# Patient Record
Sex: FEMALE | Race: BLACK OR AFRICAN AMERICAN | Employment: OTHER | URBAN - METROPOLITAN AREA
[De-identification: names, ages, dates, MRNs, and addresses within clinical notes are randomized per-mention and may not be internally consistent; named-entity substitution may affect disease eponyms.]

---

## 2017-06-13 PROBLEM — S31.819A WOUND OF RIGHT BUTTOCK: Status: ACTIVE | Noted: 2017-06-13

## 2017-06-20 PROBLEM — L89.313 DECUBITUS ULCER OF RIGHT BUTTOCK, STAGE 3 (HCC): Status: ACTIVE | Noted: 2017-06-20

## 2018-07-25 RX ORDER — PREGABALIN 50 MG/1
50 CAPSULE ORAL 2 TIMES DAILY
COMMUNITY
End: 2020-01-01

## 2018-08-10 ENCOUNTER — ANESTHESIA (OUTPATIENT)
Dept: OPERATING ROOM | Age: 68
End: 2018-08-10
Payer: COMMERCIAL

## 2018-08-10 ENCOUNTER — ANESTHESIA EVENT (OUTPATIENT)
Dept: OPERATING ROOM | Age: 68
End: 2018-08-10
Payer: COMMERCIAL

## 2018-08-10 ENCOUNTER — HOSPITAL ENCOUNTER (OUTPATIENT)
Age: 68
Setting detail: OUTPATIENT SURGERY
Discharge: HOME | End: 2018-08-10
Attending: OPHTHALMOLOGY | Admitting: OPHTHALMOLOGY
Payer: COMMERCIAL

## 2018-08-10 VITALS — DIASTOLIC BLOOD PRESSURE: 76 MMHG | OXYGEN SATURATION: 100 % | SYSTOLIC BLOOD PRESSURE: 147 MMHG

## 2018-08-10 VITALS
HEIGHT: 64 IN | SYSTOLIC BLOOD PRESSURE: 139 MMHG | TEMPERATURE: 97 F | WEIGHT: 270 LBS | HEART RATE: 80 BPM | RESPIRATION RATE: 18 BRPM | DIASTOLIC BLOOD PRESSURE: 89 MMHG | BODY MASS INDEX: 46.1 KG/M2 | OXYGEN SATURATION: 98 %

## 2018-08-10 LAB — METER GLUCOSE: 217 MG/DL (ref 70–110)

## 2018-08-10 PROCEDURE — 3600000012 HC SURGERY LEVEL 2 ADDTL 15MIN: Performed by: OPHTHALMOLOGY

## 2018-08-10 PROCEDURE — 82962 GLUCOSE BLOOD TEST: CPT

## 2018-08-10 PROCEDURE — V2632 POST CHMBR INTRAOCULAR LENS: HCPCS | Performed by: OPHTHALMOLOGY

## 2018-08-10 PROCEDURE — 2580000003 HC RX 258: Performed by: NURSE ANESTHETIST, CERTIFIED REGISTERED

## 2018-08-10 PROCEDURE — 6370000000 HC RX 637 (ALT 250 FOR IP): Performed by: OPHTHALMOLOGY

## 2018-08-10 PROCEDURE — 6370000000 HC RX 637 (ALT 250 FOR IP)

## 2018-08-10 PROCEDURE — 3700000000 HC ANESTHESIA ATTENDED CARE: Performed by: OPHTHALMOLOGY

## 2018-08-10 PROCEDURE — 3600000002 HC SURGERY LEVEL 2 BASE: Performed by: OPHTHALMOLOGY

## 2018-08-10 PROCEDURE — 3700000001 HC ADD 15 MINUTES (ANESTHESIA): Performed by: OPHTHALMOLOGY

## 2018-08-10 PROCEDURE — 6360000002 HC RX W HCPCS: Performed by: NURSE ANESTHETIST, CERTIFIED REGISTERED

## 2018-08-10 PROCEDURE — 6360000002 HC RX W HCPCS: Performed by: OPHTHALMOLOGY

## 2018-08-10 PROCEDURE — 7100000011 HC PHASE II RECOVERY - ADDTL 15 MIN: Performed by: OPHTHALMOLOGY

## 2018-08-10 PROCEDURE — 2500000003 HC RX 250 WO HCPCS: Performed by: OPHTHALMOLOGY

## 2018-08-10 PROCEDURE — 7100000010 HC PHASE II RECOVERY - FIRST 15 MIN: Performed by: OPHTHALMOLOGY

## 2018-08-10 PROCEDURE — 2709999900 HC NON-CHARGEABLE SUPPLY: Performed by: OPHTHALMOLOGY

## 2018-08-10 DEVICE — LENS IOL SN60WF 20.5D: Type: IMPLANTABLE DEVICE | Site: EYE | Status: FUNCTIONAL

## 2018-08-10 RX ORDER — TETRACAINE HYDROCHLORIDE 5 MG/ML
1 SOLUTION OPHTHALMIC
Status: COMPLETED | OUTPATIENT
Start: 2018-08-10 | End: 2018-08-10

## 2018-08-10 RX ORDER — MIDAZOLAM HYDROCHLORIDE 1 MG/ML
INJECTION INTRAMUSCULAR; INTRAVENOUS PRN
Status: DISCONTINUED | OUTPATIENT
Start: 2018-08-10 | End: 2018-08-10 | Stop reason: SDUPTHER

## 2018-08-10 RX ORDER — PHENYLEPHRINE HCL 2.5 %
1 DROPS OPHTHALMIC (EYE)
Status: COMPLETED | OUTPATIENT
Start: 2018-08-10 | End: 2018-08-10

## 2018-08-10 RX ORDER — MOXIFLOXACIN 5 MG/ML
SOLUTION/ DROPS OPHTHALMIC PRN
Status: DISCONTINUED | OUTPATIENT
Start: 2018-08-10 | End: 2018-08-10 | Stop reason: HOSPADM

## 2018-08-10 RX ORDER — KETOROLAC TROMETHAMINE 5 MG/ML
1 SOLUTION OPHTHALMIC
Status: COMPLETED | OUTPATIENT
Start: 2018-08-10 | End: 2018-08-10

## 2018-08-10 RX ORDER — SODIUM CHLORIDE, SODIUM LACTATE, POTASSIUM CHLORIDE, CALCIUM CHLORIDE 600; 310; 30; 20 MG/100ML; MG/100ML; MG/100ML; MG/100ML
INJECTION, SOLUTION INTRAVENOUS CONTINUOUS
Status: DISCONTINUED | OUTPATIENT
Start: 2018-08-10 | End: 2018-08-10 | Stop reason: HOSPADM

## 2018-08-10 RX ORDER — CYCLOPENTOLATE HYDROCHLORIDE 10 MG/ML
1 SOLUTION/ DROPS OPHTHALMIC
Status: COMPLETED | OUTPATIENT
Start: 2018-08-10 | End: 2018-08-10

## 2018-08-10 RX ORDER — TETRACAINE HYDROCHLORIDE 5 MG/ML
SOLUTION OPHTHALMIC PRN
Status: DISCONTINUED | OUTPATIENT
Start: 2018-08-10 | End: 2018-08-10 | Stop reason: HOSPADM

## 2018-08-10 RX ORDER — LIDOCAINE HYDROCHLORIDE 10 MG/ML
INJECTION, SOLUTION EPIDURAL; INFILTRATION; INTRACAUDAL; PERINEURAL PRN
Status: DISCONTINUED | OUTPATIENT
Start: 2018-08-10 | End: 2018-08-10 | Stop reason: HOSPADM

## 2018-08-10 RX ORDER — PREDNISOLONE ACETATE 10 MG/ML
1 SUSPENSION/ DROPS OPHTHALMIC
Status: DISCONTINUED | OUTPATIENT
Start: 2018-08-10 | End: 2018-08-10 | Stop reason: HOSPADM

## 2018-08-10 RX ORDER — MOXIFLOXACIN 5 MG/ML
1 SOLUTION/ DROPS OPHTHALMIC
Status: COMPLETED | OUTPATIENT
Start: 2018-08-10 | End: 2018-08-10

## 2018-08-10 RX ORDER — SODIUM CHLORIDE, SODIUM LACTATE, POTASSIUM CHLORIDE, CALCIUM CHLORIDE 600; 310; 30; 20 MG/100ML; MG/100ML; MG/100ML; MG/100ML
INJECTION, SOLUTION INTRAVENOUS CONTINUOUS PRN
Status: DISCONTINUED | OUTPATIENT
Start: 2018-08-10 | End: 2018-08-10 | Stop reason: SDUPTHER

## 2018-08-10 RX ORDER — BRIMONIDINE TARTRATE 2 MG/ML
1 SOLUTION/ DROPS OPHTHALMIC EVERY 8 HOURS SCHEDULED
Status: DISCONTINUED | OUTPATIENT
Start: 2018-08-10 | End: 2018-08-10 | Stop reason: HOSPADM

## 2018-08-10 RX ADMIN — Medication 1 DROP: at 09:30

## 2018-08-10 RX ADMIN — SODIUM CHLORIDE, POTASSIUM CHLORIDE, SODIUM LACTATE AND CALCIUM CHLORIDE: 600; 310; 30; 20 INJECTION, SOLUTION INTRAVENOUS at 11:02

## 2018-08-10 RX ADMIN — MIDAZOLAM HYDROCHLORIDE 0.5 MG: 1 INJECTION, SOLUTION INTRAMUSCULAR; INTRAVENOUS at 10:36

## 2018-08-10 RX ADMIN — Medication 1 DROP: at 09:42

## 2018-08-10 RX ADMIN — Medication 1 DROP: at 09:16

## 2018-08-10 RX ADMIN — Medication 1 DROP: at 09:31

## 2018-08-10 RX ADMIN — PHENYLEPHRINE HYDROCHLORIDE 1 DROP: 25 SOLUTION/ DROPS OPHTHALMIC at 09:17

## 2018-08-10 RX ADMIN — SODIUM CHLORIDE, POTASSIUM CHLORIDE, SODIUM LACTATE AND CALCIUM CHLORIDE: 600; 310; 30; 20 INJECTION, SOLUTION INTRAVENOUS at 10:31

## 2018-08-10 RX ADMIN — PHENYLEPHRINE HYDROCHLORIDE 1 DROP: 25 SOLUTION/ DROPS OPHTHALMIC at 09:41

## 2018-08-10 RX ADMIN — PHENYLEPHRINE HYDROCHLORIDE 1 DROP: 25 SOLUTION/ DROPS OPHTHALMIC at 09:31

## 2018-08-10 RX ADMIN — Medication 1 DROP: at 09:17

## 2018-08-10 ASSESSMENT — PULMONARY FUNCTION TESTS
PIF_VALUE: 0
PIF_VALUE: 0
PIF_VALUE: 1
PIF_VALUE: 1
PIF_VALUE: 0
PIF_VALUE: 1
PIF_VALUE: 0
PIF_VALUE: 1
PIF_VALUE: 0
PIF_VALUE: 1
PIF_VALUE: 0
PIF_VALUE: 1
PIF_VALUE: 1
PIF_VALUE: 0
PIF_VALUE: 1
PIF_VALUE: 0
PIF_VALUE: 0

## 2018-08-10 ASSESSMENT — PAIN DESCRIPTION - PAIN TYPE
TYPE: SURGICAL PAIN
TYPE: SURGICAL PAIN

## 2018-08-10 ASSESSMENT — PAIN SCALES - GENERAL
PAINLEVEL_OUTOF10: 0
PAINLEVEL_OUTOF10: 0

## 2018-08-10 ASSESSMENT — LIFESTYLE VARIABLES: SMOKING_STATUS: 0

## 2018-08-10 NOTE — ANESTHESIA PRE PROCEDURE
furosemide (LASIX) 20 MG tablet Take 1 tablet by mouth every other day 6/5/17  Yes Poornima Urrutia MD   glipiZIDE (GLUCOTROL XL) 5 MG extended release tablet Take 1 tablet by mouth daily 6/5/17 7/25/18 Yes Poornima Urrutia MD   loratadine (CLARITIN) 10 MG tablet Take 1 tablet by mouth daily 6/5/17  Yes Poornima Urrutia MD   metFORMIN (GLUCOPHAGE) 1000 MG tablet Take 1 tablet by mouth 2 times daily (with meals) 6/5/17  Yes Poornima Urrutia MD   [facility] [place] KIT 1 kit by Does not apply route daily as needed (leg edema) Compression stockings for bilateral leg swelling 6/5/17  Yes Poornima Urrutia MD   RA ALCOHOL SWABS 70 % PADS USE WITH INJECTIONS TWICE A DAY AND CHECKING BLOOD SUGAR TWICE A DAY 5/31/17  Yes Ruba Haynes MD   Insulin Pen Needle (B-D ULTRAFINE III SHORT PEN) 31G X 8 MM MISC Inject 1 kit into the skin 2 times daily 9/28/16  Yes Giancarlo Bonilla MD   Multiple Vitamins-Minerals (CHOICEFUL MULTIVITAMIN) CAPS Take 1 tablet by mouth daily 9/28/16  Yes Giancarlo Bonilla MD   [facility] [place] [address] Bed with gel overlay dsp # 1  Pt has Osteoarthritis and is Morbidly Obese 4/4/16  Yes Ronn Armstrong DO   [facility] [place] [address] bed mattress for severe osteoarthritis , morbid obesity , pressure sores  and difficulty ambulating 12/2/15  Yes Giancarlo Bonilla MD   Misc. Devices MISC Chair lift for difficulty ambulating , severe knee osteoarthritis and  morbid obesity 12/2/15  Yes Giancarlo Bonilla MD   Misc. Devices MISC Grab bar  diagnosis osteoarthritis and full thickness knee meniscal tear 2/24/15  Yes Madhavi Spaulding MD   Mis. [address] bed 11/7/14  Yes Bonifacio Liu MD   [facility] [place] [address] [facility] wheelchair 11/7/14  Yes Bonifacio Liu MD   Mis. [address] bed for better  repositioning of Patient with diagnosis of OA and full thickness meniscal tear 10/15/14  Yes Giancarlo Bonilla MD   Wound Dressings (SKINTEGRITY HYDROGEL) GEL Apply 1 each topically 3 times daily 6/5/17   Poornima Urrutia MD   Misc.  Devices

## 2018-08-10 NOTE — OP NOTE
[facility] [address]  [phone]  Roxi Kerr    Re:   OPERATIVE REPORT  AGE:   75 y.o. SEX:   female      DATE OF PROCEDURE:   8/10/2018    SURGEON:   Roxi Kerr    ASSISTANT:  None    PREOPERATIVE DIAGNOSIS:  Mature cataract, right eye    POSTOPERATIVE DIAGNOSIS:  Mature cataract, right eye    OPERATION:  Phacoemulcification of cataract with insertion of posterior chamber intraocular lens implant. ANESTHESIA:  Topical with monitered intravenous sedation. ESTIMATED BLOOD LOSS:  None. COMPLICATIONS:  None    PROCEDURE:  The eye was anesthetized with topical  lidocaine in the pre-op area. The patient was prepped and draped in the usual sterile fashion. 5% Betadine solution was dropped in the patient's eye. A lid speculum was then placed into the eye using the operative microscope. A clear beveled corneal incision was created at the 11 o'clock position. Viscoelastic was then used to reconstitute the anterior chamber. A side port incision was then created for a 2nd instrument at the 2 o'clock position. A bent 27-gauge needle as well as capsulorhexis forceps were  then used to create the capsulorrhexis. Hydrodissection with hydrodelineation were the performed in the four quadrants with preservative free lidocaine. The nucleus and epinucleus were then removed with the phacoemulsification tip. The residual cortex was then removed using an aspiration/irrigation unit. The capsular bag was polished and then reconstituted with viscoelastic. A posterior chamber intraocular lens model [id number]  20.50 Diopters was then injected into the capsular bag. The aspiration-irrigation united was then used to remove the residual viscoelastic. The wound was then hydrated and tested and found to be watertight. Vigamox, PF as well as Alphagan were then instilled into the eye. The lid speculum was removed.     The patient was transferred to the recovery room in good condition after having tolerated the procedure well. Post-operative instruction sheet was given to the patient with a one day appointment. They were instructed to call over night if they experienced any problems. Macario Flores M.D.,F.A.C.S.

## 2018-08-10 NOTE — PROGRESS NOTES
1110 family at bedside;nourishment provided  1130 discharge instructions reviewed;verbalizes understanding

## 2018-08-11 NOTE — ANESTHESIA POSTPROCEDURE EVALUATION
Department of Anesthesiology  Postprocedure Note    Patient: Tab Johnson  MRN: [id number]  YOB: 1950  Date of evaluation: 8/10/2018  Time:  10:25 PM     Procedure Summary     Date:  08/10/18 Room / Location:  [facility] OR  / [facility] OR    Anesthesia Start:  1031 Anesthesia Stop:  1108    Procedure:  RIGHT EYE CATARACT EXTRACTION WITH  IOL IMPLANT (Right ) Diagnosis:  (MATURE CATARACT RIGHT EYE )    Surgeon:  Gaby Painter MD Responsible Provider:  Edilia Archer MD    Anesthesia Type:  MAC ASA Status:  3          Anesthesia Type: MAC    Braeden Phase I: Braeden Score: 10    Braeden Phase II: Braeden Score: 10    Last vitals: Reviewed and per EMR flowsheets.        Anesthesia Post Evaluation    Patient location during evaluation: PACU  Patient participation: complete - patient participated  Level of consciousness: awake and alert  Airway patency: patent  Nausea & Vomiting: no vomiting and no nausea  Complications: no  Cardiovascular status: blood pressure returned to baseline  Respiratory status: acceptable  Hydration status: euvolemic

## 2019-10-31 ENCOUNTER — TELEPHONE (OUTPATIENT)
Dept: INTERNAL MEDICINE | Age: 69
End: 2019-10-31

## 2019-11-06 ENCOUNTER — OFFICE VISIT (OUTPATIENT)
Dept: INTERNAL MEDICINE | Age: 69
End: 2019-11-06
Payer: COMMERCIAL

## 2019-11-06 VITALS
HEIGHT: 64 IN | HEART RATE: 80 BPM | BODY MASS INDEX: 46.1 KG/M2 | RESPIRATION RATE: 16 BRPM | TEMPERATURE: 98.6 F | SYSTOLIC BLOOD PRESSURE: 132 MMHG | WEIGHT: 270 LBS | DIASTOLIC BLOOD PRESSURE: 80 MMHG

## 2019-11-06 DIAGNOSIS — E78.2 MIXED HYPERLIPIDEMIA: ICD-10-CM

## 2019-11-06 DIAGNOSIS — M10.9 GOUT INVOLVING TOE, UNSPECIFIED CAUSE, UNSPECIFIED CHRONICITY, UNSPECIFIED LATERALITY: ICD-10-CM

## 2019-11-06 DIAGNOSIS — E11.42 TYPE 2 DIABETES MELLITUS WITH DIABETIC POLYNEUROPATHY, WITHOUT LONG-TERM CURRENT USE OF INSULIN (HCC): ICD-10-CM

## 2019-11-06 DIAGNOSIS — M15.9 OSTEOARTHRITIS OF MULTIPLE JOINTS, UNSPECIFIED OSTEOARTHRITIS TYPE: ICD-10-CM

## 2019-11-06 DIAGNOSIS — M79.641 BILATERAL HAND PAIN: ICD-10-CM

## 2019-11-06 DIAGNOSIS — M79.642 BILATERAL HAND PAIN: ICD-10-CM

## 2019-11-06 DIAGNOSIS — I10 ESSENTIAL HYPERTENSION: Chronic | ICD-10-CM

## 2019-11-06 LAB — HBA1C MFR BLD: 7.5 %

## 2019-11-06 PROCEDURE — 1090F PRES/ABSN URINE INCON ASSESS: CPT | Performed by: INTERNAL MEDICINE

## 2019-11-06 PROCEDURE — 4040F PNEUMOC VAC/ADMIN/RCVD: CPT | Performed by: INTERNAL MEDICINE

## 2019-11-06 PROCEDURE — 99215 OFFICE O/P EST HI 40 MIN: CPT | Performed by: INTERNAL MEDICINE

## 2019-11-06 PROCEDURE — G8427 DOCREV CUR MEDS BY ELIG CLIN: HCPCS | Performed by: INTERNAL MEDICINE

## 2019-11-06 PROCEDURE — 2022F DILAT RTA XM EVC RTNOPTHY: CPT | Performed by: INTERNAL MEDICINE

## 2019-11-06 PROCEDURE — 99213 OFFICE O/P EST LOW 20 MIN: CPT | Performed by: INTERNAL MEDICINE

## 2019-11-06 PROCEDURE — 1123F ACP DISCUSS/DSCN MKR DOCD: CPT | Performed by: INTERNAL MEDICINE

## 2019-11-06 PROCEDURE — 3017F COLORECTAL CA SCREEN DOC REV: CPT | Performed by: INTERNAL MEDICINE

## 2019-11-06 PROCEDURE — G8417 CALC BMI ABV UP PARAM F/U: HCPCS | Performed by: INTERNAL MEDICINE

## 2019-11-06 PROCEDURE — 3046F HEMOGLOBIN A1C LEVEL >9.0%: CPT | Performed by: INTERNAL MEDICINE

## 2019-11-06 PROCEDURE — 1036F TOBACCO NON-USER: CPT | Performed by: INTERNAL MEDICINE

## 2019-11-06 PROCEDURE — G8484 FLU IMMUNIZE NO ADMIN: HCPCS | Performed by: INTERNAL MEDICINE

## 2019-11-06 PROCEDURE — 83036 HEMOGLOBIN GLYCOSYLATED A1C: CPT | Performed by: INTERNAL MEDICINE

## 2019-11-06 PROCEDURE — G8400 PT W/DXA NO RESULTS DOC: HCPCS | Performed by: INTERNAL MEDICINE

## 2019-11-06 RX ORDER — ACETAMINOPHEN 325 MG/1
650 TABLET ORAL EVERY 4 HOURS PRN
Qty: 120 TABLET | Refills: 3 | Status: ON HOLD
Start: 2019-11-06 | End: 2020-01-01 | Stop reason: HOSPADM

## 2019-11-06 RX ORDER — LANCETS
EACH MISCELLANEOUS
Qty: 200 EACH | Refills: 1 | Status: SHIPPED | OUTPATIENT
Start: 2019-11-06

## 2019-11-06 RX ORDER — IBUPROFEN 400 MG/1
400 TABLET ORAL EVERY 6 HOURS PRN
Qty: 120 TABLET | Refills: 0 | Status: SHIPPED
Start: 2019-11-06 | End: 2020-02-12 | Stop reason: SDUPTHER

## 2019-11-06 RX ORDER — ROSUVASTATIN CALCIUM 10 MG/1
10 TABLET, COATED ORAL DAILY
Qty: 30 TABLET | Refills: 0 | Status: SHIPPED | OUTPATIENT
Start: 2019-11-06 | End: 2019-12-10 | Stop reason: SDUPTHER

## 2019-11-06 RX ORDER — FUROSEMIDE 20 MG/1
20 TABLET ORAL EVERY OTHER DAY
Qty: 15 TABLET | Refills: 3 | Status: SHIPPED
Start: 2019-11-06 | End: 2020-02-17 | Stop reason: SDUPTHER

## 2019-11-06 RX ORDER — LISINOPRIL 40 MG/1
TABLET ORAL
Qty: 30 TABLET | Refills: 3 | Status: SHIPPED
Start: 2019-11-06 | End: 2020-02-17 | Stop reason: SDUPTHER

## 2019-11-06 RX ORDER — AMLODIPINE BESYLATE 10 MG/1
10 TABLET ORAL DAILY
Qty: 30 TABLET | Refills: 3 | Status: SHIPPED
Start: 2019-11-06 | End: 2020-02-17 | Stop reason: SDUPTHER

## 2019-11-06 RX ORDER — IBUPROFEN 800 MG/1
800 TABLET ORAL EVERY 6 HOURS PRN
Refills: 0 | COMMUNITY
Start: 2019-09-05 | End: 2019-11-06 | Stop reason: SDUPTHER

## 2019-11-06 RX ORDER — ATENOLOL 50 MG/1
TABLET ORAL
Qty: 60 TABLET | Refills: 3 | Status: SHIPPED
Start: 2019-11-06 | End: 2020-02-17 | Stop reason: SDUPTHER

## 2019-11-06 RX ORDER — GLIPIZIDE 5 MG/1
5 TABLET, FILM COATED, EXTENDED RELEASE ORAL DAILY
Qty: 30 TABLET | Refills: 3 | Status: SHIPPED
Start: 2019-11-06 | End: 2020-02-17 | Stop reason: SDUPTHER

## 2019-11-06 RX ORDER — ERGOCALCIFEROL 1.25 MG/1
50000 CAPSULE ORAL WEEKLY
Qty: 8 CAPSULE | Refills: 2 | Status: ON HOLD
Start: 2019-11-06 | End: 2020-01-01 | Stop reason: HOSPADM

## 2019-11-06 RX ORDER — ALLOPURINOL 100 MG/1
100 TABLET ORAL DAILY
Qty: 30 TABLET | Refills: 3 | Status: SHIPPED
Start: 2019-11-06 | End: 2020-02-17 | Stop reason: SDUPTHER

## 2019-11-06 RX ORDER — ASPIRIN 81 MG/1
81 TABLET ORAL DAILY
Qty: 30 TABLET | Refills: 3 | Status: SHIPPED
Start: 2019-11-06 | End: 2020-02-17 | Stop reason: SDUPTHER

## 2019-11-06 RX ORDER — DEXTROSE 4 G
TABLET,CHEWABLE ORAL
Qty: 300 EACH | Refills: 1 | Status: SHIPPED
Start: 2019-11-06 | End: 2020-02-17 | Stop reason: SDUPTHER

## 2019-11-06 RX ORDER — ROSUVASTATIN CALCIUM 10 MG/1
10 TABLET, COATED ORAL DAILY
Refills: 0 | COMMUNITY
Start: 2019-10-22 | End: 2019-11-06 | Stop reason: SDUPTHER

## 2019-11-06 ASSESSMENT — ENCOUNTER SYMPTOMS
RHINORRHEA: 0
TROUBLE SWALLOWING: 0
NAUSEA: 0
ABDOMINAL PAIN: 0
SHORTNESS OF BREATH: 0
WHEEZING: 0
VOMITING: 0
DIARRHEA: 0
COUGH: 0
EYE ITCHING: 0
CONSTIPATION: 0
EYE DISCHARGE: 0
BLOOD IN STOOL: 0
CHEST TIGHTNESS: 0

## 2019-11-06 ASSESSMENT — PATIENT HEALTH QUESTIONNAIRE - PHQ9
SUM OF ALL RESPONSES TO PHQ9 QUESTIONS 1 & 2: 1
SUM OF ALL RESPONSES TO PHQ QUESTIONS 1-9: 1
SUM OF ALL RESPONSES TO PHQ QUESTIONS 1-9: 1
1. LITTLE INTEREST OR PLEASURE IN DOING THINGS: 1
2. FEELING DOWN, DEPRESSED OR HOPELESS: 0

## 2019-11-20 ENCOUNTER — TELEPHONE (OUTPATIENT)
Dept: INTERNAL MEDICINE | Age: 69
End: 2019-11-20

## 2019-12-03 ENCOUNTER — TELEPHONE (OUTPATIENT)
Dept: INTERNAL MEDICINE | Age: 69
End: 2019-12-03

## 2019-12-04 ENCOUNTER — TELEPHONE (OUTPATIENT)
Dept: INTERNAL MEDICINE | Age: 69
End: 2019-12-04

## 2019-12-04 DIAGNOSIS — M15.9 OSTEOARTHRITIS OF MULTIPLE JOINTS, UNSPECIFIED OSTEOARTHRITIS TYPE: Primary | ICD-10-CM

## 2019-12-04 RX ORDER — PETROLATUM 42 G/100G
OINTMENT TOPICAL
Qty: 396 G | Refills: 1 | Status: SHIPPED
Start: 2019-12-04 | End: 2020-02-17 | Stop reason: SDUPTHER

## 2019-12-04 RX ORDER — DULOXETIN HYDROCHLORIDE 60 MG/1
60 CAPSULE, DELAYED RELEASE ORAL DAILY
Qty: 90 CAPSULE | Refills: 1 | Status: SHIPPED
Start: 2019-12-04 | End: 2020-05-28 | Stop reason: SDUPTHER

## 2019-12-10 DIAGNOSIS — E11.42 TYPE 2 DIABETES MELLITUS WITH DIABETIC POLYNEUROPATHY, WITHOUT LONG-TERM CURRENT USE OF INSULIN (HCC): ICD-10-CM

## 2019-12-11 RX ORDER — ROSUVASTATIN CALCIUM 10 MG/1
TABLET, COATED ORAL
Qty: 30 TABLET | Refills: 1 | Status: SHIPPED
Start: 2019-12-11 | End: 2020-02-13

## 2020-01-01 ENCOUNTER — HOSPITAL ENCOUNTER (OUTPATIENT)
Age: 70
Discharge: HOME | End: 2020-08-21
Payer: COMMERCIAL

## 2020-01-01 ENCOUNTER — APPOINTMENT (OUTPATIENT)
Dept: NEUROLOGY | Age: 70
DRG: 208 | End: 2020-01-01
Payer: COMMERCIAL

## 2020-01-01 ENCOUNTER — HOSPITAL ENCOUNTER (OUTPATIENT)
Dept: WOUND CARE | Age: 70
Discharge: HOME | End: 2020-09-01
Payer: COMMERCIAL

## 2020-01-01 ENCOUNTER — ANESTHESIA EVENT (OUTPATIENT)
Dept: ENDOSCOPY | Age: 70
DRG: 208 | End: 2020-01-01
Payer: COMMERCIAL

## 2020-01-01 ENCOUNTER — HOSPITAL ENCOUNTER (OUTPATIENT)
Dept: WOUND CARE | Age: 70
Discharge: HOME | End: 2020-09-29
Payer: COMMERCIAL

## 2020-01-01 ENCOUNTER — TELEPHONE (OUTPATIENT)
Dept: INTERNAL MEDICINE | Age: 70
End: 2020-01-01

## 2020-01-01 ENCOUNTER — APPOINTMENT (OUTPATIENT)
Dept: MRI IMAGING | Age: 70
DRG: 208 | End: 2020-01-01
Payer: COMMERCIAL

## 2020-01-01 ENCOUNTER — APPOINTMENT (OUTPATIENT)
Dept: GENERAL RADIOLOGY | Age: 70
DRG: 208 | End: 2020-01-01
Payer: COMMERCIAL

## 2020-01-01 ENCOUNTER — APPOINTMENT (OUTPATIENT)
Dept: CT IMAGING | Age: 70
DRG: 208 | End: 2020-01-01
Payer: COMMERCIAL

## 2020-01-01 ENCOUNTER — HOSPITAL ENCOUNTER (OUTPATIENT)
Age: 70
Discharge: HOME | End: 2020-10-22
Payer: COMMERCIAL

## 2020-01-01 ENCOUNTER — HOSPITAL ENCOUNTER (OUTPATIENT)
Dept: CT IMAGING | Age: 70
Discharge: HOME | End: 2020-08-27
Payer: COMMERCIAL

## 2020-01-01 ENCOUNTER — HOSPITAL ENCOUNTER (OUTPATIENT)
Dept: WOUND CARE | Age: 70
Discharge: HOME | End: 2020-11-03
Payer: COMMERCIAL

## 2020-01-01 ENCOUNTER — HOSPITAL ENCOUNTER (OUTPATIENT)
Dept: WOUND CARE | Age: 70
Discharge: HOME | End: 2020-11-10
Payer: COMMERCIAL

## 2020-01-01 ENCOUNTER — HOSPITAL ENCOUNTER (INPATIENT)
Age: 70
LOS: 12 days | Discharge: HOME HEALTH | DRG: 208 | End: 2020-10-14
Attending: EMERGENCY MEDICINE | Admitting: INTERNAL MEDICINE
Payer: COMMERCIAL

## 2020-01-01 ENCOUNTER — APPOINTMENT (OUTPATIENT)
Dept: GENERAL RADIOLOGY | Age: 70
DRG: 689 | End: 2020-01-01
Payer: COMMERCIAL

## 2020-01-01 ENCOUNTER — HOSPITAL ENCOUNTER (OUTPATIENT)
Dept: WOUND CARE | Age: 70
Discharge: HOME | End: 2020-12-08
Payer: COMMERCIAL

## 2020-01-01 ENCOUNTER — ANESTHESIA EVENT (OUTPATIENT)
Dept: NON INVASIVE DIAGNOSTICS | Age: 70
DRG: 208 | End: 2020-01-01
Payer: COMMERCIAL

## 2020-01-01 ENCOUNTER — TELEPHONE (OUTPATIENT)
Dept: ADMINISTRATIVE | Age: 70
End: 2020-01-01

## 2020-01-01 ENCOUNTER — CARE COORDINATION (OUTPATIENT)
Dept: CASE MANAGEMENT | Age: 70
End: 2020-01-01

## 2020-01-01 ENCOUNTER — HOSPITAL ENCOUNTER (OUTPATIENT)
Dept: WOUND CARE | Age: 70
Discharge: HOME | End: 2020-10-27
Payer: COMMERCIAL

## 2020-01-01 ENCOUNTER — TELEPHONE (OUTPATIENT)
Dept: WOUND CARE | Age: 70
End: 2020-01-01

## 2020-01-01 ENCOUNTER — APPOINTMENT (OUTPATIENT)
Dept: CT IMAGING | Age: 70
DRG: 689 | End: 2020-01-01
Payer: COMMERCIAL

## 2020-01-01 ENCOUNTER — HOSPITAL ENCOUNTER (OUTPATIENT)
Dept: WOUND CARE | Age: 70
Discharge: HOME | End: 2020-09-15
Payer: COMMERCIAL

## 2020-01-01 ENCOUNTER — HOSPITAL ENCOUNTER (OUTPATIENT)
Dept: GENERAL RADIOLOGY | Age: 70
Discharge: HOME | End: 2020-11-18
Payer: COMMERCIAL

## 2020-01-01 ENCOUNTER — HOSPITAL ENCOUNTER (OUTPATIENT)
Dept: WOUND CARE | Age: 70
Discharge: HOME | End: 2020-12-01
Payer: COMMERCIAL

## 2020-01-01 ENCOUNTER — APPOINTMENT (OUTPATIENT)
Dept: NON INVASIVE DIAGNOSTICS | Age: 70
DRG: 208 | End: 2020-01-01
Payer: COMMERCIAL

## 2020-01-01 ENCOUNTER — HOSPITAL ENCOUNTER (INPATIENT)
Age: 70
LOS: 4 days | Discharge: HOME HEALTH | DRG: 689 | End: 2020-09-28
Attending: EMERGENCY MEDICINE | Admitting: INTERNAL MEDICINE
Payer: COMMERCIAL

## 2020-01-01 ENCOUNTER — NURSE TRIAGE (OUTPATIENT)
Dept: OTHER | Facility: CLINIC | Age: 70
End: 2020-01-01

## 2020-01-01 ENCOUNTER — ANESTHESIA (OUTPATIENT)
Dept: ENDOSCOPY | Age: 70
DRG: 208 | End: 2020-01-01
Payer: COMMERCIAL

## 2020-01-01 ENCOUNTER — HOSPITAL ENCOUNTER (OUTPATIENT)
Age: 70
Discharge: HOME | End: 2020-10-18
Payer: COMMERCIAL

## 2020-01-01 ENCOUNTER — HOSPITAL ENCOUNTER (OUTPATIENT)
Dept: WOUND CARE | Age: 70
Discharge: HOME | End: 2020-08-18
Payer: COMMERCIAL

## 2020-01-01 ENCOUNTER — HOSPITAL ENCOUNTER (OUTPATIENT)
Age: 70
Discharge: HOME | End: 2020-09-25
Payer: COMMERCIAL

## 2020-01-01 ENCOUNTER — HOSPITAL ENCOUNTER (OUTPATIENT)
Dept: WOUND CARE | Age: 70
Discharge: HOME | End: 2020-09-22
Payer: COMMERCIAL

## 2020-01-01 ENCOUNTER — HOSPITAL ENCOUNTER (OUTPATIENT)
Dept: WOUND CARE | Age: 70
Discharge: HOME | End: 2020-11-17
Payer: COMMERCIAL

## 2020-01-01 ENCOUNTER — ANESTHESIA (OUTPATIENT)
Dept: NON INVASIVE DIAGNOSTICS | Age: 70
DRG: 208 | End: 2020-01-01
Payer: COMMERCIAL

## 2020-01-01 ENCOUNTER — HOSPITAL ENCOUNTER (OUTPATIENT)
Age: 70
Discharge: HOME | End: 2020-08-16
Payer: COMMERCIAL

## 2020-01-01 ENCOUNTER — HOSPITAL ENCOUNTER (OUTPATIENT)
Age: 70
Discharge: HOME | End: 2020-10-23
Payer: COMMERCIAL

## 2020-01-01 VITALS
WEIGHT: 241.6 LBS | OXYGEN SATURATION: 94 % | BODY MASS INDEX: 41.25 KG/M2 | DIASTOLIC BLOOD PRESSURE: 88 MMHG | RESPIRATION RATE: 18 BRPM | TEMPERATURE: 97 F | HEIGHT: 64 IN | HEART RATE: 90 BPM | SYSTOLIC BLOOD PRESSURE: 124 MMHG

## 2020-01-01 VITALS
SYSTOLIC BLOOD PRESSURE: 121 MMHG | DIASTOLIC BLOOD PRESSURE: 84 MMHG | OXYGEN SATURATION: 97 % | RESPIRATION RATE: 20 BRPM

## 2020-01-01 VITALS
BODY MASS INDEX: 38.41 KG/M2 | TEMPERATURE: 96 F | HEART RATE: 76 BPM | DIASTOLIC BLOOD PRESSURE: 68 MMHG | WEIGHT: 225 LBS | RESPIRATION RATE: 18 BRPM | SYSTOLIC BLOOD PRESSURE: 118 MMHG | HEIGHT: 64 IN

## 2020-01-01 VITALS
DIASTOLIC BLOOD PRESSURE: 60 MMHG | HEART RATE: 104 BPM | HEIGHT: 65 IN | WEIGHT: 202 LBS | TEMPERATURE: 96 F | RESPIRATION RATE: 16 BRPM | SYSTOLIC BLOOD PRESSURE: 100 MMHG | BODY MASS INDEX: 33.66 KG/M2

## 2020-01-01 VITALS
SYSTOLIC BLOOD PRESSURE: 140 MMHG | BODY MASS INDEX: 33.72 KG/M2 | HEIGHT: 65 IN | RESPIRATION RATE: 16 BRPM | DIASTOLIC BLOOD PRESSURE: 72 MMHG | TEMPERATURE: 98.5 F | HEART RATE: 92 BPM | WEIGHT: 202.4 LBS

## 2020-01-01 VITALS
RESPIRATION RATE: 16 BRPM | BODY MASS INDEX: 33.66 KG/M2 | HEIGHT: 65 IN | TEMPERATURE: 96.4 F | DIASTOLIC BLOOD PRESSURE: 70 MMHG | HEART RATE: 64 BPM | SYSTOLIC BLOOD PRESSURE: 108 MMHG | WEIGHT: 202 LBS

## 2020-01-01 VITALS
RESPIRATION RATE: 16 BRPM | WEIGHT: 206.9 LBS | HEART RATE: 79 BPM | OXYGEN SATURATION: 100 % | TEMPERATURE: 97.3 F | DIASTOLIC BLOOD PRESSURE: 72 MMHG | BODY MASS INDEX: 35.32 KG/M2 | HEIGHT: 64 IN | SYSTOLIC BLOOD PRESSURE: 165 MMHG

## 2020-01-01 VITALS
HEART RATE: 84 BPM | RESPIRATION RATE: 16 BRPM | TEMPERATURE: 96.8 F | DIASTOLIC BLOOD PRESSURE: 70 MMHG | SYSTOLIC BLOOD PRESSURE: 110 MMHG | BODY MASS INDEX: 38.41 KG/M2 | HEIGHT: 64 IN | WEIGHT: 225 LBS

## 2020-01-01 VITALS
RESPIRATION RATE: 18 BRPM | DIASTOLIC BLOOD PRESSURE: 76 MMHG | HEART RATE: 88 BPM | SYSTOLIC BLOOD PRESSURE: 132 MMHG | TEMPERATURE: 95.4 F

## 2020-01-01 VITALS — SYSTOLIC BLOOD PRESSURE: 144 MMHG | OXYGEN SATURATION: 96 % | DIASTOLIC BLOOD PRESSURE: 108 MMHG

## 2020-01-01 LAB
AADO2: 110.3 MMHG
AADO2: 176.2 MMHG
AADO2: 217.4 MMHG
AADO2: 366.5 MMHG
AADO2: 87.4 MMHG
ABO/RH: NORMAL
ABO/RH: NORMAL
ACINETOBACTER BAUMANNII BY PCR: NOT DETECTED
ACINETOBACTER BAUMANNII BY PCR: NOT DETECTED
ALBUMIN SERPL-MCNC: 1.8 G/DL (ref 3.5–5.2)
ALBUMIN SERPL-MCNC: 1.9 G/DL (ref 3.5–5.2)
ALBUMIN SERPL-MCNC: 2.1 G/DL (ref 3.5–5.2)
ALBUMIN SERPL-MCNC: 2.2 G/DL (ref 3.5–5.2)
ALBUMIN SERPL-MCNC: 2.3 G/DL (ref 3.5–5.2)
ALBUMIN SERPL-MCNC: 2.4 G/DL (ref 3.5–5.2)
ALBUMIN SERPL-MCNC: 2.4 G/DL (ref 3.5–5.2)
ALBUMIN SERPL-MCNC: 2.5 G/DL (ref 3.5–5.2)
ALBUMIN SERPL-MCNC: 2.5 G/DL (ref 3.5–5.2)
ALBUMIN SERPL-MCNC: 2.6 G/DL (ref 3.5–5.2)
ALBUMIN SERPL-MCNC: 2.6 G/DL (ref 3.5–5.2)
ALBUMIN SERPL-MCNC: 2.7 G/DL (ref 3.5–5.2)
ALBUMIN SERPL-MCNC: 2.7 G/DL (ref 3.5–5.2)
ALBUMIN SERPL-MCNC: 2.8 G/DL (ref 3.5–5.2)
ALP BLD-CCNC: 101 U/L (ref 35–104)
ALP BLD-CCNC: 102 U/L (ref 35–104)
ALP BLD-CCNC: 143 U/L (ref 35–104)
ALP BLD-CCNC: 225 U/L (ref 35–104)
ALP BLD-CCNC: 75 U/L (ref 35–104)
ALP BLD-CCNC: 81 U/L (ref 35–104)
ALP BLD-CCNC: 85 U/L (ref 35–104)
ALP BLD-CCNC: 86 U/L (ref 35–104)
ALP BLD-CCNC: 89 U/L (ref 35–104)
ALP BLD-CCNC: 89 U/L (ref 35–104)
ALP BLD-CCNC: 93 U/L (ref 35–104)
ALP BLD-CCNC: 95 U/L (ref 35–104)
ALP BLD-CCNC: 96 U/L (ref 35–104)
ALP BLD-CCNC: 98 U/L (ref 35–104)
ALT SERPL-CCNC: 10 U/L (ref 0–32)
ALT SERPL-CCNC: 10 U/L (ref 0–32)
ALT SERPL-CCNC: 11 U/L (ref 0–32)
ALT SERPL-CCNC: 12 U/L (ref 0–32)
ALT SERPL-CCNC: 15 U/L (ref 0–32)
ALT SERPL-CCNC: 16 U/L (ref 0–32)
ALT SERPL-CCNC: 25 U/L (ref 0–32)
ALT SERPL-CCNC: 38 U/L (ref 0–32)
ALT SERPL-CCNC: 55 U/L (ref 0–32)
ALT SERPL-CCNC: 7 U/L (ref 0–32)
ALT SERPL-CCNC: 8 U/L (ref 0–32)
ALT SERPL-CCNC: 9 U/L (ref 0–32)
ALT SERPL-CCNC: <5 U/L (ref 0–32)
ANAEROBIC CULTURE: NORMAL
ANAEROBIC CULTURE: NORMAL
ANION GAP SERPL CALCULATED.3IONS-SCNC: 10 MMOL/L (ref 7–16)
ANION GAP SERPL CALCULATED.3IONS-SCNC: 11 MMOL/L (ref 7–16)
ANION GAP SERPL CALCULATED.3IONS-SCNC: 12 MMOL/L (ref 7–16)
ANION GAP SERPL CALCULATED.3IONS-SCNC: 13 MMOL/L (ref 7–16)
ANION GAP SERPL CALCULATED.3IONS-SCNC: 13 MMOL/L (ref 7–16)
ANION GAP SERPL CALCULATED.3IONS-SCNC: 18 MMOL/L (ref 7–16)
ANION GAP SERPL CALCULATED.3IONS-SCNC: 5 MMOL/L (ref 7–16)
ANION GAP SERPL CALCULATED.3IONS-SCNC: 6 MMOL/L (ref 7–16)
ANION GAP SERPL CALCULATED.3IONS-SCNC: 7 MMOL/L (ref 7–16)
ANION GAP SERPL CALCULATED.3IONS-SCNC: 7 MMOL/L (ref 7–16)
ANION GAP SERPL CALCULATED.3IONS-SCNC: 8 MMOL/L (ref 7–16)
ANION GAP SERPL CALCULATED.3IONS-SCNC: 9 MMOL/L (ref 7–16)
ANISOCYTOSIS: ABNORMAL
ANTIBODY SCREEN: NORMAL
ANTIBODY SCREEN: NORMAL
AST SERPL-CCNC: 10 U/L (ref 0–31)
AST SERPL-CCNC: 10 U/L (ref 0–31)
AST SERPL-CCNC: 11 U/L (ref 0–31)
AST SERPL-CCNC: 12 U/L (ref 0–31)
AST SERPL-CCNC: 12 U/L (ref 0–31)
AST SERPL-CCNC: 13 U/L (ref 0–31)
AST SERPL-CCNC: 15 U/L (ref 0–31)
AST SERPL-CCNC: 18 U/L (ref 0–31)
AST SERPL-CCNC: 19 U/L (ref 0–31)
AST SERPL-CCNC: 27 U/L (ref 0–31)
AST SERPL-CCNC: 28 U/L (ref 0–31)
AST SERPL-CCNC: 36 U/L (ref 0–31)
AST SERPL-CCNC: 39 U/L (ref 0–31)
AST SERPL-CCNC: 6 U/L (ref 0–31)
B.E.: -5.4 MMOL/L (ref -3–3)
B.E.: -6 MMOL/L (ref -3–3)
B.E.: -6.1 MMOL/L (ref -3–3)
B.E.: -7 MMOL/L (ref -3–3)
B.E.: -7.6 MMOL/L (ref -3–3)
B.E.: 2.5 MMOL/L (ref -3–3)
BACTERIA: ABNORMAL /HPF
BASOPHILS ABSOLUTE: 0 E9/L (ref 0–0.2)
BASOPHILS ABSOLUTE: 0.01 E9/L (ref 0–0.2)
BASOPHILS ABSOLUTE: 0.02 E9/L (ref 0–0.2)
BASOPHILS ABSOLUTE: 0.02 E9/L (ref 0–0.2)
BASOPHILS ABSOLUTE: 0.03 E9/L (ref 0–0.2)
BASOPHILS ABSOLUTE: 0.04 E9/L (ref 0–0.2)
BASOPHILS ABSOLUTE: 0.05 E9/L (ref 0–0.2)
BASOPHILS ABSOLUTE: 0.08 E9/L (ref 0–0.2)
BASOPHILS ABSOLUTE: 0.1 E9/L (ref 0–0.2)
BASOPHILS ABSOLUTE: 0.14 E9/L (ref 0–0.2)
BASOPHILS RELATIVE PERCENT: 0 % (ref 0–2)
BASOPHILS RELATIVE PERCENT: 0.1 % (ref 0–2)
BASOPHILS RELATIVE PERCENT: 0.2 % (ref 0–2)
BASOPHILS RELATIVE PERCENT: 0.2 % (ref 0–2)
BASOPHILS RELATIVE PERCENT: 0.3 % (ref 0–2)
BASOPHILS RELATIVE PERCENT: 0.4 % (ref 0–2)
BASOPHILS RELATIVE PERCENT: 0.6 % (ref 0–2)
BASOPHILS RELATIVE PERCENT: 0.9 % (ref 0–2)
BASOPHILS RELATIVE PERCENT: 1.8 % (ref 0–2)
BILIRUB SERPL-MCNC: 0.2 MG/DL (ref 0–1.2)
BILIRUB SERPL-MCNC: 0.3 MG/DL (ref 0–1.2)
BILIRUB SERPL-MCNC: <0.2 MG/DL (ref 0–1.2)
BILIRUBIN DIRECT: <0.2 MG/DL (ref 0–0.3)
BILIRUBIN URINE: ABNORMAL
BILIRUBIN URINE: ABNORMAL
BILIRUBIN URINE: NEGATIVE
BILIRUBIN URINE: NEGATIVE
BILIRUBIN, INDIRECT: ABNORMAL MG/DL (ref 0–1)
BLOOD CULTURE, ROUTINE: ABNORMAL
BLOOD CULTURE, ROUTINE: NORMAL
BLOOD CULTURE, ROUTINE: NORMAL
BLOOD, URINE: ABNORMAL
BLOOD, URINE: NEGATIVE
BOTTLE TYPE: ABNORMAL
BOTTLE TYPE: ABNORMAL
BUN BLDV-MCNC: 11 MG/DL (ref 8–23)
BUN BLDV-MCNC: 11 MG/DL (ref 8–23)
BUN BLDV-MCNC: 12 MG/DL (ref 8–23)
BUN BLDV-MCNC: 12 MG/DL (ref 8–23)
BUN BLDV-MCNC: 13 MG/DL (ref 8–23)
BUN BLDV-MCNC: 14 MG/DL (ref 8–23)
BUN BLDV-MCNC: 15 MG/DL (ref 8–23)
BUN BLDV-MCNC: 15 MG/DL (ref 8–23)
BUN BLDV-MCNC: 16 MG/DL (ref 8–23)
BUN BLDV-MCNC: 17 MG/DL (ref 8–23)
BUN BLDV-MCNC: 18 MG/DL (ref 8–23)
BUN BLDV-MCNC: 19 MG/DL (ref 8–23)
BUN BLDV-MCNC: 20 MG/DL (ref 8–23)
BUN BLDV-MCNC: 22 MG/DL (ref 8–23)
BUN BLDV-MCNC: 25 MG/DL (ref 8–23)
BUN BLDV-MCNC: 25 MG/DL (ref 8–23)
BUN BLDV-MCNC: 28 MG/DL (ref 8–23)
BUN BLDV-MCNC: 28 MG/DL (ref 8–23)
BUN BLDV-MCNC: 29 MG/DL (ref 8–23)
BUN BLDV-MCNC: 30 MG/DL (ref 8–23)
BUN BLDV-MCNC: 31 MG/DL (ref 8–23)
BUN BLDV-MCNC: 35 MG/DL (ref 8–23)
BUN BLDV-MCNC: 8 MG/DL (ref 8–23)
BUN BLDV-MCNC: 9 MG/DL (ref 8–23)
BURR CELLS: ABNORMAL
C-REACTIVE PROTEIN: 10 MG/DL (ref 0–0.4)
C-REACTIVE PROTEIN: 4.7 MG/DL (ref 0–0.4)
C-REACTIVE PROTEIN: 5.4 MG/DL (ref 0–0.4)
C-REACTIVE PROTEIN: 6.5 MG/DL (ref 0–0.4)
C-REACTIVE PROTEIN: 8.6 MG/DL (ref 0–0.4)
CALCIUM SERPL-MCNC: 7.5 MG/DL (ref 8.6–10.2)
CALCIUM SERPL-MCNC: 7.6 MG/DL (ref 8.6–10.2)
CALCIUM SERPL-MCNC: 7.8 MG/DL (ref 8.6–10.2)
CALCIUM SERPL-MCNC: 8.1 MG/DL (ref 8.6–10.2)
CALCIUM SERPL-MCNC: 8.3 MG/DL (ref 8.6–10.2)
CALCIUM SERPL-MCNC: 8.4 MG/DL (ref 8.6–10.2)
CALCIUM SERPL-MCNC: 8.4 MG/DL (ref 8.6–10.2)
CALCIUM SERPL-MCNC: 8.5 MG/DL (ref 8.6–10.2)
CALCIUM SERPL-MCNC: 8.5 MG/DL (ref 8.6–10.2)
CALCIUM SERPL-MCNC: 8.6 MG/DL (ref 8.6–10.2)
CALCIUM SERPL-MCNC: 8.9 MG/DL (ref 8.6–10.2)
CALCIUM SERPL-MCNC: 9.1 MG/DL (ref 8.6–10.2)
CALCIUM SERPL-MCNC: 9.1 MG/DL (ref 8.6–10.2)
CALCIUM SERPL-MCNC: 9.2 MG/DL (ref 8.6–10.2)
CALCIUM SERPL-MCNC: 9.2 MG/DL (ref 8.6–10.2)
CALCIUM SERPL-MCNC: 9.3 MG/DL (ref 8.6–10.2)
CALCIUM SERPL-MCNC: 9.3 MG/DL (ref 8.6–10.2)
CALCIUM SERPL-MCNC: 9.5 MG/DL (ref 8.6–10.2)
CANDIDA ALBICANS BY PCR: NOT DETECTED
CANDIDA ALBICANS BY PCR: NOT DETECTED
CANDIDA GLABRATA BY PCR: NOT DETECTED
CANDIDA GLABRATA BY PCR: NOT DETECTED
CANDIDA KRUSEI BY PCR: NOT DETECTED
CANDIDA KRUSEI BY PCR: NOT DETECTED
CANDIDA PARAPSILOSIS BY PCR: NOT DETECTED
CANDIDA PARAPSILOSIS BY PCR: NOT DETECTED
CANDIDA TROPICALIS BY PCR: NOT DETECTED
CANDIDA TROPICALIS BY PCR: NOT DETECTED
CHLORIDE BLD-SCNC: 100 MMOL/L (ref 98–107)
CHLORIDE BLD-SCNC: 102 MMOL/L (ref 98–107)
CHLORIDE BLD-SCNC: 102 MMOL/L (ref 98–107)
CHLORIDE BLD-SCNC: 103 MMOL/L (ref 98–107)
CHLORIDE BLD-SCNC: 104 MMOL/L (ref 98–107)
CHLORIDE BLD-SCNC: 105 MMOL/L (ref 98–107)
CHLORIDE BLD-SCNC: 106 MMOL/L (ref 98–107)
CHLORIDE BLD-SCNC: 107 MMOL/L (ref 98–107)
CHLORIDE BLD-SCNC: 108 MMOL/L (ref 98–107)
CHLORIDE BLD-SCNC: 108 MMOL/L (ref 98–107)
CHLORIDE BLD-SCNC: 109 MMOL/L (ref 98–107)
CHLORIDE BLD-SCNC: 110 MMOL/L (ref 98–107)
CHLORIDE BLD-SCNC: 98 MMOL/L (ref 98–107)
CHOLESTEROL, TOTAL: 124 MG/DL (ref 0–199)
CHP ED QC CHECK: NORMAL
CHP ED QC CHECK: NORMAL
CK MB: 3.4 NG/ML (ref 0–4.3)
CK MB: 4.6 NG/ML (ref 0–4.3)
CLARITY: ABNORMAL
CLARITY: CLEAR
CO2: 18 MMOL/L (ref 22–29)
CO2: 19 MMOL/L (ref 22–29)
CO2: 21 MMOL/L (ref 22–29)
CO2: 21 MMOL/L (ref 22–29)
CO2: 22 MMOL/L (ref 22–29)
CO2: 23 MMOL/L (ref 22–29)
CO2: 24 MMOL/L (ref 22–29)
CO2: 24 MMOL/L (ref 22–29)
CO2: 25 MMOL/L (ref 22–29)
CO2: 25 MMOL/L (ref 22–29)
CO2: 26 MMOL/L (ref 22–29)
CO2: 26 MMOL/L (ref 22–29)
CO2: 27 MMOL/L (ref 22–29)
CO2: 28 MMOL/L (ref 22–29)
CO2: 28 MMOL/L (ref 22–29)
CO2: 29 MMOL/L (ref 22–29)
CO2: 30 MMOL/L (ref 22–29)
CO2: 31 MMOL/L (ref 22–29)
CO2: 31 MMOL/L (ref 22–29)
COHB: 0.1 % (ref 0–1.5)
COHB: 0.4 % (ref 0–1.5)
COHB: 0.4 % (ref 0–1.5)
COHB: 0.5 % (ref 0–1.5)
COHB: 0.6 % (ref 0–1.5)
COHB: 0.8 % (ref 0–1.5)
COLOR: YELLOW
CREAT SERPL-MCNC: 0.4 MG/DL (ref 0.5–1)
CREAT SERPL-MCNC: 0.5 MG/DL (ref 0.5–1)
CREAT SERPL-MCNC: 0.6 MG/DL (ref 0.5–1)
CREAT SERPL-MCNC: 0.7 MG/DL (ref 0.5–1)
CREAT SERPL-MCNC: 0.9 MG/DL (ref 0.5–1)
CREAT SERPL-MCNC: 1 MG/DL (ref 0.5–1)
CREAT SERPL-MCNC: 1.1 MG/DL (ref 0.5–1)
CREAT SERPL-MCNC: 1.3 MG/DL (ref 0.5–1)
CREATININE URINE: 88 MG/DL (ref 29–226)
CRITICAL: ABNORMAL
CULTURE, BLOOD 2: NORMAL
D DIMER: 3113 NG/ML DDU
DATE ANALYZED: ABNORMAL
DATE OF COLLECTION: ABNORMAL
EKG ATRIAL RATE: 67 BPM
EKG ATRIAL RATE: 70 BPM
EKG ATRIAL RATE: 73 BPM
EKG P AXIS: 49 DEGREES
EKG P AXIS: 71 DEGREES
EKG P AXIS: 76 DEGREES
EKG P-R INTERVAL: 126 MS
EKG P-R INTERVAL: 132 MS
EKG P-R INTERVAL: 138 MS
EKG Q-T INTERVAL: 434 MS
EKG Q-T INTERVAL: 436 MS
EKG Q-T INTERVAL: 450 MS
EKG QRS DURATION: 66 MS
EKG QRS DURATION: 66 MS
EKG QRS DURATION: 70 MS
EKG QTC CALCULATION (BAZETT): 468 MS
EKG QTC CALCULATION (BAZETT): 475 MS
EKG QTC CALCULATION (BAZETT): 480 MS
EKG R AXIS: 17 DEGREES
EKG R AXIS: 25 DEGREES
EKG R AXIS: 9 DEGREES
EKG T AXIS: 64 DEGREES
EKG T AXIS: 77 DEGREES
EKG T AXIS: 81 DEGREES
EKG VENTRICULAR RATE: 67 BPM
EKG VENTRICULAR RATE: 70 BPM
EKG VENTRICULAR RATE: 73 BPM
ENTEROBACTER CLOACAE COMPLEX BY PCR: NOT DETECTED
ENTEROBACTER CLOACAE COMPLEX BY PCR: NOT DETECTED
ENTEROBACTERALES BY PCR: NOT DETECTED
ENTEROBACTERALES BY PCR: NOT DETECTED
ENTEROCOCCUS BY PCR: DETECTED
ENTEROCOCCUS BY PCR: NOT DETECTED
EOSINOPHILS ABSOLUTE: 0 E9/L (ref 0.05–0.5)
EOSINOPHILS ABSOLUTE: 0.01 E9/L (ref 0.05–0.5)
EOSINOPHILS ABSOLUTE: 0.03 E9/L (ref 0.05–0.5)
EOSINOPHILS ABSOLUTE: 0.06 E9/L (ref 0.05–0.5)
EOSINOPHILS ABSOLUTE: 0.06 E9/L (ref 0.05–0.5)
EOSINOPHILS ABSOLUTE: 0.07 E9/L (ref 0.05–0.5)
EOSINOPHILS ABSOLUTE: 0.07 E9/L (ref 0.05–0.5)
EOSINOPHILS ABSOLUTE: 0.08 E9/L (ref 0.05–0.5)
EOSINOPHILS ABSOLUTE: 0.09 E9/L (ref 0.05–0.5)
EOSINOPHILS ABSOLUTE: 0.1 E9/L (ref 0.05–0.5)
EOSINOPHILS ABSOLUTE: 0.12 E9/L (ref 0.05–0.5)
EOSINOPHILS ABSOLUTE: 0.14 E9/L (ref 0.05–0.5)
EOSINOPHILS ABSOLUTE: 0.15 E9/L (ref 0.05–0.5)
EOSINOPHILS ABSOLUTE: 0.17 E9/L (ref 0.05–0.5)
EOSINOPHILS ABSOLUTE: 0.17 E9/L (ref 0.05–0.5)
EOSINOPHILS ABSOLUTE: 0.21 E9/L (ref 0.05–0.5)
EOSINOPHILS ABSOLUTE: 0.28 E9/L (ref 0.05–0.5)
EOSINOPHILS ABSOLUTE: 0.29 E9/L (ref 0.05–0.5)
EOSINOPHILS RELATIVE PERCENT: 0 % (ref 0–6)
EOSINOPHILS RELATIVE PERCENT: 0.1 % (ref 0–6)
EOSINOPHILS RELATIVE PERCENT: 0.2 % (ref 0–6)
EOSINOPHILS RELATIVE PERCENT: 0.3 % (ref 0–6)
EOSINOPHILS RELATIVE PERCENT: 0.5 % (ref 0–6)
EOSINOPHILS RELATIVE PERCENT: 0.5 % (ref 0–6)
EOSINOPHILS RELATIVE PERCENT: 0.6 % (ref 0–6)
EOSINOPHILS RELATIVE PERCENT: 0.7 % (ref 0–6)
EOSINOPHILS RELATIVE PERCENT: 0.9 % (ref 0–6)
EOSINOPHILS RELATIVE PERCENT: 1 % (ref 0–6)
EOSINOPHILS RELATIVE PERCENT: 1 % (ref 0–6)
EOSINOPHILS RELATIVE PERCENT: 1.1 % (ref 0–6)
EOSINOPHILS RELATIVE PERCENT: 1.1 % (ref 0–6)
EOSINOPHILS RELATIVE PERCENT: 1.2 % (ref 0–6)
EOSINOPHILS RELATIVE PERCENT: 1.7 % (ref 0–6)
EOSINOPHILS RELATIVE PERCENT: 1.8 % (ref 0–6)
EOSINOPHILS RELATIVE PERCENT: 1.8 % (ref 0–6)
EOSINOPHILS RELATIVE PERCENT: 2.3 % (ref 0–6)
EOSINOPHILS RELATIVE PERCENT: 2.6 % (ref 0–6)
EOSINOPHILS RELATIVE PERCENT: 2.7 % (ref 0–6)
EOSINOPHILS RELATIVE PERCENT: 3.5 % (ref 0–6)
EPITHELIAL CELLS, UA: ABNORMAL /HPF
EPITHELIAL CELLS, UA: ABNORMAL /HPF
ESCHERICHIA COLI BY PCR: NOT DETECTED
ESCHERICHIA COLI BY PCR: NOT DETECTED
FERRITIN: 283 NG/ML
FIO2: 100 %
FIO2: 40 %
FIO2: 40 %
FIO2: 50 %
FIO2: 50 %
FOLATE: 2.3 NG/ML (ref 4.8–24.2)
GFR AFRICAN AMERICAN: 49
GFR AFRICAN AMERICAN: 59
GFR AFRICAN AMERICAN: >60
GFR NON-AFRICAN AMERICAN: 49 ML/MIN/1.73
GFR NON-AFRICAN AMERICAN: 59 ML/MIN/1.73
GFR NON-AFRICAN AMERICAN: >60 ML/MIN/1.73
GLUCOSE BLD-MCNC: 104 MG/DL (ref 74–99)
GLUCOSE BLD-MCNC: 105 MG/DL (ref 74–99)
GLUCOSE BLD-MCNC: 114 MG/DL (ref 74–99)
GLUCOSE BLD-MCNC: 115 MG/DL
GLUCOSE BLD-MCNC: 125 MG/DL (ref 74–99)
GLUCOSE BLD-MCNC: 137 MG/DL (ref 74–99)
GLUCOSE BLD-MCNC: 138 MG/DL (ref 74–99)
GLUCOSE BLD-MCNC: 139 MG/DL (ref 74–99)
GLUCOSE BLD-MCNC: 143 MG/DL (ref 74–99)
GLUCOSE BLD-MCNC: 144 MG/DL (ref 74–99)
GLUCOSE BLD-MCNC: 144 MG/DL (ref 74–99)
GLUCOSE BLD-MCNC: 159 MG/DL (ref 74–99)
GLUCOSE BLD-MCNC: 161 MG/DL (ref 74–99)
GLUCOSE BLD-MCNC: 170 MG/DL (ref 74–99)
GLUCOSE BLD-MCNC: 173 MG/DL (ref 74–99)
GLUCOSE BLD-MCNC: 189 MG/DL (ref 74–99)
GLUCOSE BLD-MCNC: 195 MG/DL
GLUCOSE BLD-MCNC: 201 MG/DL (ref 74–99)
GLUCOSE BLD-MCNC: 239 MG/DL (ref 74–99)
GLUCOSE BLD-MCNC: 247 MG/DL (ref 74–99)
GLUCOSE BLD-MCNC: 248 MG/DL (ref 74–99)
GLUCOSE BLD-MCNC: 253 MG/DL (ref 74–99)
GLUCOSE BLD-MCNC: 255 MG/DL (ref 74–99)
GLUCOSE BLD-MCNC: 259 MG/DL (ref 74–99)
GLUCOSE BLD-MCNC: 268 MG/DL (ref 74–99)
GLUCOSE BLD-MCNC: 408 MG/DL (ref 74–99)
GLUCOSE BLD-MCNC: 58 MG/DL (ref 74–99)
GLUCOSE BLD-MCNC: 90 MG/DL (ref 74–99)
GLUCOSE URINE: 100 MG/DL
GLUCOSE URINE: NEGATIVE MG/DL
GRAM STAIN RESULT: ABNORMAL
HAEMOPHILUS INFLUENZAE BY PCR: NOT DETECTED
HAEMOPHILUS INFLUENZAE BY PCR: NOT DETECTED
HBA1C MFR BLD: 5.3 % (ref 4–5.6)
HBA1C MFR BLD: 6.3 % (ref 4–5.6)
HCO3: 15.5 MMOL/L (ref 22–26)
HCO3: 17.2 MMOL/L (ref 22–26)
HCO3: 17.7 MMOL/L (ref 22–26)
HCO3: 18.2 MMOL/L (ref 22–26)
HCO3: 21.3 MMOL/L (ref 22–26)
HCO3: 24.8 MMOL/L (ref 22–26)
HCT VFR BLD CALC: 22.8 % (ref 34–48)
HCT VFR BLD CALC: 23.8 % (ref 34–48)
HCT VFR BLD CALC: 24 % (ref 34–48)
HCT VFR BLD CALC: 24.2 % (ref 34–48)
HCT VFR BLD CALC: 24.5 % (ref 34–48)
HCT VFR BLD CALC: 24.8 % (ref 34–48)
HCT VFR BLD CALC: 24.8 % (ref 34–48)
HCT VFR BLD CALC: 25 % (ref 34–48)
HCT VFR BLD CALC: 25.2 % (ref 34–48)
HCT VFR BLD CALC: 25.3 % (ref 34–48)
HCT VFR BLD CALC: 26.3 % (ref 34–48)
HCT VFR BLD CALC: 26.8 % (ref 34–48)
HCT VFR BLD CALC: 27.4 % (ref 34–48)
HCT VFR BLD CALC: 27.5 % (ref 34–48)
HCT VFR BLD CALC: 28.3 % (ref 34–48)
HCT VFR BLD CALC: 28.4 % (ref 34–48)
HCT VFR BLD CALC: 28.9 % (ref 34–48)
HCT VFR BLD CALC: 30.4 % (ref 34–48)
HCT VFR BLD CALC: 31.1 % (ref 34–48)
HCT VFR BLD CALC: 31.5 % (ref 34–48)
HCT VFR BLD CALC: 32.6 % (ref 34–48)
HCT VFR BLD CALC: 33.3 % (ref 34–48)
HCT VFR BLD CALC: 34.2 % (ref 34–48)
HCT VFR BLD CALC: 35.5 % (ref 34–48)
HDLC SERPL-MCNC: 47 MG/DL
HEMOGLOBIN: 10.1 G/DL (ref 11.5–15.5)
HEMOGLOBIN: 10.5 G/DL (ref 11.5–15.5)
HEMOGLOBIN: 7 G/DL (ref 11.5–15.5)
HEMOGLOBIN: 7 G/DL (ref 11.5–15.5)
HEMOGLOBIN: 7.3 G/DL (ref 11.5–15.5)
HEMOGLOBIN: 7.4 G/DL (ref 11.5–15.5)
HEMOGLOBIN: 7.4 G/DL (ref 11.5–15.5)
HEMOGLOBIN: 7.5 G/DL (ref 11.5–15.5)
HEMOGLOBIN: 7.6 G/DL (ref 11.5–15.5)
HEMOGLOBIN: 7.6 G/DL (ref 11.5–15.5)
HEMOGLOBIN: 7.7 G/DL (ref 11.5–15.5)
HEMOGLOBIN: 7.7 G/DL (ref 11.5–15.5)
HEMOGLOBIN: 8 G/DL (ref 11.5–15.5)
HEMOGLOBIN: 8.1 G/DL (ref 11.5–15.5)
HEMOGLOBIN: 8.1 G/DL (ref 11.5–15.5)
HEMOGLOBIN: 8.3 G/DL (ref 11.5–15.5)
HEMOGLOBIN: 8.3 G/DL (ref 11.5–15.5)
HEMOGLOBIN: 8.4 G/DL (ref 11.5–15.5)
HEMOGLOBIN: 8.6 G/DL (ref 11.5–15.5)
HEMOGLOBIN: 8.8 G/DL (ref 11.5–15.5)
HEMOGLOBIN: 8.9 G/DL (ref 11.5–15.5)
HEMOGLOBIN: 9.4 G/DL (ref 11.5–15.5)
HEMOGLOBIN: 9.6 G/DL (ref 11.5–15.5)
HEMOGLOBIN: 9.8 G/DL (ref 11.5–15.5)
HHB: 0.6 % (ref 0–5)
HHB: 0.7 % (ref 0–5)
HHB: 1.3 % (ref 0–5)
HHB: 1.4 % (ref 0–5)
HHB: 1.5 % (ref 0–5)
HHB: 2.5 % (ref 0–5)
HYPOCHROMIA: ABNORMAL
IMMATURE GRANULOCYTES #: 0.05 E9/L
IMMATURE GRANULOCYTES #: 0.07 E9/L
IMMATURE GRANULOCYTES #: 0.07 E9/L
IMMATURE GRANULOCYTES #: 0.08 E9/L
IMMATURE GRANULOCYTES #: 0.08 E9/L
IMMATURE GRANULOCYTES #: 0.1 E9/L
IMMATURE GRANULOCYTES #: 0.1 E9/L
IMMATURE GRANULOCYTES #: 0.11 E9/L
IMMATURE GRANULOCYTES #: 0.12 E9/L
IMMATURE GRANULOCYTES #: 0.13 E9/L
IMMATURE GRANULOCYTES #: 0.14 E9/L
IMMATURE GRANULOCYTES #: 0.14 E9/L
IMMATURE GRANULOCYTES #: 0.15 E9/L
IMMATURE GRANULOCYTES #: 0.15 E9/L
IMMATURE GRANULOCYTES #: 0.18 E9/L
IMMATURE GRANULOCYTES #: 0.21 E9/L
IMMATURE GRANULOCYTES #: 0.23 E9/L
IMMATURE GRANULOCYTES %: 0.5 % (ref 0–5)
IMMATURE GRANULOCYTES %: 0.7 % (ref 0–5)
IMMATURE GRANULOCYTES %: 0.8 % (ref 0–5)
IMMATURE GRANULOCYTES %: 0.9 % (ref 0–5)
IMMATURE GRANULOCYTES %: 0.9 % (ref 0–5)
IMMATURE GRANULOCYTES %: 1 % (ref 0–5)
IMMATURE GRANULOCYTES %: 1 % (ref 0–5)
IMMATURE GRANULOCYTES %: 1.1 % (ref 0–5)
IMMATURE GRANULOCYTES %: 1.2 % (ref 0–5)
IMMATURE GRANULOCYTES %: 1.3 % (ref 0–5)
IMMATURE GRANULOCYTES %: 1.3 % (ref 0–5)
IMMATURE GRANULOCYTES %: 1.5 % (ref 0–5)
IMMATURE GRANULOCYTES %: 1.6 % (ref 0–5)
IMMATURE GRANULOCYTES %: 1.6 % (ref 0–5)
IMMATURE GRANULOCYTES %: 2.2 % (ref 0–5)
IRON SATURATION: 11 % (ref 15–50)
IRON: 13 MCG/DL (ref 37–145)
KETONES, URINE: ABNORMAL MG/DL
KETONES, URINE: ABNORMAL MG/DL
KETONES, URINE: NEGATIVE MG/DL
KETONES, URINE: NEGATIVE MG/DL
KLEBSIELLA OXYTOCA BY PCR: NOT DETECTED
KLEBSIELLA OXYTOCA BY PCR: NOT DETECTED
KLEBSIELLA PNEUMONIAE GROUP BY PCR: NOT DETECTED
KLEBSIELLA PNEUMONIAE GROUP BY PCR: NOT DETECTED
L. PNEUMOPHILA SEROGP 1 UR AG: NORMAL
LAB: ABNORMAL
LACTATE DEHYDROGENASE: 243 U/L (ref 135–214)
LACTIC ACID, SEPSIS: 1.1 MMOL/L (ref 0.5–1.9)
LACTIC ACID, SEPSIS: 2.8 MMOL/L (ref 0.5–1.9)
LACTIC ACID, SEPSIS: 3.1 MMOL/L (ref 0.5–1.9)
LACTIC ACID: 1.3 MMOL/L (ref 0.5–2.2)
LDL CHOLESTEROL CALCULATED: 58 MG/DL (ref 0–99)
LEUKOCYTE ESTERASE, URINE: ABNORMAL
LIPASE: 28 U/L (ref 13–60)
LISTERIA MONOCYTOGENES BY PCR: NOT DETECTED
LISTERIA MONOCYTOGENES BY PCR: NOT DETECTED
LV EF: 68 %
LVEF MODALITY: NORMAL
LYMPHOCYTES ABSOLUTE: 0.58 E9/L (ref 1.5–4)
LYMPHOCYTES ABSOLUTE: 0.59 E9/L (ref 1.5–4)
LYMPHOCYTES ABSOLUTE: 0.71 E9/L (ref 1.5–4)
LYMPHOCYTES ABSOLUTE: 0.78 E9/L (ref 1.5–4)
LYMPHOCYTES ABSOLUTE: 0.84 E9/L (ref 1.5–4)
LYMPHOCYTES ABSOLUTE: 1.15 E9/L (ref 1.5–4)
LYMPHOCYTES ABSOLUTE: 1.26 E9/L (ref 1.5–4)
LYMPHOCYTES ABSOLUTE: 1.39 E9/L (ref 1.5–4)
LYMPHOCYTES ABSOLUTE: 1.45 E9/L (ref 1.5–4)
LYMPHOCYTES ABSOLUTE: 1.47 E9/L (ref 1.5–4)
LYMPHOCYTES ABSOLUTE: 1.52 E9/L (ref 1.5–4)
LYMPHOCYTES ABSOLUTE: 1.65 E9/L (ref 1.5–4)
LYMPHOCYTES ABSOLUTE: 1.68 E9/L (ref 1.5–4)
LYMPHOCYTES ABSOLUTE: 1.76 E9/L (ref 1.5–4)
LYMPHOCYTES ABSOLUTE: 1.88 E9/L (ref 1.5–4)
LYMPHOCYTES ABSOLUTE: 1.89 E9/L (ref 1.5–4)
LYMPHOCYTES ABSOLUTE: 1.93 E9/L (ref 1.5–4)
LYMPHOCYTES ABSOLUTE: 2.02 E9/L (ref 1.5–4)
LYMPHOCYTES ABSOLUTE: 2.08 E9/L (ref 1.5–4)
LYMPHOCYTES ABSOLUTE: 2.24 E9/L (ref 1.5–4)
LYMPHOCYTES ABSOLUTE: 2.32 E9/L (ref 1.5–4)
LYMPHOCYTES ABSOLUTE: 2.35 E9/L (ref 1.5–4)
LYMPHOCYTES ABSOLUTE: 2.39 E9/L (ref 1.5–4)
LYMPHOCYTES RELATIVE PERCENT: 11.7 % (ref 20–42)
LYMPHOCYTES RELATIVE PERCENT: 12.6 % (ref 20–42)
LYMPHOCYTES RELATIVE PERCENT: 13 % (ref 20–42)
LYMPHOCYTES RELATIVE PERCENT: 14 % (ref 20–42)
LYMPHOCYTES RELATIVE PERCENT: 15.1 % (ref 20–42)
LYMPHOCYTES RELATIVE PERCENT: 16.7 % (ref 20–42)
LYMPHOCYTES RELATIVE PERCENT: 17.6 % (ref 20–42)
LYMPHOCYTES RELATIVE PERCENT: 17.8 % (ref 20–42)
LYMPHOCYTES RELATIVE PERCENT: 18.2 % (ref 20–42)
LYMPHOCYTES RELATIVE PERCENT: 19.1 % (ref 20–42)
LYMPHOCYTES RELATIVE PERCENT: 22.7 % (ref 20–42)
LYMPHOCYTES RELATIVE PERCENT: 23 % (ref 20–42)
LYMPHOCYTES RELATIVE PERCENT: 23.4 % (ref 20–42)
LYMPHOCYTES RELATIVE PERCENT: 24.8 % (ref 20–42)
LYMPHOCYTES RELATIVE PERCENT: 25.3 % (ref 20–42)
LYMPHOCYTES RELATIVE PERCENT: 26.3 % (ref 20–42)
LYMPHOCYTES RELATIVE PERCENT: 29.3 % (ref 20–42)
LYMPHOCYTES RELATIVE PERCENT: 4.5 % (ref 20–42)
LYMPHOCYTES RELATIVE PERCENT: 4.9 % (ref 20–42)
LYMPHOCYTES RELATIVE PERCENT: 6.1 % (ref 20–42)
LYMPHOCYTES RELATIVE PERCENT: 6.1 % (ref 20–42)
LYMPHOCYTES RELATIVE PERCENT: 7.7 % (ref 20–42)
LYMPHOCYTES RELATIVE PERCENT: 9.6 % (ref 20–42)
Lab: ABNORMAL
MAGNESIUM: 1.2 MG/DL (ref 1.6–2.6)
MAGNESIUM: 1.3 MG/DL (ref 1.6–2.6)
MAGNESIUM: 1.4 MG/DL (ref 1.6–2.6)
MAGNESIUM: 1.4 MG/DL (ref 1.6–2.6)
MAGNESIUM: 1.5 MG/DL (ref 1.6–2.6)
MAGNESIUM: 1.6 MG/DL (ref 1.6–2.6)
MAGNESIUM: 1.7 MG/DL (ref 1.6–2.6)
MAGNESIUM: 1.8 MG/DL (ref 1.6–2.6)
MAGNESIUM: 1.9 MG/DL (ref 1.6–2.6)
MAGNESIUM: 2 MG/DL (ref 1.6–2.6)
MAGNESIUM: 2.2 MG/DL (ref 1.6–2.6)
MAGNESIUM: 2.3 MG/DL (ref 1.6–2.6)
MCH RBC QN AUTO: 22.9 PG (ref 26–35)
MCH RBC QN AUTO: 23.8 PG (ref 26–35)
MCH RBC QN AUTO: 23.9 PG (ref 26–35)
MCH RBC QN AUTO: 23.9 PG (ref 26–35)
MCH RBC QN AUTO: 24 PG (ref 26–35)
MCH RBC QN AUTO: 24.1 PG (ref 26–35)
MCH RBC QN AUTO: 24.2 PG (ref 26–35)
MCH RBC QN AUTO: 24.2 PG (ref 26–35)
MCH RBC QN AUTO: 24.3 PG (ref 26–35)
MCH RBC QN AUTO: 24.3 PG (ref 26–35)
MCH RBC QN AUTO: 24.4 PG (ref 26–35)
MCH RBC QN AUTO: 24.5 PG (ref 26–35)
MCH RBC QN AUTO: 24.5 PG (ref 26–35)
MCH RBC QN AUTO: 24.6 PG (ref 26–35)
MCH RBC QN AUTO: 24.8 PG (ref 26–35)
MCH RBC QN AUTO: 24.8 PG (ref 26–35)
MCH RBC QN AUTO: 25 PG (ref 26–35)
MCH RBC QN AUTO: 25.1 PG (ref 26–35)
MCHC RBC AUTO-ENTMCNC: 28 % (ref 32–34.5)
MCHC RBC AUTO-ENTMCNC: 28.3 % (ref 32–34.5)
MCHC RBC AUTO-ENTMCNC: 28.4 % (ref 32–34.5)
MCHC RBC AUTO-ENTMCNC: 28.5 % (ref 32–34.5)
MCHC RBC AUTO-ENTMCNC: 28.6 % (ref 32–34.5)
MCHC RBC AUTO-ENTMCNC: 28.7 % (ref 32–34.5)
MCHC RBC AUTO-ENTMCNC: 29.2 % (ref 32–34.5)
MCHC RBC AUTO-ENTMCNC: 29.4 % (ref 32–34.5)
MCHC RBC AUTO-ENTMCNC: 29.5 % (ref 32–34.5)
MCHC RBC AUTO-ENTMCNC: 29.8 % (ref 32–34.5)
MCHC RBC AUTO-ENTMCNC: 30 % (ref 32–34.5)
MCHC RBC AUTO-ENTMCNC: 30.4 % (ref 32–34.5)
MCHC RBC AUTO-ENTMCNC: 30.6 % (ref 32–34.5)
MCHC RBC AUTO-ENTMCNC: 30.7 % (ref 32–34.5)
MCHC RBC AUTO-ENTMCNC: 30.7 % (ref 32–34.5)
MCHC RBC AUTO-ENTMCNC: 30.8 % (ref 32–34.5)
MCHC RBC AUTO-ENTMCNC: 30.8 % (ref 32–34.5)
MCHC RBC AUTO-ENTMCNC: 31 % (ref 32–34.5)
MCHC RBC AUTO-ENTMCNC: 31 % (ref 32–34.5)
MCHC RBC AUTO-ENTMCNC: 31.1 % (ref 32–34.5)
MCV RBC AUTO: 78.3 FL (ref 80–99.9)
MCV RBC AUTO: 78.4 FL (ref 80–99.9)
MCV RBC AUTO: 78.5 FL (ref 80–99.9)
MCV RBC AUTO: 78.8 FL (ref 80–99.9)
MCV RBC AUTO: 79 FL (ref 80–99.9)
MCV RBC AUTO: 79.1 FL (ref 80–99.9)
MCV RBC AUTO: 79.2 FL (ref 80–99.9)
MCV RBC AUTO: 79.4 FL (ref 80–99.9)
MCV RBC AUTO: 79.7 FL (ref 80–99.9)
MCV RBC AUTO: 80.3 FL (ref 80–99.9)
MCV RBC AUTO: 80.9 FL (ref 80–99.9)
MCV RBC AUTO: 81.1 FL (ref 80–99.9)
MCV RBC AUTO: 81.1 FL (ref 80–99.9)
MCV RBC AUTO: 81.2 FL (ref 80–99.9)
MCV RBC AUTO: 81.3 FL (ref 80–99.9)
MCV RBC AUTO: 81.6 FL (ref 80–99.9)
MCV RBC AUTO: 81.8 FL (ref 80–99.9)
MCV RBC AUTO: 82.1 FL (ref 80–99.9)
MCV RBC AUTO: 82.7 FL (ref 80–99.9)
MCV RBC AUTO: 83 FL (ref 80–99.9)
MCV RBC AUTO: 83.2 FL (ref 80–99.9)
MCV RBC AUTO: 84.8 FL (ref 80–99.9)
MCV RBC AUTO: 87.6 FL (ref 80–99.9)
MCV RBC AUTO: 88.4 FL (ref 80–99.9)
MCV RBC AUTO: 88.7 FL (ref 80–99.9)
METAMYELOCYTES RELATIVE PERCENT: 0.9 % (ref 0–1)
METER GLUCOSE: 101 MG/DL (ref 74–99)
METER GLUCOSE: 105 MG/DL (ref 74–99)
METER GLUCOSE: 105 MG/DL (ref 74–99)
METER GLUCOSE: 106 MG/DL (ref 74–99)
METER GLUCOSE: 108 MG/DL (ref 74–99)
METER GLUCOSE: 109 MG/DL (ref 74–99)
METER GLUCOSE: 111 MG/DL (ref 74–99)
METER GLUCOSE: 111 MG/DL (ref 74–99)
METER GLUCOSE: 112 MG/DL (ref 74–99)
METER GLUCOSE: 112 MG/DL (ref 74–99)
METER GLUCOSE: 114 MG/DL (ref 74–99)
METER GLUCOSE: 114 MG/DL (ref 74–99)
METER GLUCOSE: 118 MG/DL (ref 74–99)
METER GLUCOSE: 119 MG/DL (ref 74–99)
METER GLUCOSE: 119 MG/DL (ref 74–99)
METER GLUCOSE: 129 MG/DL (ref 74–99)
METER GLUCOSE: 129 MG/DL (ref 74–99)
METER GLUCOSE: 130 MG/DL (ref 74–99)
METER GLUCOSE: 131 MG/DL (ref 74–99)
METER GLUCOSE: 131 MG/DL (ref 74–99)
METER GLUCOSE: 134 MG/DL (ref 74–99)
METER GLUCOSE: 134 MG/DL (ref 74–99)
METER GLUCOSE: 135 MG/DL (ref 74–99)
METER GLUCOSE: 137 MG/DL (ref 74–99)
METER GLUCOSE: 138 MG/DL (ref 74–99)
METER GLUCOSE: 139 MG/DL (ref 74–99)
METER GLUCOSE: 139 MG/DL (ref 74–99)
METER GLUCOSE: 140 MG/DL (ref 74–99)
METER GLUCOSE: 140 MG/DL (ref 74–99)
METER GLUCOSE: 143 MG/DL (ref 74–99)
METER GLUCOSE: 143 MG/DL (ref 74–99)
METER GLUCOSE: 145 MG/DL (ref 74–99)
METER GLUCOSE: 145 MG/DL (ref 74–99)
METER GLUCOSE: 147 MG/DL (ref 74–99)
METER GLUCOSE: 148 MG/DL (ref 74–99)
METER GLUCOSE: 150 MG/DL (ref 74–99)
METER GLUCOSE: 151 MG/DL (ref 74–99)
METER GLUCOSE: 157 MG/DL (ref 74–99)
METER GLUCOSE: 157 MG/DL (ref 74–99)
METER GLUCOSE: 159 MG/DL (ref 74–99)
METER GLUCOSE: 162 MG/DL (ref 74–99)
METER GLUCOSE: 163 MG/DL (ref 74–99)
METER GLUCOSE: 164 MG/DL (ref 74–99)
METER GLUCOSE: 165 MG/DL (ref 74–99)
METER GLUCOSE: 167 MG/DL (ref 74–99)
METER GLUCOSE: 167 MG/DL (ref 74–99)
METER GLUCOSE: 168 MG/DL (ref 74–99)
METER GLUCOSE: 169 MG/DL (ref 74–99)
METER GLUCOSE: 171 MG/DL (ref 74–99)
METER GLUCOSE: 171 MG/DL (ref 74–99)
METER GLUCOSE: 175 MG/DL (ref 74–99)
METER GLUCOSE: 178 MG/DL (ref 74–99)
METER GLUCOSE: 178 MG/DL (ref 74–99)
METER GLUCOSE: 179 MG/DL (ref 74–99)
METER GLUCOSE: 184 MG/DL (ref 74–99)
METER GLUCOSE: 185 MG/DL (ref 74–99)
METER GLUCOSE: 185 MG/DL (ref 74–99)
METER GLUCOSE: 186 MG/DL (ref 74–99)
METER GLUCOSE: 188 MG/DL (ref 74–99)
METER GLUCOSE: 188 MG/DL (ref 74–99)
METER GLUCOSE: 191 MG/DL (ref 74–99)
METER GLUCOSE: 192 MG/DL (ref 74–99)
METER GLUCOSE: 194 MG/DL (ref 74–99)
METER GLUCOSE: 195 MG/DL (ref 74–99)
METER GLUCOSE: 195 MG/DL (ref 74–99)
METER GLUCOSE: 201 MG/DL (ref 74–99)
METER GLUCOSE: 202 MG/DL (ref 74–99)
METER GLUCOSE: 219 MG/DL (ref 74–99)
METER GLUCOSE: 219 MG/DL (ref 74–99)
METER GLUCOSE: 220 MG/DL (ref 74–99)
METER GLUCOSE: 227 MG/DL (ref 74–99)
METER GLUCOSE: 229 MG/DL (ref 74–99)
METER GLUCOSE: 231 MG/DL (ref 74–99)
METER GLUCOSE: 234 MG/DL (ref 74–99)
METER GLUCOSE: 57 MG/DL (ref 74–99)
METER GLUCOSE: 59 MG/DL (ref 74–99)
METER GLUCOSE: 68 MG/DL (ref 74–99)
METER GLUCOSE: 70 MG/DL (ref 74–99)
METER GLUCOSE: 70 MG/DL (ref 74–99)
METER GLUCOSE: 71 MG/DL (ref 74–99)
METER GLUCOSE: 83 MG/DL (ref 74–99)
METER GLUCOSE: 86 MG/DL (ref 74–99)
METER GLUCOSE: 93 MG/DL (ref 74–99)
METER GLUCOSE: 94 MG/DL (ref 74–99)
METER GLUCOSE: 94 MG/DL (ref 74–99)
METER GLUCOSE: 95 MG/DL (ref 74–99)
METER GLUCOSE: 98 MG/DL (ref 74–99)
METHB: 0.1 % (ref 0–1.5)
METHB: 0.1 % (ref 0–1.5)
METHB: 0.2 % (ref 0–1.5)
METHB: 0.2 % (ref 0–1.5)
METHB: 0.3 % (ref 0–1.5)
METHB: 0.3 % (ref 0–1.5)
METHICILLIN RESISTANCE MECA/C  BY PCR: NOT DETECTED
MICROALBUMIN UR-MCNC: 95.2 MG/L
MICROALBUMIN/CREAT UR-RTO: 108.2 (ref 0–30)
MODE: ABNORMAL
MODE: AC
MONOCYTES ABSOLUTE: 0.19 E9/L (ref 0.1–0.95)
MONOCYTES ABSOLUTE: 0.25 E9/L (ref 0.1–0.95)
MONOCYTES ABSOLUTE: 0.34 E9/L (ref 0.1–0.95)
MONOCYTES ABSOLUTE: 0.4 E9/L (ref 0.1–0.95)
MONOCYTES ABSOLUTE: 0.44 E9/L (ref 0.1–0.95)
MONOCYTES ABSOLUTE: 0.47 E9/L (ref 0.1–0.95)
MONOCYTES ABSOLUTE: 0.48 E9/L (ref 0.1–0.95)
MONOCYTES ABSOLUTE: 0.49 E9/L (ref 0.1–0.95)
MONOCYTES ABSOLUTE: 0.51 E9/L (ref 0.1–0.95)
MONOCYTES ABSOLUTE: 0.54 E9/L (ref 0.1–0.95)
MONOCYTES ABSOLUTE: 0.61 E9/L (ref 0.1–0.95)
MONOCYTES ABSOLUTE: 0.63 E9/L (ref 0.1–0.95)
MONOCYTES ABSOLUTE: 0.67 E9/L (ref 0.1–0.95)
MONOCYTES ABSOLUTE: 0.69 E9/L (ref 0.1–0.95)
MONOCYTES ABSOLUTE: 0.74 E9/L (ref 0.1–0.95)
MONOCYTES ABSOLUTE: 0.75 E9/L (ref 0.1–0.95)
MONOCYTES ABSOLUTE: 0.78 E9/L (ref 0.1–0.95)
MONOCYTES ABSOLUTE: 0.86 E9/L (ref 0.1–0.95)
MONOCYTES ABSOLUTE: 0.86 E9/L (ref 0.1–0.95)
MONOCYTES ABSOLUTE: 1.01 E9/L (ref 0.1–0.95)
MONOCYTES ABSOLUTE: 1.01 E9/L (ref 0.1–0.95)
MONOCYTES RELATIVE PERCENT: 1.8 % (ref 2–12)
MONOCYTES RELATIVE PERCENT: 1.9 % (ref 2–12)
MONOCYTES RELATIVE PERCENT: 2.6 % (ref 2–12)
MONOCYTES RELATIVE PERCENT: 3.2 % (ref 2–12)
MONOCYTES RELATIVE PERCENT: 4.3 % (ref 2–12)
MONOCYTES RELATIVE PERCENT: 4.4 % (ref 2–12)
MONOCYTES RELATIVE PERCENT: 4.9 % (ref 2–12)
MONOCYTES RELATIVE PERCENT: 5.2 % (ref 2–12)
MONOCYTES RELATIVE PERCENT: 5.3 % (ref 2–12)
MONOCYTES RELATIVE PERCENT: 6.1 % (ref 2–12)
MONOCYTES RELATIVE PERCENT: 6.5 % (ref 2–12)
MONOCYTES RELATIVE PERCENT: 6.6 % (ref 2–12)
MONOCYTES RELATIVE PERCENT: 6.9 % (ref 2–12)
MONOCYTES RELATIVE PERCENT: 6.9 % (ref 2–12)
MONOCYTES RELATIVE PERCENT: 7 % (ref 2–12)
MONOCYTES RELATIVE PERCENT: 7.3 % (ref 2–12)
MONOCYTES RELATIVE PERCENT: 7.7 % (ref 2–12)
MONOCYTES RELATIVE PERCENT: 7.8 % (ref 2–12)
MONOCYTES RELATIVE PERCENT: 8.4 % (ref 2–12)
MONOCYTES RELATIVE PERCENT: 8.6 % (ref 2–12)
MONOCYTES RELATIVE PERCENT: 9.2 % (ref 2–12)
MRSA CULTURE ONLY: NORMAL
MYELOCYTE PERCENT: 0.9 % (ref 0–0)
NEISSERIA MENINGITIDIS BY PCR: NOT DETECTED
NEISSERIA MENINGITIDIS BY PCR: NOT DETECTED
NEUTROPHILS ABSOLUTE: 13.18 E9/L (ref 1.8–7.3)
NEUTROPHILS ABSOLUTE: 14.46 E9/L (ref 1.8–7.3)
NEUTROPHILS ABSOLUTE: 26.01 E9/L (ref 1.8–7.3)
NEUTROPHILS ABSOLUTE: 4.19 E9/L (ref 1.8–7.3)
NEUTROPHILS ABSOLUTE: 4.75 E9/L (ref 1.8–7.3)
NEUTROPHILS ABSOLUTE: 5.04 E9/L (ref 1.8–7.3)
NEUTROPHILS ABSOLUTE: 5.12 E9/L (ref 1.8–7.3)
NEUTROPHILS ABSOLUTE: 5.74 E9/L (ref 1.8–7.3)
NEUTROPHILS ABSOLUTE: 5.74 E9/L (ref 1.8–7.3)
NEUTROPHILS ABSOLUTE: 6.56 E9/L (ref 1.8–7.3)
NEUTROPHILS ABSOLUTE: 6.57 E9/L (ref 1.8–7.3)
NEUTROPHILS ABSOLUTE: 6.68 E9/L (ref 1.8–7.3)
NEUTROPHILS ABSOLUTE: 7.14 E9/L (ref 1.8–7.3)
NEUTROPHILS ABSOLUTE: 7.23 E9/L (ref 1.8–7.3)
NEUTROPHILS ABSOLUTE: 7.53 E9/L (ref 1.8–7.3)
NEUTROPHILS ABSOLUTE: 7.58 E9/L (ref 1.8–7.3)
NEUTROPHILS ABSOLUTE: 7.92 E9/L (ref 1.8–7.3)
NEUTROPHILS ABSOLUTE: 8.32 E9/L (ref 1.8–7.3)
NEUTROPHILS ABSOLUTE: 8.35 E9/L (ref 1.8–7.3)
NEUTROPHILS ABSOLUTE: 8.46 E9/L (ref 1.8–7.3)
NEUTROPHILS ABSOLUTE: 8.64 E9/L (ref 1.8–7.3)
NEUTROPHILS ABSOLUTE: 8.9 E9/L (ref 1.8–7.3)
NEUTROPHILS ABSOLUTE: 9.16 E9/L (ref 1.8–7.3)
NEUTROPHILS RELATIVE PERCENT: 63.1 % (ref 43–80)
NEUTROPHILS RELATIVE PERCENT: 63.2 % (ref 43–80)
NEUTROPHILS RELATIVE PERCENT: 63.8 % (ref 43–80)
NEUTROPHILS RELATIVE PERCENT: 63.9 % (ref 43–80)
NEUTROPHILS RELATIVE PERCENT: 64.3 % (ref 43–80)
NEUTROPHILS RELATIVE PERCENT: 68.4 % (ref 43–80)
NEUTROPHILS RELATIVE PERCENT: 68.9 % (ref 43–80)
NEUTROPHILS RELATIVE PERCENT: 71.1 % (ref 43–80)
NEUTROPHILS RELATIVE PERCENT: 72.3 % (ref 43–80)
NEUTROPHILS RELATIVE PERCENT: 73.3 % (ref 43–80)
NEUTROPHILS RELATIVE PERCENT: 74.7 % (ref 43–80)
NEUTROPHILS RELATIVE PERCENT: 75.5 % (ref 43–80)
NEUTROPHILS RELATIVE PERCENT: 75.8 % (ref 43–80)
NEUTROPHILS RELATIVE PERCENT: 76.2 % (ref 43–80)
NEUTROPHILS RELATIVE PERCENT: 78.1 % (ref 43–80)
NEUTROPHILS RELATIVE PERCENT: 78.3 % (ref 43–80)
NEUTROPHILS RELATIVE PERCENT: 80.2 % (ref 43–80)
NEUTROPHILS RELATIVE PERCENT: 85.2 % (ref 43–80)
NEUTROPHILS RELATIVE PERCENT: 86.1 % (ref 43–80)
NEUTROPHILS RELATIVE PERCENT: 87.8 % (ref 43–80)
NEUTROPHILS RELATIVE PERCENT: 90.3 % (ref 43–80)
NEUTROPHILS RELATIVE PERCENT: 90.9 % (ref 43–80)
NEUTROPHILS RELATIVE PERCENT: 92.6 % (ref 43–80)
NITRITE, URINE: NEGATIVE
NUCLEATED RED BLOOD CELLS: 0 /100 WBC
NUCLEATED RED BLOOD CELLS: 0 /100 WBC
NUCLEATED RED BLOOD CELLS: 0.9 /100 WBC
O2 CONTENT: 11.5 ML/DL
O2 CONTENT: 12.1 ML/DL
O2 CONTENT: 12.6 ML/DL
O2 CONTENT: 13 ML/DL
O2 CONTENT: 13.6 ML/DL
O2 CONTENT: 14.3 ML/DL
O2 SATURATION: 97.5 % (ref 92–98.5)
O2 SATURATION: 98.5 % (ref 92–98.5)
O2 SATURATION: 98.6 % (ref 92–98.5)
O2 SATURATION: 98.7 % (ref 92–98.5)
O2 SATURATION: 99.3 % (ref 92–98.5)
O2 SATURATION: 99.4 % (ref 92–98.5)
O2HB: 96.9 % (ref 94–97)
O2HB: 97.6 % (ref 94–97)
O2HB: 98.1 % (ref 94–97)
O2HB: 98.2 % (ref 94–97)
O2HB: 98.5 % (ref 94–97)
O2HB: 98.7 % (ref 94–97)
OPERATOR ID: 1661
OPERATOR ID: 1687
OPERATOR ID: 3342
OPERATOR ID: 3342
OPERATOR ID: 914
OPERATOR ID: ABNORMAL
ORDER NUMBER: ABNORMAL
ORDER NUMBER: ABNORMAL
ORGANISM: ABNORMAL
OVALOCYTES: ABNORMAL
PATIENT TEMP: 37 C
PCO2: 24.5 MMHG (ref 35–45)
PCO2: 28.6 MMHG (ref 35–45)
PCO2: 29.3 MMHG (ref 35–45)
PCO2: 29.4 MMHG (ref 35–45)
PCO2: 31.6 MMHG (ref 35–45)
PCO2: 47.7 MMHG (ref 35–45)
PDW BLD-RTO: 16.2 FL (ref 11.5–15)
PDW BLD-RTO: 16.5 FL (ref 11.5–15)
PDW BLD-RTO: 16.9 FL (ref 11.5–15)
PDW BLD-RTO: 17.2 FL (ref 11.5–15)
PDW BLD-RTO: 17.7 FL (ref 11.5–15)
PDW BLD-RTO: 17.8 FL (ref 11.5–15)
PDW BLD-RTO: 18.7 FL (ref 11.5–15)
PDW BLD-RTO: 18.9 FL (ref 11.5–15)
PDW BLD-RTO: 19.1 FL (ref 11.5–15)
PDW BLD-RTO: 19.1 FL (ref 11.5–15)
PDW BLD-RTO: 19.3 FL (ref 11.5–15)
PDW BLD-RTO: 19.4 FL (ref 11.5–15)
PDW BLD-RTO: 19.4 FL (ref 11.5–15)
PDW BLD-RTO: 19.6 FL (ref 11.5–15)
PDW BLD-RTO: 19.7 FL (ref 11.5–15)
PDW BLD-RTO: 19.8 FL (ref 11.5–15)
PDW BLD-RTO: 20.4 FL (ref 11.5–15)
PDW BLD-RTO: 20.5 FL (ref 11.5–15)
PDW BLD-RTO: 20.6 FL (ref 11.5–15)
PDW BLD-RTO: 20.6 FL (ref 11.5–15)
PDW BLD-RTO: 21.1 FL (ref 11.5–15)
PDW BLD-RTO: 21.5 FL (ref 11.5–15)
PDW BLD-RTO: 21.8 FL (ref 11.5–15)
PEEP/CPAP: 5 CMH2O
PFO2: 1.98 MMHG/%
PFO2: 2.65 MMHG/%
PFO2: 2.74 MMHG/%
PFO2: 3.3 MMHG/%
PFO2: 3.85 MMHG/%
PH BLOOD GAS: 7.27 (ref 7.35–7.45)
PH BLOOD GAS: 7.38 (ref 7.35–7.45)
PH BLOOD GAS: 7.38 (ref 7.35–7.45)
PH BLOOD GAS: 7.41 (ref 7.35–7.45)
PH BLOOD GAS: 7.42 (ref 7.35–7.45)
PH BLOOD GAS: 7.55 (ref 7.35–7.45)
PH UA: 5.5 (ref 5–9)
PH UA: 6 (ref 5–9)
PHOSPHORUS: 1.1 MG/DL (ref 2.5–4.5)
PHOSPHORUS: 1.4 MG/DL (ref 2.5–4.5)
PHOSPHORUS: 1.5 MG/DL (ref 2.5–4.5)
PHOSPHORUS: 2 MG/DL (ref 2.5–4.5)
PHOSPHORUS: 2.2 MG/DL (ref 2.5–4.5)
PHOSPHORUS: 2.3 MG/DL (ref 2.5–4.5)
PHOSPHORUS: 2.7 MG/DL (ref 2.5–4.5)
PHOSPHORUS: 2.8 MG/DL (ref 2.5–4.5)
PHOSPHORUS: 2.8 MG/DL (ref 2.5–4.5)
PHOSPHORUS: 3.8 MG/DL (ref 2.5–4.5)
PHOSPHORUS: 3.9 MG/DL (ref 2.5–4.5)
PHOSPHORUS: 4 MG/DL (ref 2.5–4.5)
PLATELET # BLD: 248 E9/L (ref 130–450)
PLATELET # BLD: 257 E9/L (ref 130–450)
PLATELET # BLD: 265 E9/L (ref 130–450)
PLATELET # BLD: 268 E9/L (ref 130–450)
PLATELET # BLD: 271 E9/L (ref 130–450)
PLATELET # BLD: 272 E9/L (ref 130–450)
PLATELET # BLD: 274 E9/L (ref 130–450)
PLATELET # BLD: 284 E9/L (ref 130–450)
PLATELET # BLD: 286 E9/L (ref 130–450)
PLATELET # BLD: 288 E9/L (ref 130–450)
PLATELET # BLD: 289 E9/L (ref 130–450)
PLATELET # BLD: 297 E9/L (ref 130–450)
PLATELET # BLD: 312 E9/L (ref 130–450)
PLATELET # BLD: 327 E9/L (ref 130–450)
PLATELET # BLD: 335 E9/L (ref 130–450)
PLATELET # BLD: 336 E9/L (ref 130–450)
PLATELET # BLD: 345 E9/L (ref 130–450)
PLATELET # BLD: 355 E9/L (ref 130–450)
PLATELET # BLD: 371 E9/L (ref 130–450)
PLATELET # BLD: 372 E9/L (ref 130–450)
PLATELET # BLD: 393 E9/L (ref 130–450)
PLATELET # BLD: 439 E9/L (ref 130–450)
PLATELET # BLD: 518 E9/L (ref 130–450)
PLATELET # BLD: 553 E9/L (ref 130–450)
PLATELET # BLD: 562 E9/L (ref 130–450)
PMV BLD AUTO: 10.1 FL (ref 7–12)
PMV BLD AUTO: 10.1 FL (ref 7–12)
PMV BLD AUTO: 10.3 FL (ref 7–12)
PMV BLD AUTO: 10.5 FL (ref 7–12)
PMV BLD AUTO: 10.5 FL (ref 7–12)
PMV BLD AUTO: 10.6 FL (ref 7–12)
PMV BLD AUTO: 9 FL (ref 7–12)
PMV BLD AUTO: 9.1 FL (ref 7–12)
PMV BLD AUTO: 9.1 FL (ref 7–12)
PMV BLD AUTO: 9.2 FL (ref 7–12)
PMV BLD AUTO: 9.3 FL (ref 7–12)
PMV BLD AUTO: 9.4 FL (ref 7–12)
PMV BLD AUTO: 9.5 FL (ref 7–12)
PMV BLD AUTO: 9.6 FL (ref 7–12)
PMV BLD AUTO: 9.7 FL (ref 7–12)
PMV BLD AUTO: 9.8 FL (ref 7–12)
PMV BLD AUTO: 9.9 FL (ref 7–12)
PO2: 109 MMHG (ref 75–100)
PO2: 132 MMHG (ref 75–100)
PO2: 132.3 MMHG (ref 75–100)
PO2: 154 MMHG (ref 75–100)
PO2: 273.8 MMHG (ref 75–100)
PO2: 99 MMHG (ref 75–100)
POIKILOCYTES: ABNORMAL
POLYCHROMASIA: ABNORMAL
POTASSIUM REFLEX MAGNESIUM: 2.8 MMOL/L (ref 3.5–5)
POTASSIUM REFLEX MAGNESIUM: 3.5 MMOL/L (ref 3.5–5)
POTASSIUM REFLEX MAGNESIUM: 4 MMOL/L (ref 3.5–5)
POTASSIUM REFLEX MAGNESIUM: 4.2 MMOL/L (ref 3.5–5)
POTASSIUM SERPL-SCNC: 2.4 MMOL/L (ref 3.5–5)
POTASSIUM SERPL-SCNC: 2.5 MMOL/L (ref 3.5–5)
POTASSIUM SERPL-SCNC: 2.8 MMOL/L (ref 3.5–5)
POTASSIUM SERPL-SCNC: 3 MMOL/L (ref 3.5–5)
POTASSIUM SERPL-SCNC: 3.1 MMOL/L (ref 3.5–5)
POTASSIUM SERPL-SCNC: 3.2 MMOL/L (ref 3.5–5)
POTASSIUM SERPL-SCNC: 3.2 MMOL/L (ref 3.5–5)
POTASSIUM SERPL-SCNC: 3.3 MMOL/L (ref 3.5–5)
POTASSIUM SERPL-SCNC: 3.4 MMOL/L (ref 3.5–5)
POTASSIUM SERPL-SCNC: 3.4 MMOL/L (ref 3.5–5)
POTASSIUM SERPL-SCNC: 3.5 MMOL/L (ref 3.5–5)
POTASSIUM SERPL-SCNC: 3.5 MMOL/L (ref 3.5–5)
POTASSIUM SERPL-SCNC: 3.6 MMOL/L (ref 3.5–5)
POTASSIUM SERPL-SCNC: 3.7 MMOL/L (ref 3.5–5)
POTASSIUM SERPL-SCNC: 3.9 MMOL/L (ref 3.5–5)
POTASSIUM SERPL-SCNC: 4.2 MMOL/L (ref 3.5–5)
POTASSIUM SERPL-SCNC: 4.2 MMOL/L (ref 3.5–5)
POTASSIUM SERPL-SCNC: 4.3 MMOL/L (ref 3.5–5)
POTASSIUM SERPL-SCNC: 4.6 MMOL/L (ref 3.5–5)
POTASSIUM SERPL-SCNC: 4.7 MMOL/L (ref 3.5–5)
POTASSIUM SERPL-SCNC: 4.8 MMOL/L (ref 3.5–5)
POTASSIUM SERPL-SCNC: 4.8 MMOL/L (ref 3.5–5)
POTASSIUM SERPL-SCNC: 5 MMOL/L (ref 3.5–5)
POTASSIUM SERPL-SCNC: 5.1 MMOL/L (ref 3.5–5)
PREALBUMIN: 14 MG/DL (ref 20–40)
PREALBUMIN: 16 MG/DL (ref 20–40)
PREALBUMIN: 17 MG/DL (ref 20–40)
PRO-BNP: 385 PG/ML (ref 0–125)
PRO-BNP: 827 PG/ML (ref 0–125)
PROCALCITONIN: 0.09 NG/ML (ref 0–0.08)
PROTEIN UA: 100 MG/DL
PROTEIN UA: 100 MG/DL
PROTEIN UA: ABNORMAL MG/DL
PROTEIN UA: NEGATIVE MG/DL
PROTEUS BY PCR: NOT DETECTED
PROTEUS BY PCR: NOT DETECTED
PS: 8 CMH20
PSEUDOMONAS AERUGINOSA BY PCR: NOT DETECTED
PSEUDOMONAS AERUGINOSA BY PCR: NOT DETECTED
RBC # BLD: 2.86 E12/L (ref 3.5–5.5)
RBC # BLD: 3.02 E12/L (ref 3.5–5.5)
RBC # BLD: 3.02 E12/L (ref 3.5–5.5)
RBC # BLD: 3.03 E12/L (ref 3.5–5.5)
RBC # BLD: 3.03 E12/L (ref 3.5–5.5)
RBC # BLD: 3.09 E12/L (ref 3.5–5.5)
RBC # BLD: 3.1 E12/L (ref 3.5–5.5)
RBC # BLD: 3.12 E12/L (ref 3.5–5.5)
RBC # BLD: 3.16 E12/L (ref 3.5–5.5)
RBC # BLD: 3.19 E12/L (ref 3.5–5.5)
RBC # BLD: 3.24 E12/L (ref 3.5–5.5)
RBC # BLD: 3.26 E12/L (ref 3.5–5.5)
RBC # BLD: 3.35 E12/L (ref 3.5–5.5)
RBC # BLD: 3.37 E12/L (ref 3.5–5.5)
RBC # BLD: 3.4 E12/L (ref 3.5–5.5)
RBC # BLD: 3.42 E12/L (ref 3.5–5.5)
RBC # BLD: 3.44 E12/L (ref 3.5–5.5)
RBC # BLD: 3.47 E12/L (ref 3.5–5.5)
RBC # BLD: 3.55 E12/L (ref 3.5–5.5)
RBC # BLD: 3.58 E12/L (ref 3.5–5.5)
RBC # BLD: 3.95 E12/L (ref 3.5–5.5)
RBC # BLD: 4.03 E12/L (ref 3.5–5.5)
RBC # BLD: 4.1 E12/L (ref 3.5–5.5)
RBC # BLD: 4.31 E12/L (ref 3.5–5.5)
RBC # BLD: 4.42 E12/L (ref 3.5–5.5)
RBC UA: ABNORMAL /HPF (ref 0–2)
RI(T): 133 %
RI(T): 134 %
RI(T): 220 %
RI(T): 57 %
RI(T): 84 %
ROULEAUX: ABNORMAL
ROULEAUX: ABNORMAL
RR MECHANICAL: 12 B/MIN
SARS-COV-2, NAAT: NOT DETECTED
SARS-COV-2, NAAT: NOT DETECTED
SCHISTOCYTES: ABNORMAL
SEDIMENTATION RATE, ERYTHROCYTE: 105 MM/HR (ref 0–20)
SEDIMENTATION RATE, ERYTHROCYTE: 126 MM/HR (ref 0–20)
SEDIMENTATION RATE, ERYTHROCYTE: 80 MM/HR (ref 0–20)
SEDIMENTATION RATE, ERYTHROCYTE: 93 MM/HR (ref 0–20)
SERRATIA MARCESCENS BY PCR: NOT DETECTED
SERRATIA MARCESCENS BY PCR: NOT DETECTED
SODIUM BLD-SCNC: 132 MMOL/L (ref 132–146)
SODIUM BLD-SCNC: 132 MMOL/L (ref 132–146)
SODIUM BLD-SCNC: 133 MMOL/L (ref 132–146)
SODIUM BLD-SCNC: 134 MMOL/L (ref 132–146)
SODIUM BLD-SCNC: 135 MMOL/L (ref 132–146)
SODIUM BLD-SCNC: 137 MMOL/L (ref 132–146)
SODIUM BLD-SCNC: 138 MMOL/L (ref 132–146)
SODIUM BLD-SCNC: 139 MMOL/L (ref 132–146)
SODIUM BLD-SCNC: 139 MMOL/L (ref 132–146)
SODIUM BLD-SCNC: 140 MMOL/L (ref 132–146)
SODIUM BLD-SCNC: 141 MMOL/L (ref 132–146)
SODIUM BLD-SCNC: 142 MMOL/L (ref 132–146)
SODIUM BLD-SCNC: 142 MMOL/L (ref 132–146)
SODIUM BLD-SCNC: 143 MMOL/L (ref 132–146)
SODIUM BLD-SCNC: 143 MMOL/L (ref 132–146)
SODIUM BLD-SCNC: 144 MMOL/L (ref 132–146)
SODIUM BLD-SCNC: 147 MMOL/L (ref 132–146)
SODIUM BLD-SCNC: 151 MMOL/L (ref 132–146)
SOURCE OF BLOOD CULTURE: ABNORMAL
SOURCE OF BLOOD CULTURE: ABNORMAL
SOURCE, BLOOD GAS: ABNORMAL
SPECIFIC GRAVITY UA: 1.01 (ref 1–1.03)
SPECIFIC GRAVITY UA: 1.02 (ref 1–1.03)
SPECIFIC GRAVITY UA: <=1.005 (ref 1–1.03)
SPECIFIC GRAVITY UA: >=1.03 (ref 1–1.03)
STAPHYLOCOCCUS AUREUS BY PCR: NOT DETECTED
STAPHYLOCOCCUS AUREUS BY PCR: NOT DETECTED
STAPHYLOCOCCUS SPECIES BY PCR: DETECTED
STAPHYLOCOCCUS SPECIES BY PCR: NOT DETECTED
STREPTOCOCCUS AGALACTIAE BY PCR: NOT DETECTED
STREPTOCOCCUS AGALACTIAE BY PCR: NOT DETECTED
STREPTOCOCCUS PNEUMONIAE BY PCR: NOT DETECTED
STREPTOCOCCUS PNEUMONIAE BY PCR: NOT DETECTED
STREPTOCOCCUS PYOGENES  BY PCR: NOT DETECTED
STREPTOCOCCUS PYOGENES  BY PCR: NOT DETECTED
STREPTOCOCCUS SPECIES BY PCR: NOT DETECTED
STREPTOCOCCUS SPECIES BY PCR: NOT DETECTED
TARGET CELLS: ABNORMAL
THB: 10.4 G/DL (ref 11.5–16.5)
THB: 8.2 G/DL (ref 11.5–16.5)
THB: 8.2 G/DL (ref 11.5–16.5)
THB: 8.8 G/DL (ref 11.5–16.5)
THB: 9.2 G/DL (ref 11.5–16.5)
THB: 9.7 G/DL (ref 11.5–16.5)
TIME ANALYZED: 1111
TIME ANALYZED: 2015
TIME ANALYZED: 534
TIME ANALYZED: 541
TIME ANALYZED: 656
TIME ANALYZED: 723
TOTAL CK: 34 U/L (ref 20–180)
TOTAL CK: 424 U/L (ref 20–180)
TOTAL CK: 58 U/L (ref 20–180)
TOTAL CK: 69 U/L (ref 20–180)
TOTAL IRON BINDING CAPACITY: 114 MCG/DL (ref 250–450)
TOTAL PROTEIN: 5 G/DL (ref 6.4–8.3)
TOTAL PROTEIN: 6.1 G/DL (ref 6.4–8.3)
TOTAL PROTEIN: 6.2 G/DL (ref 6.4–8.3)
TOTAL PROTEIN: 6.4 G/DL (ref 6.4–8.3)
TOTAL PROTEIN: 6.6 G/DL (ref 6.4–8.3)
TOTAL PROTEIN: 6.7 G/DL (ref 6.4–8.3)
TOTAL PROTEIN: 6.7 G/DL (ref 6.4–8.3)
TOTAL PROTEIN: 6.8 G/DL (ref 6.4–8.3)
TOTAL PROTEIN: 6.9 G/DL (ref 6.4–8.3)
TOTAL PROTEIN: 7 G/DL (ref 6.4–8.3)
TOTAL PROTEIN: 7.1 G/DL (ref 6.4–8.3)
TOTAL PROTEIN: 7.3 G/DL (ref 6.4–8.3)
TOTAL PROTEIN: 7.7 G/DL (ref 6.4–8.3)
TOTAL PROTEIN: 8.2 G/DL (ref 6.4–8.3)
TRANSFERRIN: 91 MG/DL (ref 200–360)
TRIGL SERPL-MCNC: 95 MG/DL (ref 0–149)
TROPONIN: 0.02 NG/ML (ref 0–0.03)
TROPONIN: 0.02 NG/ML (ref 0–0.03)
TROPONIN: 0.03 NG/ML (ref 0–0.03)
TROPONIN: 0.05 NG/ML (ref 0–0.03)
URINE CULTURE, ROUTINE: ABNORMAL
URINE CULTURE, ROUTINE: ABNORMAL
URINE CULTURE, ROUTINE: NORMAL
UROBILINOGEN, URINE: 0.2 E.U./DL
VANCOMYCIN RESISTANT BY PCR: DETECTED
VITAMIN B-12: 328 PG/ML (ref 211–946)
VLDLC SERPL CALC-MCNC: 19 MG/DL
VT MECHANICAL: 400 ML
WBC # BLD: 10 E9/L (ref 4.5–11.5)
WBC # BLD: 10.1 E9/L (ref 4.5–11.5)
WBC # BLD: 10.2 E9/L (ref 4.5–11.5)
WBC # BLD: 10.7 E9/L (ref 4.5–11.5)
WBC # BLD: 11.3 E9/L (ref 4.5–11.5)
WBC # BLD: 11.8 E9/L (ref 4.5–11.5)
WBC # BLD: 12 E9/L (ref 4.5–11.5)
WBC # BLD: 12.3 E9/L (ref 4.5–11.5)
WBC # BLD: 13.2 E9/L (ref 4.5–11.5)
WBC # BLD: 15.9 E9/L (ref 4.5–11.5)
WBC # BLD: 16.5 E9/L (ref 4.5–11.5)
WBC # BLD: 28.1 E9/L (ref 4.5–11.5)
WBC # BLD: 6.6 E9/L (ref 4.5–11.5)
WBC # BLD: 7.4 E9/L (ref 4.5–11.5)
WBC # BLD: 8 E9/L (ref 4.5–11.5)
WBC # BLD: 8 E9/L (ref 4.5–11.5)
WBC # BLD: 8.1 E9/L (ref 4.5–11.5)
WBC # BLD: 8.2 E9/L (ref 4.5–11.5)
WBC # BLD: 8.3 E9/L (ref 4.5–11.5)
WBC # BLD: 8.4 E9/L (ref 4.5–11.5)
WBC # BLD: 9.2 E9/L (ref 4.5–11.5)
WBC # BLD: 9.6 E9/L (ref 4.5–11.5)
WBC # BLD: 9.8 E9/L (ref 4.5–11.5)
WBC UA: >20 /HPF (ref 0–5)
WBC UA: ABNORMAL /HPF (ref 0–5)
WOUND/ABSCESS: ABNORMAL
YEAST: PRESENT /HPF

## 2020-01-01 PROCEDURE — 71045 X-RAY EXAM CHEST 1 VIEW: CPT

## 2020-01-01 PROCEDURE — 6360000002 HC RX W HCPCS: Performed by: INTERNAL MEDICINE

## 2020-01-01 PROCEDURE — 2580000003 HC RX 258: Performed by: EMERGENCY MEDICINE

## 2020-01-01 PROCEDURE — 2500000003 HC RX 250 WO HCPCS: Performed by: NURSE PRACTITIONER

## 2020-01-01 PROCEDURE — 83735 ASSAY OF MAGNESIUM: CPT

## 2020-01-01 PROCEDURE — 36415 COLL VENOUS BLD VENIPUNCTURE: CPT

## 2020-01-01 PROCEDURE — APPSS60 APP SPLIT SHARED TIME 46-60 MINUTES: Performed by: PHYSICIAN ASSISTANT

## 2020-01-01 PROCEDURE — 2580000003 HC RX 258: Performed by: STUDENT IN AN ORGANIZED HEALTH CARE EDUCATION/TRAINING PROGRAM

## 2020-01-01 PROCEDURE — 6370000000 HC RX 637 (ALT 250 FOR IP): Performed by: PSYCHIATRY & NEUROLOGY

## 2020-01-01 PROCEDURE — 6360000002 HC RX W HCPCS

## 2020-01-01 PROCEDURE — 85025 COMPLETE CBC W/AUTO DIFF WBC: CPT

## 2020-01-01 PROCEDURE — 6370000000 HC RX 637 (ALT 250 FOR IP): Performed by: INTERNAL MEDICINE

## 2020-01-01 PROCEDURE — 82962 GLUCOSE BLOOD TEST: CPT

## 2020-01-01 PROCEDURE — 92611 MOTION FLUOROSCOPY/SWALLOW: CPT | Performed by: SPEECH-LANGUAGE PATHOLOGIST

## 2020-01-01 PROCEDURE — 83605 ASSAY OF LACTIC ACID: CPT

## 2020-01-01 PROCEDURE — 6360000002 HC RX W HCPCS: Performed by: SURGERY

## 2020-01-01 PROCEDURE — 84484 ASSAY OF TROPONIN QUANT: CPT

## 2020-01-01 PROCEDURE — 2580000003 HC RX 258: Performed by: INTERNAL MEDICINE

## 2020-01-01 PROCEDURE — G0378 HOSPITAL OBSERVATION PER HR: HCPCS

## 2020-01-01 PROCEDURE — 6370000000 HC RX 637 (ALT 250 FOR IP): Performed by: NURSE PRACTITIONER

## 2020-01-01 PROCEDURE — 84466 ASSAY OF TRANSFERRIN: CPT

## 2020-01-01 PROCEDURE — 31500 INSERT EMERGENCY AIRWAY: CPT

## 2020-01-01 PROCEDURE — C9113 INJ PANTOPRAZOLE SODIUM, VIA: HCPCS | Performed by: SURGERY

## 2020-01-01 PROCEDURE — 7100000011 HC PHASE II RECOVERY - ADDTL 15 MIN: Performed by: SURGERY

## 2020-01-01 PROCEDURE — 83036 HEMOGLOBIN GLYCOSYLATED A1C: CPT

## 2020-01-01 PROCEDURE — APPSS30 APP SPLIT SHARED TIME 16-30 MINUTES: Performed by: NURSE PRACTITIONER

## 2020-01-01 PROCEDURE — 84100 ASSAY OF PHOSPHORUS: CPT

## 2020-01-01 PROCEDURE — 43246 EGD PLACE GASTROSTOMY TUBE: CPT | Performed by: SURGERY

## 2020-01-01 PROCEDURE — 70450 CT HEAD/BRAIN W/O DYE: CPT

## 2020-01-01 PROCEDURE — 80048 BASIC METABOLIC PNL TOTAL CA: CPT

## 2020-01-01 PROCEDURE — 81001 URINALYSIS AUTO W/SCOPE: CPT

## 2020-01-01 PROCEDURE — 36410 VNPNXR 3YR/> PHY/QHP DX/THER: CPT

## 2020-01-01 PROCEDURE — 1200000000 HC SEMI PRIVATE

## 2020-01-01 PROCEDURE — 87040 BLOOD CULTURE FOR BACTERIA: CPT

## 2020-01-01 PROCEDURE — 7100000010 HC PHASE II RECOVERY - FIRST 15 MIN

## 2020-01-01 PROCEDURE — 37799 UNLISTED PX VASCULAR SURGERY: CPT

## 2020-01-01 PROCEDURE — 82550 ASSAY OF CK (CPK): CPT

## 2020-01-01 PROCEDURE — 2580000003 HC RX 258

## 2020-01-01 PROCEDURE — 85651 RBC SED RATE NONAUTOMATED: CPT

## 2020-01-01 PROCEDURE — 6370000000 HC RX 637 (ALT 250 FOR IP): Performed by: PHYSICIAN ASSISTANT

## 2020-01-01 PROCEDURE — 82553 CREATINE MB FRACTION: CPT

## 2020-01-01 PROCEDURE — 2060000000 HC ICU INTERMEDIATE R&B

## 2020-01-01 PROCEDURE — 2500000003 HC RX 250 WO HCPCS: Performed by: EMERGENCY MEDICINE

## 2020-01-01 PROCEDURE — 80053 COMPREHEN METABOLIC PANEL: CPT

## 2020-01-01 PROCEDURE — 82805 BLOOD GASES W/O2 SATURATION: CPT

## 2020-01-01 PROCEDURE — 05H933Z INSERTION OF INFUSION DEVICE INTO RIGHT BRACHIAL VEIN, PERCUTANEOUS APPROACH: ICD-10-PCS | Performed by: INTERNAL MEDICINE

## 2020-01-01 PROCEDURE — 99285 EMERGENCY DEPT VISIT HI MDM: CPT

## 2020-01-01 PROCEDURE — 82728 ASSAY OF FERRITIN: CPT

## 2020-01-01 PROCEDURE — 93325 DOPPLER ECHO COLOR FLOW MAPG: CPT

## 2020-01-01 PROCEDURE — 2709999900 HC NON-CHARGEABLE SUPPLY: Performed by: SURGERY

## 2020-01-01 PROCEDURE — 6370000000 HC RX 637 (ALT 250 FOR IP): Performed by: SURGERY

## 2020-01-01 PROCEDURE — 0DH63UZ INSERTION OF FEEDING DEVICE INTO STOMACH, PERCUTANEOUS APPROACH: ICD-10-PCS | Performed by: SURGERY

## 2020-01-01 PROCEDURE — 85018 HEMOGLOBIN: CPT

## 2020-01-01 PROCEDURE — 2580000003 HC RX 258: Performed by: NURSE PRACTITIONER

## 2020-01-01 PROCEDURE — 99233 SBSQ HOSP IP/OBS HIGH 50: CPT | Performed by: INTERNAL MEDICINE

## 2020-01-01 PROCEDURE — 11042 DBRDMT SUBQ TIS 1ST 20SQCM/<: CPT | Performed by: SURGERY

## 2020-01-01 PROCEDURE — 99232 SBSQ HOSP IP/OBS MODERATE 35: CPT | Performed by: INTERNAL MEDICINE

## 2020-01-01 PROCEDURE — 11042 DBRDMT SUBQ TIS 1ST 20SQCM/<: CPT

## 2020-01-01 PROCEDURE — 76937 US GUIDE VASCULAR ACCESS: CPT

## 2020-01-01 PROCEDURE — B24BZZ4 ULTRASONOGRAPHY OF HEART WITH AORTA, TRANSESOPHAGEAL: ICD-10-PCS | Performed by: INTERNAL MEDICINE

## 2020-01-01 PROCEDURE — 2580000003 HC RX 258: Performed by: SURGERY

## 2020-01-01 PROCEDURE — 87075 CULTR BACTERIA EXCEPT BLOOD: CPT

## 2020-01-01 PROCEDURE — 86850 RBC ANTIBODY SCREEN: CPT

## 2020-01-01 PROCEDURE — APPSS45 APP SPLIT SHARED TIME 31-45 MINUTES: Performed by: NURSE PRACTITIONER

## 2020-01-01 PROCEDURE — 6360000002 HC RX W HCPCS: Performed by: NURSE ANESTHETIST, CERTIFIED REGISTERED

## 2020-01-01 PROCEDURE — 6370000000 HC RX 637 (ALT 250 FOR IP): Performed by: EMERGENCY MEDICINE

## 2020-01-01 PROCEDURE — 83690 ASSAY OF LIPASE: CPT

## 2020-01-01 PROCEDURE — C9113 INJ PANTOPRAZOLE SODIUM, VIA: HCPCS | Performed by: EMERGENCY MEDICINE

## 2020-01-01 PROCEDURE — 5A1945Z RESPIRATORY VENTILATION, 24-96 CONSECUTIVE HOURS: ICD-10-PCS | Performed by: STUDENT IN AN ORGANIZED HEALTH CARE EDUCATION/TRAINING PROGRAM

## 2020-01-01 PROCEDURE — 96372 THER/PROPH/DIAG INJ SC/IM: CPT

## 2020-01-01 PROCEDURE — 80061 LIPID PANEL: CPT

## 2020-01-01 PROCEDURE — 99222 1ST HOSP IP/OBS MODERATE 55: CPT | Performed by: INTERNAL MEDICINE

## 2020-01-01 PROCEDURE — 3700000001 HC ADD 15 MINUTES (ANESTHESIA): Performed by: SURGERY

## 2020-01-01 PROCEDURE — 93005 ELECTROCARDIOGRAM TRACING: CPT | Performed by: STUDENT IN AN ORGANIZED HEALTH CARE EDUCATION/TRAINING PROGRAM

## 2020-01-01 PROCEDURE — 51702 INSERT TEMP BLADDER CATH: CPT

## 2020-01-01 PROCEDURE — 99232 SBSQ HOSP IP/OBS MODERATE 35: CPT | Performed by: SURGERY

## 2020-01-01 PROCEDURE — 99231 SBSQ HOSP IP/OBS SF/LOW 25: CPT | Performed by: INTERNAL MEDICINE

## 2020-01-01 PROCEDURE — 92526 ORAL FUNCTION THERAPY: CPT | Performed by: SPEECH-LANGUAGE PATHOLOGIST

## 2020-01-01 PROCEDURE — 86900 BLOOD TYPING SEROLOGIC ABO: CPT

## 2020-01-01 PROCEDURE — 93312 ECHO TRANSESOPHAGEAL: CPT | Performed by: INTERNAL MEDICINE

## 2020-01-01 PROCEDURE — 80076 HEPATIC FUNCTION PANEL: CPT

## 2020-01-01 PROCEDURE — 96376 TX/PRO/DX INJ SAME DRUG ADON: CPT

## 2020-01-01 PROCEDURE — 82570 ASSAY OF URINE CREATININE: CPT

## 2020-01-01 PROCEDURE — 6360000002 HC RX W HCPCS: Performed by: EMERGENCY MEDICINE

## 2020-01-01 PROCEDURE — 99239 HOSP IP/OBS DSCHRG MGMT >30: CPT | Performed by: INTERNAL MEDICINE

## 2020-01-01 PROCEDURE — 3700000000 HC ANESTHESIA ATTENDED CARE: Performed by: SURGERY

## 2020-01-01 PROCEDURE — 86140 C-REACTIVE PROTEIN: CPT

## 2020-01-01 PROCEDURE — 36600 WITHDRAWAL OF ARTERIAL BLOOD: CPT

## 2020-01-01 PROCEDURE — 6360000004 HC RX CONTRAST MEDICATION: Performed by: RADIOLOGY

## 2020-01-01 PROCEDURE — 92610 EVALUATE SWALLOWING FUNCTION: CPT | Performed by: SPEECH-LANGUAGE PATHOLOGIST

## 2020-01-01 PROCEDURE — 6360000002 HC RX W HCPCS: Performed by: STUDENT IN AN ORGANIZED HEALTH CARE EDUCATION/TRAINING PROGRAM

## 2020-01-01 PROCEDURE — 70551 MRI BRAIN STEM W/O DYE: CPT

## 2020-01-01 PROCEDURE — 74177 CT ABD & PELVIS W/CONTRAST: CPT

## 2020-01-01 PROCEDURE — 94002 VENT MGMT INPAT INIT DAY: CPT

## 2020-01-01 PROCEDURE — 6360000002 HC RX W HCPCS: Performed by: FAMILY MEDICINE

## 2020-01-01 PROCEDURE — 87150 DNA/RNA AMPLIFIED PROBE: CPT

## 2020-01-01 PROCEDURE — 83615 LACTATE (LD) (LDH) ENZYME: CPT

## 2020-01-01 PROCEDURE — 99223 1ST HOSP IP/OBS HIGH 75: CPT | Performed by: PSYCHIATRY & NEUROLOGY

## 2020-01-01 PROCEDURE — 87186 SC STD MICRODIL/AGAR DIL: CPT

## 2020-01-01 PROCEDURE — 99223 1ST HOSP IP/OBS HIGH 75: CPT | Performed by: INTERNAL MEDICINE

## 2020-01-01 PROCEDURE — 2000000000 HC ICU R&B

## 2020-01-01 PROCEDURE — 36620 INSERTION CATHETER ARTERY: CPT

## 2020-01-01 PROCEDURE — 2500000003 HC RX 250 WO HCPCS: Performed by: INTERNAL MEDICINE

## 2020-01-01 PROCEDURE — 7100000010 HC PHASE II RECOVERY - FIRST 15 MIN: Performed by: SURGERY

## 2020-01-01 PROCEDURE — 74018 RADEX ABDOMEN 1 VIEW: CPT

## 2020-01-01 PROCEDURE — 2580000003 HC RX 258: Performed by: NURSE ANESTHETIST, CERTIFIED REGISTERED

## 2020-01-01 PROCEDURE — 95816 EEG AWAKE AND DROWSY: CPT

## 2020-01-01 PROCEDURE — 97165 OT EVAL LOW COMPLEX 30 MIN: CPT

## 2020-01-01 PROCEDURE — 3700000001 HC ADD 15 MINUTES (ANESTHESIA)

## 2020-01-01 PROCEDURE — 94003 VENT MGMT INPAT SUBQ DAY: CPT

## 2020-01-01 PROCEDURE — 83880 ASSAY OF NATRIURETIC PEPTIDE: CPT

## 2020-01-01 PROCEDURE — 71250 CT THORAX DX C-: CPT

## 2020-01-01 PROCEDURE — 96374 THER/PROPH/DIAG INJ IV PUSH: CPT

## 2020-01-01 PROCEDURE — 96375 TX/PRO/DX INJ NEW DRUG ADDON: CPT

## 2020-01-01 PROCEDURE — 82746 ASSAY OF FOLIC ACID SERUM: CPT

## 2020-01-01 PROCEDURE — 3609013300 HC EGD TUBE PLACEMENT: Performed by: SURGERY

## 2020-01-01 PROCEDURE — 99232 SBSQ HOSP IP/OBS MODERATE 35: CPT | Performed by: PSYCHIATRY & NEUROLOGY

## 2020-01-01 PROCEDURE — 93010 ELECTROCARDIOGRAM REPORT: CPT | Performed by: INTERNAL MEDICINE

## 2020-01-01 PROCEDURE — 99222 1ST HOSP IP/OBS MODERATE 55: CPT | Performed by: SURGERY

## 2020-01-01 PROCEDURE — 36556 INSERT NON-TUNNEL CV CATH: CPT

## 2020-01-01 PROCEDURE — 84134 ASSAY OF PREALBUMIN: CPT

## 2020-01-01 PROCEDURE — 74176 CT ABD & PELVIS W/O CONTRAST: CPT

## 2020-01-01 PROCEDURE — APPSS45 APP SPLIT SHARED TIME 31-45 MINUTES: Performed by: PHYSICIAN ASSISTANT

## 2020-01-01 PROCEDURE — 93312 ECHO TRANSESOPHAGEAL: CPT

## 2020-01-01 PROCEDURE — 87088 URINE BACTERIA CULTURE: CPT

## 2020-01-01 PROCEDURE — 84132 ASSAY OF SERUM POTASSIUM: CPT

## 2020-01-01 PROCEDURE — 85378 FIBRIN DEGRADE SEMIQUANT: CPT

## 2020-01-01 PROCEDURE — 87077 CULTURE AEROBIC IDENTIFY: CPT

## 2020-01-01 PROCEDURE — 93320 DOPPLER ECHO COMPLETE: CPT

## 2020-01-01 PROCEDURE — 83550 IRON BINDING TEST: CPT

## 2020-01-01 PROCEDURE — 87070 CULTURE OTHR SPECIMN AEROBIC: CPT

## 2020-01-01 PROCEDURE — 0BH18EZ INSERTION OF ENDOTRACHEAL AIRWAY INTO TRACHEA, VIA NATURAL OR ARTIFICIAL OPENING ENDOSCOPIC: ICD-10-PCS | Performed by: STUDENT IN AN ORGANIZED HEALTH CARE EDUCATION/TRAINING PROGRAM

## 2020-01-01 PROCEDURE — 86901 BLOOD TYPING SEROLOGIC RH(D): CPT

## 2020-01-01 PROCEDURE — 6360000002 HC RX W HCPCS: Performed by: NURSE PRACTITIONER

## 2020-01-01 PROCEDURE — 82607 VITAMIN B-12: CPT

## 2020-01-01 PROCEDURE — 87081 CULTURE SCREEN ONLY: CPT

## 2020-01-01 PROCEDURE — 83540 ASSAY OF IRON: CPT

## 2020-01-01 PROCEDURE — 93325 DOPPLER ECHO COLOR FLOW MAPG: CPT | Performed by: INTERNAL MEDICINE

## 2020-01-01 PROCEDURE — 02HV33Z INSERTION OF INFUSION DEVICE INTO SUPERIOR VENA CAVA, PERCUTANEOUS APPROACH: ICD-10-PCS | Performed by: INTERNAL MEDICINE

## 2020-01-01 PROCEDURE — 2700000000 HC OXYGEN THERAPY PER DAY

## 2020-01-01 PROCEDURE — C1751 CATH, INF, PER/CENT/MIDLINE: HCPCS

## 2020-01-01 PROCEDURE — 3700000000 HC ANESTHESIA ATTENDED CARE

## 2020-01-01 PROCEDURE — 2700000000 HC OXYGEN THERAPY PER DAY: Performed by: INTERNAL MEDICINE

## 2020-01-01 PROCEDURE — U0002 COVID-19 LAB TEST NON-CDC: HCPCS

## 2020-01-01 PROCEDURE — 7100000011 HC PHASE II RECOVERY - ADDTL 15 MIN

## 2020-01-01 PROCEDURE — 2500000003 HC RX 250 WO HCPCS

## 2020-01-01 PROCEDURE — 74230 X-RAY XM SWLNG FUNCJ C+: CPT

## 2020-01-01 PROCEDURE — 6360000002 HC RX W HCPCS: Performed by: ANESTHESIOLOGY

## 2020-01-01 PROCEDURE — 99284 EMERGENCY DEPT VISIT MOD MDM: CPT

## 2020-01-01 PROCEDURE — 93320 DOPPLER ECHO COMPLETE: CPT | Performed by: INTERNAL MEDICINE

## 2020-01-01 PROCEDURE — 82044 UR ALBUMIN SEMIQUANTITATIVE: CPT

## 2020-01-01 PROCEDURE — 84145 PROCALCITONIN (PCT): CPT

## 2020-01-01 PROCEDURE — 87450 HC DIRECT STREP B ANTIGEN: CPT

## 2020-01-01 PROCEDURE — 36592 COLLECT BLOOD FROM PICC: CPT

## 2020-01-01 PROCEDURE — 95816 EEG AWAKE AND DROWSY: CPT | Performed by: PSYCHIATRY & NEUROLOGY

## 2020-01-01 PROCEDURE — 85014 HEMATOCRIT: CPT

## 2020-01-01 RX ORDER — ALLOPURINOL 100 MG/1
100 TABLET ORAL DAILY
Qty: 30 TABLET | Refills: 3 | Status: SHIPPED | OUTPATIENT
Start: 2020-01-01 | End: 2021-01-01

## 2020-01-01 RX ORDER — POTASSIUM CHLORIDE 7.45 MG/ML
INJECTION INTRAVENOUS
Status: COMPLETED
Start: 2020-01-01 | End: 2020-01-01

## 2020-01-01 RX ORDER — MAGNESIUM SULFATE IN WATER 40 MG/ML
2 INJECTION, SOLUTION INTRAVENOUS ONCE
Status: COMPLETED | OUTPATIENT
Start: 2020-01-01 | End: 2020-01-01

## 2020-01-01 RX ORDER — ALLOPURINOL 100 MG/1
100 TABLET ORAL DAILY
Status: DISCONTINUED | OUTPATIENT
Start: 2020-01-01 | End: 2020-01-01 | Stop reason: HOSPADM

## 2020-01-01 RX ORDER — ROSUVASTATIN CALCIUM 10 MG/1
10 TABLET, COATED ORAL NIGHTLY
Status: DISCONTINUED | OUTPATIENT
Start: 2020-01-01 | End: 2020-01-01 | Stop reason: HOSPADM

## 2020-01-01 RX ORDER — LIDOCAINE HYDROCHLORIDE 40 MG/ML
SOLUTION TOPICAL ONCE
Status: DISCONTINUED | OUTPATIENT
Start: 2020-01-01 | End: 2020-01-01 | Stop reason: HOSPADM

## 2020-01-01 RX ORDER — EXENATIDE 250 UG/ML
10 INJECTION SUBCUTANEOUS 2 TIMES DAILY WITH MEALS
Qty: 2.4 ML | Refills: 1 | Status: SHIPPED
Start: 2020-01-01 | End: 2021-01-01 | Stop reason: ALTCHOICE

## 2020-01-01 RX ORDER — FOLIC ACID 1 MG/1
1 TABLET ORAL DAILY
Status: DISCONTINUED | OUTPATIENT
Start: 2020-01-01 | End: 2020-01-01 | Stop reason: HOSPADM

## 2020-01-01 RX ORDER — GLIPIZIDE 5 MG/1
10 TABLET ORAL
Status: DISCONTINUED | OUTPATIENT
Start: 2020-01-01 | End: 2020-01-01 | Stop reason: HOSPADM

## 2020-01-01 RX ORDER — POLYETHYLENE GLYCOL 3350 17 G/17G
17 POWDER, FOR SOLUTION ORAL DAILY PRN
Status: DISCONTINUED | OUTPATIENT
Start: 2020-01-01 | End: 2020-01-01 | Stop reason: HOSPADM

## 2020-01-01 RX ORDER — HEPARIN SODIUM (PORCINE) LOCK FLUSH IV SOLN 100 UNIT/ML 100 UNIT/ML
1 SOLUTION INTRAVENOUS EVERY 12 HOURS SCHEDULED
Status: DISCONTINUED | OUTPATIENT
Start: 2020-01-01 | End: 2020-01-01 | Stop reason: HOSPADM

## 2020-01-01 RX ORDER — HYDRALAZINE HYDROCHLORIDE 20 MG/ML
5 INJECTION INTRAMUSCULAR; INTRAVENOUS EVERY 10 MIN PRN
Status: DISCONTINUED | OUTPATIENT
Start: 2020-01-01 | End: 2020-01-01 | Stop reason: HOSPADM

## 2020-01-01 RX ORDER — DEXTROSE AND SODIUM CHLORIDE 5; .9 G/100ML; G/100ML
INJECTION, SOLUTION INTRAVENOUS CONTINUOUS
Status: DISCONTINUED | OUTPATIENT
Start: 2020-01-01 | End: 2020-01-01

## 2020-01-01 RX ORDER — ONDANSETRON 2 MG/ML
4 INJECTION INTRAMUSCULAR; INTRAVENOUS EVERY 6 HOURS PRN
Status: DISCONTINUED | OUTPATIENT
Start: 2020-01-01 | End: 2020-01-01 | Stop reason: HOSPADM

## 2020-01-01 RX ORDER — HALOPERIDOL 5 MG/ML
1 INJECTION INTRAMUSCULAR EVERY 4 HOURS PRN
Status: DISCONTINUED | OUTPATIENT
Start: 2020-01-01 | End: 2020-01-01 | Stop reason: HOSPADM

## 2020-01-01 RX ORDER — POTASSIUM CHLORIDE 7.45 MG/ML
10 INJECTION INTRAVENOUS PRN
Status: DISCONTINUED | OUTPATIENT
Start: 2020-01-01 | End: 2020-01-01

## 2020-01-01 RX ORDER — SENNOSIDES 8.8 MG/5ML
5 LIQUID ORAL NIGHTLY
Status: DISCONTINUED | OUTPATIENT
Start: 2020-01-01 | End: 2020-01-01

## 2020-01-01 RX ORDER — POTASSIUM CHLORIDE 7.45 MG/ML
10 INJECTION INTRAVENOUS
Status: DISPENSED | OUTPATIENT
Start: 2020-01-01 | End: 2020-01-01

## 2020-01-01 RX ORDER — POTASSIUM CHLORIDE 7.45 MG/ML
10 INJECTION INTRAVENOUS
Status: COMPLETED | OUTPATIENT
Start: 2020-01-01 | End: 2020-01-01

## 2020-01-01 RX ORDER — SODIUM CHLORIDE 9 MG/ML
INJECTION, SOLUTION INTRAVENOUS ONCE
Status: COMPLETED | OUTPATIENT
Start: 2020-01-01 | End: 2020-01-01

## 2020-01-01 RX ORDER — DEXTROSE MONOHYDRATE 50 MG/ML
100 INJECTION, SOLUTION INTRAVENOUS PRN
Status: DISCONTINUED | OUTPATIENT
Start: 2020-01-01 | End: 2020-01-01 | Stop reason: HOSPADM

## 2020-01-01 RX ORDER — IBUPROFEN 800 MG/1
800 TABLET ORAL EVERY 6 HOURS PRN
Status: DISCONTINUED | OUTPATIENT
Start: 2020-01-01 | End: 2020-01-01 | Stop reason: HOSPADM

## 2020-01-01 RX ORDER — PROPOFOL 10 MG/ML
INJECTION, EMULSION INTRAVENOUS PRN
Status: DISCONTINUED | OUTPATIENT
Start: 2020-01-01 | End: 2020-01-01 | Stop reason: SDUPTHER

## 2020-01-01 RX ORDER — SODIUM CHLORIDE 0.9 % (FLUSH) 0.9 %
10 SYRINGE (ML) INJECTION EVERY 12 HOURS SCHEDULED
Status: DISCONTINUED | OUTPATIENT
Start: 2020-01-01 | End: 2020-01-01 | Stop reason: HOSPADM

## 2020-01-01 RX ORDER — SODIUM CHLORIDE 0.9 % (FLUSH) 0.9 %
10 SYRINGE (ML) INJECTION PRN
Status: DISCONTINUED | OUTPATIENT
Start: 2020-01-01 | End: 2020-01-01 | Stop reason: HOSPADM

## 2020-01-01 RX ORDER — POTASSIUM BICARBONATE 25 MEQ/1
50 TABLET, EFFERVESCENT ORAL 2 TIMES DAILY
Status: DISCONTINUED | OUTPATIENT
Start: 2020-01-01 | End: 2020-01-01 | Stop reason: CLARIF

## 2020-01-01 RX ORDER — IBUPROFEN 800 MG/1
800 TABLET ORAL EVERY 8 HOURS PRN
Qty: 120 TABLET | Refills: 1 | Status: ON HOLD
Start: 2020-01-01 | End: 2020-01-01 | Stop reason: HOSPADM

## 2020-01-01 RX ORDER — INSULIN LISPRO 100 [IU]/ML
INJECTION, SOLUTION INTRAVENOUS; SUBCUTANEOUS
Qty: 3 PEN | Refills: 0 | Status: SHIPPED | OUTPATIENT
Start: 2020-01-01 | End: 2021-01-01 | Stop reason: ALTCHOICE

## 2020-01-01 RX ORDER — MAGNESIUM SULFATE IN WATER 40 MG/ML
2 INJECTION, SOLUTION INTRAVENOUS ONCE
Status: DISCONTINUED | OUTPATIENT
Start: 2020-01-01 | End: 2020-01-01 | Stop reason: HOSPADM

## 2020-01-01 RX ORDER — INSULIN GLARGINE 100 [IU]/ML
0.25 INJECTION, SOLUTION SUBCUTANEOUS NIGHTLY
Status: DISCONTINUED | OUTPATIENT
Start: 2020-01-01 | End: 2020-01-01

## 2020-01-01 RX ORDER — ACETAMINOPHEN 650 MG/1
650 SUPPOSITORY RECTAL EVERY 6 HOURS PRN
Status: DISCONTINUED | OUTPATIENT
Start: 2020-01-01 | End: 2020-01-01 | Stop reason: HOSPADM

## 2020-01-01 RX ORDER — FUROSEMIDE 10 MG/ML
40 INJECTION INTRAMUSCULAR; INTRAVENOUS ONCE
Status: COMPLETED | OUTPATIENT
Start: 2020-01-01 | End: 2020-01-01

## 2020-01-01 RX ORDER — DEXTROSE MONOHYDRATE 25 G/50ML
12.5 INJECTION, SOLUTION INTRAVENOUS PRN
Status: DISCONTINUED | OUTPATIENT
Start: 2020-01-01 | End: 2020-01-01 | Stop reason: HOSPADM

## 2020-01-01 RX ORDER — LEVOFLOXACIN 500 MG/1
500 TABLET, FILM COATED ORAL DAILY
Qty: 10 TABLET | Refills: 0 | Status: SHIPPED | OUTPATIENT
Start: 2020-01-01 | End: 2020-01-01 | Stop reason: ALTCHOICE

## 2020-01-01 RX ORDER — PROMETHAZINE HYDROCHLORIDE 25 MG/1
12.5 TABLET ORAL EVERY 6 HOURS PRN
Status: DISCONTINUED | OUTPATIENT
Start: 2020-01-01 | End: 2020-01-01 | Stop reason: HOSPADM

## 2020-01-01 RX ORDER — EXENATIDE 250 UG/ML
INJECTION SUBCUTANEOUS
COMMUNITY
Start: 2020-01-01 | End: 2020-01-01 | Stop reason: SDUPTHER

## 2020-01-01 RX ORDER — POTASSIUM CHLORIDE 20 MEQ/1
40 TABLET, EXTENDED RELEASE ORAL PRN
Status: DISCONTINUED | OUTPATIENT
Start: 2020-01-01 | End: 2020-01-01

## 2020-01-01 RX ORDER — POTASSIUM CHLORIDE 29.8 MG/ML
40 INJECTION INTRAVENOUS ONCE
Status: COMPLETED | OUTPATIENT
Start: 2020-01-01 | End: 2020-01-01

## 2020-01-01 RX ORDER — FUROSEMIDE 10 MG/ML
INJECTION INTRAMUSCULAR; INTRAVENOUS
Status: DISPENSED
Start: 2020-01-01 | End: 2020-01-01

## 2020-01-01 RX ORDER — ERGOCALCIFEROL 1.25 MG/1
50000 CAPSULE ORAL WEEKLY
Status: DISCONTINUED | OUTPATIENT
Start: 2020-01-01 | End: 2020-01-01 | Stop reason: HOSPADM

## 2020-01-01 RX ORDER — MAGNESIUM SULFATE IN WATER 40 MG/ML
4 INJECTION, SOLUTION INTRAVENOUS ONCE
Status: COMPLETED | OUTPATIENT
Start: 2020-01-01 | End: 2020-01-01

## 2020-01-01 RX ORDER — SODIUM CHLORIDE 0.9 % (FLUSH) 0.9 %
10 SYRINGE (ML) INJECTION ONCE
Status: DISCONTINUED | OUTPATIENT
Start: 2020-01-01 | End: 2020-01-01 | Stop reason: HOSPADM

## 2020-01-01 RX ORDER — ATENOLOL 50 MG/1
50 TABLET ORAL DAILY
Status: DISCONTINUED | OUTPATIENT
Start: 2020-01-01 | End: 2020-01-01 | Stop reason: HOSPADM

## 2020-01-01 RX ORDER — POTASSIUM CHLORIDE 20 MEQ/1
40 TABLET, EXTENDED RELEASE ORAL ONCE
Status: COMPLETED | OUTPATIENT
Start: 2020-01-01 | End: 2020-01-01

## 2020-01-01 RX ORDER — POTASSIUM BICARBONATE 25 MEQ/1
50 TABLET, EFFERVESCENT ORAL ONCE
Status: DISCONTINUED | OUTPATIENT
Start: 2020-01-01 | End: 2020-01-01 | Stop reason: CLARIF

## 2020-01-01 RX ORDER — SODIUM CHLORIDE 9 MG/ML
INJECTION, SOLUTION INTRAVENOUS CONTINUOUS
Status: DISCONTINUED | OUTPATIENT
Start: 2020-01-01 | End: 2020-01-01

## 2020-01-01 RX ORDER — FENTANYL CITRATE 50 UG/ML
12.5 INJECTION, SOLUTION INTRAMUSCULAR; INTRAVENOUS
Status: DISCONTINUED | OUTPATIENT
Start: 2020-01-01 | End: 2020-01-01

## 2020-01-01 RX ORDER — ASPIRIN 81 MG/1
81 TABLET ORAL DAILY
COMMUNITY

## 2020-01-01 RX ORDER — 0.9 % SODIUM CHLORIDE 0.9 %
1000 INTRAVENOUS SOLUTION INTRAVENOUS ONCE
Status: DISCONTINUED | OUTPATIENT
Start: 2020-01-01 | End: 2020-01-01 | Stop reason: HOSPADM

## 2020-01-01 RX ORDER — DULOXETIN HYDROCHLORIDE 60 MG/1
60 CAPSULE, DELAYED RELEASE ORAL DAILY
Status: DISCONTINUED | OUTPATIENT
Start: 2020-01-01 | End: 2020-01-01 | Stop reason: HOSPADM

## 2020-01-01 RX ORDER — FLUCONAZOLE 100 MG/1
200 TABLET ORAL DAILY
Status: DISCONTINUED | OUTPATIENT
Start: 2020-01-01 | End: 2020-01-01 | Stop reason: HOSPADM

## 2020-01-01 RX ORDER — LANOLIN ALCOHOL/MO/W.PET/CERES
3 CREAM (GRAM) TOPICAL NIGHTLY PRN
Status: DISCONTINUED | OUTPATIENT
Start: 2020-01-01 | End: 2020-01-01 | Stop reason: HOSPADM

## 2020-01-01 RX ORDER — SODIUM CHLORIDE 9 MG/ML
INJECTION, SOLUTION INTRAVENOUS CONTINUOUS PRN
Status: DISCONTINUED | OUTPATIENT
Start: 2020-01-01 | End: 2020-01-01 | Stop reason: SDUPTHER

## 2020-01-01 RX ORDER — MEPERIDINE HYDROCHLORIDE 25 MG/ML
12.5 INJECTION INTRAMUSCULAR; INTRAVENOUS; SUBCUTANEOUS EVERY 5 MIN PRN
Status: DISCONTINUED | OUTPATIENT
Start: 2020-01-01 | End: 2020-01-01 | Stop reason: HOSPADM

## 2020-01-01 RX ORDER — PANTOPRAZOLE SODIUM 40 MG/10ML
40 INJECTION, POWDER, LYOPHILIZED, FOR SOLUTION INTRAVENOUS DAILY
Status: DISCONTINUED | OUTPATIENT
Start: 2020-01-01 | End: 2020-01-01 | Stop reason: HOSPADM

## 2020-01-01 RX ORDER — POTASSIUM CHLORIDE 7.45 MG/ML
10 INJECTION INTRAVENOUS PRN
Status: DISCONTINUED | OUTPATIENT
Start: 2020-01-01 | End: 2020-01-01 | Stop reason: HOSPADM

## 2020-01-01 RX ORDER — ETOMIDATE 2 MG/ML
INJECTION INTRAVENOUS
Status: COMPLETED
Start: 2020-01-01 | End: 2020-01-01

## 2020-01-01 RX ORDER — DULOXETIN HYDROCHLORIDE 60 MG/1
60 CAPSULE, DELAYED RELEASE ORAL DAILY
COMMUNITY

## 2020-01-01 RX ORDER — FOLIC ACID 1 MG/1
1 TABLET ORAL DAILY
Qty: 30 TABLET | Refills: 0 | Status: SHIPPED | OUTPATIENT
Start: 2020-01-01 | End: 2020-01-01

## 2020-01-01 RX ORDER — SENNA PLUS 8.6 MG/1
1 TABLET ORAL NIGHTLY
Status: DISCONTINUED | OUTPATIENT
Start: 2020-01-01 | End: 2020-01-01 | Stop reason: SDUPTHER

## 2020-01-01 RX ORDER — SODIUM CHLORIDE 9 MG/ML
INJECTION, SOLUTION INTRAVENOUS EVERY 8 HOURS
Status: DISCONTINUED | OUTPATIENT
Start: 2020-01-01 | End: 2020-01-01 | Stop reason: HOSPADM

## 2020-01-01 RX ORDER — PROPOFOL 10 MG/ML
10 INJECTION, EMULSION INTRAVENOUS
Status: DISCONTINUED | OUTPATIENT
Start: 2020-01-01 | End: 2020-01-01

## 2020-01-01 RX ORDER — CALCIUM CARBONATE/VITAMIN D3 500-10/5ML
400 LIQUID (ML) ORAL DAILY
Qty: 7 CAPSULE | Refills: 0 | Status: ON HOLD
Start: 2020-01-01 | End: 2020-01-01 | Stop reason: HOSPADM

## 2020-01-01 RX ORDER — 0.9 % SODIUM CHLORIDE 0.9 %
1000 INTRAVENOUS SOLUTION INTRAVENOUS ONCE
Status: COMPLETED | OUTPATIENT
Start: 2020-01-01 | End: 2020-01-01

## 2020-01-01 RX ORDER — HEPARIN SODIUM (PORCINE) LOCK FLUSH IV SOLN 100 UNIT/ML 100 UNIT/ML
1 SOLUTION INTRAVENOUS PRN
Status: DISCONTINUED | OUTPATIENT
Start: 2020-01-01 | End: 2020-01-01 | Stop reason: HOSPADM

## 2020-01-01 RX ORDER — DULOXETIN HYDROCHLORIDE 60 MG/1
60 CAPSULE, DELAYED RELEASE ORAL DAILY
Qty: 30 CAPSULE | Refills: 0 | Status: SHIPPED | OUTPATIENT
Start: 2020-01-01 | End: 2020-01-01

## 2020-01-01 RX ORDER — LABETALOL HYDROCHLORIDE 5 MG/ML
5 INJECTION, SOLUTION INTRAVENOUS EVERY 10 MIN PRN
Status: DISCONTINUED | OUTPATIENT
Start: 2020-01-01 | End: 2020-01-01 | Stop reason: HOSPADM

## 2020-01-01 RX ORDER — NICOTINE POLACRILEX 4 MG
15 LOZENGE BUCCAL PRN
Status: DISCONTINUED | OUTPATIENT
Start: 2020-01-01 | End: 2020-01-01 | Stop reason: HOSPADM

## 2020-01-01 RX ORDER — DEXAMETHASONE SODIUM PHOSPHATE 4 MG/ML
6 INJECTION, SOLUTION INTRA-ARTICULAR; INTRALESIONAL; INTRAMUSCULAR; INTRAVENOUS; SOFT TISSUE DAILY
Status: DISCONTINUED | OUTPATIENT
Start: 2020-01-01 | End: 2020-01-01

## 2020-01-01 RX ORDER — INSULIN GLARGINE 100 [IU]/ML
12 INJECTION, SOLUTION SUBCUTANEOUS NIGHTLY
Status: DISCONTINUED | OUTPATIENT
Start: 2020-01-01 | End: 2020-01-01

## 2020-01-01 RX ORDER — ASPIRIN 81 MG/1
81 TABLET, CHEWABLE ORAL DAILY
Status: DISCONTINUED | OUTPATIENT
Start: 2020-01-01 | End: 2020-01-01 | Stop reason: HOSPADM

## 2020-01-01 RX ORDER — ACETAMINOPHEN 325 MG/1
650 TABLET ORAL EVERY 6 HOURS PRN
Status: DISCONTINUED | OUTPATIENT
Start: 2020-01-01 | End: 2020-01-01 | Stop reason: HOSPADM

## 2020-01-01 RX ORDER — ROSUVASTATIN CALCIUM 10 MG/1
10 TABLET, COATED ORAL NIGHTLY
Qty: 30 TABLET | Refills: 3 | Status: SHIPPED | OUTPATIENT
Start: 2020-01-01

## 2020-01-01 RX ORDER — DEXAMETHASONE SODIUM PHOSPHATE 10 MG/ML
6 INJECTION, SOLUTION INTRAMUSCULAR; INTRAVENOUS ONCE
Status: COMPLETED | OUTPATIENT
Start: 2020-01-01 | End: 2020-01-01

## 2020-01-01 RX ORDER — LEVOFLOXACIN 5 MG/ML
750 INJECTION, SOLUTION INTRAVENOUS EVERY 24 HOURS
Status: DISCONTINUED | OUTPATIENT
Start: 2020-01-01 | End: 2020-01-01

## 2020-01-01 RX ORDER — POTASSIUM CHLORIDE 20 MEQ/1
40 TABLET, EXTENDED RELEASE ORAL PRN
Status: DISCONTINUED | OUTPATIENT
Start: 2020-01-01 | End: 2020-01-01 | Stop reason: HOSPADM

## 2020-01-01 RX ORDER — MAGNESIUM SULFATE 1 G/100ML
1 INJECTION INTRAVENOUS PRN
Status: DISCONTINUED | OUTPATIENT
Start: 2020-01-01 | End: 2020-01-01

## 2020-01-01 RX ORDER — ASPIRIN 81 MG/1
81 TABLET, CHEWABLE ORAL DAILY
Qty: 30 TABLET | Refills: 0 | Status: SHIPPED | OUTPATIENT
Start: 2020-01-01 | End: 2020-01-01

## 2020-01-01 RX ORDER — DEXTROSE MONOHYDRATE 25 G/50ML
INJECTION, SOLUTION INTRAVENOUS
Status: COMPLETED
Start: 2020-01-01 | End: 2020-01-01

## 2020-01-01 RX ORDER — SODIUM CHLORIDE 9 MG/ML
10 INJECTION INTRAVENOUS DAILY
Status: DISCONTINUED | OUTPATIENT
Start: 2020-01-01 | End: 2020-01-01 | Stop reason: HOSPADM

## 2020-01-01 RX ORDER — ACETAMINOPHEN 325 MG/1
650 TABLET ORAL EVERY 8 HOURS PRN
Status: DISCONTINUED | OUTPATIENT
Start: 2020-01-01 | End: 2020-01-01 | Stop reason: SDUPTHER

## 2020-01-01 RX ORDER — PROMETHAZINE HYDROCHLORIDE 25 MG/ML
6.25 INJECTION, SOLUTION INTRAMUSCULAR; INTRAVENOUS
Status: DISCONTINUED | OUTPATIENT
Start: 2020-01-01 | End: 2020-01-01 | Stop reason: HOSPADM

## 2020-01-01 RX ORDER — DEXTROSE MONOHYDRATE 25 G/50ML
12.5 INJECTION, SOLUTION INTRAVENOUS ONCE
Status: COMPLETED | OUTPATIENT
Start: 2020-01-01 | End: 2020-01-01

## 2020-01-01 RX ORDER — FOLIC ACID 1 MG/1
1 TABLET ORAL DAILY
COMMUNITY
End: 2021-01-01 | Stop reason: ALTCHOICE

## 2020-01-01 RX ORDER — SUCCINYLCHOLINE CHLORIDE 20 MG/ML
INJECTION INTRAMUSCULAR; INTRAVENOUS
Status: COMPLETED
Start: 2020-01-01 | End: 2020-01-01

## 2020-01-01 RX ORDER — LISINOPRIL 20 MG/1
40 TABLET ORAL DAILY
Status: DISCONTINUED | OUTPATIENT
Start: 2020-01-01 | End: 2020-01-01 | Stop reason: HOSPADM

## 2020-01-01 RX ORDER — SODIUM CHLORIDE AND POTASSIUM CHLORIDE .9; .15 G/100ML; G/100ML
SOLUTION INTRAVENOUS CONTINUOUS
Status: DISCONTINUED | OUTPATIENT
Start: 2020-01-01 | End: 2020-01-01 | Stop reason: HOSPADM

## 2020-01-01 RX ORDER — FLUCONAZOLE 200 MG/1
200 TABLET ORAL DAILY
Qty: 20 TABLET | Refills: 0 | Status: ON HOLD | OUTPATIENT
Start: 2020-01-01 | End: 2020-01-01 | Stop reason: HOSPADM

## 2020-01-01 RX ORDER — PEN NEEDLE, DIABETIC 31 G X1/4"
1 NEEDLE, DISPOSABLE MISCELLANEOUS DAILY
Qty: 100 EACH | Refills: 1 | Status: SHIPPED | OUTPATIENT
Start: 2020-01-01

## 2020-01-01 RX ADMIN — POTASSIUM CHLORIDE 10 MEQ: 10 INJECTION, SOLUTION INTRAVENOUS at 17:59

## 2020-01-01 RX ADMIN — Medication 25 MCG/HR: at 21:18

## 2020-01-01 RX ADMIN — FOLIC ACID 1 MG: 1 TABLET ORAL at 08:20

## 2020-01-01 RX ADMIN — Medication 10 ML: at 21:37

## 2020-01-01 RX ADMIN — SODIUM CHLORIDE, PRESERVATIVE FREE 10 ML: 5 INJECTION INTRAVENOUS at 18:00

## 2020-01-01 RX ADMIN — ATENOLOL 50 MG: 50 TABLET ORAL at 08:01

## 2020-01-01 RX ADMIN — SODIUM CHLORIDE 400 MG: 9 INJECTION, SOLUTION INTRAVENOUS at 17:24

## 2020-01-01 RX ADMIN — POTASSIUM CHLORIDE 10 MEQ: 7.46 INJECTION, SOLUTION INTRAVENOUS at 10:39

## 2020-01-01 RX ADMIN — SODIUM CHLORIDE: 900 INJECTION INTRAVENOUS at 11:03

## 2020-01-01 RX ADMIN — MEROPENEM 1 G: 1 INJECTION, POWDER, FOR SOLUTION INTRAVENOUS at 04:05

## 2020-01-01 RX ADMIN — DEXTROSE MONOHYDRATE 50 ML: 25 INJECTION, SOLUTION INTRAVENOUS at 14:25

## 2020-01-01 RX ADMIN — INSULIN LISPRO 2 UNITS: 100 INJECTION, SOLUTION INTRAVENOUS; SUBCUTANEOUS at 20:38

## 2020-01-01 RX ADMIN — LIDOCAINE HYDROCHLORIDE 0.5 ML: 10 INJECTION, SOLUTION EPIDURAL; INFILTRATION; INTRACAUDAL; PERINEURAL at 14:10

## 2020-01-01 RX ADMIN — POTASSIUM CHLORIDE 40 MEQ: 29.8 INJECTION, SOLUTION INTRAVENOUS at 09:31

## 2020-01-01 RX ADMIN — Medication 10 ML: at 10:35

## 2020-01-01 RX ADMIN — MEROPENEM 1 G: 1 INJECTION, POWDER, FOR SOLUTION INTRAVENOUS at 18:00

## 2020-01-01 RX ADMIN — CEFTRIAXONE SODIUM 1 G: 1 INJECTION, POWDER, FOR SOLUTION INTRAMUSCULAR; INTRAVENOUS at 01:00

## 2020-01-01 RX ADMIN — MEROPENEM 1 G: 1 INJECTION, POWDER, FOR SOLUTION INTRAVENOUS at 19:32

## 2020-01-01 RX ADMIN — INSULIN LISPRO 2 UNITS: 100 INJECTION, SOLUTION INTRAVENOUS; SUBCUTANEOUS at 11:29

## 2020-01-01 RX ADMIN — ASPIRIN 81 MG: 81 TABLET, CHEWABLE ORAL at 12:56

## 2020-01-01 RX ADMIN — HYDROCORTISONE SODIUM SUCCINATE 50 MG: 100 INJECTION, POWDER, FOR SOLUTION INTRAMUSCULAR; INTRAVENOUS at 23:35

## 2020-01-01 RX ADMIN — ASPIRIN 81 MG: 81 TABLET, CHEWABLE ORAL at 12:40

## 2020-01-01 RX ADMIN — Medication 10 ML: at 08:23

## 2020-01-01 RX ADMIN — Medication 100 UNITS: at 21:03

## 2020-01-01 RX ADMIN — SODIUM CHLORIDE, PRESERVATIVE FREE 10 ML: 5 INJECTION INTRAVENOUS at 05:08

## 2020-01-01 RX ADMIN — SODIUM CHLORIDE: 900 INJECTION INTRAVENOUS at 23:59

## 2020-01-01 RX ADMIN — HYDROCORTISONE SODIUM SUCCINATE 50 MG: 100 INJECTION, POWDER, FOR SOLUTION INTRAMUSCULAR; INTRAVENOUS at 20:43

## 2020-01-01 RX ADMIN — GLIPIZIDE 10 MG: 5 TABLET ORAL at 06:27

## 2020-01-01 RX ADMIN — FOLIC ACID 1 MG: 1 TABLET ORAL at 09:11

## 2020-01-01 RX ADMIN — POTASSIUM CHLORIDE 10 MEQ: 10 INJECTION, SOLUTION INTRAVENOUS at 19:47

## 2020-01-01 RX ADMIN — ALLOPURINOL 100 MG: 100 TABLET ORAL at 08:01

## 2020-01-01 RX ADMIN — METOPROLOL TARTRATE 25 MG: 25 TABLET, FILM COATED ORAL at 21:02

## 2020-01-01 RX ADMIN — MEROPENEM 1 G: 1 INJECTION, POWDER, FOR SOLUTION INTRAVENOUS at 11:50

## 2020-01-01 RX ADMIN — WATER 2 G: 1 INJECTION INTRAMUSCULAR; INTRAVENOUS; SUBCUTANEOUS at 11:55

## 2020-01-01 RX ADMIN — FENTANYL CITRATE 12.5 MCG: 50 INJECTION, SOLUTION INTRAMUSCULAR; INTRAVENOUS at 04:02

## 2020-01-01 RX ADMIN — LISINOPRIL 40 MG: 20 TABLET ORAL at 09:48

## 2020-01-01 RX ADMIN — ATENOLOL 50 MG: 50 TABLET ORAL at 09:48

## 2020-01-01 RX ADMIN — SODIUM CHLORIDE: 900 INJECTION INTRAVENOUS at 22:56

## 2020-01-01 RX ADMIN — ALLOPURINOL 100 MG: 100 TABLET ORAL at 09:48

## 2020-01-01 RX ADMIN — LEVOFLOXACIN 750 MG: 750 INJECTION, SOLUTION INTRAVENOUS at 12:17

## 2020-01-01 RX ADMIN — Medication 10 ML: at 12:42

## 2020-01-01 RX ADMIN — SODIUM CHLORIDE, PRESERVATIVE FREE 10 ML: 5 INJECTION INTRAVENOUS at 09:11

## 2020-01-01 RX ADMIN — SODIUM CHLORIDE 400 MG: 9 INJECTION, SOLUTION INTRAVENOUS at 17:04

## 2020-01-01 RX ADMIN — FOLIC ACID 1 MG: 1 TABLET ORAL at 12:56

## 2020-01-01 RX ADMIN — HYDROCORTISONE SODIUM SUCCINATE 50 MG: 100 INJECTION, POWDER, FOR SOLUTION INTRAMUSCULAR; INTRAVENOUS at 17:53

## 2020-01-01 RX ADMIN — POTASSIUM CHLORIDE 10 MEQ: 7.46 INJECTION, SOLUTION INTRAVENOUS at 11:51

## 2020-01-01 RX ADMIN — LISINOPRIL 40 MG: 20 TABLET ORAL at 08:16

## 2020-01-01 RX ADMIN — VANCOMYCIN HYDROCHLORIDE 2000 MG: 10 INJECTION, POWDER, LYOPHILIZED, FOR SOLUTION INTRAVENOUS at 15:32

## 2020-01-01 RX ADMIN — ROSUVASTATIN CALCIUM 10 MG: 10 TABLET, FILM COATED ORAL at 22:57

## 2020-01-01 RX ADMIN — METOPROLOL TARTRATE 25 MG: 25 TABLET, FILM COATED ORAL at 08:50

## 2020-01-01 RX ADMIN — Medication 10 ML: at 11:45

## 2020-01-01 RX ADMIN — MEROPENEM 1 G: 1 INJECTION, POWDER, FOR SOLUTION INTRAVENOUS at 12:41

## 2020-01-01 RX ADMIN — ASPIRIN 81 MG: 81 TABLET, CHEWABLE ORAL at 22:47

## 2020-01-01 RX ADMIN — DEXTROSE MONOHYDRATE 2 MCG/MIN: 50 INJECTION, SOLUTION INTRAVENOUS at 23:46

## 2020-01-01 RX ADMIN — ENOXAPARIN SODIUM 40 MG: 40 INJECTION SUBCUTANEOUS at 13:36

## 2020-01-01 RX ADMIN — MEROPENEM 1 G: 1 INJECTION, POWDER, FOR SOLUTION INTRAVENOUS at 01:50

## 2020-01-01 RX ADMIN — POTASSIUM BICARBONATE 50 MEQ: 782 TABLET, EFFERVESCENT ORAL at 13:56

## 2020-01-01 RX ADMIN — DEXTROSE AND SODIUM CHLORIDE: 5; 900 INJECTION, SOLUTION INTRAVENOUS at 06:11

## 2020-01-01 RX ADMIN — SODIUM CHLORIDE, PRESERVATIVE FREE 10 ML: 5 INJECTION INTRAVENOUS at 08:53

## 2020-01-01 RX ADMIN — SODIUM CHLORIDE 400 MG: 9 INJECTION, SOLUTION INTRAVENOUS at 18:02

## 2020-01-01 RX ADMIN — INSULIN LISPRO 4 UNITS: 100 INJECTION, SOLUTION INTRAVENOUS; SUBCUTANEOUS at 04:24

## 2020-01-01 RX ADMIN — MEROPENEM 1 G: 1 INJECTION, POWDER, FOR SOLUTION INTRAVENOUS at 03:31

## 2020-01-01 RX ADMIN — SODIUM CHLORIDE: 900 INJECTION INTRAVENOUS at 09:57

## 2020-01-01 RX ADMIN — HYDROCORTISONE SODIUM SUCCINATE 50 MG: 100 INJECTION, POWDER, FOR SOLUTION INTRAMUSCULAR; INTRAVENOUS at 11:04

## 2020-01-01 RX ADMIN — INSULIN LISPRO 2 UNITS: 100 INJECTION, SOLUTION INTRAVENOUS; SUBCUTANEOUS at 23:25

## 2020-01-01 RX ADMIN — INSULIN LISPRO 2 UNITS: 100 INJECTION, SOLUTION INTRAVENOUS; SUBCUTANEOUS at 00:10

## 2020-01-01 RX ADMIN — HYDROCORTISONE SODIUM SUCCINATE 50 MG: 100 INJECTION, POWDER, FOR SOLUTION INTRAMUSCULAR; INTRAVENOUS at 11:50

## 2020-01-01 RX ADMIN — SODIUM CHLORIDE, PRESERVATIVE FREE 10 ML: 5 INJECTION INTRAVENOUS at 10:55

## 2020-01-01 RX ADMIN — FOLIC ACID 1 MG: 1 TABLET ORAL at 09:13

## 2020-01-01 RX ADMIN — ENOXAPARIN SODIUM 40 MG: 40 INJECTION SUBCUTANEOUS at 08:56

## 2020-01-01 RX ADMIN — Medication 10 ML: at 21:14

## 2020-01-01 RX ADMIN — DEXAMETHASONE SODIUM PHOSPHATE 6 MG: 10 INJECTION, SOLUTION INTRAMUSCULAR; INTRAVENOUS at 11:53

## 2020-01-01 RX ADMIN — Medication 10 ML: at 09:06

## 2020-01-01 RX ADMIN — SODIUM CHLORIDE, PRESERVATIVE FREE 10 ML: 5 INJECTION INTRAVENOUS at 08:02

## 2020-01-01 RX ADMIN — METOPROLOL TARTRATE 25 MG: 25 TABLET, FILM COATED ORAL at 21:55

## 2020-01-01 RX ADMIN — SODIUM CHLORIDE, PRESERVATIVE FREE 10 ML: 5 INJECTION INTRAVENOUS at 11:42

## 2020-01-01 RX ADMIN — FLUCONAZOLE 200 MG: 100 TABLET ORAL at 13:40

## 2020-01-01 RX ADMIN — Medication 10 ML: at 08:20

## 2020-01-01 RX ADMIN — MEROPENEM 1 G: 1 INJECTION, POWDER, FOR SOLUTION INTRAVENOUS at 09:22

## 2020-01-01 RX ADMIN — PANTOPRAZOLE SODIUM 40 MG: 40 INJECTION, POWDER, FOR SOLUTION INTRAVENOUS at 08:50

## 2020-01-01 RX ADMIN — LEVOFLOXACIN 750 MG: 750 INJECTION, SOLUTION INTRAVENOUS at 11:50

## 2020-01-01 RX ADMIN — MEROPENEM 1 G: 1 INJECTION, POWDER, FOR SOLUTION INTRAVENOUS at 09:03

## 2020-01-01 RX ADMIN — MEROPENEM 1 G: 1 INJECTION, POWDER, FOR SOLUTION INTRAVENOUS at 18:22

## 2020-01-01 RX ADMIN — BARIUM SULFATE 70 G: 0.81 POWDER, FOR SUSPENSION ORAL at 11:09

## 2020-01-01 RX ADMIN — ALLOPURINOL 100 MG: 100 TABLET ORAL at 09:52

## 2020-01-01 RX ADMIN — POTASSIUM CHLORIDE AND SODIUM CHLORIDE: 900; 150 INJECTION, SOLUTION INTRAVENOUS at 15:46

## 2020-01-01 RX ADMIN — SODIUM CHLORIDE, PRESERVATIVE FREE 10 ML: 5 INJECTION INTRAVENOUS at 08:20

## 2020-01-01 RX ADMIN — Medication 10 ML: at 20:23

## 2020-01-01 RX ADMIN — ASPIRIN 81 MG: 81 TABLET, CHEWABLE ORAL at 08:51

## 2020-01-01 RX ADMIN — INSULIN LISPRO 2 UNITS: 100 INJECTION, SOLUTION INTRAVENOUS; SUBCUTANEOUS at 08:54

## 2020-01-01 RX ADMIN — LEVOFLOXACIN 750 MG: 750 INJECTION, SOLUTION INTRAVENOUS at 12:05

## 2020-01-01 RX ADMIN — SODIUM CHLORIDE: 900 INJECTION INTRAVENOUS at 18:00

## 2020-01-01 RX ADMIN — MICONAZOLE NITRATE: 20 POWDER TOPICAL at 12:12

## 2020-01-01 RX ADMIN — INSULIN LISPRO 1 UNITS: 100 INJECTION, SOLUTION INTRAVENOUS; SUBCUTANEOUS at 16:37

## 2020-01-01 RX ADMIN — Medication: at 07:00

## 2020-01-01 RX ADMIN — SODIUM CHLORIDE: 9 INJECTION, SOLUTION INTRAVENOUS at 08:28

## 2020-01-01 RX ADMIN — DEXTROSE AND SODIUM CHLORIDE: 5; 900 INJECTION, SOLUTION INTRAVENOUS at 11:21

## 2020-01-01 RX ADMIN — MICONAZOLE NITRATE: 2 OINTMENT TOPICAL at 20:42

## 2020-01-01 RX ADMIN — Medication 10 ML: at 21:03

## 2020-01-01 RX ADMIN — INSULIN LISPRO 2 UNITS: 100 INJECTION, SOLUTION INTRAVENOUS; SUBCUTANEOUS at 01:42

## 2020-01-01 RX ADMIN — Medication 100 UNITS: at 08:51

## 2020-01-01 RX ADMIN — DULOXETINE HYDROCHLORIDE 60 MG: 60 CAPSULE, DELAYED RELEASE ORAL at 12:40

## 2020-01-01 RX ADMIN — Medication 100 UNITS: at 12:39

## 2020-01-01 RX ADMIN — POTASSIUM & SODIUM PHOSPHATES POWDER PACK 280-160-250 MG 250 MG: 280-160-250 PACK at 11:05

## 2020-01-01 RX ADMIN — FLUCONAZOLE 200 MG: 100 TABLET ORAL at 08:16

## 2020-01-01 RX ADMIN — Medication 100 UNITS: at 22:36

## 2020-01-01 RX ADMIN — HYDROMORPHONE HYDROCHLORIDE 0.5 MG: 1 INJECTION, SOLUTION INTRAMUSCULAR; INTRAVENOUS; SUBCUTANEOUS at 14:22

## 2020-01-01 RX ADMIN — HYDROCORTISONE SODIUM SUCCINATE 50 MG: 100 INJECTION, POWDER, FOR SOLUTION INTRAMUSCULAR; INTRAVENOUS at 05:08

## 2020-01-01 RX ADMIN — DULOXETINE HYDROCHLORIDE 60 MG: 60 CAPSULE, DELAYED RELEASE ORAL at 17:31

## 2020-01-01 RX ADMIN — SODIUM CHLORIDE: 900 INJECTION INTRAVENOUS at 03:10

## 2020-01-01 RX ADMIN — SODIUM CHLORIDE 125 MG: 900 INJECTION INTRAVENOUS at 12:16

## 2020-01-01 RX ADMIN — FOLIC ACID 1 MG: 1 TABLET ORAL at 08:22

## 2020-01-01 RX ADMIN — MAGNESIUM SULFATE 2 G: 2 INJECTION INTRAVENOUS at 13:49

## 2020-01-01 RX ADMIN — METOPROLOL TARTRATE 25 MG: 25 TABLET, FILM COATED ORAL at 20:22

## 2020-01-01 RX ADMIN — MICONAZOLE NITRATE: 20 POWDER TOPICAL at 20:42

## 2020-01-01 RX ADMIN — MEROPENEM 1 G: 1 INJECTION, POWDER, FOR SOLUTION INTRAVENOUS at 09:24

## 2020-01-01 RX ADMIN — MEROPENEM 1 G: 1 INJECTION, POWDER, FOR SOLUTION INTRAVENOUS at 17:30

## 2020-01-01 RX ADMIN — POTASSIUM & SODIUM PHOSPHATES POWDER PACK 280-160-250 MG 250 MG: 280-160-250 PACK at 21:13

## 2020-01-01 RX ADMIN — POTASSIUM & SODIUM PHOSPHATES POWDER PACK 280-160-250 MG 250 MG: 280-160-250 PACK at 18:49

## 2020-01-01 RX ADMIN — ROSUVASTATIN CALCIUM 10 MG: 10 TABLET, FILM COATED ORAL at 21:14

## 2020-01-01 RX ADMIN — INSULIN LISPRO 4 UNITS: 100 INJECTION, SOLUTION INTRAVENOUS; SUBCUTANEOUS at 00:23

## 2020-01-01 RX ADMIN — HYDROCORTISONE SODIUM SUCCINATE 50 MG: 100 INJECTION, POWDER, FOR SOLUTION INTRAMUSCULAR; INTRAVENOUS at 18:22

## 2020-01-01 RX ADMIN — PROPOFOL 10 MCG/KG/MIN: 10 INJECTION, EMULSION INTRAVENOUS at 07:03

## 2020-01-01 RX ADMIN — POTASSIUM CHLORIDE 40 MEQ: 20 TABLET, EXTENDED RELEASE ORAL at 09:31

## 2020-01-01 RX ADMIN — MEROPENEM 1 G: 1 INJECTION, POWDER, FOR SOLUTION INTRAVENOUS at 08:21

## 2020-01-01 RX ADMIN — DEXTROSE AND SODIUM CHLORIDE: 5; 900 INJECTION, SOLUTION INTRAVENOUS at 22:47

## 2020-01-01 RX ADMIN — Medication 100 UNITS: at 09:10

## 2020-01-01 RX ADMIN — INSULIN LISPRO 4 UNITS: 100 INJECTION, SOLUTION INTRAVENOUS; SUBCUTANEOUS at 09:53

## 2020-01-01 RX ADMIN — PANTOPRAZOLE SODIUM 40 MG: 40 INJECTION, POWDER, FOR SOLUTION INTRAVENOUS at 20:31

## 2020-01-01 RX ADMIN — INSULIN LISPRO 2 UNITS: 100 INJECTION, SOLUTION INTRAVENOUS; SUBCUTANEOUS at 12:28

## 2020-01-01 RX ADMIN — INSULIN GLARGINE 12 UNITS: 100 INJECTION, SOLUTION SUBCUTANEOUS at 21:45

## 2020-01-01 RX ADMIN — PANTOPRAZOLE SODIUM 40 MG: 40 INJECTION, POWDER, FOR SOLUTION INTRAVENOUS at 09:13

## 2020-01-01 RX ADMIN — PANTOPRAZOLE SODIUM 40 MG: 40 INJECTION, POWDER, FOR SOLUTION INTRAVENOUS at 08:24

## 2020-01-01 RX ADMIN — POTASSIUM & SODIUM PHOSPHATES POWDER PACK 280-160-250 MG 250 MG: 280-160-250 PACK at 20:22

## 2020-01-01 RX ADMIN — INSULIN LISPRO 2 UNITS: 100 INJECTION, SOLUTION INTRAVENOUS; SUBCUTANEOUS at 00:48

## 2020-01-01 RX ADMIN — HYDROCORTISONE SODIUM SUCCINATE 50 MG: 100 INJECTION, POWDER, FOR SOLUTION INTRAMUSCULAR; INTRAVENOUS at 09:04

## 2020-01-01 RX ADMIN — SODIUM CHLORIDE: 900 INJECTION INTRAVENOUS at 08:52

## 2020-01-01 RX ADMIN — INSULIN LISPRO 2 UNITS: 100 INJECTION, SOLUTION INTRAVENOUS; SUBCUTANEOUS at 21:39

## 2020-01-01 RX ADMIN — SODIUM CHLORIDE, PRESERVATIVE FREE 10 ML: 5 INJECTION INTRAVENOUS at 09:53

## 2020-01-01 RX ADMIN — SODIUM CHLORIDE: 900 INJECTION INTRAVENOUS at 10:50

## 2020-01-01 RX ADMIN — PANTOPRAZOLE SODIUM 40 MG: 40 INJECTION, POWDER, FOR SOLUTION INTRAVENOUS at 08:22

## 2020-01-01 RX ADMIN — ACETAMINOPHEN 650 MG: 325 TABLET ORAL at 17:59

## 2020-01-01 RX ADMIN — ENOXAPARIN SODIUM 40 MG: 40 INJECTION SUBCUTANEOUS at 08:20

## 2020-01-01 RX ADMIN — POTASSIUM CHLORIDE 10 MEQ: 7.46 INJECTION, SOLUTION INTRAVENOUS at 12:11

## 2020-01-01 RX ADMIN — POTASSIUM CHLORIDE 40 MEQ: 20 TABLET, EXTENDED RELEASE ORAL at 09:12

## 2020-01-01 RX ADMIN — Medication 50 MCG/HR: at 04:13

## 2020-01-01 RX ADMIN — ALLOPURINOL 100 MG: 100 TABLET ORAL at 08:16

## 2020-01-01 RX ADMIN — SODIUM CHLORIDE, PRESERVATIVE FREE 10 ML: 5 INJECTION INTRAVENOUS at 20:32

## 2020-01-01 RX ADMIN — INSULIN LISPRO 4 UNITS: 100 INJECTION, SOLUTION INTRAVENOUS; SUBCUTANEOUS at 00:11

## 2020-01-01 RX ADMIN — Medication 10 ML: at 20:32

## 2020-01-01 RX ADMIN — ATENOLOL 50 MG: 50 TABLET ORAL at 09:52

## 2020-01-01 RX ADMIN — ROSUVASTATIN CALCIUM 10 MG: 10 TABLET, FILM COATED ORAL at 21:35

## 2020-01-01 RX ADMIN — DULOXETINE HYDROCHLORIDE 60 MG: 60 CAPSULE, DELAYED RELEASE ORAL at 09:13

## 2020-01-01 RX ADMIN — SODIUM CHLORIDE, PRESERVATIVE FREE 10 ML: 5 INJECTION INTRAVENOUS at 12:42

## 2020-01-01 RX ADMIN — DULOXETINE HYDROCHLORIDE 60 MG: 60 CAPSULE, DELAYED RELEASE ORAL at 08:01

## 2020-01-01 RX ADMIN — LISINOPRIL 40 MG: 20 TABLET ORAL at 08:01

## 2020-01-01 RX ADMIN — MEROPENEM 1 G: 1 INJECTION, POWDER, FOR SOLUTION INTRAVENOUS at 21:56

## 2020-01-01 RX ADMIN — PANTOPRAZOLE SODIUM 40 MG: 40 INJECTION, POWDER, FOR SOLUTION INTRAVENOUS at 10:54

## 2020-01-01 RX ADMIN — POTASSIUM CHLORIDE 10 MEQ: 7.46 INJECTION, SOLUTION INTRAVENOUS at 10:56

## 2020-01-01 RX ADMIN — ENOXAPARIN SODIUM 40 MG: 40 INJECTION SUBCUTANEOUS at 09:47

## 2020-01-01 RX ADMIN — Medication 75 MCG/HR: at 04:10

## 2020-01-01 RX ADMIN — MICONAZOLE NITRATE: 2 OINTMENT TOPICAL at 08:17

## 2020-01-01 RX ADMIN — ENOXAPARIN SODIUM 40 MG: 40 INJECTION SUBCUTANEOUS at 08:01

## 2020-01-01 RX ADMIN — INSULIN LISPRO 2 UNITS: 100 INJECTION, SOLUTION INTRAVENOUS; SUBCUTANEOUS at 21:04

## 2020-01-01 RX ADMIN — ATENOLOL 50 MG: 50 TABLET ORAL at 08:16

## 2020-01-01 RX ADMIN — DULOXETINE HYDROCHLORIDE 60 MG: 60 CAPSULE, DELAYED RELEASE ORAL at 08:24

## 2020-01-01 RX ADMIN — ENOXAPARIN SODIUM 40 MG: 40 INJECTION SUBCUTANEOUS at 09:52

## 2020-01-01 RX ADMIN — METOPROLOL TARTRATE 25 MG: 25 TABLET, FILM COATED ORAL at 09:10

## 2020-01-01 RX ADMIN — IBUPROFEN 800 MG: 800 TABLET, FILM COATED ORAL at 22:57

## 2020-01-01 RX ADMIN — INSULIN LISPRO 2 UNITS: 100 INJECTION, SOLUTION INTRAVENOUS; SUBCUTANEOUS at 03:47

## 2020-01-01 RX ADMIN — SODIUM CHLORIDE, PRESERVATIVE FREE 10 ML: 5 INJECTION INTRAVENOUS at 08:24

## 2020-01-01 RX ADMIN — SODIUM CHLORIDE: 900 INJECTION INTRAVENOUS at 15:47

## 2020-01-01 RX ADMIN — POTASSIUM CHLORIDE 10 MEQ: 7.46 INJECTION, SOLUTION INTRAVENOUS at 12:57

## 2020-01-01 RX ADMIN — HYDROCORTISONE SODIUM SUCCINATE 50 MG: 100 INJECTION, POWDER, FOR SOLUTION INTRAMUSCULAR; INTRAVENOUS at 10:21

## 2020-01-01 RX ADMIN — SODIUM CHLORIDE 400 MG: 9 INJECTION, SOLUTION INTRAVENOUS at 21:12

## 2020-01-01 RX ADMIN — MEROPENEM 1 G: 1 INJECTION, POWDER, FOR SOLUTION INTRAVENOUS at 02:31

## 2020-01-01 RX ADMIN — ROSUVASTATIN CALCIUM 10 MG: 10 TABLET, FILM COATED ORAL at 20:37

## 2020-01-01 RX ADMIN — MICONAZOLE NITRATE: 2 OINTMENT TOPICAL at 15:51

## 2020-01-01 RX ADMIN — FOLIC ACID 1 MG: 1 TABLET ORAL at 09:00

## 2020-01-01 RX ADMIN — MEROPENEM 1 G: 1 INJECTION, POWDER, FOR SOLUTION INTRAVENOUS at 06:33

## 2020-01-01 RX ADMIN — ALLOPURINOL 100 MG: 100 TABLET ORAL at 10:17

## 2020-01-01 RX ADMIN — SODIUM CHLORIDE 400 MG: 9 INJECTION, SOLUTION INTRAVENOUS at 17:14

## 2020-01-01 RX ADMIN — POTASSIUM CHLORIDE 10 MEQ: 7.46 INJECTION, SOLUTION INTRAVENOUS at 09:04

## 2020-01-01 RX ADMIN — METOPROLOL TARTRATE 25 MG: 25 TABLET, FILM COATED ORAL at 21:34

## 2020-01-01 RX ADMIN — INSULIN LISPRO 2 UNITS: 100 INJECTION, SOLUTION INTRAVENOUS; SUBCUTANEOUS at 13:08

## 2020-01-01 RX ADMIN — MICONAZOLE NITRATE: 20 POWDER TOPICAL at 08:17

## 2020-01-01 RX ADMIN — MEROPENEM 1 G: 1 INJECTION, POWDER, FOR SOLUTION INTRAVENOUS at 01:40

## 2020-01-01 RX ADMIN — ROSUVASTATIN CALCIUM 10 MG: 10 TABLET, FILM COATED ORAL at 20:21

## 2020-01-01 RX ADMIN — SODIUM CHLORIDE, PRESERVATIVE FREE 10 ML: 5 INJECTION INTRAVENOUS at 10:36

## 2020-01-01 RX ADMIN — POTASSIUM CHLORIDE 10 MEQ: 7.46 INJECTION, SOLUTION INTRAVENOUS at 11:56

## 2020-01-01 RX ADMIN — MEROPENEM 1 G: 1 INJECTION, POWDER, FOR SOLUTION INTRAVENOUS at 01:30

## 2020-01-01 RX ADMIN — MICONAZOLE NITRATE: 20 POWDER TOPICAL at 09:48

## 2020-01-01 RX ADMIN — DEXTROSE MONOHYDRATE 12.5 G: 25 INJECTION, SOLUTION INTRAVENOUS at 12:15

## 2020-01-01 RX ADMIN — DULOXETINE HYDROCHLORIDE 60 MG: 60 CAPSULE, DELAYED RELEASE ORAL at 09:52

## 2020-01-01 RX ADMIN — INSULIN LISPRO 4 UNITS: 100 INJECTION, SOLUTION INTRAVENOUS; SUBCUTANEOUS at 20:38

## 2020-01-01 RX ADMIN — Medication 10 ML: at 09:10

## 2020-01-01 RX ADMIN — PROPOFOL 100 MG: 10 INJECTION, EMULSION INTRAVENOUS at 12:57

## 2020-01-01 RX ADMIN — DEXAMETHASONE SODIUM PHOSPHATE 6 MG: 4 INJECTION, SOLUTION INTRAMUSCULAR; INTRAVENOUS at 08:24

## 2020-01-01 RX ADMIN — ALLOPURINOL 100 MG: 100 TABLET ORAL at 17:34

## 2020-01-01 RX ADMIN — PANTOPRAZOLE SODIUM 40 MG: 40 INJECTION, POWDER, FOR SOLUTION INTRAVENOUS at 10:20

## 2020-01-01 RX ADMIN — MICONAZOLE NITRATE: 2 OINTMENT TOPICAL at 14:16

## 2020-01-01 RX ADMIN — Medication 10 ML: at 20:55

## 2020-01-01 RX ADMIN — POTASSIUM & SODIUM PHOSPHATES POWDER PACK 280-160-250 MG 250 MG: 280-160-250 PACK at 21:55

## 2020-01-01 RX ADMIN — SODIUM CHLORIDE: 900 INJECTION INTRAVENOUS at 03:11

## 2020-01-01 RX ADMIN — MEROPENEM 1 G: 1 INJECTION, POWDER, FOR SOLUTION INTRAVENOUS at 04:18

## 2020-01-01 RX ADMIN — SODIUM CHLORIDE: 900 INJECTION INTRAVENOUS at 15:57

## 2020-01-01 RX ADMIN — HYDROCORTISONE SODIUM SUCCINATE 50 MG: 100 INJECTION, POWDER, FOR SOLUTION INTRAMUSCULAR; INTRAVENOUS at 16:59

## 2020-01-01 RX ADMIN — DEXTROSE MONOHYDRATE 12.5 G: 25 INJECTION, SOLUTION INTRAVENOUS at 16:11

## 2020-01-01 RX ADMIN — Medication 3 MG: at 21:54

## 2020-01-01 RX ADMIN — ETOMIDATE: 2 INJECTION INTRAVENOUS at 07:00

## 2020-01-01 RX ADMIN — FUROSEMIDE 40 MG: 10 INJECTION, SOLUTION INTRAMUSCULAR; INTRAVENOUS at 11:03

## 2020-01-01 RX ADMIN — SODIUM CHLORIDE: 900 INJECTION INTRAVENOUS at 19:00

## 2020-01-01 RX ADMIN — DULOXETINE HYDROCHLORIDE 60 MG: 60 CAPSULE, DELAYED RELEASE ORAL at 12:56

## 2020-01-01 RX ADMIN — SODIUM CHLORIDE: 900 INJECTION INTRAVENOUS at 21:37

## 2020-01-01 RX ADMIN — INSULIN LISPRO 2 UNITS: 100 INJECTION, SOLUTION INTRAVENOUS; SUBCUTANEOUS at 17:12

## 2020-01-01 RX ADMIN — SODIUM BICARBONATE 50 MEQ: 84 INJECTION, SOLUTION INTRAVENOUS at 12:40

## 2020-01-01 RX ADMIN — WATER 2 ML: 1 INJECTION INTRAMUSCULAR; INTRAVENOUS; SUBCUTANEOUS at 11:05

## 2020-01-01 RX ADMIN — ALLOPURINOL 100 MG: 100 TABLET ORAL at 09:10

## 2020-01-01 RX ADMIN — SODIUM CHLORIDE 100 MG: 9 INJECTION, SOLUTION INTRAVENOUS at 16:00

## 2020-01-01 RX ADMIN — Medication 10 ML: at 21:36

## 2020-01-01 RX ADMIN — ENOXAPARIN SODIUM 40 MG: 40 INJECTION SUBCUTANEOUS at 08:16

## 2020-01-01 RX ADMIN — SODIUM CHLORIDE: 900 INJECTION INTRAVENOUS at 21:13

## 2020-01-01 RX ADMIN — MEROPENEM 1 G: 1 INJECTION, POWDER, FOR SOLUTION INTRAVENOUS at 17:53

## 2020-01-01 RX ADMIN — SUCCINYLCHOLINE CHLORIDE: 20 INJECTION, SOLUTION INTRAMUSCULAR; INTRAVENOUS at 07:00

## 2020-01-01 RX ADMIN — MEROPENEM 1 G: 1 INJECTION, POWDER, FOR SOLUTION INTRAVENOUS at 01:27

## 2020-01-01 RX ADMIN — PANTOPRAZOLE SODIUM 40 MG: 40 INJECTION, POWDER, FOR SOLUTION INTRAVENOUS at 09:41

## 2020-01-01 RX ADMIN — ROSUVASTATIN CALCIUM 10 MG: 10 TABLET, FILM COATED ORAL at 01:00

## 2020-01-01 RX ADMIN — FLUCONAZOLE 200 MG: 100 TABLET ORAL at 09:48

## 2020-01-01 RX ADMIN — MICONAZOLE NITRATE: 2 OINTMENT TOPICAL at 08:21

## 2020-01-01 RX ADMIN — HYDROCORTISONE SODIUM SUCCINATE 50 MG: 100 INJECTION, POWDER, FOR SOLUTION INTRAMUSCULAR; INTRAVENOUS at 11:45

## 2020-01-01 RX ADMIN — MEROPENEM 1 G: 1 INJECTION, POWDER, FOR SOLUTION INTRAVENOUS at 11:07

## 2020-01-01 RX ADMIN — Medication 10 ML: at 20:42

## 2020-01-01 RX ADMIN — HYDROCORTISONE SODIUM SUCCINATE 25 MG: 100 INJECTION, POWDER, FOR SOLUTION INTRAMUSCULAR; INTRAVENOUS at 09:13

## 2020-01-01 RX ADMIN — PANTOPRAZOLE SODIUM 40 MG: 40 INJECTION, POWDER, FOR SOLUTION INTRAVENOUS at 09:10

## 2020-01-01 RX ADMIN — POTASSIUM BICARBONATE 40 MEQ: 782 TABLET, EFFERVESCENT ORAL at 06:10

## 2020-01-01 RX ADMIN — SODIUM CHLORIDE, PRESERVATIVE FREE 10 ML: 5 INJECTION INTRAVENOUS at 09:14

## 2020-01-01 RX ADMIN — SODIUM CHLORIDE: 900 INJECTION INTRAVENOUS at 02:30

## 2020-01-01 RX ADMIN — PANTOPRAZOLE SODIUM 40 MG: 40 INJECTION, POWDER, FOR SOLUTION INTRAVENOUS at 08:51

## 2020-01-01 RX ADMIN — SODIUM CHLORIDE: 900 INJECTION INTRAVENOUS at 13:57

## 2020-01-01 RX ADMIN — INSULIN LISPRO 2 UNITS: 100 INJECTION, SOLUTION INTRAVENOUS; SUBCUTANEOUS at 16:22

## 2020-01-01 RX ADMIN — ALLOPURINOL 100 MG: 100 TABLET ORAL at 08:20

## 2020-01-01 RX ADMIN — SODIUM CHLORIDE: 9 INJECTION, SOLUTION INTRAVENOUS at 12:51

## 2020-01-01 RX ADMIN — ALLOPURINOL 100 MG: 100 TABLET ORAL at 12:41

## 2020-01-01 RX ADMIN — SODIUM CHLORIDE, PRESERVATIVE FREE 10 ML: 5 INJECTION INTRAVENOUS at 08:23

## 2020-01-01 RX ADMIN — SODIUM BICARBONATE 50 MEQ: 84 INJECTION, SOLUTION INTRAVENOUS at 12:10

## 2020-01-01 RX ADMIN — POTASSIUM CHLORIDE 10 MEQ: 10 INJECTION, SOLUTION INTRAVENOUS at 02:55

## 2020-01-01 RX ADMIN — INSULIN LISPRO 2 UNITS: 100 INJECTION, SOLUTION INTRAVENOUS; SUBCUTANEOUS at 18:48

## 2020-01-01 RX ADMIN — MEROPENEM 1 G: 1 INJECTION, POWDER, FOR SOLUTION INTRAVENOUS at 12:28

## 2020-01-01 RX ADMIN — PROPOFOL 200 MG: 10 INJECTION, EMULSION INTRAVENOUS at 14:01

## 2020-01-01 RX ADMIN — INSULIN GLARGINE 12 UNITS: 100 INJECTION, SOLUTION SUBCUTANEOUS at 20:38

## 2020-01-01 RX ADMIN — PANTOPRAZOLE SODIUM 40 MG: 40 INJECTION, POWDER, FOR SOLUTION INTRAVENOUS at 08:20

## 2020-01-01 RX ADMIN — INSULIN LISPRO 4 UNITS: 100 INJECTION, SOLUTION INTRAVENOUS; SUBCUTANEOUS at 17:02

## 2020-01-01 RX ADMIN — INSULIN LISPRO 2 UNITS: 100 INJECTION, SOLUTION INTRAVENOUS; SUBCUTANEOUS at 04:11

## 2020-01-01 RX ADMIN — GLIPIZIDE 10 MG: 5 TABLET ORAL at 06:35

## 2020-01-01 RX ADMIN — INSULIN LISPRO 2 UNITS: 100 INJECTION, SOLUTION INTRAVENOUS; SUBCUTANEOUS at 21:45

## 2020-01-01 RX ADMIN — HYDROCORTISONE SODIUM SUCCINATE 50 MG: 100 INJECTION, POWDER, FOR SOLUTION INTRAMUSCULAR; INTRAVENOUS at 22:44

## 2020-01-01 RX ADMIN — ALLOPURINOL 100 MG: 100 TABLET ORAL at 08:22

## 2020-01-01 RX ADMIN — ROSUVASTATIN CALCIUM 10 MG: 10 TABLET, FILM COATED ORAL at 21:13

## 2020-01-01 RX ADMIN — INSULIN GLARGINE 24 UNITS: 100 INJECTION, SOLUTION SUBCUTANEOUS at 20:32

## 2020-01-01 RX ADMIN — MEROPENEM 1 G: 1 INJECTION, POWDER, FOR SOLUTION INTRAVENOUS at 18:39

## 2020-01-01 RX ADMIN — HYDROCORTISONE SODIUM SUCCINATE 50 MG: 100 INJECTION, POWDER, FOR SOLUTION INTRAMUSCULAR; INTRAVENOUS at 21:14

## 2020-01-01 RX ADMIN — MEROPENEM 1 G: 1 INJECTION, POWDER, FOR SOLUTION INTRAVENOUS at 18:06

## 2020-01-01 RX ADMIN — SODIUM CHLORIDE 400 MG: 9 INJECTION, SOLUTION INTRAVENOUS at 16:37

## 2020-01-01 RX ADMIN — POTASSIUM CHLORIDE 10 MEQ: 7.46 INJECTION, SOLUTION INTRAVENOUS at 11:02

## 2020-01-01 RX ADMIN — ALLOPURINOL 100 MG: 100 TABLET ORAL at 08:51

## 2020-01-01 RX ADMIN — MAGNESIUM SULFATE 2 G: 2 INJECTION INTRAVENOUS at 08:24

## 2020-01-01 RX ADMIN — POTASSIUM CHLORIDE 10 MEQ: 7.46 INJECTION, SOLUTION INTRAVENOUS at 14:05

## 2020-01-01 RX ADMIN — PANTOPRAZOLE SODIUM 40 MG: 40 INJECTION, POWDER, FOR SOLUTION INTRAVENOUS at 09:04

## 2020-01-01 RX ADMIN — BARIUM SULFATE 10 ML: 400 PASTE ORAL at 11:09

## 2020-01-01 RX ADMIN — SODIUM CHLORIDE: 900 INJECTION INTRAVENOUS at 12:39

## 2020-01-01 RX ADMIN — MEROPENEM 1 G: 1 INJECTION, POWDER, FOR SOLUTION INTRAVENOUS at 20:37

## 2020-01-01 RX ADMIN — POTASSIUM & SODIUM PHOSPHATES POWDER PACK 280-160-250 MG 250 MG: 280-160-250 PACK at 15:56

## 2020-01-01 RX ADMIN — POTASSIUM CHLORIDE 10 MEQ: 7.46 INJECTION, SOLUTION INTRAVENOUS at 09:24

## 2020-01-01 RX ADMIN — MICONAZOLE NITRATE: 20 POWDER TOPICAL at 13:40

## 2020-01-01 RX ADMIN — ROSUVASTATIN CALCIUM 10 MG: 10 TABLET, FILM COATED ORAL at 20:22

## 2020-01-01 RX ADMIN — Medication 100 UNITS: at 20:42

## 2020-01-01 RX ADMIN — ALLOPURINOL 100 MG: 100 TABLET ORAL at 08:50

## 2020-01-01 RX ADMIN — DULOXETINE HYDROCHLORIDE 60 MG: 60 CAPSULE, DELAYED RELEASE ORAL at 08:51

## 2020-01-01 RX ADMIN — Medication 3 MG: at 01:00

## 2020-01-01 RX ADMIN — Medication 10 ML: at 08:50

## 2020-01-01 RX ADMIN — GLIPIZIDE 10 MG: 5 TABLET ORAL at 08:01

## 2020-01-01 RX ADMIN — POTASSIUM CHLORIDE AND SODIUM CHLORIDE: 900; 150 INJECTION, SOLUTION INTRAVENOUS at 06:16

## 2020-01-01 RX ADMIN — HYDROCORTISONE SODIUM SUCCINATE 50 MG: 100 INJECTION, POWDER, FOR SOLUTION INTRAMUSCULAR; INTRAVENOUS at 05:26

## 2020-01-01 RX ADMIN — FOLIC ACID 1 MG: 1 TABLET ORAL at 12:40

## 2020-01-01 RX ADMIN — INSULIN LISPRO 2 UNITS: 100 INJECTION, SOLUTION INTRAVENOUS; SUBCUTANEOUS at 10:29

## 2020-01-01 RX ADMIN — SODIUM CHLORIDE, PRESERVATIVE FREE 10 ML: 5 INJECTION INTRAVENOUS at 09:13

## 2020-01-01 RX ADMIN — INSULIN LISPRO 2 UNITS: 100 INJECTION, SOLUTION INTRAVENOUS; SUBCUTANEOUS at 13:05

## 2020-01-01 RX ADMIN — CEFTRIAXONE 1 G: 1 INJECTION, POWDER, FOR SOLUTION INTRAMUSCULAR; INTRAVENOUS at 21:27

## 2020-01-01 RX ADMIN — IBUPROFEN 800 MG: 800 TABLET, FILM COATED ORAL at 12:10

## 2020-01-01 RX ADMIN — ROSUVASTATIN CALCIUM 10 MG: 10 TABLET, FILM COATED ORAL at 21:55

## 2020-01-01 RX ADMIN — INSULIN LISPRO 2 UNITS: 100 INJECTION, SOLUTION INTRAVENOUS; SUBCUTANEOUS at 05:11

## 2020-01-01 RX ADMIN — DULOXETINE HYDROCHLORIDE 60 MG: 60 CAPSULE, DELAYED RELEASE ORAL at 09:48

## 2020-01-01 RX ADMIN — INSULIN LISPRO 2 UNITS: 100 INJECTION, SOLUTION INTRAVENOUS; SUBCUTANEOUS at 12:25

## 2020-01-01 RX ADMIN — MEROPENEM 1 G: 1 INJECTION, POWDER, FOR SOLUTION INTRAVENOUS at 01:22

## 2020-01-01 RX ADMIN — SODIUM CHLORIDE 400 MG: 9 INJECTION, SOLUTION INTRAVENOUS at 17:30

## 2020-01-01 RX ADMIN — SODIUM CHLORIDE: 9 INJECTION, SOLUTION INTRAVENOUS at 20:28

## 2020-01-01 RX ADMIN — POTASSIUM BICARBONATE 40 MEQ: 782 TABLET, EFFERVESCENT ORAL at 18:39

## 2020-01-01 RX ADMIN — SODIUM CHLORIDE: 900 INJECTION INTRAVENOUS at 18:34

## 2020-01-01 RX ADMIN — ENOXAPARIN SODIUM 40 MG: 40 INJECTION SUBCUTANEOUS at 08:22

## 2020-01-01 RX ADMIN — MICONAZOLE NITRATE: 20 POWDER TOPICAL at 12:08

## 2020-01-01 RX ADMIN — MEROPENEM 1 G: 1 INJECTION, POWDER, FOR SOLUTION INTRAVENOUS at 12:56

## 2020-01-01 RX ADMIN — MEROPENEM 1 G: 1 INJECTION, POWDER, FOR SOLUTION INTRAVENOUS at 21:30

## 2020-01-01 RX ADMIN — MEROPENEM 1 G: 1 INJECTION, POWDER, FOR SOLUTION INTRAVENOUS at 20:25

## 2020-01-01 RX ADMIN — DEXTROSE AND SODIUM CHLORIDE: 5; 900 INJECTION, SOLUTION INTRAVENOUS at 15:38

## 2020-01-01 RX ADMIN — ALLOPURINOL 100 MG: 100 TABLET ORAL at 12:58

## 2020-01-01 RX ADMIN — INSULIN LISPRO 2 UNITS: 100 INJECTION, SOLUTION INTRAVENOUS; SUBCUTANEOUS at 17:04

## 2020-01-01 RX ADMIN — GLIPIZIDE 10 MG: 5 TABLET ORAL at 08:16

## 2020-01-01 RX ADMIN — FOLIC ACID 1 MG: 1 TABLET ORAL at 08:50

## 2020-01-01 RX ADMIN — FOLIC ACID 1 MG: 1 TABLET ORAL at 08:51

## 2020-01-01 RX ADMIN — METOPROLOL TARTRATE 25 MG: 25 TABLET, FILM COATED ORAL at 12:40

## 2020-01-01 RX ADMIN — BARIUM SULFATE 120 ML: 400 SUSPENSION ORAL at 11:09

## 2020-01-01 RX ADMIN — POTASSIUM & SODIUM PHOSPHATES POWDER PACK 280-160-250 MG 250 MG: 280-160-250 PACK at 08:51

## 2020-01-01 RX ADMIN — DEXTROSE MONOHYDRATE 12.5 G: 25 INJECTION, SOLUTION INTRAVENOUS at 09:37

## 2020-01-01 RX ADMIN — SODIUM CHLORIDE 1000 ML: 9 INJECTION, SOLUTION INTRAVENOUS at 12:49

## 2020-01-01 RX ADMIN — SODIUM CHLORIDE: 900 INJECTION INTRAVENOUS at 15:17

## 2020-01-01 RX ADMIN — DEXTROSE AND SODIUM CHLORIDE: 5; 900 INJECTION, SOLUTION INTRAVENOUS at 15:50

## 2020-01-01 RX ADMIN — HYDROCORTISONE SODIUM SUCCINATE 50 MG: 100 INJECTION, POWDER, FOR SOLUTION INTRAMUSCULAR; INTRAVENOUS at 01:18

## 2020-01-01 RX ADMIN — INSULIN LISPRO 2 UNITS: 100 INJECTION, SOLUTION INTRAVENOUS; SUBCUTANEOUS at 01:58

## 2020-01-01 RX ADMIN — SODIUM BICARBONATE 50 MEQ: 84 INJECTION, SOLUTION INTRAVENOUS at 12:25

## 2020-01-01 RX ADMIN — ROSUVASTATIN CALCIUM 10 MG: 10 TABLET, FILM COATED ORAL at 20:41

## 2020-01-01 RX ADMIN — MAGNESIUM SULFATE 4 G: 4 INJECTION INTRAVENOUS at 09:30

## 2020-01-01 RX ADMIN — ENOXAPARIN SODIUM 40 MG: 40 INJECTION SUBCUTANEOUS at 08:51

## 2020-01-01 RX ADMIN — ROSUVASTATIN CALCIUM 10 MG: 10 TABLET, FILM COATED ORAL at 21:36

## 2020-01-01 RX ADMIN — INSULIN LISPRO 4 UNITS: 100 INJECTION, SOLUTION INTRAVENOUS; SUBCUTANEOUS at 21:50

## 2020-01-01 RX ADMIN — INSULIN LISPRO 2 UNITS: 100 INJECTION, SOLUTION INTRAVENOUS; SUBCUTANEOUS at 03:38

## 2020-01-01 RX ADMIN — INSULIN LISPRO 2 UNITS: 100 INJECTION, SOLUTION INTRAVENOUS; SUBCUTANEOUS at 01:25

## 2020-01-01 RX ADMIN — ROSUVASTATIN CALCIUM 10 MG: 10 TABLET, FILM COATED ORAL at 20:34

## 2020-01-01 RX ADMIN — ROSUVASTATIN CALCIUM 10 MG: 10 TABLET, FILM COATED ORAL at 20:31

## 2020-01-01 RX ADMIN — ASPIRIN 81 MG: 81 TABLET, CHEWABLE ORAL at 09:09

## 2020-01-01 RX ADMIN — MICONAZOLE NITRATE: 20 POWDER TOPICAL at 20:45

## 2020-01-01 RX ADMIN — MEROPENEM 1 G: 1 INJECTION, POWDER, FOR SOLUTION INTRAVENOUS at 12:09

## 2020-01-01 RX ADMIN — SODIUM CHLORIDE, PRESERVATIVE FREE 100 UNITS: 5 INJECTION INTRAVENOUS at 09:13

## 2020-01-01 RX ADMIN — IOPAMIDOL 110 ML: 755 INJECTION, SOLUTION INTRAVENOUS at 12:26

## 2020-01-01 RX ADMIN — ENOXAPARIN SODIUM 40 MG: 40 INJECTION SUBCUTANEOUS at 09:10

## 2020-01-01 RX ADMIN — HYDROMORPHONE HYDROCHLORIDE 0.5 MG: 1 INJECTION, SOLUTION INTRAMUSCULAR; INTRAVENOUS; SUBCUTANEOUS at 15:39

## 2020-01-01 RX ADMIN — ROSUVASTATIN CALCIUM 10 MG: 10 TABLET, FILM COATED ORAL at 21:02

## 2020-01-01 RX ADMIN — POTASSIUM CHLORIDE 10 MEQ: 10 INJECTION, SOLUTION INTRAVENOUS at 15:49

## 2020-01-01 RX ADMIN — ALLOPURINOL 100 MG: 100 TABLET ORAL at 08:24

## 2020-01-01 RX ADMIN — SODIUM CHLORIDE: 900 INJECTION INTRAVENOUS at 00:54

## 2020-01-01 RX ADMIN — INSULIN LISPRO 2 UNITS: 100 INJECTION, SOLUTION INTRAVENOUS; SUBCUTANEOUS at 17:14

## 2020-01-01 RX ADMIN — Medication 10 ML: at 20:21

## 2020-01-01 RX ADMIN — SODIUM CHLORIDE: 900 INJECTION INTRAVENOUS at 09:08

## 2020-01-01 RX ADMIN — SODIUM CHLORIDE: 9 INJECTION, SOLUTION INTRAVENOUS at 13:45

## 2020-01-01 RX ADMIN — ENOXAPARIN SODIUM 40 MG: 40 INJECTION SUBCUTANEOUS at 11:45

## 2020-01-01 RX ADMIN — LISINOPRIL 40 MG: 20 TABLET ORAL at 09:52

## 2020-01-01 RX ADMIN — IBUPROFEN 800 MG: 800 TABLET, FILM COATED ORAL at 09:48

## 2020-01-01 RX ADMIN — ALLOPURINOL 100 MG: 100 TABLET ORAL at 09:12

## 2020-01-01 RX ADMIN — Medication 75 MCG/HR: at 10:20

## 2020-01-01 RX ADMIN — SODIUM CHLORIDE: 900 INJECTION INTRAVENOUS at 21:15

## 2020-01-01 RX ADMIN — MEROPENEM 1 G: 1 INJECTION, POWDER, FOR SOLUTION INTRAVENOUS at 02:05

## 2020-01-01 RX ADMIN — INSULIN GLARGINE 24 UNITS: 100 INJECTION, SOLUTION SUBCUTANEOUS at 20:21

## 2020-01-01 RX ADMIN — INSULIN LISPRO 4 UNITS: 100 INJECTION, SOLUTION INTRAVENOUS; SUBCUTANEOUS at 20:00

## 2020-01-01 RX ADMIN — POTASSIUM & SODIUM PHOSPHATES POWDER PACK 280-160-250 MG 250 MG: 280-160-250 PACK at 21:35

## 2020-01-01 RX ADMIN — ENOXAPARIN SODIUM 40 MG: 40 INJECTION SUBCUTANEOUS at 12:40

## 2020-01-01 RX ADMIN — AZITHROMYCIN MONOHYDRATE 500 MG: 500 INJECTION, POWDER, LYOPHILIZED, FOR SOLUTION INTRAVENOUS at 12:31

## 2020-01-01 RX ADMIN — ENOXAPARIN SODIUM 40 MG: 40 INJECTION SUBCUTANEOUS at 10:37

## 2020-01-01 RX ADMIN — SODIUM CHLORIDE, PRESERVATIVE FREE 10 ML: 5 INJECTION INTRAVENOUS at 09:09

## 2020-01-01 RX ADMIN — SODIUM CHLORIDE: 900 INJECTION INTRAVENOUS at 03:45

## 2020-01-01 RX ADMIN — DULOXETINE HYDROCHLORIDE 60 MG: 60 CAPSULE, DELAYED RELEASE ORAL at 09:09

## 2020-01-01 RX ADMIN — MICONAZOLE NITRATE: 2 OINTMENT TOPICAL at 20:45

## 2020-01-01 RX ADMIN — Medication 3 MG: at 22:57

## 2020-01-01 RX ADMIN — DULOXETINE HYDROCHLORIDE 60 MG: 60 CAPSULE, DELAYED RELEASE ORAL at 08:22

## 2020-01-01 RX ADMIN — INSULIN LISPRO 2 UNITS: 100 INJECTION, SOLUTION INTRAVENOUS; SUBCUTANEOUS at 12:10

## 2020-01-01 RX ADMIN — DULOXETINE HYDROCHLORIDE 60 MG: 60 CAPSULE, DELAYED RELEASE ORAL at 08:16

## 2020-01-01 RX ADMIN — SODIUM CHLORIDE: 900 INJECTION INTRAVENOUS at 02:45

## 2020-01-01 RX ADMIN — ROSUVASTATIN CALCIUM 10 MG: 10 TABLET, FILM COATED ORAL at 21:37

## 2020-01-01 RX ADMIN — SODIUM CHLORIDE 25 MG: 9 INJECTION, SOLUTION INTRAVENOUS at 12:56

## 2020-01-01 RX ADMIN — ENOXAPARIN SODIUM 40 MG: 40 INJECTION SUBCUTANEOUS at 10:54

## 2020-01-01 RX ADMIN — Medication 10 ML: at 09:14

## 2020-01-01 RX ADMIN — INSULIN LISPRO 2 UNITS: 100 INJECTION, SOLUTION INTRAVENOUS; SUBCUTANEOUS at 13:49

## 2020-01-01 RX ADMIN — SODIUM PHOSPHATE, MONOBASIC, MONOHYDRATE 10 MMOL: 276; 142 INJECTION, SOLUTION INTRAVENOUS at 15:28

## 2020-01-01 RX ADMIN — Medication 2 G: at 12:52

## 2020-01-01 RX ADMIN — POTASSIUM CHLORIDE AND SODIUM CHLORIDE: 900; 150 INJECTION, SOLUTION INTRAVENOUS at 20:41

## 2020-01-01 RX ADMIN — DEXAMETHASONE SODIUM PHOSPHATE 6 MG: 4 INJECTION, SOLUTION INTRAMUSCULAR; INTRAVENOUS at 17:31

## 2020-01-01 ASSESSMENT — PAIN SCALES - PAIN ASSESSMENT IN ADVANCED DEMENTIA (PAINAD)
NEGVOCALIZATION: 0
FACIALEXPRESSION: 0
NEGVOCALIZATION: 0
NEGVOCALIZATION: 0
FACIALEXPRESSION: 0
BODYLANGUAGE: 0
BREATHING: 0
BODYLANGUAGE: 0
BREATHING: 0
BODYLANGUAGE: 0
FACIALEXPRESSION: 0
BREATHING: 0
CONSOLABILITY: 0
BODYLANGUAGE: 0
BREATHING: 0
NEGVOCALIZATION: 0
CONSOLABILITY: 0
BODYLANGUAGE: 0
TOTALSCORE: 1
FACIALEXPRESSION: 0
TOTALSCORE: 0
BREATHING: 0
CONSOLABILITY: 0
FACIALEXPRESSION: 0
TOTALSCORE: 0
NEGVOCALIZATION: 1
CONSOLABILITY: 0
NEGVOCALIZATION: 0
FACIALEXPRESSION: 0
FACIALEXPRESSION: 0
TOTALSCORE: 0
TOTALSCORE: 0
NEGVOCALIZATION: 0
BODYLANGUAGE: 0
FACIALEXPRESSION: 0
TOTALSCORE: 0
BODYLANGUAGE: 0
CONSOLABILITY: 0
FACIALEXPRESSION: 0
NEGVOCALIZATION: 0
TOTALSCORE: 0
NEGVOCALIZATION: 0
BODYLANGUAGE: 0
NEGVOCALIZATION: 0
FACIALEXPRESSION: 0
BREATHING: 0
CONSOLABILITY: 0
TOTALSCORE: 0
CONSOLABILITY: 0
BREATHING: 0
NEGVOCALIZATION: 0
CONSOLABILITY: 0
TOTALSCORE: 2
TOTALSCORE: 0
FACIALEXPRESSION: 0
CONSOLABILITY: 0
BREATHING: 0
NEGVOCALIZATION: 0
BREATHING: 0
CONSOLABILITY: 0
BODYLANGUAGE: 0
BODYLANGUAGE: 0
CONSOLABILITY: 0
NEGVOCALIZATION: 0
BODYLANGUAGE: 0
BREATHING: 0
CONSOLABILITY: 0
BODYLANGUAGE: 0
NEGVOCALIZATION: 1
BODYLANGUAGE: 0
BREATHING: 0
BODYLANGUAGE: 0
BREATHING: 0
BREATHING: 0
TOTALSCORE: 1
BODYLANGUAGE: 0
TOTALSCORE: 0
TOTALSCORE: 0
FACIALEXPRESSION: 0
FACIALEXPRESSION: 0
BODYLANGUAGE: 0
BODYLANGUAGE: 0
FACIALEXPRESSION: 0
BODYLANGUAGE: 0
BREATHING: 0
NEGVOCALIZATION: 0
BREATHING: 0
CONSOLABILITY: 0
TOTALSCORE: 0
BREATHING: 0
TOTALSCORE: 0
CONSOLABILITY: 0
BODYLANGUAGE: 0
NEGVOCALIZATION: 0
CONSOLABILITY: 0
FACIALEXPRESSION: 0
CONSOLABILITY: 0
CONSOLABILITY: 0
TOTALSCORE: 0
FACIALEXPRESSION: 0
TOTALSCORE: 0
NEGVOCALIZATION: 0
BREATHING: 0
BREATHING: 0
FACIALEXPRESSION: 0
NEGVOCALIZATION: 0
FACIALEXPRESSION: 0
FACIALEXPRESSION: 0
TOTALSCORE: 0
BREATHING: 0
NEGVOCALIZATION: 1
TOTALSCORE: 0
CONSOLABILITY: 1
CONSOLABILITY: 0

## 2020-01-01 ASSESSMENT — PAIN SCALES - WONG BAKER: WONGBAKER_NUMERICALRESPONSE: 0

## 2020-01-01 ASSESSMENT — PAIN SCALES - GENERAL
PAINLEVEL_OUTOF10: 0
PAINLEVEL_OUTOF10: 7
PAINLEVEL_OUTOF10: 0
PAINLEVEL_OUTOF10: 7
PAINLEVEL_OUTOF10: 3
PAINLEVEL_OUTOF10: 5
PAINLEVEL_OUTOF10: 0
PAINLEVEL_OUTOF10: 4
PAINLEVEL_OUTOF10: 7
PAINLEVEL_OUTOF10: 5
PAINLEVEL_OUTOF10: 7
PAINLEVEL_OUTOF10: 0
PAINLEVEL_OUTOF10: 2
PAINLEVEL_OUTOF10: 0
PAINLEVEL_OUTOF10: 2
PAINLEVEL_OUTOF10: 0
PAINLEVEL_OUTOF10: 0
PAINLEVEL_OUTOF10: 4
PAINLEVEL_OUTOF10: 0
PAINLEVEL_OUTOF10: 6
PAINLEVEL_OUTOF10: 0

## 2020-01-01 ASSESSMENT — PULMONARY FUNCTION TESTS
PIF_VALUE: 22
PIF_VALUE: 14
PIF_VALUE: 11
PIF_VALUE: 12
PIF_VALUE: 18
PIF_VALUE: 23
PIF_VALUE: 9
PIF_VALUE: 15
PIF_VALUE: 22
PIF_VALUE: 24
PIF_VALUE: 10
PIF_VALUE: 16
PIF_VALUE: 10
PIF_VALUE: 14
PIF_VALUE: 10
PIF_VALUE: 21
PIF_VALUE: 12
PIF_VALUE: 15
PIF_VALUE: 11
PIF_VALUE: 11
PIF_VALUE: 12
PIF_VALUE: 11
PIF_VALUE: 35
PIF_VALUE: 11
PIF_VALUE: 21
PIF_VALUE: 20
PIF_VALUE: 14
PIF_VALUE: 10
PIF_VALUE: 12
PIF_VALUE: 14
PIF_VALUE: 10
PIF_VALUE: 10
PIF_VALUE: 16
PIF_VALUE: 20
PIF_VALUE: 10
PIF_VALUE: 15
PIF_VALUE: 31
PIF_VALUE: 14
PIF_VALUE: 22
PIF_VALUE: 10
PIF_VALUE: 13
PIF_VALUE: 11
PIF_VALUE: 10
PIF_VALUE: 14
PIF_VALUE: 10
PIF_VALUE: 11
PIF_VALUE: 26
PIF_VALUE: 10

## 2020-01-01 ASSESSMENT — ENCOUNTER SYMPTOMS
NAUSEA: 0
BACK PAIN: 1
DIARRHEA: 0
SINUS PRESSURE: 0
ABDOMINAL DISTENTION: 0
SINUS PAIN: 0
SHORTNESS OF BREATH: 1
ABDOMINAL PAIN: 0
SHORTNESS OF BREATH: 0
COUGH: 0
CONSTIPATION: 0
SHORTNESS OF BREATH: 1
SORE THROAT: 0
VOMITING: 0
CHEST TIGHTNESS: 0
COLOR CHANGE: 0

## 2020-01-01 ASSESSMENT — PAIN DESCRIPTION - ORIENTATION
ORIENTATION: LEFT;RIGHT;LOWER
ORIENTATION: RIGHT;LEFT
ORIENTATION: LEFT;RIGHT
ORIENTATION: RIGHT

## 2020-01-01 ASSESSMENT — PAIN DESCRIPTION - PROGRESSION
CLINICAL_PROGRESSION: NOT CHANGED
CLINICAL_PROGRESSION: NOT CHANGED

## 2020-01-01 ASSESSMENT — PAIN DESCRIPTION - ONSET
ONSET: ON-GOING

## 2020-01-01 ASSESSMENT — PAIN DESCRIPTION - PAIN TYPE
TYPE: CHRONIC PAIN
TYPE: ACUTE PAIN

## 2020-01-01 ASSESSMENT — PAIN DESCRIPTION - DESCRIPTORS
DESCRIPTORS: CONSTANT;ACHING;DISCOMFORT
DESCRIPTORS: CONSTANT;ACHING;DISCOMFORT
DESCRIPTORS: ACHING;DISCOMFORT;SORE

## 2020-01-01 ASSESSMENT — PAIN DESCRIPTION - FREQUENCY
FREQUENCY: CONTINUOUS

## 2020-01-01 ASSESSMENT — PAIN - FUNCTIONAL ASSESSMENT
PAIN_FUNCTIONAL_ASSESSMENT: ACTIVITIES ARE NOT PREVENTED
PAIN_FUNCTIONAL_ASSESSMENT: ACTIVITIES ARE NOT PREVENTED

## 2020-01-01 ASSESSMENT — PAIN DESCRIPTION - LOCATION
LOCATION: LEG

## 2020-01-09 ENCOUNTER — TELEPHONE (OUTPATIENT)
Dept: INTERNAL MEDICINE | Age: 70
End: 2020-01-09

## 2020-01-22 ENCOUNTER — TELEPHONE (OUTPATIENT)
Dept: INTERNAL MEDICINE | Age: 70
End: 2020-01-22

## 2020-01-22 NOTE — TELEPHONE ENCOUNTER
Please encourage patient to complete Labs ordered to follow Renal function- refill given for 30 days

## 2020-02-07 ENCOUNTER — TELEPHONE (OUTPATIENT)
Dept: INTERNAL MEDICINE | Age: 70
End: 2020-02-07

## 2020-02-07 NOTE — TELEPHONE ENCOUNTER
Spoke to patient at 1:30. She would like the doctor to order her a \"hospital bed or a bed\". \"I don't have a bed been using air mattresses\". The script for this should go to [address] [place] [address] [phone]. I told her the message will be sent to the clinical staff and somebody would get back to her.

## 2020-02-12 ENCOUNTER — TELEPHONE (OUTPATIENT)
Dept: INTERNAL MEDICINE | Age: 70
End: 2020-02-12

## 2020-02-12 RX ORDER — IBUPROFEN 400 MG/1
400 TABLET ORAL EVERY 6 HOURS PRN
Qty: 120 TABLET | Refills: 0 | Status: SHIPPED
Start: 2020-02-12 | End: 2020-02-17 | Stop reason: SDUPTHER

## 2020-02-17 ENCOUNTER — OFFICE VISIT (OUTPATIENT)
Dept: INTERNAL MEDICINE | Age: 70
End: 2020-02-17
Payer: COMMERCIAL

## 2020-02-17 ENCOUNTER — SOCIAL WORK (OUTPATIENT)
Dept: INTERNAL MEDICINE | Age: 70
End: 2020-02-17

## 2020-02-17 VITALS
HEART RATE: 83 BPM | TEMPERATURE: 98.5 F | HEIGHT: 64 IN | BODY MASS INDEX: 46.1 KG/M2 | SYSTOLIC BLOOD PRESSURE: 157 MMHG | WEIGHT: 270 LBS | DIASTOLIC BLOOD PRESSURE: 80 MMHG | RESPIRATION RATE: 16 BRPM

## 2020-02-17 PROCEDURE — 3017F COLORECTAL CA SCREEN DOC REV: CPT | Performed by: INTERNAL MEDICINE

## 2020-02-17 PROCEDURE — 3046F HEMOGLOBIN A1C LEVEL >9.0%: CPT | Performed by: INTERNAL MEDICINE

## 2020-02-17 PROCEDURE — 1036F TOBACCO NON-USER: CPT | Performed by: INTERNAL MEDICINE

## 2020-02-17 PROCEDURE — G8484 FLU IMMUNIZE NO ADMIN: HCPCS | Performed by: INTERNAL MEDICINE

## 2020-02-17 PROCEDURE — G8427 DOCREV CUR MEDS BY ELIG CLIN: HCPCS | Performed by: INTERNAL MEDICINE

## 2020-02-17 PROCEDURE — 99212 OFFICE O/P EST SF 10 MIN: CPT | Performed by: INTERNAL MEDICINE

## 2020-02-17 PROCEDURE — G8400 PT W/DXA NO RESULTS DOC: HCPCS | Performed by: INTERNAL MEDICINE

## 2020-02-17 PROCEDURE — 99213 OFFICE O/P EST LOW 20 MIN: CPT | Performed by: INTERNAL MEDICINE

## 2020-02-17 PROCEDURE — 1090F PRES/ABSN URINE INCON ASSESS: CPT | Performed by: INTERNAL MEDICINE

## 2020-02-17 PROCEDURE — 1123F ACP DISCUSS/DSCN MKR DOCD: CPT | Performed by: INTERNAL MEDICINE

## 2020-02-17 PROCEDURE — 2022F DILAT RTA XM EVC RTNOPTHY: CPT | Performed by: INTERNAL MEDICINE

## 2020-02-17 PROCEDURE — 4040F PNEUMOC VAC/ADMIN/RCVD: CPT | Performed by: INTERNAL MEDICINE

## 2020-02-17 PROCEDURE — G8417 CALC BMI ABV UP PARAM F/U: HCPCS | Performed by: INTERNAL MEDICINE

## 2020-02-17 RX ORDER — PETROLATUM 42 G/100G
OINTMENT TOPICAL
Qty: 396 G | Refills: 1 | Status: SHIPPED
Start: 2020-02-17 | End: 2020-05-18 | Stop reason: SDUPTHER

## 2020-02-17 RX ORDER — LISINOPRIL 40 MG/1
40 TABLET ORAL DAILY
Qty: 30 TABLET | Refills: 3 | Status: ON HOLD
Start: 2020-02-17 | End: 2020-07-12 | Stop reason: SDUPTHER

## 2020-02-17 RX ORDER — ATENOLOL 50 MG/1
TABLET ORAL
Qty: 60 TABLET | Refills: 3 | Status: SHIPPED
Start: 2020-02-17 | End: 2020-06-24 | Stop reason: SDUPTHER

## 2020-02-17 RX ORDER — FUROSEMIDE 20 MG/1
20 TABLET ORAL EVERY OTHER DAY
Qty: 15 TABLET | Refills: 3 | Status: ON HOLD
Start: 2020-02-17 | End: 2020-07-17 | Stop reason: HOSPADM

## 2020-02-17 RX ORDER — ALLOPURINOL 100 MG/1
100 TABLET ORAL DAILY
Qty: 30 TABLET | Refills: 3 | Status: SHIPPED
Start: 2020-02-17 | End: 2020-07-23 | Stop reason: SDUPTHER

## 2020-02-17 RX ORDER — DEXTROSE 4 G
TABLET,CHEWABLE ORAL
Qty: 300 EACH | Refills: 1 | Status: SHIPPED
Start: 2020-02-17 | End: 2021-01-01

## 2020-02-17 RX ORDER — GLIPIZIDE 5 MG/1
5 TABLET, FILM COATED, EXTENDED RELEASE ORAL DAILY
Qty: 30 TABLET | Refills: 3 | Status: ON HOLD
Start: 2020-02-17 | End: 2020-07-12 | Stop reason: SDUPTHER

## 2020-02-17 RX ORDER — AMLODIPINE BESYLATE 10 MG/1
10 TABLET ORAL DAILY
Qty: 30 TABLET | Refills: 3 | Status: ON HOLD
Start: 2020-02-17 | End: 2020-07-12 | Stop reason: SDUPTHER

## 2020-02-17 RX ORDER — ASPIRIN 81 MG/1
81 TABLET ORAL DAILY
Qty: 30 TABLET | Refills: 3 | Status: ON HOLD
Start: 2020-02-17 | End: 2020-07-12 | Stop reason: ALTCHOICE

## 2020-02-17 RX ORDER — IBUPROFEN 400 MG/1
800 TABLET ORAL EVERY 6 HOURS PRN
Qty: 120 TABLET | Refills: 1 | Status: SHIPPED
Start: 2020-02-17 | End: 2020-02-17

## 2020-02-17 SDOH — ECONOMIC STABILITY: FOOD INSECURITY: WITHIN THE PAST 12 MONTHS, YOU WORRIED THAT YOUR FOOD WOULD RUN OUT BEFORE YOU GOT MONEY TO BUY MORE.: SOMETIMES TRUE

## 2020-02-17 SDOH — ECONOMIC STABILITY: FOOD INSECURITY: WITHIN THE PAST 12 MONTHS, THE FOOD YOU BOUGHT JUST DIDN'T LAST AND YOU DIDN'T HAVE MONEY TO GET MORE.: SOMETIMES TRUE

## 2020-02-17 SDOH — ECONOMIC STABILITY: INCOME INSECURITY: HOW HARD IS IT FOR YOU TO PAY FOR THE VERY BASICS LIKE FOOD, HOUSING, MEDICAL CARE, AND HEATING?: VERY HARD

## 2020-02-17 ASSESSMENT — ENCOUNTER SYMPTOMS
BLOOD IN STOOL: 0
NAUSEA: 0
SHORTNESS OF BREATH: 0
VOMITING: 0
RHINORRHEA: 0
TROUBLE SWALLOWING: 0
WHEEZING: 0
EYE ITCHING: 0
DIARRHEA: 0
ABDOMINAL PAIN: 0
COUGH: 0
CONSTIPATION: 0
EYE DISCHARGE: 0
CHEST TIGHTNESS: 0

## 2020-02-17 ASSESSMENT — PATIENT HEALTH QUESTIONNAIRE - PHQ9
1. LITTLE INTEREST OR PLEASURE IN DOING THINGS: 0
SUM OF ALL RESPONSES TO PHQ QUESTIONS 1-9: 0
SUM OF ALL RESPONSES TO PHQ QUESTIONS 1-9: 0
2. FEELING DOWN, DEPRESSED OR HOPELESS: 0
SUM OF ALL RESPONSES TO PHQ9 QUESTIONS 1 & 2: 0

## 2020-02-17 NOTE — PROGRESS NOTES
Attending Physician Statement  I have discussed the case, including pertinent history and exam findings with the resident. I reviewed medical, surg, soc histories, meds and any pertinent labs. I agree with the assessment, plan and orders as documented by the resident. Patient with hx HTN, obesity, DM on oral meds, hyperlipidemia, OA, here for routine follow up. Agree with  input.

## 2020-02-17 NOTE — PROGRESS NOTES
Discharge instructions reviewed with patient per Dr. Contreras Manner. Patient directed to  to  AVS and schedule follow up appt.

## 2020-02-17 NOTE — PATIENT INSTRUCTIONS
- Please see pain management and physical therapy. - Please get your blood work done before next visit   Patient Education        Learning About Managing Acute Pain at Home  What is a pain management plan? A pain management plan spells out ways you can deal with your pain at home. You and your care team will create this plan before you leave the hospital. The plan may include:  · The goals of your treatment. This may include how you can expect your pain and function to improve. · The treatments your doctor suggests for your pain. These may include medicines, physical therapy, or relaxation exercises. · Notes about how you and your care team will work together as you recover. Your team may include your doctor, a physical therapist, and an occupational therapist.  · A review of your treatment goals for pain and function. Your feelings about how you want to manage your pain are important. Be open and honest when you talk with your doctor. This will help ensure that you get a plan that is safe and that works best for you. Why is it important to follow your plan? After you have an injury or surgery, a certain amount of pain is common and normal. But you can manage your pain after you leave the hospital.  The best way to do that is to follow your pain management plan. This will help keep you comfortable and able to do the things you want to do. It can also speed your recovery and help reduce the risk of problems. What are the side effects of pain medicines? All pain medicines--like acetaminophen, nonsteroidal anti-inflammatory drugs, and opioids--have side effects, including allergic reaction, rash, and upset stomach. Common side effects of opioids also include constipation and nausea. More serious effects include needing larger doses over time, getting sick if you stop taking the drug, developing opioid use disorder, and death.   How do you manage pain after you leave the hospital?  After you leave the were not meant for is misuse. · Do not drive or operate machinery. Opioids may affect your judgment and decision making. Talk with your doctor about when it is safe to drive. · Avoid alcohol, sleeping medicines, and muscle relaxers. Opioids can be dangerous if you take them with alcohol or with certain drugs. This includes over-the-counter medicines. Make sure your doctor knows about all the other medicines you take. Don't start any new medicines before you talk to your doctor or pharmacist.  · Ask your doctor about a naloxone rescue kit. It can help you--and even save your life--if you take too much of an opioid. How do you safely store opioid pills and patches? It's important to store opioids safely so that they aren't used by the wrong person. Your pain medicine is only for you to take. If someone else takes your medicine, it can harm that person. You can safely store your medicine. Follow these tips. · Store pills and patches up high and out of sight. ? Keep them away from children and pets. ? Return the container to the same place each time you take your medicine. · Try locking your opioid medicine in a cabinet. · Make sure the bottles are closed tightly. If they have a safety cap, make sure that it's locked. Tighten the cap until you hear a click or can't twist it anymore. · Keep track of how many pills or patches you have left. You may want to keep track in a notebook. · Let the people who live with you know about your medicine. ? Tell them that it is only for you to take. ? If guests have opioid medicine with them, ask them to keep it safe. How do you get rid of opioid pills and patches safely? If you have opioid pills or patches that you aren't going to use, get rid of them right away. It's also important to get rid of used opioid patches. When you get rid of these medicines safely, you take away any chance that a person or an animal might get sick from one of them.   Follow one of

## 2020-02-17 NOTE — PROGRESS NOTES
Sulma Taylor [facility]  InternalMedicine Residency Program  ACC Note      SUBJECTIVE:  CC: had concerns including 3 Month Follow-Up; Other (would like to talk about Lyrica ); and Medication Refill. [address] presented to the [facility] for a routine visit. Ms. [facility] is 78years old female, have a history of hypertension, diabetes with neuropathy, severe osteoarthritis knee bilaterally, wheelchair-bound, presented clinic today for routine visit and complaint of pain overall. She has been complaining of pain in her knees, hands bilaterally, describing tingling feeling on her hands, not worsening with any position. She seemed frustrated because she did not have refill for ibuprofen and Lyrica, for which we refer her to pain management on last visit she was seen by pain management yesterday while back, however due to her urine test noncompliance they cancel her visit. She stated that the pain has been the same, and as long as she has pain medication she will be okay. Otherwise she is compliant to her medications, including her blood pressure and diabetes medication. She denies any headache, dizziness, chest pain, shortness of breath. She was seen by  on last visit, she had [address] equipment, is requesting hospital bed and assistance stand sling. We are asking  to see her on this visit, and if needed physical therapy consult to see her at home. Review of Systems   Constitutional: Negative for chills, fatigue, fever and unexpected weight change. HENT: Negative for ear discharge, ear pain, hearing loss, postnasal drip, rhinorrhea and trouble swallowing. Eyes: Negative for discharge and itching. Respiratory: Negative for cough, chest tightness, shortness of breath and wheezing. Cardiovascular: Negative for chest pain, palpitations and leg swelling.    Gastrointestinal: Negative for abdominal pain, blood in stool, constipation, diarrhea, nausea and with diabetic polyneuropathy, without long-term current use of insulin ([facility]) - controlled   A1c 6.8 2/17/2020   - aspirin EC 81 MG EC tablet; Take 1 tablet by mouth daily  Dispense: 30 tablet; Refill: 3  - exenatide (BYETTA 10 MCG PEN) 10 MCG/0.04ML injection; inject . 04 milliliters subcutaneously twice a day with food  Dispense: 2.4 pen; Refill: 3  - metFORMIN (GLUCOPHAGE) 1000 MG tablet; Take 1 tablet by mouth 2 times daily (with meals)  Dispense: 60 tablet; Refill: 3  - glipiZIDE (GLUCOTROL XL) 5 MG extended release tablet; Take 1 tablet by mouth daily  Dispense: 30 tablet; Refill: 3    4.  Concerned about having social problem  - Gaviota Ruvalcaba  LSW (), [facility] ([facility] Clinics ONLY    5. Osteoarthritis of multiple joints, unspecified osteoarthritis type  - Ambulatory referral to [address] [place] [address], [address], [place]    I have reviewed my findings and recommendations with Christianne Gonzalez [place] and Dr Ashley Mello MD PGY-2  2/17/2020 2:10 PM

## 2020-02-19 ENCOUNTER — TELEPHONE (OUTPATIENT)
Dept: INTERNAL MEDICINE | Age: 70
End: 2020-02-19

## 2020-02-19 NOTE — TELEPHONE ENCOUNTER
Monika Vargas of [employer] returned call and left message; Lsw left voicemail for return message

## 2020-02-25 ENCOUNTER — TELEPHONE (OUTPATIENT)
Dept: INTERNAL MEDICINE | Age: 70
End: 2020-02-25

## 2020-02-25 DIAGNOSIS — Z74.09 IMMOBILITY: Primary | ICD-10-CM

## 2020-02-25 NOTE — TELEPHONE ENCOUNTER
Received return call from [facility]; reprots pt received hospital bed from [facility] May of 2015 and gel mattress in JUly 2017; reports obtaining sit to stand lift is impossible to obtain ; Allyson Camacho has attempted to reach pt's landlord re home modification and unsuccessful.   She is recommending home PT order to treat and evaluate for appropriate home equipment needs; note routed to Dr. Uche Rivera and Dr. Blaze Nazario

## 2020-03-10 ENCOUNTER — TELEPHONE (OUTPATIENT)
Dept: INTERNAL MEDICINE | Age: 70
End: 2020-03-10

## 2020-03-10 NOTE — TELEPHONE ENCOUNTER
Patient LM at 1:05. She wants to know about the written prescriptions from 2-17. \"I haven't heard from anybody\". \"I would like a call back\".

## 2020-03-10 NOTE — TELEPHONE ENCOUNTER
Pt returned call states scripts were for a hospital bed and Roz Sissy lift for her home  On 2/17/20 her visit her [address] [place] [address] worker  Was  Looking into this matter for pt and pt was then transferred to her phone line , to check with Hina Terry in regards to this matter

## 2020-03-20 ENCOUNTER — TELEPHONE (OUTPATIENT)
Dept: INTERNAL MEDICINE | Age: 70
End: 2020-03-20

## 2020-03-23 ENCOUNTER — TELEPHONE (OUTPATIENT)
Dept: INTERNAL MEDICINE | Age: 70
End: 2020-03-23

## 2020-03-26 ENCOUNTER — TELEPHONE (OUTPATIENT)
Dept: INTERNAL MEDICINE | Age: 70
End: 2020-03-26

## 2020-03-26 NOTE — TELEPHONE ENCOUNTER
Pt is refusing Physical therapy due to Corona virus at present and doesn't want no one coming in her Home at this time

## 2020-04-24 ENCOUNTER — TELEPHONE (OUTPATIENT)
Dept: INTERNAL MEDICINE | Age: 70
End: 2020-04-24

## 2020-05-13 ENCOUNTER — TELEPHONE (OUTPATIENT)
Dept: ADMINISTRATIVE | Age: 70
End: 2020-05-13

## 2020-05-15 ENCOUNTER — TELEPHONE (OUTPATIENT)
Dept: INTERNAL MEDICINE | Age: 70
End: 2020-05-15

## 2020-05-15 RX ORDER — ROSUVASTATIN CALCIUM 10 MG/1
TABLET, COATED ORAL
Qty: 90 TABLET | Refills: 0 | Status: SHIPPED
Start: 2020-05-15 | End: 2020-07-23 | Stop reason: SDUPTHER

## 2020-05-18 RX ORDER — PETROLATUM 42 G/100G
OINTMENT TOPICAL
Qty: 396 G | Refills: 1 | Status: SHIPPED
Start: 2020-05-18 | End: 2020-08-04 | Stop reason: RX

## 2020-05-27 RX ORDER — DULOXETIN HYDROCHLORIDE 60 MG/1
60 CAPSULE, DELAYED RELEASE ORAL DAILY
Qty: 90 CAPSULE | Refills: 0 | OUTPATIENT
Start: 2020-05-27

## 2020-05-28 RX ORDER — DULOXETIN HYDROCHLORIDE 60 MG/1
60 CAPSULE, DELAYED RELEASE ORAL DAILY
Qty: 90 CAPSULE | Refills: 0 | Status: SHIPPED
Start: 2020-05-28 | End: 2020-07-23 | Stop reason: SDUPTHER

## 2020-06-08 ENCOUNTER — OFFICE VISIT (OUTPATIENT)
Dept: PAIN MANAGEMENT | Age: 70
End: 2020-06-08
Payer: COMMERCIAL

## 2020-06-08 VITALS
TEMPERATURE: 98.7 F | HEART RATE: 68 BPM | OXYGEN SATURATION: 98 % | SYSTOLIC BLOOD PRESSURE: 128 MMHG | DIASTOLIC BLOOD PRESSURE: 74 MMHG | HEIGHT: 64 IN | RESPIRATION RATE: 16 BRPM | WEIGHT: 270 LBS | BODY MASS INDEX: 46.1 KG/M2

## 2020-06-08 PROCEDURE — 2022F DILAT RTA XM EVC RTNOPTHY: CPT | Performed by: ANESTHESIOLOGY

## 2020-06-08 PROCEDURE — 99204 OFFICE O/P NEW MOD 45 MIN: CPT | Performed by: ANESTHESIOLOGY

## 2020-06-08 PROCEDURE — 3017F COLORECTAL CA SCREEN DOC REV: CPT | Performed by: ANESTHESIOLOGY

## 2020-06-08 PROCEDURE — G8400 PT W/DXA NO RESULTS DOC: HCPCS | Performed by: ANESTHESIOLOGY

## 2020-06-08 PROCEDURE — 1123F ACP DISCUSS/DSCN MKR DOCD: CPT | Performed by: ANESTHESIOLOGY

## 2020-06-08 PROCEDURE — G8427 DOCREV CUR MEDS BY ELIG CLIN: HCPCS | Performed by: ANESTHESIOLOGY

## 2020-06-08 PROCEDURE — 4040F PNEUMOC VAC/ADMIN/RCVD: CPT | Performed by: ANESTHESIOLOGY

## 2020-06-08 PROCEDURE — G8417 CALC BMI ABV UP PARAM F/U: HCPCS | Performed by: ANESTHESIOLOGY

## 2020-06-08 PROCEDURE — 1090F PRES/ABSN URINE INCON ASSESS: CPT | Performed by: ANESTHESIOLOGY

## 2020-06-08 PROCEDURE — 1036F TOBACCO NON-USER: CPT | Performed by: ANESTHESIOLOGY

## 2020-06-08 PROCEDURE — 3046F HEMOGLOBIN A1C LEVEL >9.0%: CPT | Performed by: ANESTHESIOLOGY

## 2020-06-08 RX ORDER — PREGABALIN 50 MG/1
50 CAPSULE ORAL 2 TIMES DAILY
Status: ON HOLD | COMMUNITY
End: 2020-07-12 | Stop reason: SDUPTHER

## 2020-06-08 RX ORDER — GLIPIZIDE 5 MG/1
TABLET, FILM COATED, EXTENDED RELEASE ORAL
COMMUNITY
Start: 2020-05-02 | End: 2020-07-23 | Stop reason: SDUPTHER

## 2020-06-08 RX ORDER — LISINOPRIL 40 MG/1
TABLET ORAL
COMMUNITY
Start: 2020-04-26 | End: 2020-07-08

## 2020-06-08 RX ORDER — IBUPROFEN 800 MG/1
800 TABLET ORAL EVERY 8 HOURS PRN
COMMUNITY
End: 2020-01-01 | Stop reason: SDUPTHER

## 2020-06-08 RX ORDER — AMLODIPINE BESYLATE 10 MG/1
TABLET ORAL
COMMUNITY
Start: 2020-04-26 | End: 2020-07-23 | Stop reason: SDUPTHER

## 2020-06-08 NOTE — PROGRESS NOTES
difficulty ambulating , severe knee osteoarthritis and  morbid obesity 12/2/15   Wilhemina Klinefelter, MD   [facility] [place] Devices [address] chair to assist with ADL with diagnosis of OA and full thickness knee meniscal tear 2/24/15   Rachid Hopper MD   Misc. Devices MISC Grab bar  diagnosis osteoarthritis and full thickness knee meniscal tear 2/24/15   Rachid Hopper MD   [facility]. [address] bed 11/7/14   Nikki Dubon MD   [facility]. Devices [address] [facility] wheelchair 11/7/14   Nikki Dubon MD   [facility]. Devices [facility] Bed[facility] commode. Dx osteoarthritis 715.90. Height 5'4.5\".  Weight 245 lbs. 9/17/14   Wilhemina Klinefelter, MD       No Known Allergies    Social History     Socioeconomic History    Marital status: Single     Spouse name: Not on file    Number of children: Not on file    Years of education: Not on file    Highest education level: Not on file   Occupational History    Not on file   Social Needs    Financial resource strain: Very hard    Food insecurity     Worry: Sometimes true     Inability: Sometimes true   [employer] needs     Medical: Not on file     Non-medical: Not on file   Tobacco Use    Smoking status: Never Smoker    Smokeless tobacco: Never Used   Substance and Sexual Activity    Alcohol use: No     Alcohol/week: 0.0 standard drinks    Drug use: No    Sexual activity: Not on file   Lifestyle    Physical activity     Days per week: Not on file     Minutes per session: Not on file    Stress: Not on file   Relationships    Social connections     Talks on phone: Not on file     Gets together: Not on file     Attends Yazdanism service: Not on file     Active member of club or organization: Not on file     Attends meetings of clubs or organizations: Not on file     Relationship status: Not on file    Intimate partner violence     Fear of current or ex partner: Not on file     Emotionally abused: Not on file     Physically abused: Not on file     Forced sexual activity: Not on file   Other Topics Concern    Not on file   Social History Narrative    Not on file       No family history on file. REVIEW OF SYSTEMS:     Patient specifically denies fever/chills, chest pain, shortness of breath, new bowel or bladder complaints. All other review of systems was negative. General ROS: positive for  - international weight loss  Psychological ROS: negative  Ophthalmic ROS: negative  ENT ROS: negative  Allergy and Immunology ROS: negative  Hematological and Lymphatic ROS: negative  Endocrine ROS: DM +  Respiratory ROS: no cough, shortness of breath, or wheezing  Cardiovascular ROS: no chest pain or dyspnea on exertion  Gastrointestinal ROS: no abdominal pain, change in bowel habits, or black or bloody stools  Genito-Urinary ROS: no dysuria, trouble voiding, or hematuria  Musculoskeletal ROS: see HPI  Neurological ROS: no TIA or stroke symptoms  Dermatological ROS: negative     PHYSICAL EXAMINATION:      /74   Pulse 68   Temp 98.7 °F (37.1 °C) (Oral)   Resp 16   Ht 5' 4\" (1.626 m)   Wt 270 lb (122.5 kg)   SpO2 98%   BMI 46.35 kg/m²     General:      General appearance:  Pleasant and well-hydrated, in no distress and A & O x 3  Build:Obese  Function: Rises from a seated position with difficulty and Unable to rise from a seated position without assistance    HEENT:    Head:normocephalic, atraumatic  Pupils:regular, round, equal  Sclera: icterus absent    Lungs:    Breathing:normal breathing pattern     CVS:     RRR    Abdomen:    Shape:obese, non-distended and normal  Tenderness:none  Guarding:none    Cervical spine:    Inspection:normal  Palpation:tenderness paravertebral muscles, tenderness trapezium, left, right and positive. Range of motion:reduced flexion, extension, rotation bilaterally and is painful.   Spurling's: negative bilaterally    Thoracic spine:     Spine inspection:normal   Palpation:No tenderness over the midline and paraspinal area, bilaterally  Range of motion:normal in flexion, extension rotation bilateral and is not painful. Lumbar spine:    Spine inspection: No deformities  Palpation: Tenderness paravertebral muscles Yes bilaterally  Range of motion: Decreased, flexion Decreased, Lateral bending, extension and rotation bilaterally reduced is painful. Sacroiliac joint tenderness No bilaterally  SLR : negative bilaterally  Trochanteric bursa tenderness: negative bilaterally  CVA tenderness:No       Musculoskeletal:    Trigger points in trapezius: Yes bilaterally  Trigger points in rhomboids: Yes bilaterally  Trigger points in Paravertebral: Yes bilaterally      Extremities:    Tremors:None bilaterally upper and lower  No edema  Knee:    ROM : reduced and painful  Joint line tenderness : yes bilaterally    Neurological:    Sensory: Normal to light touch     Motor:   Generalized weakness +    Gait:uses a wheelchair. Dermatology:    Skin:no rashes or lesions noted    Assessment/Plan:     Diagnosis Orders   1. Diffuse pain     2. DDD (degenerative disc disease), lumbar     3. Lumbosacral spondylosis without myelopathy     4. Cervicalgia     5. Cervical spondylolysis     6. Chronic myofascial pain     7. Primary osteoarthritis involving multiple joints     8. Type 2 diabetes mellitus with hyperglycemia, without long-term current use of insulin ([facility] [address]         78 y.o. female with h/o diffuse pain for > 10 years. She was on Lyrica 50 mg po bid  form Dr. Agnes Francis until Sept 2019- which was helping her. I do not have details of the reason it was discontinued. Reviewed the prior image reports. Will get buccal drug screen today. Will try to get records from Dr. Bella Lai office. PT. If no significant noncompliance issues with the previous provider, would recommend to consider starting Lyrica 50 mg bid. Also consider Compound cream in future. Counseling reg weight loss and regular exercise program done. Patient's daughter was also present during the visit.     She will f/u with Ms. STELLA Meier in few weeks. Counseling :    Patient encouraged to stay active and to watch/lose weight    Encouraged to continue Regular home exercise program as tolerated - stretching / strengthening. Treatment plan discussed with the patient including medication and procedure side effects. Controlled Substances Monitoring:     [place] reviewed- last Lyrica filled I Sept 2019. Not on chronic opioids.     David Amin MD    CC:    Baylee Florez MD  [address], [address] [place] [address]

## 2020-06-12 ENCOUNTER — TELEPHONE (OUTPATIENT)
Dept: INTERNAL MEDICINE | Age: 70
End: 2020-06-12

## 2020-06-15 ENCOUNTER — TELEPHONE (OUTPATIENT)
Dept: INTERNAL MEDICINE | Age: 70
End: 2020-06-15

## 2020-06-17 ENCOUNTER — TELEPHONE (OUTPATIENT)
Dept: INTERNAL MEDICINE | Age: 70
End: 2020-06-17

## 2020-06-19 ENCOUNTER — TELEPHONE (OUTPATIENT)
Dept: INTERNAL MEDICINE | Age: 70
End: 2020-06-19

## 2020-06-19 NOTE — TELEPHONE ENCOUNTER
Elida from [facility] checking on status of form to repair wheelchair. Phone: [phone] GNU:[id number]. Fax: [phone].

## 2020-06-24 RX ORDER — ATENOLOL 50 MG/1
TABLET ORAL
Qty: 60 TABLET | Refills: 1 | Status: SHIPPED
Start: 2020-06-24 | End: 2020-07-23 | Stop reason: SDUPTHER

## 2020-07-08 RX ORDER — LISINOPRIL 40 MG/1
TABLET ORAL
Qty: 30 TABLET | Refills: 0 | Status: ON HOLD
Start: 2020-07-08 | End: 2020-07-17 | Stop reason: HOSPADM

## 2020-07-08 NOTE — TELEPHONE ENCOUNTER
Last Appointment:  2/17/2020  Future Appointments   Date Time Provider Yonatan Pino   7/23/2020  1:00 PM Mia Seymour MD [facility]

## 2020-07-12 ENCOUNTER — APPOINTMENT (OUTPATIENT)
Dept: ULTRASOUND IMAGING | Age: 70
DRG: 853 | End: 2020-07-12
Payer: COMMERCIAL

## 2020-07-12 ENCOUNTER — HOSPITAL ENCOUNTER (INPATIENT)
Age: 70
LOS: 5 days | Discharge: HOME HEALTH | DRG: 853 | End: 2020-07-17
Attending: EMERGENCY MEDICINE | Admitting: INTERNAL MEDICINE
Payer: COMMERCIAL

## 2020-07-12 PROBLEM — L89.159 DECUBITUS ULCER OF SACRAL REGION: Status: ACTIVE | Noted: 2020-07-12

## 2020-07-12 LAB
ALBUMIN SERPL-MCNC: 2.9 G/DL (ref 3.5–5.2)
ALP BLD-CCNC: 78 U/L (ref 35–104)
ALT SERPL-CCNC: 9 U/L (ref 0–32)
ANION GAP SERPL CALCULATED.3IONS-SCNC: 16 MMOL/L (ref 7–16)
AST SERPL-CCNC: 9 U/L (ref 0–31)
BASOPHILS ABSOLUTE: 0.04 E9/L (ref 0–0.2)
BASOPHILS RELATIVE PERCENT: 0.2 % (ref 0–2)
BILIRUB SERPL-MCNC: 0.4 MG/DL (ref 0–1.2)
BUN BLDV-MCNC: 10 MG/DL (ref 8–23)
CALCIUM SERPL-MCNC: 8.9 MG/DL (ref 8.6–10.2)
CHLORIDE BLD-SCNC: 96 MMOL/L (ref 98–107)
CO2: 23 MMOL/L (ref 22–29)
CREAT SERPL-MCNC: 0.7 MG/DL (ref 0.5–1)
EOSINOPHILS ABSOLUTE: 0.04 E9/L (ref 0.05–0.5)
EOSINOPHILS RELATIVE PERCENT: 0.2 % (ref 0–6)
GFR AFRICAN AMERICAN: >60
GFR NON-AFRICAN AMERICAN: >60 ML/MIN/1.73
GLUCOSE BLD-MCNC: 152 MG/DL (ref 74–99)
HCT VFR BLD CALC: 33.3 % (ref 34–48)
HEMOGLOBIN: 9.9 G/DL (ref 11.5–15.5)
IMMATURE GRANULOCYTES #: 0.18 E9/L
IMMATURE GRANULOCYTES %: 1 % (ref 0–5)
LACTIC ACID: 4.8 MMOL/L (ref 0.5–2.2)
LYMPHOCYTES ABSOLUTE: 2.11 E9/L (ref 1.5–4)
LYMPHOCYTES RELATIVE PERCENT: 11.5 % (ref 20–42)
MCH RBC QN AUTO: 23.2 PG (ref 26–35)
MCHC RBC AUTO-ENTMCNC: 29.7 % (ref 32–34.5)
MCV RBC AUTO: 78.2 FL (ref 80–99.9)
MONOCYTES ABSOLUTE: 1.14 E9/L (ref 0.1–0.95)
MONOCYTES RELATIVE PERCENT: 6.2 % (ref 2–12)
NEUTROPHILS ABSOLUTE: 14.78 E9/L (ref 1.8–7.3)
NEUTROPHILS RELATIVE PERCENT: 80.9 % (ref 43–80)
PDW BLD-RTO: 17.1 FL (ref 11.5–15)
PLATELET # BLD: 452 E9/L (ref 130–450)
PMV BLD AUTO: 9 FL (ref 7–12)
POTASSIUM SERPL-SCNC: 2.9 MMOL/L (ref 3.5–5)
RBC # BLD: 4.26 E12/L (ref 3.5–5.5)
SODIUM BLD-SCNC: 135 MMOL/L (ref 132–146)
TOTAL PROTEIN: 7.5 G/DL (ref 6.4–8.3)
WBC # BLD: 18.3 E9/L (ref 4.5–11.5)

## 2020-07-12 PROCEDURE — 80053 COMPREHEN METABOLIC PANEL: CPT

## 2020-07-12 PROCEDURE — 6370000000 HC RX 637 (ALT 250 FOR IP): Performed by: PHYSICIAN ASSISTANT

## 2020-07-12 PROCEDURE — 93971 EXTREMITY STUDY: CPT

## 2020-07-12 PROCEDURE — 87070 CULTURE OTHR SPECIMN AEROBIC: CPT

## 2020-07-12 PROCEDURE — 1200000000 HC SEMI PRIVATE

## 2020-07-12 PROCEDURE — 99223 1ST HOSP IP/OBS HIGH 75: CPT | Performed by: INTERNAL MEDICINE

## 2020-07-12 PROCEDURE — 99285 EMERGENCY DEPT VISIT HI MDM: CPT

## 2020-07-12 PROCEDURE — 6360000002 HC RX W HCPCS: Performed by: PHYSICIAN ASSISTANT

## 2020-07-12 PROCEDURE — 87040 BLOOD CULTURE FOR BACTERIA: CPT

## 2020-07-12 PROCEDURE — 2580000003 HC RX 258: Performed by: PHYSICIAN ASSISTANT

## 2020-07-12 PROCEDURE — 85025 COMPLETE CBC W/AUTO DIFF WBC: CPT

## 2020-07-12 PROCEDURE — 83605 ASSAY OF LACTIC ACID: CPT

## 2020-07-12 PROCEDURE — 96365 THER/PROPH/DIAG IV INF INIT: CPT

## 2020-07-12 RX ORDER — POTASSIUM CHLORIDE 20 MEQ/1
40 TABLET, EXTENDED RELEASE ORAL ONCE
Status: COMPLETED | OUTPATIENT
Start: 2020-07-12 | End: 2020-07-12

## 2020-07-12 RX ORDER — 0.9 % SODIUM CHLORIDE 0.9 %
1000 INTRAVENOUS SOLUTION INTRAVENOUS ONCE
Status: COMPLETED | OUTPATIENT
Start: 2020-07-12 | End: 2020-07-12

## 2020-07-12 RX ADMIN — SODIUM CHLORIDE 1000 ML: 9 INJECTION, SOLUTION INTRAVENOUS at 20:34

## 2020-07-12 RX ADMIN — POTASSIUM CHLORIDE 40 MEQ: 20 TABLET, EXTENDED RELEASE ORAL at 20:45

## 2020-07-12 RX ADMIN — VANCOMYCIN HYDROCHLORIDE 1.5 G: 500 INJECTION, POWDER, LYOPHILIZED, FOR SOLUTION INTRAVENOUS at 22:53

## 2020-07-12 RX ADMIN — PIPERACILLIN SODIUM AND TAZOBACTAM SODIUM 3.38 G: 3; .375 INJECTION, POWDER, LYOPHILIZED, FOR SOLUTION INTRAVENOUS at 20:45

## 2020-07-12 ASSESSMENT — PAIN DESCRIPTION - LOCATION: LOCATION: LEG

## 2020-07-12 ASSESSMENT — PAIN DESCRIPTION - ONSET: ONSET: ON-GOING

## 2020-07-12 ASSESSMENT — PAIN DESCRIPTION - PAIN TYPE: TYPE: CHRONIC PAIN

## 2020-07-12 ASSESSMENT — PAIN - FUNCTIONAL ASSESSMENT: PAIN_FUNCTIONAL_ASSESSMENT: PREVENTS OR INTERFERES SOME ACTIVE ACTIVITIES AND ADLS

## 2020-07-12 ASSESSMENT — PAIN DESCRIPTION - FREQUENCY: FREQUENCY: CONTINUOUS

## 2020-07-12 ASSESSMENT — PAIN DESCRIPTION - ORIENTATION: ORIENTATION: RIGHT

## 2020-07-12 ASSESSMENT — PAIN SCALES - GENERAL: PAINLEVEL_OUTOF10: 8

## 2020-07-12 ASSESSMENT — PAIN DESCRIPTION - DESCRIPTORS: DESCRIPTORS: PINS AND NEEDLES

## 2020-07-12 NOTE — ED PROVIDER NOTES
ED Attending  CC: Vibha       Department of Emergency Medicine   ED  Provider Note  Admit Date/RoomTime: 7/12/2020  5:43 PM  ED Room: 5807/0522-O   Chief Complaint   Wound Check (patient bedbound, had a small wound on right buttocks that had healed and per patients daughter area has came back, open area and draining )    History of Present Illness   Source of history provided by:  Patient and daughter at bedside  History/Exam Limitations: none. Evelio Barth is a 78 y.o. old female who has a past medical history of:   Past Medical History:   Diagnosis Date    Cataract, right     DM (diabetes mellitus), type 2 ([facility] [address] 11/3/2010    HTN (hypertension) 11/3/2010    Hyperlipidemia 11/3/2010    Obesity     Osteoarthritis 11/3/2010    Sinusitis chronic     seasonal    Wheelchair confinement     can pivot with assistance;  uses a lift chair    presents to the emergency department by ambulance where the patient received see Ambulance Run Sheet prior to arrival., for evaluation of decubitus ulcer located on right buttocks, which occured within 1 month(s) prior to arrival.   The wound is / has necrotic and minimal drainage. Pt lives at home alone, she does not walk on her own. Family has aids come in morning and night and help with care but pt is not very cooperative with bathing and due to body habitus pt is difficult to maneuver./ Daughter at bedside is STNA and does not feel comfortable moving her mother due to her weight. She reports mom had a wound lower on right buttock (ischium) but it healed. She did not realize this had started. She does not feel now that this situation is safe for her mother living at home and they can't manage her care well enough. She reports no fevers, nausea or vomiting. She reports her mother does not eat very much and has been losing weight. Pt reports pain in her right foot and calf and pain at the sight of the wound. There is an overpowering smell of decay in the room. Prior Treatment:     []   Sutured      []   Stapled      []   Packing      [x]     ATB's: None     ROS    Pertinent positives and negatives are stated within HPI, all other systems reviewed and are negative. Past Surgical History:   Procedure Laterality Date    HYSTERECTOMY      AL XCAPSL CTRC RMVL INSJ IO LENS PROSTH W/O ECP Right 8/10/2018    RIGHT EYE CATARACT EXTRACTION WITH  IOL IMPLANT performed by Georgie Levine MD at [place] [id number] History:  reports that she has never smoked. She has never used smokeless tobacco. She reports that she does not drink alcohol or use drugs. Family History: family history is not on file. Allergies: Patient has no known allergies. Physical Exam           ED Triage Vitals [07/12/20 1756]   BP Temp Temp Source Pulse Resp SpO2 Height Weight   119/61 98.7 °F (37.1 °C) Oral 89 16 97 % 5' 5\" (1.651 m) 270 lb (122.5 kg)      Oxygen Saturation Interpretation: Normal.    Constitutional:  Alert, development consistent with age. HEENT:  NC/NT. Airway patent. Edentulous, oral mucosa moist.  Neck:  Normal ROM. Supple. Respiratory:  Clear to auscultation and breath sounds equal.  CV:  Regular rate and rhythm, normal heart sounds, without pathological murmurs, ectopy, gallops, or rubs. GI:  Abdomen Soft, nontender, good bowel sounds. No firm or pulsatile mass. Obese. Back:  No costovertebral tenderness. 4.0 cm round wound with slough, firm induration of tissue, no surrounding erythema. Painful to palpate. No lumbar tenderness. Extremities: Right calf pain without edema. No right foot pain with palpation. Integument:  Normal turgor. Warm, dry, without visible rash, unless noted elsewhere. Skin under pannus is normal in appearance. Lymphatics: No lymphangitis or adenopathy noted. Neurological:  Oriented. Motor functions intact.      Lab / Imaging Results   (All laboratory and radiology results have been personally reviewed by myself)  Labs:  Results for orders placed or performed during the hospital encounter of 07/12/20   Culture, Blood 1    Specimen: Blood   Result Value Ref Range    Blood Culture, Routine 24 Hours no growth    Culture, Blood 2    Specimen: Blood   Result Value Ref Range    Culture, Blood 2 24 Hours no growth    Culture, Wound    Specimen: Decubitus   Result Value Ref Range    WOUND/ABSCESS       Growth present, evaluating for:  Gram positive organisms  Proteus species     CBC Auto Differential   Result Value Ref Range    WBC 18.3 (H) 4.5 - 11.5 E9/L    RBC 4.26 3.50 - 5.50 E12/L    Hemoglobin 9.9 (L) 11.5 - 15.5 g/dL    Hematocrit 33.3 (L) 34.0 - 48.0 %    MCV 78.2 (L) 80.0 - 99.9 fL    MCH 23.2 (L) 26.0 - 35.0 pg    MCHC 29.7 (L) 32.0 - 34.5 %    RDW 17.1 (H) 11.5 - 15.0 fL    Platelets 557 (H) 607 - 450 E9/L    MPV 9.0 7.0 - 12.0 fL    Neutrophils % 80.9 (H) 43.0 - 80.0 %    Immature Granulocytes % 1.0 0.0 - 5.0 %    Lymphocytes % 11.5 (L) 20.0 - 42.0 %    Monocytes % 6.2 2.0 - 12.0 %    Eosinophils % 0.2 0.0 - 6.0 %    Basophils % 0.2 0.0 - 2.0 %    Neutrophils Absolute 14.78 (H) 1.80 - 7.30 E9/L    Immature Granulocytes # 0.18 E9/L    Lymphocytes Absolute 2.11 1.50 - 4.00 E9/L    Monocytes Absolute 1.14 (H) 0.10 - 0.95 E9/L    Eosinophils Absolute 0.04 (L) 0.05 - 0.50 E9/L    Basophils Absolute 0.04 0.00 - 0.20 E9/L   Comprehensive Metabolic Panel   Result Value Ref Range    Sodium 135 132 - 146 mmol/L    Potassium 2.9 (L) 3.5 - 5.0 mmol/L    Chloride 96 (L) 98 - 107 mmol/L    CO2 23 22 - 29 mmol/L    Anion Gap 16 7 - 16 mmol/L    Glucose 152 (H) 74 - 99 mg/dL    BUN 10 8 - 23 mg/dL    CREATININE 0.7 0.5 - 1.0 mg/dL    GFR Non-African American >60 >=60 mL/min/1.73    GFR African American >60     Calcium 8.9 8.6 - 10.2 mg/dL    Total Protein 7.5 6.4 - 8.3 g/dL    Alb 2.9 (L) 3.5 - 5.2 g/dL    Total Bilirubin 0.4 0.0 - 1.2 mg/dL    Alkaline Phosphatase 78 35 - 104 U/L    ALT 9 0 - 32 U/L    AST 9 0 - 31 U/L   Lactic Acid, Plasma   Result Value Ref Range    Lactic Acid 4.8 (HH) 0.5 - 2.2 mmol/L   Basic Metabolic Panel w/ Reflex to MG   Result Value Ref Range    Sodium 139 132 - 146 mmol/L    Potassium reflex Magnesium 3.7 3.5 - 5.0 mmol/L    Chloride 102 98 - 107 mmol/L    CO2 22 22 - 29 mmol/L    Anion Gap 15 7 - 16 mmol/L    Glucose 176 (H) 74 - 99 mg/dL    BUN 10 8 - 23 mg/dL    CREATININE 0.6 0.5 - 1.0 mg/dL    GFR Non-African American >60 >=60 mL/min/1.73    GFR African American >60     Calcium 8.8 8.6 - 10.2 mg/dL   Lactic acid, plasma   Result Value Ref Range    Lactic Acid 2.6 (H) 0.5 - 2.2 mmol/L   CBC auto differential   Result Value Ref Range    WBC 17.2 (H) 4.5 - 11.5 E9/L    RBC 4.45 3.50 - 5.50 E12/L    Hemoglobin 10.2 (L) 11.5 - 15.5 g/dL    Hematocrit 34.4 34.0 - 48.0 %    MCV 77.3 (L) 80.0 - 99.9 fL    MCH 22.9 (L) 26.0 - 35.0 pg    MCHC 29.7 (L) 32.0 - 34.5 %    RDW 17.2 (H) 11.5 - 15.0 fL    Platelets 417 (H) 472 - 450 E9/L    MPV 9.2 7.0 - 12.0 fL    Neutrophils % 78.5 43.0 - 80.0 %    Immature Granulocytes % 0.7 0.0 - 5.0 %    Lymphocytes % 13.2 (L) 20.0 - 42.0 %    Monocytes % 7.0 2.0 - 12.0 %    Eosinophils % 0.3 0.0 - 6.0 %    Basophils % 0.3 0.0 - 2.0 %    Neutrophils Absolute 13.49 (H) 1.80 - 7.30 E9/L    Immature Granulocytes # 0.12 E9/L    Lymphocytes Absolute 2.27 1.50 - 4.00 E9/L    Monocytes Absolute 1.21 (H) 0.10 - 0.95 E9/L    Eosinophils Absolute 0.05 0.05 - 0.50 E9/L    Basophils Absolute 0.05 0.00 - 0.20 E9/L   Magnesium   Result Value Ref Range    Magnesium 1.3 (L) 1.6 - 2.6 mg/dL   Iron and TIBC   Result Value Ref Range    Iron 23 (L) 37 - 145 mcg/dL    TIBC 155 (L) 250 - 450 mcg/dL    Iron Saturation 15 15 - 50 %   Ferritin   Result Value Ref Range    Ferritin 366 ng/mL   Hemoglobin A1c   Result Value Ref Range    Hemoglobin A1C 7.0 (H) 4.0 - 5.6 %   Basic Metabolic Panel w/ Reflex to MG   Result Value Ref Range    Sodium 137 132 - 146 mmol/L    Potassium reflex Magnesium 2.7 (LL) 3.5 - 5.0 mmol/L    Chloride 101 98 - 107 mmol/L    CO2 25 22 - 29 mmol/L    Anion Gap 11 7 - 16 mmol/L    Glucose 194 (H) 74 - 99 mg/dL    BUN 7 (L) 8 - 23 mg/dL    CREATININE 0.6 0.5 - 1.0 mg/dL    GFR Non-African American >60 >=60 mL/min/1.73    GFR African American >60     Calcium 8.2 (L) 8.6 - 10.2 mg/dL   Magnesium   Result Value Ref Range    Magnesium 1.3 (L) 1.6 - 2.6 mg/dL   Potassium   Result Value Ref Range    Potassium 2.9 (L) 3.5 - 5.0 mmol/L   Magnesium   Result Value Ref Range    Magnesium 1.5 (L) 1.6 - 2.6 mg/dL   Uric Acid   Result Value Ref Range    Uric Acid, Serum 2.3 (L) 2.4 - 5.7 mg/dL   POCT Glucose   Result Value Ref Range    Meter Glucose 190 (H) 74 - 99 mg/dL   POCT Glucose   Result Value Ref Range    Meter Glucose 182 (H) 74 - 99 mg/dL   POCT Glucose   Result Value Ref Range    Meter Glucose 197 (H) 74 - 99 mg/dL   POCT Glucose   Result Value Ref Range    Meter Glucose 120 (H) 74 - 99 mg/dL   POCT Glucose   Result Value Ref Range    Meter Glucose 207 (H) 74 - 99 mg/dL   POCT Glucose   Result Value Ref Range    Meter Glucose 162 (H) 74 - 99 mg/dL   POCT Glucose   Result Value Ref Range    Meter Glucose 125 (H) 74 - 99 mg/dL   POCT Glucose   Result Value Ref Range    Meter Glucose 125 (H) 74 - 99 mg/dL   POCT Glucose   Result Value Ref Range    Meter Glucose 179 (H) 74 - 99 mg/dL   POCT Glucose   Result Value Ref Range    Meter Glucose 200 (H) 74 - 99 mg/dL     Imaging: All Radiology results interpreted by Radiologist unless otherwise noted. US DUP LOWER EXTREMITY RIGHT LIZZ   Final Result      Venous structures below the knee are not demonstrated. No evidence for thrombus within the common femoral, superficial   femoral or popliteal veins.         ED Course / Medical Decision Making     Medications   sodium chloride flush 0.9 % injection 10 mL (10 mLs Intravenous Given 7/14/20 1001)   sodium chloride flush 0.9 % injection 10 mL (has no administration in time range)   acetaminophen (TYLENOL) tablet 650 mg (has no administration in time range)   enoxaparin (LOVENOX) injection 40 mg (40 mg Subcutaneous Not Given 7/14/20 0742)   acetaminophen (TYLENOL) tablet 650 mg (650 mg Oral Given 7/13/20 2225)   allopurinol (ZYLOPRIM) tablet 100 mg (100 mg Oral Given 7/14/20 0947)   amLODIPine (NORVASC) tablet 10 mg (10 mg Oral Given 7/14/20 0946)   aspirin EC tablet 81 mg (81 mg Oral Given 7/14/20 0947)   atenolol (TENORMIN) tablet 50 mg (50 mg Oral Given 7/14/20 1127)   DULoxetine (CYMBALTA) extended release capsule 60 mg (60 mg Oral Given 7/14/20 0947)   lisinopril (PRINIVIL;ZESTRIL) tablet 40 mg (40 mg Oral Given 7/14/20 0946)   pregabalin (LYRICA) capsule 50 mg (50 mg Oral Given 7/14/20 2052)   rosuvastatin (CRESTOR) tablet 10 mg (10 mg Oral Given 7/14/20 2052)   sodium chloride flush 0.9 % injection 10 mL (10 mLs Intravenous Given 7/14/20 2053)   sodium chloride flush 0.9 % injection 10 mL (has no administration in time range)   acetaminophen (TYLENOL) tablet 650 mg (has no administration in time range)     Or   acetaminophen (TYLENOL) suppository 650 mg (has no administration in time range)   polyethylene glycol (GLYCOLAX) packet 17 g (has no administration in time range)   promethazine (PHENERGAN) tablet 12.5 mg (has no administration in time range)     Or   ondansetron (ZOFRAN) injection 4 mg (has no administration in time range)   enoxaparin (LOVENOX) injection 40 mg (0 mg Subcutaneous Held 7/14/20 0742)   0.9 % sodium chloride infusion ( Intravenous New Bag 7/14/20 1903)   glucose (GLUTOSE) 40 % oral gel 15 g (has no administration in time range)   dextrose 50 % IV solution (has no administration in time range)   glucagon (rDNA) injection 1 mg (has no administration in time range)   dextrose 5 % solution (has no administration in time range)   insulin lispro (HUMALOG) injection vial 0-6 Units (1 Units Subcutaneous Given 7/14/20 1637)   insulin lispro (HUMALOG) injection vial 0-3 Units (1 Units Subcutaneous Given 7/14/20 3172)   0.9 % sodium chloride infusion (25 mLs Intravenous New Bag 7/15/20 0040)     And   piperacillin-tazobactam (ZOSYN) 3.375 g in dextrose 5 % 50 mL IVPB extended infusion (mini-bag) (0 g Intravenous Stopped 7/15/20 0053)   vancomycin 1.5 g in dextrose 5% 300 mL IVPB (0 g Intravenous Stopped 7/14/20 0444)   insulin glargine (LANTUS) injection vial 17 Units (17 Units Subcutaneous Given 7/14/20 2103)   insulin lispro (HUMALOG) injection vial 6 Units (6 Units Subcutaneous Given 7/14/20 1638)   collagenase ointment (has no administration in time range)   0.9 % sodium chloride bolus (1,000 mLs Intravenous New Bag 7/12/20 2034)   piperacillin-tazobactam (ZOSYN) 3.375 g in dextrose 5 % 50 mL IVPB extended infusion (mini-bag) (0 g Intravenous Stopped 7/12/20 2253)   vancomycin 1.5 g in dextrose 5% 300 mL IVPB (0 g Intravenous Stopped 7/13/20 0114)   potassium chloride (KLOR-CON M) extended release tablet 40 mEq (40 mEq Oral Given 7/12/20 2045)   lidocaine-EPINEPHrine 1 percent-1:[id number] injection 20 mL (20 mLs Intradermal Given by Other 7/14/20 0900)   povidone-iodine (BETADINE) 10 % external solution ( Topical Given by Other 7/14/20 0900)   potassium chloride 10 mEq/100 mL IVPB (Peripheral Line) (10 mEq Intravenous New Bag 7/14/20 0947)   magnesium sulfate 2 g in 50 mL IVPB premix (0 g Intravenous Stopped 7/14/20 0743)   potassium chloride 10 mEq/100 mL IVPB (Peripheral Line) (10 mEq Intravenous New Bag 7/14/20 2051)   magnesium sulfate 2 g in 50 mL IVPB premix (0 g Intravenous Stopped 7/14/20 1840)        Re-examination:  7/12/20       Time: pt was helped with comfortable body positions in Ed. She was given box lunch as well    Consult(s):   IP CONSULT TO SOCIAL WORK  IP CONSULT TO PHARMACY  IP CONSULT TO HOME CARE NEEDS  IP CONSULT TO GENERAL SURGERY  IP CONSULT TO INFECTIOUS DISEASES    Procedure(s):   none    MDM:   Pt presents from home with family having increased difficulty taking care of patient at home.   Patient is mostly bed/wheelchair bound due to her lack of drive and trying to remain physically capable and family has a hard time getting her motivated to try to walk or do her physical therapy exercises. Recently they noticed that she had a large decubitus ulcer and the smell was overwhelming and so they brought her to emergency. At home she lives alone and there is home health aides who come in as well as family comes in to help with bathing and helping her around the house. Family at this point feels overwhelmed with their care at this time and patient is found to have a very large decubitus wound in need of debridement and elevated white blood cell count 18,000. Lactic acidosis was present and IV fluids were being infused in ED. Antibiotics were started in department as well and potassium was replenished. Patient will be admitted to general floor for continued IV antibiotics, surgical consult and debridement,  for help with outpatient living situation. Discussed with family, daughter Marion Nice. Counseling: The emergency provider has spoken with the patient and family member daughter and discussed todays results, in addition to providing specific details for the plan of care and counseling regarding the diagnosis and prognosis. Questions are answered at this time and they are agreeable with the plan. Assessment     1. Pressure injury of skin of sacral region, unspecified injury stage    2. Leukocytosis, unspecified type    3. Hypokalemia      Plan   Disposition: Admit to med/surg floor  Patient condition is stable. New Medications     Current Discharge Medication List        Electronically signed by Aundrea Weeks PA-C   DD: 7/12/20  **This report was transcribed using voice recognition software. Every effort was made to ensure accuracy; however, inadvertent computerized transcription errors may be present.   END OF ED PROVIDER NOTE       Aundrea Weeks PA-C  07/15/20 1500 [facility]  07/15/20 0045

## 2020-07-13 ENCOUNTER — ANESTHESIA EVENT (OUTPATIENT)
Dept: OPERATING ROOM | Age: 70
End: 2020-07-13

## 2020-07-13 LAB
ANION GAP SERPL CALCULATED.3IONS-SCNC: 15 MMOL/L (ref 7–16)
BASOPHILS ABSOLUTE: 0.05 E9/L (ref 0–0.2)
BASOPHILS RELATIVE PERCENT: 0.3 % (ref 0–2)
BUN BLDV-MCNC: 10 MG/DL (ref 8–23)
CALCIUM SERPL-MCNC: 8.8 MG/DL (ref 8.6–10.2)
CHLORIDE BLD-SCNC: 102 MMOL/L (ref 98–107)
CO2: 22 MMOL/L (ref 22–29)
CREAT SERPL-MCNC: 0.6 MG/DL (ref 0.5–1)
EOSINOPHILS ABSOLUTE: 0.05 E9/L (ref 0.05–0.5)
EOSINOPHILS RELATIVE PERCENT: 0.3 % (ref 0–6)
FERRITIN: 366 NG/ML
GFR AFRICAN AMERICAN: >60
GFR NON-AFRICAN AMERICAN: >60 ML/MIN/1.73
GLUCOSE BLD-MCNC: 176 MG/DL (ref 74–99)
HBA1C MFR BLD: 7 % (ref 4–5.6)
HCT VFR BLD CALC: 34.4 % (ref 34–48)
HEMOGLOBIN: 10.2 G/DL (ref 11.5–15.5)
IMMATURE GRANULOCYTES #: 0.12 E9/L
IMMATURE GRANULOCYTES %: 0.7 % (ref 0–5)
IRON SATURATION: 15 % (ref 15–50)
IRON: 23 MCG/DL (ref 37–145)
LACTIC ACID: 2.6 MMOL/L (ref 0.5–2.2)
LYMPHOCYTES ABSOLUTE: 2.27 E9/L (ref 1.5–4)
LYMPHOCYTES RELATIVE PERCENT: 13.2 % (ref 20–42)
MAGNESIUM: 1.3 MG/DL (ref 1.6–2.6)
MCH RBC QN AUTO: 22.9 PG (ref 26–35)
MCHC RBC AUTO-ENTMCNC: 29.7 % (ref 32–34.5)
MCV RBC AUTO: 77.3 FL (ref 80–99.9)
METER GLUCOSE: 120 MG/DL (ref 74–99)
METER GLUCOSE: 182 MG/DL (ref 74–99)
METER GLUCOSE: 190 MG/DL (ref 74–99)
METER GLUCOSE: 197 MG/DL (ref 74–99)
METER GLUCOSE: 207 MG/DL (ref 74–99)
MONOCYTES ABSOLUTE: 1.21 E9/L (ref 0.1–0.95)
MONOCYTES RELATIVE PERCENT: 7 % (ref 2–12)
NEUTROPHILS ABSOLUTE: 13.49 E9/L (ref 1.8–7.3)
NEUTROPHILS RELATIVE PERCENT: 78.5 % (ref 43–80)
PDW BLD-RTO: 17.2 FL (ref 11.5–15)
PLATELET # BLD: 457 E9/L (ref 130–450)
PMV BLD AUTO: 9.2 FL (ref 7–12)
POTASSIUM REFLEX MAGNESIUM: 3.7 MMOL/L (ref 3.5–5)
RBC # BLD: 4.45 E12/L (ref 3.5–5.5)
SODIUM BLD-SCNC: 139 MMOL/L (ref 132–146)
TOTAL IRON BINDING CAPACITY: 155 MCG/DL (ref 250–450)
WBC # BLD: 17.2 E9/L (ref 4.5–11.5)

## 2020-07-13 PROCEDURE — 83550 IRON BINDING TEST: CPT

## 2020-07-13 PROCEDURE — 83540 ASSAY OF IRON: CPT

## 2020-07-13 PROCEDURE — 6360000002 HC RX W HCPCS: Performed by: INTERNAL MEDICINE

## 2020-07-13 PROCEDURE — 82728 ASSAY OF FERRITIN: CPT

## 2020-07-13 PROCEDURE — 36415 COLL VENOUS BLD VENIPUNCTURE: CPT

## 2020-07-13 PROCEDURE — 2580000003 HC RX 258: Performed by: INTERNAL MEDICINE

## 2020-07-13 PROCEDURE — 80048 BASIC METABOLIC PNL TOTAL CA: CPT

## 2020-07-13 PROCEDURE — 83605 ASSAY OF LACTIC ACID: CPT

## 2020-07-13 PROCEDURE — 83735 ASSAY OF MAGNESIUM: CPT

## 2020-07-13 PROCEDURE — 85025 COMPLETE CBC W/AUTO DIFF WBC: CPT

## 2020-07-13 PROCEDURE — 83036 HEMOGLOBIN GLYCOSYLATED A1C: CPT

## 2020-07-13 PROCEDURE — 1200000000 HC SEMI PRIVATE

## 2020-07-13 PROCEDURE — 99222 1ST HOSP IP/OBS MODERATE 55: CPT | Performed by: SURGERY

## 2020-07-13 PROCEDURE — 99232 SBSQ HOSP IP/OBS MODERATE 35: CPT | Performed by: FAMILY MEDICINE

## 2020-07-13 PROCEDURE — 6370000000 HC RX 637 (ALT 250 FOR IP): Performed by: INTERNAL MEDICINE

## 2020-07-13 PROCEDURE — 82962 GLUCOSE BLOOD TEST: CPT

## 2020-07-13 PROCEDURE — APPSS30 APP SPLIT SHARED TIME 16-30 MINUTES: Performed by: NURSE PRACTITIONER

## 2020-07-13 RX ORDER — ACETAMINOPHEN 325 MG/1
650 TABLET ORAL EVERY 4 HOURS PRN
Status: DISCONTINUED | OUTPATIENT
Start: 2020-07-13 | End: 2020-07-16 | Stop reason: SDUPTHER

## 2020-07-13 RX ORDER — SODIUM CHLORIDE 0.9 % (FLUSH) 0.9 %
10 SYRINGE (ML) INJECTION EVERY 12 HOURS SCHEDULED
Status: DISCONTINUED | OUTPATIENT
Start: 2020-07-13 | End: 2020-07-17 | Stop reason: SDUPTHER

## 2020-07-13 RX ORDER — ALLOPURINOL 100 MG/1
100 TABLET ORAL DAILY
Status: DISCONTINUED | OUTPATIENT
Start: 2020-07-13 | End: 2020-07-17 | Stop reason: HOSPADM

## 2020-07-13 RX ORDER — ROSUVASTATIN CALCIUM 10 MG/1
10 TABLET, COATED ORAL NIGHTLY
Status: DISCONTINUED | OUTPATIENT
Start: 2020-07-13 | End: 2020-07-17 | Stop reason: HOSPADM

## 2020-07-13 RX ORDER — LISINOPRIL 20 MG/1
40 TABLET ORAL DAILY
Status: DISCONTINUED | OUTPATIENT
Start: 2020-07-13 | End: 2020-07-17 | Stop reason: HOSPADM

## 2020-07-13 RX ORDER — SODIUM CHLORIDE 0.9 % (FLUSH) 0.9 %
10 SYRINGE (ML) INJECTION PRN
Status: DISCONTINUED | OUTPATIENT
Start: 2020-07-13 | End: 2020-07-17 | Stop reason: SDUPTHER

## 2020-07-13 RX ORDER — ACETAMINOPHEN 650 MG
TABLET, EXTENDED RELEASE ORAL ONCE
Status: COMPLETED | OUTPATIENT
Start: 2020-07-13 | End: 2020-07-14

## 2020-07-13 RX ORDER — LIDOCAINE HYDROCHLORIDE AND EPINEPHRINE 10; 10 MG/ML; UG/ML
20 INJECTION, SOLUTION INFILTRATION; PERINEURAL ONCE
Status: COMPLETED | OUTPATIENT
Start: 2020-07-13 | End: 2020-07-14

## 2020-07-13 RX ORDER — PREGABALIN 50 MG/1
50 CAPSULE ORAL 2 TIMES DAILY
Status: DISCONTINUED | OUTPATIENT
Start: 2020-07-13 | End: 2020-07-17 | Stop reason: HOSPADM

## 2020-07-13 RX ORDER — ACETAMINOPHEN 650 MG/1
650 SUPPOSITORY RECTAL EVERY 6 HOURS PRN
Status: DISCONTINUED | OUTPATIENT
Start: 2020-07-13 | End: 2020-07-17 | Stop reason: HOSPADM

## 2020-07-13 RX ORDER — NICOTINE POLACRILEX 4 MG
15 LOZENGE BUCCAL PRN
Status: DISCONTINUED | OUTPATIENT
Start: 2020-07-13 | End: 2020-07-17 | Stop reason: HOSPADM

## 2020-07-13 RX ORDER — PROMETHAZINE HYDROCHLORIDE 25 MG/1
12.5 TABLET ORAL EVERY 6 HOURS PRN
Status: DISCONTINUED | OUTPATIENT
Start: 2020-07-13 | End: 2020-07-17 | Stop reason: HOSPADM

## 2020-07-13 RX ORDER — SODIUM CHLORIDE 0.9 % (FLUSH) 0.9 %
10 SYRINGE (ML) INJECTION PRN
Status: DISCONTINUED | OUTPATIENT
Start: 2020-07-13 | End: 2020-07-17 | Stop reason: HOSPADM

## 2020-07-13 RX ORDER — ATENOLOL 50 MG/1
50 TABLET ORAL DAILY
Status: DISCONTINUED | OUTPATIENT
Start: 2020-07-13 | End: 2020-07-17 | Stop reason: HOSPADM

## 2020-07-13 RX ORDER — ACETAMINOPHEN 325 MG/1
650 TABLET ORAL EVERY 6 HOURS PRN
Status: DISCONTINUED | OUTPATIENT
Start: 2020-07-13 | End: 2020-07-17 | Stop reason: HOSPADM

## 2020-07-13 RX ORDER — DEXTROSE MONOHYDRATE 25 G/50ML
12.5 INJECTION, SOLUTION INTRAVENOUS PRN
Status: DISCONTINUED | OUTPATIENT
Start: 2020-07-13 | End: 2020-07-17 | Stop reason: HOSPADM

## 2020-07-13 RX ORDER — SODIUM CHLORIDE 0.9 % (FLUSH) 0.9 %
10 SYRINGE (ML) INJECTION EVERY 12 HOURS SCHEDULED
Status: DISCONTINUED | OUTPATIENT
Start: 2020-07-13 | End: 2020-07-17 | Stop reason: HOSPADM

## 2020-07-13 RX ORDER — SODIUM CHLORIDE 9 MG/ML
25 INJECTION, SOLUTION INTRAVENOUS EVERY 8 HOURS
Status: DISCONTINUED | OUTPATIENT
Start: 2020-07-13 | End: 2020-07-17

## 2020-07-13 RX ORDER — ASPIRIN 81 MG/1
81 TABLET ORAL DAILY
Status: DISCONTINUED | OUTPATIENT
Start: 2020-07-13 | End: 2020-07-17 | Stop reason: HOSPADM

## 2020-07-13 RX ORDER — INSULIN GLARGINE 100 [IU]/ML
0.15 INJECTION, SOLUTION SUBCUTANEOUS NIGHTLY
Status: DISCONTINUED | OUTPATIENT
Start: 2020-07-13 | End: 2020-07-14

## 2020-07-13 RX ORDER — POLYETHYLENE GLYCOL 3350 17 G/17G
17 POWDER, FOR SOLUTION ORAL DAILY PRN
Status: DISCONTINUED | OUTPATIENT
Start: 2020-07-13 | End: 2020-07-17 | Stop reason: HOSPADM

## 2020-07-13 RX ORDER — ONDANSETRON 2 MG/ML
4 INJECTION INTRAMUSCULAR; INTRAVENOUS EVERY 6 HOURS PRN
Status: DISCONTINUED | OUTPATIENT
Start: 2020-07-13 | End: 2020-07-17 | Stop reason: HOSPADM

## 2020-07-13 RX ORDER — DEXTROSE MONOHYDRATE 50 MG/ML
100 INJECTION, SOLUTION INTRAVENOUS PRN
Status: DISCONTINUED | OUTPATIENT
Start: 2020-07-13 | End: 2020-07-17 | Stop reason: HOSPADM

## 2020-07-13 RX ORDER — SODIUM CHLORIDE 9 MG/ML
INJECTION, SOLUTION INTRAVENOUS CONTINUOUS
Status: DISCONTINUED | OUTPATIENT
Start: 2020-07-13 | End: 2020-07-17 | Stop reason: HOSPADM

## 2020-07-13 RX ORDER — DULOXETIN HYDROCHLORIDE 60 MG/1
60 CAPSULE, DELAYED RELEASE ORAL DAILY
Status: DISCONTINUED | OUTPATIENT
Start: 2020-07-13 | End: 2020-07-17 | Stop reason: HOSPADM

## 2020-07-13 RX ORDER — AMLODIPINE BESYLATE 10 MG/1
10 TABLET ORAL DAILY
Status: DISCONTINUED | OUTPATIENT
Start: 2020-07-13 | End: 2020-07-17 | Stop reason: HOSPADM

## 2020-07-13 RX ORDER — ACETAMINOPHEN 650 MG
TABLET, EXTENDED RELEASE ORAL ONCE
Status: DISCONTINUED | OUTPATIENT
Start: 2020-07-13 | End: 2020-07-13 | Stop reason: SDUPTHER

## 2020-07-13 RX ADMIN — DULOXETINE HYDROCHLORIDE 60 MG: 60 CAPSULE, DELAYED RELEASE ORAL at 08:56

## 2020-07-13 RX ADMIN — ACETAMINOPHEN 650 MG: 325 TABLET ORAL at 22:25

## 2020-07-13 RX ADMIN — INSULIN LISPRO 1 UNITS: 100 INJECTION, SOLUTION INTRAVENOUS; SUBCUTANEOUS at 22:26

## 2020-07-13 RX ADMIN — ATENOLOL 50 MG: 50 TABLET ORAL at 08:57

## 2020-07-13 RX ADMIN — PREGABALIN 50 MG: 50 CAPSULE ORAL at 22:25

## 2020-07-13 RX ADMIN — SODIUM CHLORIDE: 9 INJECTION, SOLUTION INTRAVENOUS at 01:18

## 2020-07-13 RX ADMIN — PREGABALIN 50 MG: 50 CAPSULE ORAL at 08:56

## 2020-07-13 RX ADMIN — ENOXAPARIN SODIUM 40 MG: 40 INJECTION SUBCUTANEOUS at 08:57

## 2020-07-13 RX ADMIN — PIPERACILLIN AND TAZOBACTAM 3.38 G: 3; .375 INJECTION, POWDER, FOR SOLUTION INTRAVENOUS at 13:12

## 2020-07-13 RX ADMIN — PIPERACILLIN AND TAZOBACTAM 3.38 G: 3; .375 INJECTION, POWDER, FOR SOLUTION INTRAVENOUS at 05:29

## 2020-07-13 RX ADMIN — ROSUVASTATIN CALCIUM 10 MG: 10 TABLET, FILM COATED ORAL at 22:25

## 2020-07-13 RX ADMIN — ACETAMINOPHEN 650 MG: 325 TABLET ORAL at 01:18

## 2020-07-13 RX ADMIN — INSULIN LISPRO 5 UNITS: 100 INJECTION, SOLUTION INTRAVENOUS; SUBCUTANEOUS at 16:52

## 2020-07-13 RX ADMIN — INSULIN LISPRO 1 UNITS: 100 INJECTION, SOLUTION INTRAVENOUS; SUBCUTANEOUS at 08:58

## 2020-07-13 RX ADMIN — INSULIN LISPRO 5 UNITS: 100 INJECTION, SOLUTION INTRAVENOUS; SUBCUTANEOUS at 08:58

## 2020-07-13 RX ADMIN — SODIUM CHLORIDE 25 ML: 9 INJECTION, SOLUTION INTRAVENOUS at 16:30

## 2020-07-13 RX ADMIN — ALLOPURINOL 100 MG: 100 TABLET ORAL at 08:56

## 2020-07-13 RX ADMIN — PIPERACILLIN AND TAZOBACTAM 3.38 G: 3; .375 INJECTION, POWDER, FOR SOLUTION INTRAVENOUS at 22:25

## 2020-07-13 RX ADMIN — ACETAMINOPHEN 650 MG: 325 TABLET ORAL at 05:30

## 2020-07-13 RX ADMIN — LISINOPRIL 40 MG: 20 TABLET ORAL at 08:56

## 2020-07-13 RX ADMIN — INSULIN LISPRO 1 UNITS: 100 INJECTION, SOLUTION INTRAVENOUS; SUBCUTANEOUS at 11:52

## 2020-07-13 RX ADMIN — INSULIN GLARGINE 14 UNITS: 100 INJECTION, SOLUTION SUBCUTANEOUS at 22:26

## 2020-07-13 RX ADMIN — ACETAMINOPHEN 650 MG: 325 TABLET ORAL at 09:48

## 2020-07-13 RX ADMIN — INSULIN LISPRO 5 UNITS: 100 INJECTION, SOLUTION INTRAVENOUS; SUBCUTANEOUS at 11:51

## 2020-07-13 RX ADMIN — AMLODIPINE BESYLATE 10 MG: 10 TABLET ORAL at 08:56

## 2020-07-13 RX ADMIN — ASPIRIN 81 MG: 81 TABLET, COATED ORAL at 08:56

## 2020-07-13 ASSESSMENT — PAIN DESCRIPTION - PAIN TYPE
TYPE: CHRONIC PAIN

## 2020-07-13 ASSESSMENT — PAIN SCALES - GENERAL
PAINLEVEL_OUTOF10: 5
PAINLEVEL_OUTOF10: 0
PAINLEVEL_OUTOF10: 2
PAINLEVEL_OUTOF10: 0
PAINLEVEL_OUTOF10: 10
PAINLEVEL_OUTOF10: 8
PAINLEVEL_OUTOF10: 8

## 2020-07-13 ASSESSMENT — PAIN DESCRIPTION - DESCRIPTORS
DESCRIPTORS: PINS AND NEEDLES

## 2020-07-13 ASSESSMENT — PAIN DESCRIPTION - LOCATION
LOCATION: LEG

## 2020-07-13 ASSESSMENT — PAIN DESCRIPTION - ORIENTATION
ORIENTATION: RIGHT

## 2020-07-13 ASSESSMENT — PAIN DESCRIPTION - ONSET
ONSET: ON-GOING

## 2020-07-13 ASSESSMENT — PAIN DESCRIPTION - FREQUENCY
FREQUENCY: CONTINUOUS

## 2020-07-13 ASSESSMENT — PAIN - FUNCTIONAL ASSESSMENT
PAIN_FUNCTIONAL_ASSESSMENT: PREVENTS OR INTERFERES SOME ACTIVE ACTIVITIES AND ADLS

## 2020-07-13 ASSESSMENT — ENCOUNTER SYMPTOMS: SHORTNESS OF BREATH: 1

## 2020-07-13 NOTE — PLAN OF CARE
Problem: Pain:  Goal: Control of acute pain  Description: Control of acute pain  7/13/2020 1820 by Maverick Fritz RN  Outcome: Met This Shift     Problem: Pain:  Goal: Control of chronic pain  Description: Control of chronic pain  7/13/2020 1820 by Maverick Fritz RN  Outcome: Met This Shift     Problem: Falls - Risk of:  Goal: Will remain free from falls  Description: Will remain free from falls  7/13/2020 1820 by Maverick Fritz RN  Outcome: Met This Shift     Problem: Falls - Risk of:  Goal: Absence of physical injury  Description: Absence of physical injury  7/13/2020 1820 by Maverick Fritz RN  Outcome: Met This Shift

## 2020-07-13 NOTE — PROGRESS NOTES
[facility] Quality Flow/Interdisciplinary Rounds Progress Note        Quality Flow Rounds held on July 13, 2020    Disciplines Attending:  Bedside Nurse, ,  and Nursing Unit [address] was admitted on 7/12/2020  5:43 PM    Anticipated Discharge Date:       Disposition:    Jayden Score:  Jayden Scale Score: 16    Readmission Risk              Risk of Unplanned Readmission:        11           Discussed patient goal for the day, patient clinical progression, and barriers to discharge.   The following Goal(s) of the Day/Commitment(s) have been identified:  await joy Martin  July 13, 2020

## 2020-07-13 NOTE — PROGRESS NOTES
Occupational Therapy      Occupational Therapy referral received. Spoke with patient and son present. Patient has has assist at home with ADLS, adriana lift for xfers, w/c for mobility (assist to propel)  Patient at functional baseline.    No skilled OT need at this time

## 2020-07-13 NOTE — ANESTHESIA PRE PROCEDURE
Department of Anesthesiology  Preprocedure Note       Name:  Darío Dhaliwal   Age:  78 y.o.  :  1950                                          MRN:  [id number]         Date:  2020      Surgeon: Fani Dye):  Seda Schuster MD    Procedure: Procedure(s):  SACRAL DECUBITUS ULCER DEBRIDEMENT    Medications prior to admission:   Prior to Admission medications    Medication Sig Start Date End Date Taking? Authorizing Provider   metFORMIN (GLUCOPHAGE) 1000 MG tablet take 1 tablet by mouth twice a day WITH MEALS 20   Chelsea Flores, DO   lisinopril (PRINIVIL;ZESTRIL) 40 MG tablet take 1 tablet by mouth once daily 20   Chelsea Flores, DO   atenolol (TENORMIN) 50 MG tablet take 1 tablet by mouth twice a day 20   Chelsea Flores, DO   amLODIPine (NORVASC) 10 MG tablet take 1 tablet by mouth once daily 20   Historical Provider, MD   glipiZIDE (GLUCOTROL XL) 5 MG extended release tablet take 1 tablet by mouth once daily 20   Historical Provider, MD   ibuprofen (ADVIL;MOTRIN) 800 MG tablet Take 800 mg by mouth every 8 hours as needed for Pain    Historical Provider, MD   DULoxetine (CYMBALTA) 60 MG extended release capsule Take 1 capsule by mouth daily 20   Gisela Pederson MD   mineral oil-hydrophilic petrolatum (HYDROPHOR) ointment Apply topically as needed. 20   David García, DO   rosuvastatin (CRESTOR) 10 MG tablet take 1 tablet by mouth once daily 5/15/20   David García DO   allopurinol (ZYLOPRIM) 100 MG tablet Take 1 tablet by mouth daily 2/17/20 3/18/20  Ze Mcdonnell MD   furosemide (LASIX) 20 MG tablet Take 1 tablet by mouth every other day 2/17/20 3/18/20  Ze Mcdonnell MD   exenatide (BYETTA 10 MCG PEN) 10 MCG/0.04ML injection inject . 04 milliliters subcutaneously twice a day with food 20   Ze Mcdonnell MD   RA Alcohol Swabs 70 % PADS USE WITH INJECTIONS TWICE A DAY AND CHECKING BLOOD SUGAR TWICE A DAY 20   Ze Mcdonnell MD   blood glucose test strips (ACCU-CHEK RUI PLUS) strip TEST twice a day FASTING BEFORE BREAKFAST AND SUPPER 11/6/19   Saroj Severino MD   ACCU-CHEK MULTICLIX LANCETS MISC Check BS Twice daily 11/6/19   Saroj Severino MD   acetaminophen (TYLENOL) 325 MG tablet Take 2 tablets by mouth every 4 hours as needed for Pain 11/6/19   Saroj Severino MD   Insulin Pen Needle (B-D UF III MINI PEN NEEDLES) 31G X 5 MM MISC use twice a day 11/6/19   Saroj Severino MD   Insulin Pen Needle (B-D ULTRAFINE III SHORT PEN) 31G X 8 MM MISC Inject 1 kit into the skin 2 times daily 11/6/19   Saroj Severino MD   vitamin D (ERGOCALCIFEROL) 1.25 MG (45411 UT) CAPS capsule Take 1 capsule by mouth once a week 11/6/19   Saroj Severino MD   pregabalin (LYRICA) 50 MG capsule Take 50 mg by mouth 2 times daily. Will Linares [facility] MD Ashley   naproxen (NAPROSYN) 375 MG tablet Take 1 tablet by mouth 2 times daily (with meals)  Patient not taking: Reported on 11/6/2019 6/12/17   Ysabel Sears MD   Wound Dressings (SKINTEGRITY HYDROGEL) GEL Apply 1 each topically 3 times daily 6/5/17   MD Zoe Burgessc. Devices MISC Wheelchair cushion 6/5/17   MD Zoe Burgess. Devices (MATTRESS PAD) MISC 1 each by Does not apply route once for 1 dose 6/5/17 6/5/17  Ysabel Sears MD   loratadine (CLARITIN) 10 MG tablet Take 1 tablet by mouth daily  Patient not taking: Reported on 6/8/2020 6/5/17   MD Greta Burgess. Devices KIT 1 kit by Does not apply route daily as needed (leg edema) Compression stockings for bilateral leg swelling 6/5/17   Ysabel Sears MD   miconazole (MICOTIN) 2 % powder Apply topically 2 times daily. 3/14/17   Chivo Hand MD   Skin Protectants, Misc. (EUCERIN) cream Apply topically as needed. 9/28/16   Jyothi De Los Santos MD   [facility]. [address] Bed with gel overlay dsp # 1  Pt has Osteoarthritis and is Morbidly Obese 4/4/16   Christian Cayla, DO   Misc.  Devices KIT 1 kit by Does not apply route daily as needed Foam mattress for bilateral knees osteoarthritis , degenerative back osteoarthitis 3/31/16   Evan Rivera MD   [facility] [place]. [address] bed mattress for severe osteoarthritis , morbid obesity , pressure sores  and difficulty ambulating 12/2/15   Evan Rivera MD   Misc. Devices MISC Chair lift for difficulty ambulating , severe knee osteoarthritis and  morbid obesity 12/2/15   Evan Rivera MD   [facility] [place]. Devices [address] [facility] chair to assist with ADL with diagnosis of OA and full thickness knee meniscal tear 2/24/15   January Hill MD   Misc. Devices MISC Grab bar  diagnosis osteoarthritis and full thickness knee meniscal tear 2/24/15   January Hill MD   [facility] [place]. [address] bed 11/7/14   Eusebio Garcia MD   [facility] [place]. Devices [address] [facility] wheelchair 11/7/14   Eusebio Garcia MD   [facility] [place]. Devices [facility] [place] Bedside commode. Dx osteoarthritis 715.90. Height 5'4.5\".  Weight 245 lbs. 9/17/14   Evan Rivera MD       Current medications:    Current Facility-Administered Medications   Medication Dose Route Frequency Provider Last Rate Last Dose    sodium chloride flush 0.9 % injection 10 mL  10 mL Intravenous 2 times per day Isaura Cuevas MD        sodium chloride flush 0.9 % injection 10 mL  10 mL Intravenous PRN Isaura Cuevas MD        acetaminophen (TYLENOL) tablet 650 mg  650 mg Oral Q4H PRN Isaura Cuevas MD        enoxaparin (LOVENOX) injection 40 mg  40 mg Subcutaneous Daily Isaura Cuevas MD        acetaminophen (TYLENOL) tablet 650 mg  650 mg Oral Q4H PRN Gary Sen MD   650 mg at 07/13/20 0948    allopurinol (ZYLOPRIM) tablet 100 mg  100 mg Oral Daily Stephenie Ying MD   100 mg at 07/13/20 0856    amLODIPine (NORVASC) tablet 10 mg  10 mg Oral Daily Stephenie Ying MD   10 mg at 07/13/20 0856    aspirin EC tablet 81 mg  81 mg Oral Daily Stephenie Ying MD   81 mg at 07/13/20 0856    atenolol (TENORMIN) tablet 50 mg  50 mg Oral Daily Stephenie Ying MD   50 mg at 07/13/20 0857    DULoxetine (CYMBALTA) extended release capsule 60 mg  60 mg Oral Daily Stephenie Ying MD   60 mg at 07/13/20 0856    lisinopril (PRINIVIL;ZESTRIL) tablet 40 mg  40 mg Oral Daily Stephenie Ying MD   40 mg at 07/13/20 0856    pregabalin (LYRICA) capsule 50 mg  50 mg Oral BID Jai Potts MD   50 mg at 07/13/20 0856    rosuvastatin (CRESTOR) tablet 10 mg  10 mg Oral Nightly Stephenie Ying MD        sodium chloride flush 0.9 % injection 10 mL  10 mL Intravenous 2 times per day Jai Potts MD        sodium chloride flush 0.9 % injection 10 mL  10 mL Intravenous PRN Jai Potts MD        acetaminophen (TYLENOL) tablet 650 mg  650 mg Oral Q6H PRN Stephenie Ying MD        Or    acetaminophen (TYLENOL) suppository 650 mg  650 mg Rectal Q6H PRN Stephenie Ying MD        polyethylene glycol (GLYCOLAX) packet 17 g  17 g Oral Daily PRN Stephenie Ying MD        promethazine (PHENERGAN) tablet 12.5 mg  12.5 mg Oral Q6H PRN Stephenie Ying MD        Or    ondansetron (ZOFRAN) injection 4 mg  4 mg Intravenous Q6H PRN Stephenie Ying MD        enoxaparin (LOVENOX) injection 40 mg  40 mg Subcutaneous Daily Stephenie Ying MD   40 mg at 07/13/20 0857    0.9 % sodium chloride infusion   Intravenous Continuous Stephenie Ying MD 75 mL/hr at 07/13/20 0118      glucose (GLUTOSE) 40 % oral gel 15 g  15 g Oral PRN Stephenie Ying MD        dextrose 50 % IV solution  12.5 g Intravenous PRN Stephenie Ying MD        glucagon (rDNA) injection 1 mg  1 mg Intramuscular PRN Stephenie Ying MD        dextrose 5 % solution  100 mL/hr Intravenous PRN Stephenie Ying MD        insulin glargine (LANTUS) injection vial 14 Units  0.15 Units/kg Subcutaneous Nightly Stephenie Ying MD        insulin lispro (HUMALOG) injection vial 5 Units  0.05 Units/kg Subcutaneous TID WC Stephenie Ying MD   5 Units at 07/13/20 1151    insulin lispro (HUMALOG) injection vial 0-6 Units  0-6 Units Subcutaneous TID  Stephenie Ying MD   1 Units at 07/13/20 1152    insulin lispro (HUMALOG) injection vial 0-3 Units  0-3 Units Subcutaneous Nightly Stephenie Ying MD        0.9 % sodium chloride infusion  25 mL Intravenous Rico Valdez MD        And    piperacillin-tazobactam (ZOSYN) 3.375 g in dextrose 5 % 50 mL IVPB extended infusion (mini-bag)  3.375 g Intravenous Q8H Stephenie Ying MD 12.5 mL/hr at 07/13/20 1312 3.375 g at 07/13/20 1312       Allergies:  No Known Allergies    Problem List:    Patient Active Problem List   Diagnosis Code    Essential hypertension I10    Type 2 diabetes mellitus with hyperglycemia, without long-term current use of insulin ([address] E11.65    Osteoarthritis M19.90    Hyperlipidemia E78.5    Chronic sinusitis J32.9    Eczema L30.9    Primary osteoarthritis involving multiple joints M15.0    Frequent falls R29.6    General weakness R53.1    Morbid obesity due to excess calories (HCC) E66.01    Normochromic normocytic anemia D64.9    Wound of right buttock S31.819A    Decubitus ulcer of right buttock, stage 3 ([address] L89.313    Decubitus ulcer of sacral region L89.159       Past Medical History:        Diagnosis Date    Cataract, right     DM (diabetes mellitus), type 2 ([address] 11/3/2010    HTN (hypertension) 11/3/2010    Hyperlipidemia 11/3/2010    Obesity     Osteoarthritis 11/3/2010    Sinusitis chronic     seasonal    Wheelchair confinement     can pivot with assistance;  uses a lift chair       Past Surgical History:        Procedure Laterality Date    HYSTERECTOMY      TX XCAPSL CTRC RMVL INSJ IO LENS PROSTH W/O ECP Right 8/10/2018    RIGHT EYE CATARACT EXTRACTION WITH  IOL IMPLANT performed by Bettye Pendleton MD at [facility] OR       Social History:    Social History     Tobacco Use    Smoking status: Never Smoker    Smokeless tobacco: Never Used   Substance Use Topics    Alcohol use: No     Alcohol/week: 0.0 standard drinks                                Counseling given: Not Answered      Vital Signs (Current):   Vitals: 07/12/20 2034 07/12/20 2332 07/12/20 2347 07/13/20 0745   BP: 125/72  126/83 (!) 150/68   Pulse: 102  97 93   Resp: 18  18 18   Temp:   36.8 °C (98.3 °F) 37 °C (98.6 °F)   TempSrc:   Oral Oral   SpO2: 95%  97% 97%   Weight:  209 lb 4.8 oz (94.9 kg)     Height:  5' 4\" (1.626 m)                                                BP Readings from Last 3 Encounters:   07/13/20 (!) 150/68   06/08/20 128/74   02/17/20 (!) 157/80       NPO Status:  Pt instructed to be NPO after midnight 7/14/20, states understanding. BMI:   Wt Readings from Last 3 Encounters:   07/12/20 209 lb 4.8 oz (94.9 kg)   06/08/20 270 lb (122.5 kg)   02/17/20 270 lb (122.5 kg)     Body mass index is 35.93 kg/m². CBC:   Lab Results   Component Value Date    WBC 17.2 07/13/2020    RBC 4.45 07/13/2020    HGB 10.2 07/13/2020    HCT 34.4 07/13/2020    MCV 77.3 07/13/2020    RDW 17.2 07/13/2020     07/13/2020       CMP:   Lab Results   Component Value Date     07/13/2020    K 3.7 07/13/2020     07/13/2020    CO2 22 07/13/2020    BUN 10 07/13/2020    CREATININE 0.6 07/13/2020    GFRAA >60 07/13/2020    LABGLOM >60 07/13/2020    GLUCOSE 176 07/13/2020    GLUCOSE 147 03/07/2012    PROT 7.5 07/12/2020    CALCIUM 8.8 07/13/2020    BILITOT 0.4 07/12/2020    ALKPHOS 78 07/12/2020    AST 9 07/12/2020    ALT 9 07/12/2020       POC Tests: No results for input(s): POCGLU, POCNA, POCK, POCCL, POCBUN, POCHEMO, POCHCT in the last 72 hours.     Coags: No results found for: PROTIME, INR, APTT    HCG (If Applicable): No results found for: PREGTESTUR, PREGSERUM, HCG, HCGQUANT     ABGs: No results found for: PHART, PO2ART, ICE9OZI, CJX4KCQ, BEART, H7HKKCVM     Type & Screen (If Applicable):  No results found for: LABABO, LABRH    Drug/Infectious Status (If Applicable):  No results found for: HIV, HEPCAB    COVID-19 Screening (If Applicable): No results found for: COVID19    EKG

## 2020-07-13 NOTE — H&P
MG tablet take 1 tablet by mouth twice a day WITH MEALS 7/8/20   Chelsea Flores,    lisinopril (PRINIVIL;ZESTRIL) 40 MG tablet take 1 tablet by mouth once daily 7/8/20   Chelsea Flores, DO   atenolol (TENORMIN) 50 MG tablet take 1 tablet by mouth twice a day 6/24/20   Chelsea Flores, DO   amLODIPine (NORVASC) 10 MG tablet take 1 tablet by mouth once daily 4/26/20   Historical Provider, MD   glipiZIDE (GLUCOTROL XL) 5 MG extended release tablet take 1 tablet by mouth once daily 5/2/20   Historical Provider, MD   pregabalin (LYRICA) 50 MG capsule Take 50 mg by mouth 2 times daily. Historical Provider, MD   ibuprofen (ADVIL;MOTRIN) 800 MG tablet Take 800 mg by mouth every 8 hours as needed for Pain    Historical Provider, MD   DULoxetine (CYMBALTA) 60 MG extended release capsule Take 1 capsule by mouth daily 5/28/20   Ross Aly MD   mineral oil-hydrophilic petrolatum (HYDROPHOR) ointment Apply topically as needed. 5/18/20   Steven Cos, DO   rosuvastatin (CRESTOR) 10 MG tablet take 1 tablet by mouth once daily 5/15/20   Maurizio Cos, DO   allopurinol (ZYLOPRIM) 100 MG tablet Take 1 tablet by mouth daily 2/17/20 3/18/20  Ansley Dobson MD   amLODIPine (NORVASC) 10 MG tablet Take 1 tablet by mouth daily 2/17/20 3/18/20  Ansley Dobson MD   aspirin EC 81 MG EC tablet Take 1 tablet by mouth daily 2/17/20 3/18/20  Ansley Dobson MD   furosemide (LASIX) 20 MG tablet Take 1 tablet by mouth every other day 2/17/20 3/18/20  Ansley Dobson MD   exenatide (BYETTA 10 MCG PEN) 10 MCG/0.04ML injection inject . 04 milliliters subcutaneously twice a day with food 2/17/20   Ansley Dobson MD   lisinopril (PRINIVIL;ZESTRIL) 40 MG tablet Take 1 tablet by mouth daily take 1 tablet by mouth once daily 2/17/20 3/18/20  Ansley Dobson MD   metFORMIN (GLUCOPHAGE) 1000 MG tablet Take 1 tablet by mouth 2 times daily (with meals) 2/17/20 3/18/20  Ansley Dobson MD   RA Alcohol Swabs 70 % PADS USE WITH INJECTIONS TWICE A DAY AND CHECKING BLOOD SUGAR TWICE A DAY 2/17/20   Marla Shelton MD   glipiZIDE (GLUCOTROL XL) 5 MG extended release tablet Take 1 tablet by mouth daily 2/17/20 3/18/20  Marla Shelton MD   blood glucose test strips (ACCU-CHEK RUI PLUS) strip TEST twice a day FASTING BEFORE BREAKFAST AND SUPPER 11/6/19   Marla Shelton MD   ACCU-CHEK MULTICLIX LANCETS MISC Check BS Twice daily 11/6/19   Marla Shelton MD   acetaminophen (TYLENOL) 325 MG tablet Take 2 tablets by mouth every 4 hours as needed for Pain 11/6/19   Marla Shelton MD   Insulin Pen Needle (B-D UF III MINI PEN NEEDLES) 31G X 5 MM MISC use twice a day 11/6/19   Marla Shelton MD   Insulin Pen Needle (B-D ULTRAFINE III SHORT PEN) 31G X 8 MM MISC Inject 1 kit into the skin 2 times daily 11/6/19   Marla Shelton MD   vitamin D (ERGOCALCIFEROL) 1.25 MG (84471 UT) CAPS capsule Take 1 capsule by mouth once a week 11/6/19   Marla Shelton MD   pregabalin (LYRICA) 50 MG capsule Take 50 mg by mouth 2 times daily. Ishan Castro Historical MD Ashley   naproxen (NAPROSYN) 375 MG tablet Take 1 tablet by mouth 2 times daily (with meals)  Patient not taking: Reported on 11/6/2019 6/12/17   Ce Chavarria MD   Wound Dressings (SKINTEGRITY HYDROGEL) GEL Apply 1 each topically 3 times daily 6/5/17   Ce Chavarria MD   Misc. Devices MISC Wheelchair cushion 6/5/17   MD Greta Scott. Devices (MATTRESS PAD) MISC 1 each by Does not apply route once for 1 dose 6/5/17 6/5/17  Ce Chavarria MD   loratadine (CLARITIN) 10 MG tablet Take 1 tablet by mouth daily  Patient not taking: Reported on 6/8/2020 6/5/17   MD Greta Scott. Devices KIT 1 kit by Does not apply route daily as needed (leg edema) Compression stockings for bilateral leg swelling 6/5/17   Ce Chavarria MD   miconazole (MICOTIN) 2 % powder Apply topically 2 times daily.  3/14/17   Carlene Gurrola MD   Multiple Vitamins-Minerals (CHOICEFUL MULTIVITAMIN) CAPS Take 1 tablet by mouth daily  Patient not taking: Reported on 2/17/2020 9/28/16 Suleiman Drew MD   Skin Protectants, Misc. (EUCERIN) cream Apply topically as needed. 9/28/16   Suleiman Drew MD   [facility] [address] Bed with gel overlay dsp # 1  Pt has Osteoarthritis and is Morbidly Obese 4/4/16   Melissa Fay DO   [facility]. Devices KIT 1 kit by Does not apply route daily as needed Foam mattress for bilateral knees osteoarthritis , degenerative back osteoarthitis 3/31/16   Suleiman Drew MD   [facility]. [address] bed mattress for severe osteoarthritis , morbid obesity , pressure sores  and difficulty ambulating 12/2/15   Suleiman Drew MD   Misc. Devices MISC Chair lift for difficulty ambulating , severe knee osteoarthritis and  morbid obesity 12/2/15   Suleiman Drew MD   [facility] Devices [address] [facility] chair to assist with ADL with diagnosis of OA and full thickness knee meniscal tear 2/24/15   Sheri Perry MD   Misc. Devices MISC Grab bar  diagnosis osteoarthritis and full thickness knee meniscal tear 2/24/15   Sheri Perry MD   [facility] [address] bed 11/7/14   Joanne Calix MD   [facility]. Devices [address] [facility] wheelchair 11/7/14   Joanne Calix MD   [facility] Devices [facility] Bedside commode. Dx osteoarthritis 715.90. Height 5'4.5\". Weight 245 lbs. 9/17/14   Suleiman Drew MD       Allergies:    Patient has no known allergies. Social History:    reports that she has never smoked. She has never used smokeless tobacco. She reports that she does not drink alcohol or use drugs. Family History:   family history is not on file. PHYSICAL EXAM:  Vitals:  /72   Pulse 102   Temp 98.7 °F (37.1 °C) (Oral)   Resp 18   Ht 5' 5\" (1.651 m)   Wt 270 lb (122.5 kg)   SpO2 95%   BMI 44.93 kg/m²   General Appearance: alert and oriented to person, place and time and in no acute distress  Skin: warm and dry, right ischial decubitus ulcer with greenish drainage. No surrounding erythema. Does not appear to be deep.   Head: normocephalic and atraumatic  Eyes: pupils equal, round, and reactive to light, extraocular eye movements intact, conjunctivae normal  Neck: neck supple and non tender without mass   Pulmonary/Chest: clear to auscultation bilaterally- no wheezes, rales or rhonchi, normal air movement, no respiratory distress  Cardiovascular: normal rate, normal S1 and S2 and 2 out of 6 systolic ejection murmur  Abdomen: soft, non-tender, non-distended, normal bowel sounds, no masses or organomegaly  Extremities: no cyanosis, no clubbing and no edema. Left leg with +1 edema that the daughter states is chronic  Neurologic: no cranial nerve deficit and speech normal        LABS:  Recent Labs     07/12/20  1856      K 2.9*   CL 96*   CO2 23   BUN 10   CREATININE 0.7   GLUCOSE 152*   CALCIUM 8.9       Recent Labs     07/12/20  1856   WBC 18.3*   RBC 4.26   HGB 9.9*   HCT 33.3*   MCV 78.2*   MCH 23.2*   MCHC 29.7*   RDW 17.1*   *   MPV 9.0       No results for input(s): POCGLU in the last 72 hours. Radiology:   US DUP LOWER EXTREMITY RIGHT LIZZ   Final Result      Venous structures below the knee are not demonstrated. No evidence for thrombus within the common femoral, superficial   femoral or popliteal veins. ASSESSMENT:    Ischial decubitus ulcer  Sepsis likely due to above  Lactic acidosis  Hypokalemia  Diabetes mellitus type 2  Essential hypertension  Morbid obesity  Microcytic anemia  Functional paraplegic secondary to sciatica per patient    PLAN:  Ischial ulcer that is likely infected leading to sepsis:  - Continue Vanco and Zosyn. Obtain culture from wound. Blood cultures pending  -Wound care consult  -Does not appear to be deep enough to raise concern for osteomyelitis or deep-seated infection. -IV fluids and trend lactic acid    Hypokalemia: Replace, check magnesium  Diabetes mellitus type 2: Basal/bolus with SSI  Functional paraplegia: Family indicates they wish to pursue nursing home however the patient does not appear to be in agreement.   PT/OT and social work referral    DVT prophylaxis: lovenox      NOTE: This report was transcribed using voice recognition software. Every effort was made to ensure accuracy; however, inadvertent computerized transcription errors may be present.   Electronically signed by German Londono MD on 7/12/2020 at 9:49 PM

## 2020-07-13 NOTE — PROGRESS NOTES
Pharmacy Consultation Note  (Antibiotic Dosing and Monitoring)    Initial consult date:   Consulting physician: Aleksandra Tian  Drug(s): Vanco  Indication: Wound infection    Ht Readings from Last 1 Encounters:   20 5' 4\" (1.626 m)     Wt Readings from Last 1 Encounters:   20 209 lb 4.8 oz (94.9 kg)         Age/Gender Height IBW Weight  Allergy Information   78 y.o. female 5' 5\" (165.1 cm) Ideal body weight: 54.7 kg (120 lb 9.5 oz)  Adjusted ideal body weight: 70.8 kg (156 lb 1.2 oz) 270 lb (122.5 kg)  Patient has no known allergies. Date  WBC BUN/CR Drug/Dose Time   Given Level(s)   (Time) Comments     (#1) 18.3 10/0.7 Vanco 1.5g IV x1 2253       (#2) 17.2 10/0.6 Vanco 1.5g IV q24h <2200>       (#3)           (#4)           Estimated Creatinine Clearance: 98 mL/min (based on SCr of 0.6 mg/dL). UOP over the past 24 hours:   No intake or output data in the 24 hours ending 20 1426    Temp max: Temp (24hrs), Av.5 °F (36.9 °C), Min:98.3 °F (36.8 °C), Max:98.7 °F (37.1 °C)        Cultures:  available culture and sensitivity results were reviewed in EPIC  Cultures sent and are pending. Culture Date Result    Wound     Blood     Urine       No results for input(s): BLOODCULT2 in the last 72 hours. Assessment:  · Pt is a 78 y.o. female who presented with wound infection -- ischial decubitus ulcer  · PMH significant for morbid obesity, DMT2, HTN, functional paraplegia  · Consulted by Dr. Aleksandra Tian to dose/monitor vancomycin  · Goal trough level:  15-20 mcg/mL;  Goal AUC/MADALYN 400-600  · Serum creatinine today: 0.6 ;CrCl ~ 98; baseline Scr ~ 0.6  · Pt is also on Zosyn  · Vanco 1.5g IV x1 given    Plan:  · Vancomycin 1500mG IV q24h  · Will check a vancomycin trough when steady state attained if vancomycin continues  · Pharmacist will follow and monitor/adjust dosing as necessary      Thank you for the consult,    Urban ToledoD, BCPS 2020 2:26 PM

## 2020-07-13 NOTE — PROGRESS NOTES
[address] [place] [address] Hospitalist   Progress Note    Admitting Date and Time: 7/12/2020  5:43 PM  Admit Dx: Decubitus ulcer of sacral region [L89.159]  Decubitus ulcer of sacral region [L89.159]    Subjective:    Pt feels okay this morning. Patient son at bedside states patient has CNA's care for throughout the day. Was unaware of this wound. States he would typically let someone know. Unsure of how long wound has been present. Per RN: No new concerns noted. ROS: denies fever, chills, cp, sob, n/v, HA unless stated above.      sodium chloride flush  10 mL Intravenous 2 times per day    enoxaparin  40 mg Subcutaneous Daily    allopurinol  100 mg Oral Daily    amLODIPine  10 mg Oral Daily    aspirin EC  81 mg Oral Daily    atenolol  50 mg Oral Daily    DULoxetine  60 mg Oral Daily    lisinopril  40 mg Oral Daily    pregabalin  50 mg Oral BID    rosuvastatin  10 mg Oral Nightly    sodium chloride flush  10 mL Intravenous 2 times per day    enoxaparin  40 mg Subcutaneous Daily    insulin glargine  0.15 Units/kg Subcutaneous Nightly    insulin lispro  0.05 Units/kg Subcutaneous TID WC    insulin lispro  0-6 Units Subcutaneous TID WC    insulin lispro  0-3 Units Subcutaneous Nightly    piperacillin-tazobactam  3.375 g Intravenous Q8H     sodium chloride flush, 10 mL, PRN  acetaminophen, 650 mg, Q4H PRN  acetaminophen, 650 mg, Q4H PRN  sodium chloride flush, 10 mL, PRN  acetaminophen, 650 mg, Q6H PRN    Or  acetaminophen, 650 mg, Q6H PRN  polyethylene glycol, 17 g, Daily PRN  promethazine, 12.5 mg, Q6H PRN    Or  ondansetron, 4 mg, Q6H PRN  glucose, 15 g, PRN  dextrose, 12.5 g, PRN  glucagon (rDNA), 1 mg, PRN  dextrose, 100 mL/hr, PRN         Objective:    BP (!) 150/68   Pulse 93   Temp 98.6 °F (37 °C) (Oral)   Resp 18   Ht 5' 4\" (1.626 m)   Wt 209 lb 4.8 oz (94.9 kg)   SpO2 97%   BMI 35.93 kg/m²   General Appearance: alert and oriented to person, place and time and in no acute distress  Skin: warm and dry. Decubitus wound to the right ischial  Head: normocephalic and atraumatic  Eyes: pupils equal, round, and reactive to light, extraocular eye movements intact, conjunctivae normal  Neck: neck supple and non tender without mass   Pulmonary/Chest: clear to auscultation bilaterally firm induration no wheezes, rales or rhonchi, normal air movement, no respiratory distress  Cardiovascular: normal rate, normal S1 and S2 no rubs, gallops, murmur noted. Abdomen: soft, non-tender, non-distended, normal bowel sounds, no masses or organomegaly  Extremities: no cyanosis, no clubbing and no edema  Neurologic: no cranial nerve deficit and speech normal      Recent Labs     07/12/20 1856 07/13/20  0339    139   K 2.9* 3.7   CL 96* 102   CO2 23 22   BUN 10 10   CREATININE 0.7 0.6   GLUCOSE 152* 176*   CALCIUM 8.9 8.8       Recent Labs     07/12/20 1856   ALKPHOS 78   PROT 7.5   LABALBU 2.9*   BILITOT 0.4   AST 9   ALT 9       Recent Labs     07/12/20 1856 07/13/20  0339   WBC 18.3* 17.2*   RBC 4.26 4.45   HGB 9.9* 10.2*   HCT 33.3* 34.4   MCV 78.2* 77.3*   MCH 23.2* 22.9*   MCHC 29.7* 29.7*   RDW 17.1* 17.2*   * 457*   MPV 9.0 9.2          Radiology:   US DUP LOWER EXTREMITY RIGHT LIZZ   Final Result      Venous structures below the knee are not demonstrated. No evidence for thrombus within the common femoral, superficial   femoral or popliteal veins. Assessment:  Active Problems:    Decubitus ulcer of sacral region  Resolved Problems:    * No resolved hospital problems. *      Plan:  1. Decubitus ulcer of sacral region- with 4.0 cm round wound slough/firm induration. Not present 2 weeks ago. For debridement in a.m. general surgery input appreciated. 2. Hypokalemia- resolved K+ 3.7. Follow replete   3. DM II-  A1c 7.0. Continue SSI/Lantus. Carb controlled diet once diet is resumed. Hypoglycemic protocol. 4. HTN- currently controlled. Continue current regimen. Adjust as needed. 5. HLD- continue statin therapy. 6. Hx functional paraplegia- wheelchair-bound. Patient receives home care and is a sufficient living arrangement per family. PT/OT evaluation. Case management to follow for discharge. NOTE: This report was transcribed using voice recognition software. Every effort was made to ensure accuracy; however, inadvertent computerized transcription errors may be present. Electronically signed by Daniel Jasso CNP on 7/13/2020 at 10:22 AM

## 2020-07-13 NOTE — CONSULTS
WITH MEALS 7/8/20   Chelsea Flores,    lisinopril (PRINIVIL;ZESTRIL) 40 MG tablet take 1 tablet by mouth once daily 7/8/20   Chelsea Flores,    atenolol (TENORMIN) 50 MG tablet take 1 tablet by mouth twice a day 6/24/20   Chelsea Flores,    amLODIPine (NORVASC) 10 MG tablet take 1 tablet by mouth once daily 4/26/20   Historical Provider, MD   glipiZIDE (GLUCOTROL XL) 5 MG extended release tablet take 1 tablet by mouth once daily 5/2/20   Historical Provider, MD   ibuprofen (ADVIL;MOTRIN) 800 MG tablet Take 800 mg by mouth every 8 hours as needed for Pain    Historical Provider, MD   DULoxetine (CYMBALTA) 60 MG extended release capsule Take 1 capsule by mouth daily 5/28/20   Martha Rodgers MD   mineral oil-hydrophilic petrolatum (HYDROPHOR) ointment Apply topically as needed. 5/18/20   Hui Salm, DO   rosuvastatin (CRESTOR) 10 MG tablet take 1 tablet by mouth once daily 5/15/20   Hui Salm, DO   allopurinol (ZYLOPRIM) 100 MG tablet Take 1 tablet by mouth daily 2/17/20 3/18/20  Miguel Green MD   furosemide (LASIX) 20 MG tablet Take 1 tablet by mouth every other day 2/17/20 3/18/20  Miguel Green MD   exenatide (BYETTA 10 MCG PEN) 10 MCG/0.04ML injection inject . 04 milliliters subcutaneously twice a day with food 2/17/20   Miguel Green MD   RA Alcohol Swabs 70 % PADS USE WITH INJECTIONS TWICE A DAY AND CHECKING BLOOD SUGAR TWICE A DAY 2/17/20   Miguel Green MD   blood glucose test strips (ACCU-CHEK RUI PLUS) strip TEST twice a day FASTING BEFORE BREAKFAST AND SUPPER 11/6/19   Miguel Green MD   ACCU-CHEK MULTICLIX LANCETS MISC Check BS Twice daily 11/6/19   Miguel Green MD   acetaminophen (TYLENOL) 325 MG tablet Take 2 tablets by mouth every 4 hours as needed for Pain 11/6/19   Miguel Green MD   Insulin Pen Needle (B-D UF III MINI PEN NEEDLES) 31G X 5 MM MISC use twice a day 11/6/19   Miguel Green MD   Insulin Pen Needle (B-D ULTRAFINE III SHORT PEN) 31G X 8 MM MISC Inject 1 kit into the skin 2 times daily 11/6/19   Nehemias Shaw MD   vitamin D (ERGOCALCIFEROL) 1.25 MG (78339 UT) CAPS capsule Take 1 capsule by mouth once a week 11/6/19   Nehemias Shaw MD   pregabalin (LYRICA) 50 MG capsule Take 50 mg by mouth 2 times daily. Emely Prince Historical MD Ashley   naproxen (NAPROSYN) 375 MG tablet Take 1 tablet by mouth 2 times daily (with meals)  Patient not taking: Reported on 11/6/2019 6/12/17   Johnathan Arora MD   Wound Dressings (SKINTEGRITY HYDROGEL) GEL Apply 1 each topically 3 times daily 6/5/17   Johnathan Arora MD   Misc. Devices MISC Wheelchair cushion 6/5/17   MD Zoe Pedersonc. Devices (MATTRESS PAD) MISC 1 each by Does not apply route once for 1 dose 6/5/17 6/5/17  Johnathan Arora MD   loratadine (CLARITIN) 10 MG tablet Take 1 tablet by mouth daily  Patient not taking: Reported on 6/8/2020 6/5/17   Johnathan Arora MD   Misc. Devices KIT 1 kit by Does not apply route daily as needed (leg edema) Compression stockings for bilateral leg swelling 6/5/17   Johnathan Arora MD   miconazole (MICOTIN) 2 % powder Apply topically 2 times daily. 3/14/17   Belle Merino MD   Skin Protectants, Misc. (EUCERIN) cream Apply topically as needed. 9/28/16   Damon العلي MD   Misc. [address] Bed with gel overlay dsp # 1  Pt has Osteoarthritis and is Morbidly Obese 4/4/16   Tomer Jiang,    Misc. Devices KIT 1 kit by Does not apply route daily as needed Foam mattress for bilateral knees osteoarthritis , degenerative back osteoarthitis 3/31/16   MD Zoe Craigc. [address] bed mattress for severe osteoarthritis , morbid obesity , pressure sores  and difficulty ambulating 12/2/15   Damon العلي MD   Misc. Devices MISC Chair lift for difficulty ambulating , severe knee osteoarthritis and  morbid obesity 12/2/15   MD Zoe Craigc. Devices [address] [facility] chair to assist with ADL with diagnosis of OA and full thickness knee meniscal tear 2/24/15   Jc De Souza MD   Misc. Devices MISC Grab bar  diagnosis osteoarthritis and full thickness knee meniscal tear 2/24/15   Deanna Loyola MD   [facility]. [address] bed 11/7/14   Cami Saeed MD   [facility]. Devices [address] [facility] wheelchair 11/7/14   Cami Saeed MD   [facility]. Devices [facility] Bedside commode. Dx osteoarthritis 715.90. Height 5'4.5\". Weight 245 lbs. 9/17/14   Randle Duverney, MD       No Known Allergies    History reviewed. No pertinent family history. Social History     Tobacco Use    Smoking status: Never Smoker    Smokeless tobacco: Never Used   Substance Use Topics    Alcohol use: No     Alcohol/week: 0.0 standard drinks    Drug use: No         Review of Systems:   General ROS: negative  Hematological and Lymphatic ROS: negative  Respiratory ROS: no cough, shortness of breath, or wheezing  Cardiovascular ROS: no chest pain or dyspnea on exertion  Gastrointestinal ROS: no abdominal pain, change in bowel habits, or black or bloody stools  Genito-Urinary ROS: no dysuria, trouble voiding, or hematuria  Musculoskeletal ROS: paraplegia, bedbound/wheelchair bound      PHYSICAL EXAM:    Vitals:    07/13/20 0745   BP: (!) 150/68   Pulse: 93   Resp: 18   Temp: 98.6 °F (37 °C)   SpO2: 97%       GENERAL:  NAD. A&Ox3. HEAD:  Normocephalic. Atraumatic. EYES:   No scleral icterus. PERRL. LUNGS:  No increased work of breathing. CARDIOVASCULAR: RR  ABDOMEN:  Soft, non-distended, non-tender. No guarding, rigidity, rebound. Low midline incision from hysterectomy  EXTREMITIES:   MAEx4. Atraumatic. No LE edema. SKIN:  Warm and dry.   Non-tender 7cm x 6cm sacral decubitus ulcer, with white eschar            LABS:    CBC  Recent Labs     07/13/20  0339   WBC 17.2*   HGB 10.2*   HCT 34.4   *     BMP  Recent Labs     07/13/20  0339      K 3.7      CO2 22   BUN 10   CREATININE 0.6   CALCIUM 8.8     Liver Function  Recent Labs     07/12/20  1856   BILITOT 0.4   AST 9   ALT 9   ALKPHOS 78   PROT 7.5   LABALBU 2.9*     No results for input(s): LACTATE in the last 72 hours. No results for input(s): INR, PTT in the last 72 hours. Invalid input(s): PT    RADIOLOGY    Us Dup Lower Extremity Right Ishan    Result Date: 7/12/2020  Real-time and Doppler sonography of the deep venous structures of the right lower extremity. Grayscale, color Doppler, and spectral Doppler analysis. There is adequate and spontaneous blood flow, compressibility and augmentation within the right common femoral, superficial femoral and popliteal veins. Below the knee, the venous structures were not demonstrated. Venous structures below the knee are not demonstrated. No evidence for thrombus within the common femoral, superficial femoral or popliteal veins. ASSESSMENT/PLAN:  78 y.o. female with sacral decubitus ulcer    Overall care per Primary  Abx per Primary  Wound needs debrided   Patient not presently NPO  Will discuss if this is to be done in OR vs bedside. The risks, benefits, alternatives, and potential complications of the procedure, including the risks of bleeding, infection, and injury to surrounding structures, were explained to the patient. All questions were answered. The patient understands and agrees to proceed with the procedure. Plan will be discussed with Dr. Anitra Christianson. Alvarado YacoltDO  Resident, PGY-2  7/13/2020  1:48 PM       I saw and examined the patient. I reviewed the above resident's note. I agree with the assessment and plan as outlined.     REVIEW OF SYSTEMS:    Constitutional: negative  Eyes: negative  Ears, nose, mouth, throat, and face: negative  Respiratory: negative  Cardiovascular: negative  Gastrointestinal: negative  Genitourinary:negative  Integument/breast: as in hpi  Hematologic/lymphatic: negative  Musculoskeletal:negative  Neurological: negative  Allergic/Immunologic: negative    PHYSICAL EXAM   /69   Pulse 79   Temp 98.4 °F (36.9 °C) (Axillary)   Resp 16   Ht 5' 4\" (1.626 m)   Wt 245 lb 12.8 oz (111.5 kg)   SpO2 98%   BMI 42.19 kg/m²     General appearance: alert, cooperative and in no acute distress. Eyes: grossly normal  Lungs: clear to auscultation bilaterally  Heart: regular rate and rhythm  Abdomen: soft, non-tender, non distended, no masses,  no organomegaly  Skin: wound as pictured above  Neurologic: Alert and oriented x 3  Musculoskeletal: No clubbing cyanosis or edema.     A/P  Debridement at bedside    Yenni Ludwig MD  General Surgery

## 2020-07-13 NOTE — PROGRESS NOTES
Patient son called to give consent for bedside procedure. The patient prefers to wait until tomorrow. I explained to the son that the surgeon will come when she has time available to see the patient and that may be tonight or tomorrow. The patient does not want the procedure tonight because she is tired. I reiterated to the son that the surgeon needs to do this procedure when she is available and that may be tonight or tomorrow.

## 2020-07-13 NOTE — FLOWSHEET NOTE
Initial Inpatient Wound Care    Admit Date: 7/12/2020  5:43 PM    Reason for consult:  wound  Significant history:  DM, functional paraplegia. Bed bound. Wound history:  POA  Findings: awake, verbal    07/13/20 1436   Wound 07/13/20 Coccyx Right   Date First Assessed/Time First Assessed: 07/13/20 0000   Present on Hospital Admission: Yes  Location: Coccyx  Wound Location Orientation: Right   Wound Image    Wound Pressure Unstageable   Wound Length (cm) 5 cm   Wound Width (cm) 4 cm   Wound Depth (cm)   (obs)   Wound Surface Area (cm^2) 20 cm^2   Change in Wound Size % (l*w) 0   Wound Assessment Black; Yellow   Drainage Amount None   Odor Strong   Svetlana-wound Assessment Induration  (scarring)   periwound with induration-extending proximal up to 2 cm, darkened. No increase in warmth  Left gluteal fold has small pink open area. Scarring. Bilateral heels. Color variation. See pic below      Left heels has area that is darker. ? Variant vs pressure injury  Impression: unstageable pressure injury coccyx  Interventions in place: SOS    Plan: lo air loss bed ordered  Surgery following for debridement  SOS  Will follow  RICHARD Clark [place]outh 7/13/2020 4:11 PM

## 2020-07-13 NOTE — PROGRESS NOTES
Physical Therapy    Facility/Department: [address] MED SURG/TELE     NAME: Deanna Gerardo  : 1950  MRN: [id number]    Date of Service: 2020    PT consult was received and eval was attempted. After speaking with pt and son she is long standing paraplegic. They report she has home aides daily and they use adriana lift for transfers and pt is dependent for WC mobility. Pt has no skilled PT needs at this time as she is at her functional baseline, but now has sacral ulcer that is causing pain and limiting her ability to sit in chair. Pt and son do not want to have pt attempt to sit up until sacral wound is healed. Will discharge pt from our service at this time. Christopher Harada Danie Skye., P.T.   License Number: [id number]

## 2020-07-13 NOTE — PROGRESS NOTES
Pharmacy Consultation Note  (Antibiotic Dosing and Monitoring)    Initial consult date: 7-13-20  Consulting physician: Dr Cait Lilly  Drug: Vancomycin  Indication: wound infection    Age/  Gender Height Weight IBW Dosing weight  Allergy Information   69 y.o./female @FLOW(11:first:1)@ @FLOW[14:FIRST:1@     Ideal body weight: 54.7 kg (120 lb 9.5 oz)  Adjusted ideal body weight: 70.8 kg (156 lb 1.2 oz)  95kg    Patient has no known allergies. @HRET(89)@        Date  WBC BUN SCr CrCl  (mL/min) Drug/Dose Time   Given Level(s)   (Time) Comments   7-12-20 12.6 10 0.7  84   Vancomycin 1500 mg IV  2300 x1                                          No intake or output data in the 24 hours ending 07/13/20 0222    Historical Cultures:  Organism   Date Value Ref Range Status   03/09/2017 FILM ARR Rhinovirus/Enterovirus Detected  (A)  Final     No results for input(s): BC in the last 72 hours. Cultures:  available culture and sensitivity results were reviewed in EPIC    Assessment:  78 y.o. female has been initiated Vancomycin. Estimated CrCl = 84 mL/min  Goal trough level = 15-20 mcg/mL    Plan:   Will initiate vancomycin at a dose of 1500 x1 2300 7-12-20  Monitor renal function   Clinical pharmacy to follow      Alejandra Chang [address] [place] [address] 7/13/2020 2:22 AM

## 2020-07-14 ENCOUNTER — TELEPHONE (OUTPATIENT)
Dept: INTERNAL MEDICINE | Age: 70
End: 2020-07-14

## 2020-07-14 ENCOUNTER — ANESTHESIA (OUTPATIENT)
Dept: OPERATING ROOM | Age: 70
End: 2020-07-14

## 2020-07-14 LAB
ANION GAP SERPL CALCULATED.3IONS-SCNC: 11 MMOL/L (ref 7–16)
BUN BLDV-MCNC: 7 MG/DL (ref 8–23)
CALCIUM SERPL-MCNC: 8.2 MG/DL (ref 8.6–10.2)
CHLORIDE BLD-SCNC: 101 MMOL/L (ref 98–107)
CO2: 25 MMOL/L (ref 22–29)
CREAT SERPL-MCNC: 0.6 MG/DL (ref 0.5–1)
GFR AFRICAN AMERICAN: >60
GFR NON-AFRICAN AMERICAN: >60 ML/MIN/1.73
GLUCOSE BLD-MCNC: 194 MG/DL (ref 74–99)
MAGNESIUM: 1.3 MG/DL (ref 1.6–2.6)
MAGNESIUM: 1.5 MG/DL (ref 1.6–2.6)
METER GLUCOSE: 125 MG/DL (ref 74–99)
METER GLUCOSE: 125 MG/DL (ref 74–99)
METER GLUCOSE: 162 MG/DL (ref 74–99)
METER GLUCOSE: 179 MG/DL (ref 74–99)
METER GLUCOSE: 200 MG/DL (ref 74–99)
POTASSIUM REFLEX MAGNESIUM: 2.7 MMOL/L (ref 3.5–5)
POTASSIUM SERPL-SCNC: 2.9 MMOL/L (ref 3.5–5)
SODIUM BLD-SCNC: 137 MMOL/L (ref 132–146)
URIC ACID, SERUM: 2.3 MG/DL (ref 2.4–5.7)

## 2020-07-14 PROCEDURE — 6360000002 HC RX W HCPCS: Performed by: INTERNAL MEDICINE

## 2020-07-14 PROCEDURE — 0JB90ZZ EXCISION OF BUTTOCK SUBCUTANEOUS TISSUE AND FASCIA, OPEN APPROACH: ICD-10-PCS | Performed by: SURGERY

## 2020-07-14 PROCEDURE — 1200000000 HC SEMI PRIVATE

## 2020-07-14 PROCEDURE — 2580000003 HC RX 258: Performed by: INTERNAL MEDICINE

## 2020-07-14 PROCEDURE — 87070 CULTURE OTHR SPECIMN AEROBIC: CPT

## 2020-07-14 PROCEDURE — 82962 GLUCOSE BLOOD TEST: CPT

## 2020-07-14 PROCEDURE — 84550 ASSAY OF BLOOD/URIC ACID: CPT

## 2020-07-14 PROCEDURE — 6360000002 HC RX W HCPCS: Performed by: NURSE PRACTITIONER

## 2020-07-14 PROCEDURE — 84132 ASSAY OF SERUM POTASSIUM: CPT

## 2020-07-14 PROCEDURE — 87186 SC STD MICRODIL/AGAR DIL: CPT

## 2020-07-14 PROCEDURE — APPSS30 APP SPLIT SHARED TIME 16-30 MINUTES: Performed by: NURSE PRACTITIONER

## 2020-07-14 PROCEDURE — 11042 DBRDMT SUBQ TIS 1ST 20SQCM/<: CPT | Performed by: SURGERY

## 2020-07-14 PROCEDURE — 87081 CULTURE SCREEN ONLY: CPT

## 2020-07-14 PROCEDURE — 2500000003 HC RX 250 WO HCPCS: Performed by: SURGERY

## 2020-07-14 PROCEDURE — 11045 DBRDMT SUBQ TISS EACH ADDL: CPT | Performed by: SURGERY

## 2020-07-14 PROCEDURE — 80048 BASIC METABOLIC PNL TOTAL CA: CPT

## 2020-07-14 PROCEDURE — 6370000000 HC RX 637 (ALT 250 FOR IP): Performed by: INTERNAL MEDICINE

## 2020-07-14 PROCEDURE — 99232 SBSQ HOSP IP/OBS MODERATE 35: CPT | Performed by: FAMILY MEDICINE

## 2020-07-14 PROCEDURE — 36415 COLL VENOUS BLD VENIPUNCTURE: CPT

## 2020-07-14 PROCEDURE — 83735 ASSAY OF MAGNESIUM: CPT

## 2020-07-14 PROCEDURE — 87075 CULTR BACTERIA EXCEPT BLOOD: CPT

## 2020-07-14 PROCEDURE — 6360000002 HC RX W HCPCS: Performed by: FAMILY MEDICINE

## 2020-07-14 PROCEDURE — 87077 CULTURE AEROBIC IDENTIFY: CPT

## 2020-07-14 RX ORDER — MAGNESIUM SULFATE IN WATER 40 MG/ML
2 INJECTION, SOLUTION INTRAVENOUS ONCE
Status: COMPLETED | OUTPATIENT
Start: 2020-07-14 | End: 2020-07-14

## 2020-07-14 RX ORDER — POTASSIUM CHLORIDE 7.45 MG/ML
10 INJECTION INTRAVENOUS
Status: COMPLETED | OUTPATIENT
Start: 2020-07-14 | End: 2020-07-14

## 2020-07-14 RX ORDER — INSULIN GLARGINE 100 [IU]/ML
0.15 INJECTION, SOLUTION SUBCUTANEOUS NIGHTLY
Status: DISCONTINUED | OUTPATIENT
Start: 2020-07-14 | End: 2020-07-17 | Stop reason: HOSPADM

## 2020-07-14 RX ADMIN — LISINOPRIL 40 MG: 20 TABLET ORAL at 09:46

## 2020-07-14 RX ADMIN — INSULIN LISPRO 6 UNITS: 100 INJECTION, SOLUTION INTRAVENOUS; SUBCUTANEOUS at 11:26

## 2020-07-14 RX ADMIN — AMLODIPINE BESYLATE 10 MG: 10 TABLET ORAL at 09:46

## 2020-07-14 RX ADMIN — Medication 10 ML: at 20:53

## 2020-07-14 RX ADMIN — INSULIN LISPRO 6 UNITS: 100 INJECTION, SOLUTION INTRAVENOUS; SUBCUTANEOUS at 16:38

## 2020-07-14 RX ADMIN — POTASSIUM CHLORIDE 10 MEQ: 10 INJECTION, SOLUTION INTRAVENOUS at 20:51

## 2020-07-14 RX ADMIN — ATENOLOL 50 MG: 50 TABLET ORAL at 11:27

## 2020-07-14 RX ADMIN — POTASSIUM CHLORIDE 10 MEQ: 7.46 INJECTION, SOLUTION INTRAVENOUS at 08:08

## 2020-07-14 RX ADMIN — SODIUM CHLORIDE: 9 INJECTION, SOLUTION INTRAVENOUS at 06:17

## 2020-07-14 RX ADMIN — MAGNESIUM SULFATE 2 G: 2 INJECTION INTRAVENOUS at 16:25

## 2020-07-14 RX ADMIN — DULOXETINE HYDROCHLORIDE 60 MG: 60 CAPSULE, DELAYED RELEASE ORAL at 09:47

## 2020-07-14 RX ADMIN — ASPIRIN 81 MG: 81 TABLET, COATED ORAL at 09:47

## 2020-07-14 RX ADMIN — PIPERACILLIN AND TAZOBACTAM 3.38 G: 3; .375 INJECTION, POWDER, FOR SOLUTION INTRAVENOUS at 06:12

## 2020-07-14 RX ADMIN — Medication: at 09:00

## 2020-07-14 RX ADMIN — SODIUM CHLORIDE 25 ML: 9 INJECTION, SOLUTION INTRAVENOUS at 10:00

## 2020-07-14 RX ADMIN — ROSUVASTATIN CALCIUM 10 MG: 10 TABLET, FILM COATED ORAL at 20:52

## 2020-07-14 RX ADMIN — PREGABALIN 50 MG: 50 CAPSULE ORAL at 09:46

## 2020-07-14 RX ADMIN — Medication 10 ML: at 10:01

## 2020-07-14 RX ADMIN — POTASSIUM CHLORIDE 10 MEQ: 7.46 INJECTION, SOLUTION INTRAVENOUS at 06:07

## 2020-07-14 RX ADMIN — MAGNESIUM SULFATE 2 G: 2 INJECTION INTRAVENOUS at 06:07

## 2020-07-14 RX ADMIN — POTASSIUM CHLORIDE 10 MEQ: 7.46 INJECTION, SOLUTION INTRAVENOUS at 06:59

## 2020-07-14 RX ADMIN — INSULIN LISPRO 1 UNITS: 100 INJECTION, SOLUTION INTRAVENOUS; SUBCUTANEOUS at 16:37

## 2020-07-14 RX ADMIN — POTASSIUM CHLORIDE 10 MEQ: 10 INJECTION, SOLUTION INTRAVENOUS at 19:04

## 2020-07-14 RX ADMIN — PIPERACILLIN AND TAZOBACTAM 3.38 G: 3; .375 INJECTION, POWDER, FOR SOLUTION INTRAVENOUS at 13:52

## 2020-07-14 RX ADMIN — PIPERACILLIN AND TAZOBACTAM 3.38 G: 3; .375 INJECTION, POWDER, FOR SOLUTION INTRAVENOUS at 20:53

## 2020-07-14 RX ADMIN — POTASSIUM CHLORIDE 10 MEQ: 7.46 INJECTION, SOLUTION INTRAVENOUS at 09:47

## 2020-07-14 RX ADMIN — SODIUM CHLORIDE: 9 INJECTION, SOLUTION INTRAVENOUS at 00:21

## 2020-07-14 RX ADMIN — PREGABALIN 50 MG: 50 CAPSULE ORAL at 20:52

## 2020-07-14 RX ADMIN — POTASSIUM CHLORIDE 10 MEQ: 10 INJECTION, SOLUTION INTRAVENOUS at 16:25

## 2020-07-14 RX ADMIN — POTASSIUM CHLORIDE 10 MEQ: 10 INJECTION, SOLUTION INTRAVENOUS at 17:38

## 2020-07-14 RX ADMIN — INSULIN GLARGINE 17 UNITS: 100 INJECTION, SOLUTION SUBCUTANEOUS at 21:03

## 2020-07-14 RX ADMIN — SODIUM CHLORIDE 25 ML: 9 INJECTION, SOLUTION INTRAVENOUS at 18:29

## 2020-07-14 RX ADMIN — VANCOMYCIN HYDROCHLORIDE 1.5 G: 500 INJECTION, POWDER, LYOPHILIZED, FOR SOLUTION INTRAVENOUS at 02:44

## 2020-07-14 RX ADMIN — LIDOCAINE HYDROCHLORIDE,EPINEPHRINE BITARTRATE 20 ML: 10; .01 INJECTION, SOLUTION INFILTRATION; PERINEURAL at 09:00

## 2020-07-14 RX ADMIN — INSULIN LISPRO 1 UNITS: 100 INJECTION, SOLUTION INTRAVENOUS; SUBCUTANEOUS at 21:03

## 2020-07-14 RX ADMIN — ALLOPURINOL 100 MG: 100 TABLET ORAL at 09:47

## 2020-07-14 RX ADMIN — SODIUM CHLORIDE: 9 INJECTION, SOLUTION INTRAVENOUS at 19:03

## 2020-07-14 RX ADMIN — SODIUM CHLORIDE 25 ML: 9 INJECTION, SOLUTION INTRAVENOUS at 05:03

## 2020-07-14 ASSESSMENT — PAIN SCALES - GENERAL
PAINLEVEL_OUTOF10: 0

## 2020-07-14 NOTE — CONSULTS
[facility] CONSULT NOTE  Reason for Consult:  Infected decubitus ulcer   Requesting Physician:  IVON Martinez - CNP     Chief Complaint   Patient presents with    Wound Check     patient bedbound, had a small wound on right buttocks that had healed and per patients daughter area has came back, open area and draining        History Obtained From:  Patient and chart    HISTORY OFPRESENT ILLNESS              The patient is a 78 y.o. female with significant past medical history of morbid obesity, essential hypertension, diabetes mellitus type 2, sciatica, functional paraplegia that she states is due to her sciatica, bed bound for past 3 years with caregivers at home who presents with foul smelling right ischial wound. Wound developed in last two weeks. Discharge was present as well. WBC  Were elevated. She did not have fever. She is on vanco and zosyn. She had excisional debridement skin and subcutaneous tissue today with cultures sent. Tissue was necrotic per op note and did not go down to bone. Wound culture upon presentation grew proteus and GPO. She has hx of Ecoli in urine 2017. Patient alert. She denies fever, chills, n/v/d. Tolerating antibiotics. She reports wound was present for past two weeks and caregivers were keeping an eye on it however did not notice how bad it was getting. She has had decub ulcers in past managed by wound care center. Per note she saw them last in 2017. No previous wound cultures in chart.       Past Medical History:   Diagnosis Date    Cataract, right     DM (diabetes mellitus), type 2 ([facility] [address] 11/3/2010    HTN (hypertension) 11/3/2010    Hyperlipidemia 11/3/2010    Obesity     Osteoarthritis 11/3/2010    Sinusitis chronic     seasonal    Wheelchair confinement     can pivot with assistance;  uses a lift chair       Past Surgical History:   Procedure Laterality Date    HYSTERECTOMY      CA XCAPSL CTRC RMVL INSJ IO LENS PROSTH W/O ECP Right 8/10/2018    RIGHT EYE CATARACT EXTRACTION WITH  IOL IMPLANT performed by Patricia Moore MD at [facility] OR       Current Facility-Administered Medications   Medication Dose Route Frequency Provider Last Rate Last Dose    insulin glargine (LANTUS) injection vial 17 Units  0.15 Units/kg Subcutaneous Nightly Stephenie Ying MD        insulin lispro (HUMALOG) injection vial 6 Units  0.05 Units/kg Subcutaneous TID WC Stephenie Ying MD   6 Units at 07/14/20 1126    collagenase ointment   Topical BID Kimberly Nickerson MD        sodium chloride flush 0.9 % injection 10 mL  10 mL Intravenous 2 times per day Salomon Canavan, MD   10 mL at 07/14/20 1001    sodium chloride flush 0.9 % injection 10 mL  10 mL Intravenous PRN Salomon Canavan, MD        acetaminophen (TYLENOL) tablet 650 mg  650 mg Oral Q4H PRN Salomon Canavan, MD        enoxaparin (LOVENOX) injection 40 mg  40 mg Subcutaneous Daily Salomon Canavan, MD        acetaminophen (TYLENOL) tablet 650 mg  650 mg Oral Q4H PRN Stephenie Ying MD   650 mg at 07/13/20 2225    allopurinol (ZYLOPRIM) tablet 100 mg  100 mg Oral Daily Stephenie Ying MD   100 mg at 07/14/20 0947    amLODIPine (NORVASC) tablet 10 mg  10 mg Oral Daily Stephenie Ying MD   10 mg at 07/14/20 0946    aspirin EC tablet 81 mg  81 mg Oral Daily Stephenie Ying MD   81 mg at 07/14/20 0947    atenolol (TENORMIN) tablet 50 mg  50 mg Oral Daily Stephenie Ying MD   50 mg at 07/14/20 1127    DULoxetine (CYMBALTA) extended release capsule 60 mg  60 mg Oral Daily Stephenie Ying MD   60 mg at 07/14/20 0947    lisinopril (PRINIVIL;ZESTRIL) tablet 40 mg  40 mg Oral Daily Stephenie Ying MD   40 mg at 07/14/20 0946    pregabalin (LYRICA) capsule 50 mg  50 mg Oral BID Stephenie Ying MD   50 mg at 07/14/20 0946    rosuvastatin (CRESTOR) tablet 10 mg  10 mg Oral Nightly Stephenie Ying MD   10 mg at 07/13/20 2225    sodium chloride flush 0.9 % injection 10 mL  10 mL Intravenous 2 times per day Kush Weaver MD        sodium chloride - 146 mmol/L Final    Potassium 07/12/2020 2.9* 3.5 - 5.0 mmol/L Final    Chloride 07/12/2020 96* 98 - 107 mmol/L Final    CO2 07/12/2020 23  22 - 29 mmol/L Final    Anion Gap 07/12/2020 16  7 - 16 mmol/L Final    Glucose 07/12/2020 152* 74 - 99 mg/dL Final    BUN 07/12/2020 10  8 - 23 mg/dL Final    CREATININE 07/12/2020 0.7  0.5 - 1.0 mg/dL Final    GFR Non- 07/12/2020 >60  >=60 mL/min/1.73 Final    GFR  07/12/2020 >60   Final    Calcium 07/12/2020 8.9  8.6 - 10.2 mg/dL Final    Total Protein 07/12/2020 7.5  6.4 - 8.3 g/dL Final    Alb 07/12/2020 2.9* 3.5 - 5.2 g/dL Final    Total Bilirubin 07/12/2020 0.4  0.0 - 1.2 mg/dL Final    Alkaline Phosphatase 07/12/2020 78  35 - 104 U/L Final    ALT 07/12/2020 9  0 - 32 U/L Final    AST 07/12/2020 9  0 - 31 U/L Final    Blood Culture, Routine 07/12/2020 24 Hours no growth   Preliminary    Culture, Blood 2 07/12/2020 24 Hours no growth   Preliminary    Lactic Acid 07/12/2020 4.8* 0.5 - 2.2 mmol/L Final    WOUND/ABSCESS 07/12/2020    Preliminary                    Value:Growth present, evaluating for:  Gram positive organisms  Proteus species      Sodium 07/13/2020 139  132 - 146 mmol/L Final    Potassium reflex Magnesium 07/13/2020 3.7  3.5 - 5.0 mmol/L Final    Chloride 07/13/2020 102  98 - 107 mmol/L Final    CO2 07/13/2020 22  22 - 29 mmol/L Final    Anion Gap 07/13/2020 15  7 - 16 mmol/L Final    Glucose 07/13/2020 176* 74 - 99 mg/dL Final    BUN 07/13/2020 10  8 - 23 mg/dL Final    CREATININE 07/13/2020 0.6  0.5 - 1.0 mg/dL Final    GFR Non- 07/13/2020 >60  >=60 mL/min/1.73 Final    GFR  07/13/2020 >60   Final    Calcium 07/13/2020 8.8  8.6 - 10.2 mg/dL Final    Lactic Acid 07/13/2020 2.6* 0.5 - 2.2 mmol/L Final    WBC 07/13/2020 17.2* 4.5 - 11.5 E9/L Final    RBC 07/13/2020 4.45  3.50 - 5.50 E12/L Final    Hemoglobin 07/13/2020 10.2* 11.5 - 15.5 g/dL Final    Hematocrit 07/13/2020 34.4  34.0 - 48.0 % Final    MCV 07/13/2020 77.3* 80.0 - 99.9 fL Final    MCH 07/13/2020 22.9* 26.0 - 35.0 pg Final    MCHC 07/13/2020 29.7* 32.0 - 34.5 % Final    RDW 07/13/2020 17.2* 11.5 - 15.0 fL Final    Platelets 07/18/3260 457* 130 - 450 E9/L Final    MPV 07/13/2020 9.2  7.0 - 12.0 fL Final    Neutrophils % 07/13/2020 78.5  43.0 - 80.0 % Final    Immature Granulocytes % 07/13/2020 0.7  0.0 - 5.0 % Final    Lymphocytes % 07/13/2020 13.2* 20.0 - 42.0 % Final    Monocytes % 07/13/2020 7.0  2.0 - 12.0 % Final    Eosinophils % 07/13/2020 0.3  0.0 - 6.0 % Final    Basophils % 07/13/2020 0.3  0.0 - 2.0 % Final    Neutrophils Absolute 07/13/2020 13.49* 1.80 - 7.30 E9/L Final    Immature Granulocytes # 07/13/2020 0.12  E9/L Final    Lymphocytes Absolute 07/13/2020 2.27  1.50 - 4.00 E9/L Final    Monocytes Absolute 07/13/2020 1.21* 0.10 - 0.95 E9/L Final    Eosinophils Absolute 07/13/2020 0.05  0.05 - 0.50 E9/L Final    Basophils Absolute 07/13/2020 0.05  0.00 - 0.20 E9/L Final    Magnesium 07/13/2020 1.3* 1.6 - 2.6 mg/dL Final    Iron 07/13/2020 23* 37 - 145 mcg/dL Final    TIBC 07/13/2020 155* 250 - 450 mcg/dL Final    Iron Saturation 07/13/2020 15  15 - 50 % Final    Ferritin 07/13/2020 366  ng/mL Final    Hemoglobin A1C 07/13/2020 7.0* 4.0 - 5.6 % Final    Meter Glucose 07/13/2020 190* 74 - 99 mg/dL Final    Meter Glucose 07/13/2020 182* 74 - 99 mg/dL Final    Meter Glucose 07/13/2020 197* 74 - 99 mg/dL Final    Meter Glucose 07/13/2020 120* 74 - 99 mg/dL Final    Sodium 07/14/2020 137  132 - 146 mmol/L Final    Potassium reflex Magnesium 07/14/2020 2.7* 3.5 - 5.0 mmol/L Final    Chloride 07/14/2020 101  98 - 107 mmol/L Final    CO2 07/14/2020 25  22 - 29 mmol/L Final    Anion Gap 07/14/2020 11  7 - 16 mmol/L Final    Glucose 07/14/2020 194* 74 - 99 mg/dL Final    BUN 07/14/2020 7* 8 - 23 mg/dL Final    CREATININE 07/14/2020 0.6  0.5 - 1.0 mg/dL Final    GFR Non- 07/14/2020 >60  >=60 mL/min/1.73 Final    GFR  07/14/2020 >60   Final    Calcium 07/14/2020 8.2* 8.6 - 10.2 mg/dL Final    Meter Glucose 07/13/2020 207* 74 - 99 mg/dL Final    Magnesium 07/14/2020 1.3* 1.6 - 2.6 mg/dL Final    Meter Glucose 07/14/2020 162* 74 - 99 mg/dL Final    Meter Glucose 07/14/2020 125* 74 - 99 mg/dL Final    Meter Glucose 07/14/2020 125* 74 - 99 mg/dL Final       ASSESSMENT:  · Infected sacral decubitus ulcer stage III-cx GPO and proteus  · paraplegia    PLAN:  · Continue vanco and zosyn, will narrow when C&S complete  · S/p excisional debridement skin and subcutaneous tissue   · Follow cultures  · Monitor labs  · Baseline esr and crp        Meir Fruits, CNP  Pt seen and examined. Above discussed agree with advanced practice nurse. Labs, cultures, and radiographs reviewed. Face to Face encounter occurred. Changes made as necessary.      Keeley Guerrero MD

## 2020-07-14 NOTE — PROGRESS NOTES
[address] [facility] Hospitalist   Progress Note    Admitting Date and Time: 7/12/2020  5:43 PM  Admit Dx: Decubitus ulcer of sacral region [L89.159]  Decubitus ulcer of sacral region [L89.159]    Subjective:    Pt feels well this morning. General surgery finishing debridement at bedside this morning. Patient continues on Vanco/Zosyn. Per RN: Completed bedside debridement this morning with general surgery. Patient tolerated well    ROS: denies fever, chills, cp, sob, n/v, HA unless stated above.      insulin glargine  0.15 Units/kg Subcutaneous Nightly    insulin lispro  0.05 Units/kg Subcutaneous TID WC    potassium chloride  10 mEq Intravenous Q1H    sodium chloride flush  10 mL Intravenous 2 times per day    enoxaparin  40 mg Subcutaneous Daily    allopurinol  100 mg Oral Daily    amLODIPine  10 mg Oral Daily    aspirin EC  81 mg Oral Daily    atenolol  50 mg Oral Daily    DULoxetine  60 mg Oral Daily    lisinopril  40 mg Oral Daily    pregabalin  50 mg Oral BID    rosuvastatin  10 mg Oral Nightly    sodium chloride flush  10 mL Intravenous 2 times per day    enoxaparin  40 mg Subcutaneous Daily    insulin lispro  0-6 Units Subcutaneous TID WC    insulin lispro  0-3 Units Subcutaneous Nightly    piperacillin-tazobactam  3.375 g Intravenous Q8H    lidocaine-EPINEPHrine  20 mL Intradermal Once    povidone-iodine   Topical Once    vancomycin  1,500 mg Intravenous Q24H     sodium chloride flush, 10 mL, PRN  acetaminophen, 650 mg, Q4H PRN  acetaminophen, 650 mg, Q4H PRN  sodium chloride flush, 10 mL, PRN  acetaminophen, 650 mg, Q6H PRN    Or  acetaminophen, 650 mg, Q6H PRN  polyethylene glycol, 17 g, Daily PRN  promethazine, 12.5 mg, Q6H PRN    Or  ondansetron, 4 mg, Q6H PRN  glucose, 15 g, PRN  dextrose, 12.5 g, PRN  glucagon (rDNA), 1 mg, PRN  dextrose, 100 mL/hr, PRN         Objective:    /67   Pulse 78   Temp 98.4 °F (36.9 °C) (Axillary)   Resp 18   Ht 5' 4\" (1.626 m) Wt 245 lb 12.8 oz (111.5 kg)   SpO2 98%   BMI 42.19 kg/m²   General Appearance: alert and oriented to person, place and time and in no acute distress  Skin: warm and dry. Right ischial  wound  Head: normocephalic and atraumatic  Eyes: pupils equal, round, and reactive to light, extraocular eye movements intact, conjunctivae normal  Neck: neck supple and non tender without mass   Pulmonary/Chest: clear to auscultation bilaterally- no wheezes, rales or rhonchi, normal air movement, no respiratory distress  Cardiovascular: normal rate, normal S1 and S2 no rubs, gallops, murmur noted. Abdomen: soft, non-tender, non-distended, normal bowel sounds, no masses or organomegaly  Extremities: no cyanosis, no clubbing and no edema  Neurologic: no cranial nerve deficit and speech normal      Recent Labs     07/12/20 1856 07/13/20 0339 07/14/20  0427    139 137   K 2.9* 3.7 2.7*   CL 96* 102 101   CO2 23 22 25   BUN 10 10 7*   CREATININE 0.7 0.6 0.6   GLUCOSE 152* 176* 194*   CALCIUM 8.9 8.8 8.2*       Recent Labs     07/12/20 1856   ALKPHOS 78   PROT 7.5   LABALBU 2.9*   BILITOT 0.4   AST 9   ALT 9       Recent Labs     07/12/20 1856 07/13/20  0339   WBC 18.3* 17.2*   RBC 4.26 4.45   HGB 9.9* 10.2*   HCT 33.3* 34.4   MCV 78.2* 77.3*   MCH 23.2* 22.9*   MCHC 29.7* 29.7*   RDW 17.1* 17.2*   * 457*   MPV 9.0 9.2         Radiology:   US DUP LOWER EXTREMITY RIGHT LIZZ   Final Result      Venous structures below the knee are not demonstrated. No evidence for thrombus within the common femoral, superficial   femoral or popliteal veins. Assessment:  Active Problems:    Decubitus ulcer of sacral region  Resolved Problems:    * No resolved hospital problems. *      Plan:  1. Decubitus ulcer of sacral region- with 4.0 cm round wound slough/firm induration. Not present 2 weeks ago. Completed bedside debridement with general surgery this morning. Tolerated well. Dressing changes per surgery.   Continue vancomycin/Zosyn. Follow cultures. 2. Hypokalemia-  K+ 2.7. Follow replete repeat BMP  3. DM II-  A1c 7.0. Continue SSI/Lantus. Carb controlled diet once diet is resumed. Hypoglycemic protocol. 4. HTN- currently controlled. Continue current regimen. Adjust as needed. 5. HLD- continue statin therapy. 6.  Hx functional paraplegia- wheelchair-bound. Patient receives home care and is a          sufficient living arrangement per family. PT/OT evaluation. Requires frequent manual positioning every 2 hours 2/2 paraplegia. Hospital bed will be required at home. Case management to follow for discharge. NOTE: This report was transcribed using voice recognition software. Every effort was made to ensure accuracy; however, inadvertent computerized transcription errors may be present. Electronically signed by Tee Jasso CNP on 7/14/2020 at 8:47 AM

## 2020-07-14 NOTE — PROCEDURES
Operative Note    Tawnya RAMESH 1984 [facility]     DATE OF PROCEDURE: 7/14/2020  SURGEON: Surgeon(s):  Joline Landau, MD   PREOPERATIVE DIAGNOSIS: Right buttock decubitus ulcer, unstageable  POSTOPERATIVE DIAGNOSIS: Right buttock decubitus ulcer, stage 3   OPERATION:  Excisional debridement skin and subcutaneous tissue  Wound measurements: 7 cm x 6 cm x 5 cm deep  ANESTHESIA: local  ESTIMATED BLOOD LOSS: less than 43HH  COMPLICATIONS: None. HISTORY: Sia Cote is a  78 y.o.  female who has a sacral decubitus ulcer with concerns for infection. OPERATION: The patient lied on the bed in the lateral position, right side up. The area over the wound was prepped and draped in a sterile fashion. The area around the wound was anesthetised with  10 cc of 1% lidocaine. A scalpel was then used to cut away necrotic tissue. The tissue removed was foul smelling and necrotic. Anaerobic, aerobic tissue cultures were sent. The debridement was continued to the depth of and including removal of subcutaneous tissue. At the end of debridement, the wound appeared clean with viable, bleeding tissue. Bleeding was controlled with pressure The wound was packed with saline soaked gauze and covered with a dry dressing. Santyl was ordered to place on the wound. The patient tolerated the procedure well. Physician Signature: Electronically signed by Dr. Ghada Davison M.D. FACS    Send copy of H&P to PCP, Cruzito Escamilla MD and referring physician, No ref.  provider found

## 2020-07-14 NOTE — PROGRESS NOTES
-Second total dose of vancomycin 1500 mg IV q24h given on 7/14 (0244). -Renal function is stable, will continue current regimen with plan to order trough prior to 4th overall dose.

## 2020-07-14 NOTE — PROGRESS NOTES
Notified Dr. Zoya Linder of patient's K and Mg level this morning and asked if she wanted to proceed with surgery at the same scheduled time this morning via perfectserve.

## 2020-07-14 NOTE — HOME CARE
Call received from [facility] from [address]. Mernacassandracami Paiz will not sign [facility] orders or follow for Adair Damon until patient has a follow up. Patient will need a physician who will agree to follow for homecare before we can see patient. Updated CM.    [facility]

## 2020-07-14 NOTE — TELEPHONE ENCOUNTER
Argelia Bello from [facility] left message asking if pt is patient and what attending will sign for Home Care orders. Pt last seen in office 2/17/20 and is currently admitted to [facility] and spoke with Argelia Bello and notified this office unable to sign any home care orders until patient seen if office after discharge as she was not admitted to our services and also > 90 days since last seen in office.

## 2020-07-15 LAB
ANION GAP SERPL CALCULATED.3IONS-SCNC: 12 MMOL/L (ref 7–16)
BUN BLDV-MCNC: 5 MG/DL (ref 8–23)
C-REACTIVE PROTEIN: 8 MG/DL (ref 0–0.4)
CALCIUM SERPL-MCNC: 8.6 MG/DL (ref 8.6–10.2)
CHLORIDE BLD-SCNC: 109 MMOL/L (ref 98–107)
CO2: 23 MMOL/L (ref 22–29)
CREAT SERPL-MCNC: 0.7 MG/DL (ref 0.5–1)
GFR AFRICAN AMERICAN: >60
GFR NON-AFRICAN AMERICAN: >60 ML/MIN/1.73
GLUCOSE BLD-MCNC: 81 MG/DL (ref 74–99)
MAGNESIUM: 1.7 MG/DL (ref 1.6–2.6)
METER GLUCOSE: 162 MG/DL (ref 74–99)
METER GLUCOSE: 84 MG/DL (ref 74–99)
METER GLUCOSE: 95 MG/DL (ref 74–99)
METER GLUCOSE: 99 MG/DL (ref 74–99)
MRSA CULTURE ONLY: NORMAL
POTASSIUM REFLEX MAGNESIUM: 3.2 MMOL/L (ref 3.5–5)
SEDIMENTATION RATE, ERYTHROCYTE: 111 MM/HR (ref 0–20)
SODIUM BLD-SCNC: 144 MMOL/L (ref 132–146)

## 2020-07-15 PROCEDURE — 6370000000 HC RX 637 (ALT 250 FOR IP): Performed by: INTERNAL MEDICINE

## 2020-07-15 PROCEDURE — 86140 C-REACTIVE PROTEIN: CPT

## 2020-07-15 PROCEDURE — 83735 ASSAY OF MAGNESIUM: CPT

## 2020-07-15 PROCEDURE — 80048 BASIC METABOLIC PNL TOTAL CA: CPT

## 2020-07-15 PROCEDURE — 99232 SBSQ HOSP IP/OBS MODERATE 35: CPT | Performed by: FAMILY MEDICINE

## 2020-07-15 PROCEDURE — 6370000000 HC RX 637 (ALT 250 FOR IP): Performed by: NURSE PRACTITIONER

## 2020-07-15 PROCEDURE — 2580000003 HC RX 258: Performed by: INTERNAL MEDICINE

## 2020-07-15 PROCEDURE — 36415 COLL VENOUS BLD VENIPUNCTURE: CPT

## 2020-07-15 PROCEDURE — 1200000000 HC SEMI PRIVATE

## 2020-07-15 PROCEDURE — 6370000000 HC RX 637 (ALT 250 FOR IP): Performed by: SURGERY

## 2020-07-15 PROCEDURE — APPSS30 APP SPLIT SHARED TIME 16-30 MINUTES: Performed by: NURSE PRACTITIONER

## 2020-07-15 PROCEDURE — 85651 RBC SED RATE NONAUTOMATED: CPT

## 2020-07-15 PROCEDURE — 6360000002 HC RX W HCPCS: Performed by: INTERNAL MEDICINE

## 2020-07-15 PROCEDURE — 82962 GLUCOSE BLOOD TEST: CPT

## 2020-07-15 RX ORDER — POTASSIUM CHLORIDE 20 MEQ/1
40 TABLET, EXTENDED RELEASE ORAL ONCE
Status: COMPLETED | OUTPATIENT
Start: 2020-07-15 | End: 2020-07-15

## 2020-07-15 RX ADMIN — SODIUM CHLORIDE: 9 INJECTION, SOLUTION INTRAVENOUS at 07:52

## 2020-07-15 RX ADMIN — SODIUM CHLORIDE 25 ML: 9 INJECTION, SOLUTION INTRAVENOUS at 00:40

## 2020-07-15 RX ADMIN — POTASSIUM CHLORIDE 40 MEQ: 20 TABLET, EXTENDED RELEASE ORAL at 08:47

## 2020-07-15 RX ADMIN — ENOXAPARIN SODIUM 40 MG: 40 INJECTION SUBCUTANEOUS at 08:47

## 2020-07-15 RX ADMIN — COLLAGENASE SANTYL: 250 OINTMENT TOPICAL at 20:45

## 2020-07-15 RX ADMIN — ASPIRIN 81 MG: 81 TABLET, COATED ORAL at 08:42

## 2020-07-15 RX ADMIN — SODIUM CHLORIDE: 9 INJECTION, SOLUTION INTRAVENOUS at 20:44

## 2020-07-15 RX ADMIN — PIPERACILLIN AND TAZOBACTAM 3.38 G: 3; .375 INJECTION, POWDER, FOR SOLUTION INTRAVENOUS at 13:15

## 2020-07-15 RX ADMIN — VANCOMYCIN HYDROCHLORIDE 1.5 G: 500 INJECTION, POWDER, LYOPHILIZED, FOR SOLUTION INTRAVENOUS at 02:25

## 2020-07-15 RX ADMIN — AMLODIPINE BESYLATE 10 MG: 10 TABLET ORAL at 08:42

## 2020-07-15 RX ADMIN — INSULIN LISPRO 6 UNITS: 100 INJECTION, SOLUTION INTRAVENOUS; SUBCUTANEOUS at 07:52

## 2020-07-15 RX ADMIN — PIPERACILLIN AND TAZOBACTAM 3.38 G: 3; .375 INJECTION, POWDER, FOR SOLUTION INTRAVENOUS at 05:23

## 2020-07-15 RX ADMIN — PREGABALIN 50 MG: 50 CAPSULE ORAL at 20:38

## 2020-07-15 RX ADMIN — INSULIN GLARGINE 17 UNITS: 100 INJECTION, SOLUTION SUBCUTANEOUS at 20:39

## 2020-07-15 RX ADMIN — ALLOPURINOL 100 MG: 100 TABLET ORAL at 08:42

## 2020-07-15 RX ADMIN — SODIUM CHLORIDE 25 ML: 9 INJECTION, SOLUTION INTRAVENOUS at 09:30

## 2020-07-15 RX ADMIN — INSULIN LISPRO 1 UNITS: 100 INJECTION, SOLUTION INTRAVENOUS; SUBCUTANEOUS at 20:39

## 2020-07-15 RX ADMIN — ATENOLOL 50 MG: 50 TABLET ORAL at 08:42

## 2020-07-15 RX ADMIN — LISINOPRIL 40 MG: 20 TABLET ORAL at 08:42

## 2020-07-15 RX ADMIN — INSULIN LISPRO 6 UNITS: 100 INJECTION, SOLUTION INTRAVENOUS; SUBCUTANEOUS at 11:58

## 2020-07-15 RX ADMIN — PREGABALIN 50 MG: 50 CAPSULE ORAL at 08:42

## 2020-07-15 RX ADMIN — Medication 10 ML: at 08:42

## 2020-07-15 RX ADMIN — DULOXETINE HYDROCHLORIDE 60 MG: 60 CAPSULE, DELAYED RELEASE ORAL at 08:42

## 2020-07-15 RX ADMIN — PIPERACILLIN AND TAZOBACTAM 3.38 G: 3; .375 INJECTION, POWDER, FOR SOLUTION INTRAVENOUS at 20:37

## 2020-07-15 RX ADMIN — ROSUVASTATIN CALCIUM 10 MG: 10 TABLET, FILM COATED ORAL at 20:38

## 2020-07-15 RX ADMIN — INSULIN LISPRO 6 UNITS: 100 INJECTION, SOLUTION INTRAVENOUS; SUBCUTANEOUS at 17:53

## 2020-07-15 RX ADMIN — SODIUM CHLORIDE 25 ML: 9 INJECTION, SOLUTION INTRAVENOUS at 17:52

## 2020-07-15 ASSESSMENT — PAIN SCALES - GENERAL
PAINLEVEL_OUTOF10: 0

## 2020-07-15 NOTE — PROGRESS NOTES
warm and dry. Right ischial  wound  Head: normocephalic and atraumatic  Eyes: pupils equal, round, and reactive to light, extraocular eye movements intact, conjunctivae normal  Neck: neck supple and non tender without mass   Pulmonary/Chest: clear to auscultation bilaterally- no wheezes, rales or rhonchi, normal air movement, no respiratory distress  Cardiovascular: normal rate, normal S1 and S2 no rubs, gallops, murmur noted. Abdomen: soft, non-tender, non-distended, normal bowel sounds, no masses or organomegaly  Extremities: no cyanosis, no clubbing and no edema  Neurologic: no cranial nerve deficit and speech normal      Recent Labs     07/13/20  0339 07/14/20  0427 07/14/20  1330 07/15/20  0322    137  --  144   K 3.7 2.7* 2.9* 3.2*    101  --  109*   CO2 22 25  --  23   BUN 10 7*  --  5*   CREATININE 0.6 0.6  --  0.7   GLUCOSE 176* 194*  --  81   CALCIUM 8.8 8.2*  --  8.6       Recent Labs     07/12/20  1856   ALKPHOS 78   PROT 7.5   LABALBU 2.9*   BILITOT 0.4   AST 9   ALT 9       Recent Labs     07/12/20  1856 07/13/20  0339   WBC 18.3* 17.2*   RBC 4.26 4.45   HGB 9.9* 10.2*   HCT 33.3* 34.4   MCV 78.2* 77.3*   MCH 23.2* 22.9*   MCHC 29.7* 29.7*   RDW 17.1* 17.2*   * 457*   MPV 9.0 9.2         Radiology:   US DUP LOWER EXTREMITY RIGHT LIZZ   Final Result      Venous structures below the knee are not demonstrated. No evidence for thrombus within the common femoral, superficial   femoral or popliteal veins. Assessment:  Active Problems:    Decubitus ulcer of sacral region    Hypokalemia  Resolved Problems:    * No resolved hospital problems. *      Plan:  1. Decubitus ulcer of sacral region- with 4.0 cm round wound slough/firm induration. Not present 2 weeks ago. Completed bedside debridement with general surgery this morning. Tolerated well. Dressing changes per surgery. Continue vancomycin/Zosyn. Follow cultures and sensitivities. WOund clinic follow up upon discharge. 2. Hypokalemia-  K+ 3.2. Follow replete repeat BMP  3. DM II-  A1c 7.0. Glucose 95. Continue SSI/Lantus. Carb controlled diet once diet is resumed. Hypoglycemic protocol. 4. HTN- currently controlled. Continue current regimen. Adjust as needed. 5. HLD- continue statin therapy. 6.  Hx functional paraplegia- wheelchair-bound. Patient receives home      care and is a  sufficient living arrangement per family. PT/OT evaluation. Requires frequent manual positioning every 2 hours 2/2 paraplegia. Hospital bed will be required at home. Case management to follow for discharge. 7.  Disposition- [facility] versus home with home health care. May benefit from [facility] placement for wound care and therapy prior to discharge. Case management following. Awaiting culture sensitivities for narrowing antibiotics. NOTE: This report was transcribed using voice recognition software. Every effort was made to ensure accuracy; however, inadvertent computerized transcription errors may be present. Electronically signed by Daniel Jasso CNP on 7/15/2020 at 8:07 AM

## 2020-07-15 NOTE — PROGRESS NOTES
-Renal function is stable. -Steady state vancomycin trough level ordered for 7/16 (0130). -HOLD VANCOMYCIN IF VANCOMYCIN LEVEL IS GREATER THAN 20 MCG/ML.   -Will assess vancomycin level to determine fututre vancomycin dosage regimen.

## 2020-07-15 NOTE — PROGRESS NOTES
GENERAL SURGERY  DAILY PROGRESS NOTE  7/15/2020    Subjective:  No events overnight. Patient with new packing and wound dressing this morning. Objective:  /61   Pulse 74   Temp 97.9 °F (36.6 °C) (Oral)   Resp 16   Ht 5' 4\" (1.626 m)   Wt 245 lb 12.8 oz (111.5 kg)   SpO2 96%   BMI 42.19 kg/m²     Gen: alert, oriented, no apparent distress  HEENT: NCAT, anicteric  CV: RR  Pulm: nonlabored breathing on room air  Abdomen: soft, nontender, nondistended  Extremities: moving all extremities, no peripheral edema  Skin: Stage 3 decubitus ulcer improved after debridement.  No drainage              Assessment/Plan:  78 y.o. female with sacral decubitus ulcer status post debridement D#1    -Overall care per Primary  -Abx per Primary  -Wound is not infected  -Continue local wound care  -Wound RN following  -No additional debridement required at this time  -Please call with questions or concerns  -Follow up in the wound clinic as an outpatient      Electronically signed by Amanda Jauregui DO on 7/15/2020 at 7:27 AM

## 2020-07-15 NOTE — PROGRESS NOTES
[address] [place] [address] [facility]  Progress Note      Chief Complaint   Patient presents with    Wound Check     patient bedbound, had a small wound on right buttocks that had healed and per patients daughter area has came back, open area and draining        SUBJECTIVE:  Patient is tolerating medications. No reported adverse drug reactions. No nausea, vomiting, diarrhea. Review of systems:  As stated above in the chief complaint, otherwise negative.     Medications:  Scheduled Meds:   insulin glargine  0.15 Units/kg Subcutaneous Nightly    insulin lispro  0.05 Units/kg Subcutaneous TID WC    collagenase   Topical BID    sodium chloride flush  10 mL Intravenous 2 times per day    enoxaparin  40 mg Subcutaneous Daily    allopurinol  100 mg Oral Daily    amLODIPine  10 mg Oral Daily    aspirin EC  81 mg Oral Daily    atenolol  50 mg Oral Daily    DULoxetine  60 mg Oral Daily    lisinopril  40 mg Oral Daily    pregabalin  50 mg Oral BID    rosuvastatin  10 mg Oral Nightly    sodium chloride flush  10 mL Intravenous 2 times per day    enoxaparin  40 mg Subcutaneous Daily    insulin lispro  0-6 Units Subcutaneous TID WC    insulin lispro  0-3 Units Subcutaneous Nightly    piperacillin-tazobactam  3.375 g Intravenous Q8H    vancomycin  1,500 mg Intravenous Q24H     Continuous Infusions:   sodium chloride 75 mL/hr at 07/15/20 0752    dextrose      sodium chloride 25 mL (07/15/20 0930)     PRN Meds:sodium chloride flush, acetaminophen, acetaminophen, sodium chloride flush, acetaminophen **OR** acetaminophen, polyethylene glycol, promethazine **OR** ondansetron, glucose, dextrose, glucagon (rDNA), dextrose    OBJECTIVE:  BP (!) 148/98   Pulse 76   Temp 97.6 °F (36.4 °C) (Oral)   Resp 16   Ht 5' 4\" (1.626 m)   Wt 245 lb 12.8 oz (111.5 kg)   SpO2 98%   BMI 42.19 kg/m²   Temp  Av.8 °F (36.6 °C)  Min: 97.6 °F (36.4 °C)  Max: 97.9 °F (36.6 °C)  Constitutional: The patient is awake, alert, and oriented. Skin: Warm and dry. No rashes were noted. HEENT: Round and reactive pupils. Moist mucous membranes. No ulcerations or thrush. Neck: Supple to movements. Chest: No use of accessory muscles to breathe. Symmetrical expansion. No wheezing, crackles or rhonchi. Cardiovascular: S1 and S2 are rhythmic and regular. No murmurs appreciated. Abdomen: Positive bowel sounds to auscultation. Benign to palpation. No masses felt. No hepatosplenomegaly. Genitourinary: Female  Extremities: No clubbing, no cyanosis, no edema.   Lines: peripheral    Laboratory and Tests Review:  Lab Results   Component Value Date    WBC 17.2 (H) 07/13/2020    WBC 18.3 (H) 07/12/2020    WBC 8.5 09/19/2017    HGB 10.2 (L) 07/13/2020    HCT 34.4 07/13/2020    MCV 77.3 (L) 07/13/2020     (H) 07/13/2020     Lab Results   Component Value Date    NEUTROABS 13.49 (H) 07/13/2020    NEUTROABS 14.78 (H) 07/12/2020    NEUTROABS 5.30 07/11/2017     No results found for: RUST  Lab Results   Component Value Date    ALT 9 07/12/2020    AST 9 07/12/2020    ALKPHOS 78 07/12/2020    BILITOT 0.4 07/12/2020     Lab Results   Component Value Date     07/15/2020    K 3.2 07/15/2020     07/15/2020    CO2 23 07/15/2020    BUN 5 07/15/2020    CREATININE 0.7 07/15/2020    CREATININE 0.6 07/14/2020    CREATININE 0.6 07/13/2020    GFRAA >60 07/15/2020    LABGLOM >60 07/15/2020    GLUCOSE 81 07/15/2020    GLUCOSE 147 03/07/2012    PROT 7.5 07/12/2020    LABALBU 2.9 07/12/2020    LABALBU 4.1 03/07/2012    CALCIUM 8.6 07/15/2020    BILITOT 0.4 07/12/2020    ALKPHOS 78 07/12/2020    AST 9 07/12/2020    ALT 9 07/12/2020     Lab Results   Component Value Date    CRP 8.0 (H) 07/15/2020    CRP 0.8 (H) 09/19/2017    CRP 9.6 (H) 10/12/2015     Lab Results   Component Value Date    SEDRATE 111 (H) 07/15/2020    SEDRATE 62 (H) 09/19/2017    SEDRATE 69 (H) 10/12/2015     Radiology:      Microbiology:   Lab Results Component Value Date    BC 24 Hours no growth 07/12/2020    BC 5 Days- no growth 03/09/2017    ORG Gram negative guido 07/14/2020    ORG Gram negative guido 07/14/2020    ORG FILM ARR Rhinovirus/Enterovirus Detected  03/09/2017     Lab Results   Component Value Date    BLOODCULT2 24 Hours no growth 07/12/2020    BLOODCULT2 5 Days- no growth 03/09/2017    ORG Gram negative guido 07/14/2020    ORG Gram negative guido 07/14/2020    ORG FILM ARR Rhinovirus/Enterovirus Detected  03/09/2017     WOUND/ABSCESS   Date Value Ref Range Status   07/14/2020   Preliminary    Moderate growth  Identification and sensitivity to follow     07/12/2020   Preliminary    Growth present, evaluating for:  Gram positive organisms  Proteus species       No results found for: RESPSMEAR  No results found for: MPNEUMO, CLAMYDCU, LABLEGI, AFBCX, FUNGSM, LABFUNG  No results found for: CULTRESP  No results found for: CXCATHTIP  No results found for: BFCS  Culture Surgical   Date Value Ref Range Status   07/14/2020   Preliminary    Heavy growth  Identification and sensitivity to follow       Urine Culture, Routine   Date Value Ref Range Status   03/09/2017 >100,000 CFU/ml  Final     MRSA Culture Only   Date Value Ref Range Status   07/14/2020 Methicillin resistant Staph aureus not isolated  Final       ASSESSMENT:  · Skin and soft tissue infection  · Sacral decubitus infected-gram-negative growing    PLAN:  · Continue Zosyn; stop Vanco pending  cultures  · Check final cultures  · Monitor labs    [facility]  5:13 PM  7/15/2020

## 2020-07-16 LAB
ANION GAP SERPL CALCULATED.3IONS-SCNC: 12 MMOL/L (ref 7–16)
BUN BLDV-MCNC: 6 MG/DL (ref 8–23)
CALCIUM SERPL-MCNC: 8.5 MG/DL (ref 8.6–10.2)
CHLORIDE BLD-SCNC: 108 MMOL/L (ref 98–107)
CO2: 22 MMOL/L (ref 22–29)
CREAT SERPL-MCNC: 0.7 MG/DL (ref 0.5–1)
GFR AFRICAN AMERICAN: >60
GFR NON-AFRICAN AMERICAN: >60 ML/MIN/1.73
GLUCOSE BLD-MCNC: 69 MG/DL (ref 74–99)
METER GLUCOSE: 124 MG/DL (ref 74–99)
METER GLUCOSE: 145 MG/DL (ref 74–99)
METER GLUCOSE: 174 MG/DL (ref 74–99)
METER GLUCOSE: 80 MG/DL (ref 74–99)
POTASSIUM REFLEX MAGNESIUM: 4 MMOL/L (ref 3.5–5)
SODIUM BLD-SCNC: 142 MMOL/L (ref 132–146)
WOUND/ABSCESS: NORMAL

## 2020-07-16 PROCEDURE — 99232 SBSQ HOSP IP/OBS MODERATE 35: CPT | Performed by: INTERNAL MEDICINE

## 2020-07-16 PROCEDURE — 2580000003 HC RX 258: Performed by: INTERNAL MEDICINE

## 2020-07-16 PROCEDURE — 6370000000 HC RX 637 (ALT 250 FOR IP): Performed by: INTERNAL MEDICINE

## 2020-07-16 PROCEDURE — 6370000000 HC RX 637 (ALT 250 FOR IP): Performed by: SPECIALIST

## 2020-07-16 PROCEDURE — APPSS30 APP SPLIT SHARED TIME 16-30 MINUTES: Performed by: NURSE PRACTITIONER

## 2020-07-16 PROCEDURE — 36415 COLL VENOUS BLD VENIPUNCTURE: CPT

## 2020-07-16 PROCEDURE — 1200000000 HC SEMI PRIVATE

## 2020-07-16 PROCEDURE — 6360000002 HC RX W HCPCS: Performed by: INTERNAL MEDICINE

## 2020-07-16 PROCEDURE — 82962 GLUCOSE BLOOD TEST: CPT

## 2020-07-16 PROCEDURE — 99232 SBSQ HOSP IP/OBS MODERATE 35: CPT | Performed by: SURGERY

## 2020-07-16 PROCEDURE — 80048 BASIC METABOLIC PNL TOTAL CA: CPT

## 2020-07-16 RX ORDER — AMOXICILLIN AND CLAVULANATE POTASSIUM 600; 42.9 MG/5ML; MG/5ML
1800 POWDER, FOR SUSPENSION ORAL EVERY 12 HOURS SCHEDULED
Qty: 450 ML | Refills: 0 | Status: SHIPPED | OUTPATIENT
Start: 2020-07-16 | End: 2020-07-31

## 2020-07-16 RX ORDER — AMOXICILLIN AND CLAVULANATE POTASSIUM 600; 42.9 MG/5ML; MG/5ML
1800 POWDER, FOR SUSPENSION ORAL EVERY 12 HOURS SCHEDULED
Status: DISCONTINUED | OUTPATIENT
Start: 2020-07-16 | End: 2020-07-17 | Stop reason: HOSPADM

## 2020-07-16 RX ORDER — LACTOBACILLUS RHAMNOSUS GG 10B CELL
1 CAPSULE ORAL DAILY
Status: DISCONTINUED | OUTPATIENT
Start: 2020-07-16 | End: 2020-07-17 | Stop reason: HOSPADM

## 2020-07-16 RX ORDER — GREEN TEA/HOODIA GORDONII 315-12.5MG
2 CAPSULE ORAL 2 TIMES DAILY
Qty: 60 TABLET | Refills: 3 | Status: SHIPPED | OUTPATIENT
Start: 2020-07-16 | End: 2020-08-04 | Stop reason: ALTCHOICE

## 2020-07-16 RX ADMIN — AMLODIPINE BESYLATE 10 MG: 10 TABLET ORAL at 08:38

## 2020-07-16 RX ADMIN — Medication 10 ML: at 08:41

## 2020-07-16 RX ADMIN — SODIUM CHLORIDE 25 ML: 9 INJECTION, SOLUTION INTRAVENOUS at 09:45

## 2020-07-16 RX ADMIN — INSULIN LISPRO 1 UNITS: 100 INJECTION, SOLUTION INTRAVENOUS; SUBCUTANEOUS at 20:52

## 2020-07-16 RX ADMIN — INSULIN LISPRO 6 UNITS: 100 INJECTION, SOLUTION INTRAVENOUS; SUBCUTANEOUS at 12:17

## 2020-07-16 RX ADMIN — PREGABALIN 50 MG: 50 CAPSULE ORAL at 20:52

## 2020-07-16 RX ADMIN — COLLAGENASE SANTYL: 250 OINTMENT TOPICAL at 15:52

## 2020-07-16 RX ADMIN — INSULIN GLARGINE 17 UNITS: 100 INJECTION, SOLUTION SUBCUTANEOUS at 20:52

## 2020-07-16 RX ADMIN — ROSUVASTATIN CALCIUM 10 MG: 10 TABLET, FILM COATED ORAL at 20:52

## 2020-07-16 RX ADMIN — Medication 10 ML: at 20:56

## 2020-07-16 RX ADMIN — SODIUM CHLORIDE: 9 INJECTION, SOLUTION INTRAVENOUS at 10:02

## 2020-07-16 RX ADMIN — LISINOPRIL 40 MG: 20 TABLET ORAL at 08:38

## 2020-07-16 RX ADMIN — ASPIRIN 81 MG: 81 TABLET, COATED ORAL at 08:38

## 2020-07-16 RX ADMIN — COLLAGENASE SANTYL: 250 OINTMENT TOPICAL at 20:56

## 2020-07-16 RX ADMIN — ALLOPURINOL 100 MG: 100 TABLET ORAL at 08:38

## 2020-07-16 RX ADMIN — INSULIN LISPRO 1 UNITS: 100 INJECTION, SOLUTION INTRAVENOUS; SUBCUTANEOUS at 12:18

## 2020-07-16 RX ADMIN — INSULIN LISPRO 6 UNITS: 100 INJECTION, SOLUTION INTRAVENOUS; SUBCUTANEOUS at 08:44

## 2020-07-16 RX ADMIN — Medication 1 CAPSULE: at 15:50

## 2020-07-16 RX ADMIN — INSULIN LISPRO 6 UNITS: 100 INJECTION, SOLUTION INTRAVENOUS; SUBCUTANEOUS at 16:54

## 2020-07-16 RX ADMIN — ENOXAPARIN SODIUM 40 MG: 40 INJECTION SUBCUTANEOUS at 08:38

## 2020-07-16 RX ADMIN — ATENOLOL 50 MG: 50 TABLET ORAL at 08:38

## 2020-07-16 RX ADMIN — AMOXICILLIN AND CLAVULANATE POTASSIUM 1800 MG: 600; 42.9 POWDER, FOR SUSPENSION ORAL at 20:57

## 2020-07-16 RX ADMIN — PIPERACILLIN AND TAZOBACTAM 3.38 G: 3; .375 INJECTION, POWDER, FOR SOLUTION INTRAVENOUS at 13:57

## 2020-07-16 RX ADMIN — PREGABALIN 50 MG: 50 CAPSULE ORAL at 08:38

## 2020-07-16 RX ADMIN — SODIUM CHLORIDE 25 ML: 9 INJECTION, SOLUTION INTRAVENOUS at 01:38

## 2020-07-16 RX ADMIN — PIPERACILLIN AND TAZOBACTAM 3.38 G: 3; .375 INJECTION, POWDER, FOR SOLUTION INTRAVENOUS at 05:13

## 2020-07-16 RX ADMIN — DULOXETINE HYDROCHLORIDE 60 MG: 60 CAPSULE, DELAYED RELEASE ORAL at 08:38

## 2020-07-16 ASSESSMENT — PAIN SCALES - GENERAL
PAINLEVEL_OUTOF10: 0
PAINLEVEL_OUTOF10: 0

## 2020-07-16 NOTE — PROGRESS NOTES
GENERAL SURGERY  DAILY PROGRESS NOTE  7/16/2020  CC: decubitus ulcer    Subjective:  No events overnight. Patient with new packing and wound dressing this morning. Objective:  BP (!) 150/72   Pulse 73   Temp 98 °F (36.7 °C) (Oral)   Resp 18   Ht 5' 4\" (1.626 m)   Wt 262 lb (118.8 kg)   SpO2 95%   BMI 44.97 kg/m²     Gen: alert, oriented, no apparent distress  HEENT: NCAT, anicteric  CV: RR  Pulm: nonlabored breathing on room air  Abdomen: soft, nontender, nondistended  Extremities: moving all extremities, no peripheral edema  Skin: Stage 3 decubitus ulcer improved after debridement.  No drainage    Assessment/Plan:  78 y.o. female with sacral decubitus ulcer status post debridement D#1    -Abx   -Continue local wound care - santyl to wound  -will follow at wound clinic upon discharge    Electronically signed by Armando Valencia MD on 7/16/2020 at 1:16 PM

## 2020-07-16 NOTE — PLAN OF CARE
Problem: Increased nutrient needs (NI-5.1)  Goal: Food and/or Nutrient Delivery  Continue Diet. Continue Glucerna Diabetic ONS BID and Earl Wound Healing ONS BID. Description: Individualized approach for food/nutrient provision.   Outcome: Met This Shift

## 2020-07-16 NOTE — PROGRESS NOTES
· Ideal Body Weight: 120 lbs; 218.3 lbs   · BMI: 44.9  · Adjusted Body Weight:  ; No Adjustment   · BMI Categories: Obese Class 3 (BMI 40.0 or greater)       Nutrition Diagnosis:   · Increased nutrient needs related to increase demand for energy/nutrients(2/2 wound healing) as evidenced by wounds      Nutrition Interventions:   Food and/or Nutrient Delivery:  Continue Current Diet, Continue Oral Nutrition Supplement(Continue Diet. Continue Glucerna Diabetic ONS BID and Earl Wound Healing ONS BID.)  Nutrition Education/Counseling:  No recommendation at this time   Coordination of Nutrition Care:  Continued Inpatient Monitoring    Goals:  PO intake >75% of meals/ONS.        Nutrition Monitoring and Evaluation:  Food/Nutrient Intake Outcomes:  Diet Advancement/Tolerance, Food and Nutrient Intake, Supplement Intake  Physical Signs/Symptoms Outcomes:  Biochemical Data, Chewing or Swallowing, GI Status, Fluid Status or Edema, Nutrition Focused Physical Findings, Skin, Weight     Discharge Planning:    Continue Oral Nutrition Supplement, Continue current diet     Electronically signed by Mukesh Colorado RD, LD on 7/16/20 at 2:53 PM EDT    Contact: ext [phone]

## 2020-07-16 NOTE — PROGRESS NOTES
[address] [place] [address] [facility]  Progress Note      Chief Complaint   Patient presents with    Wound Check     patient bedbound, had a small wound on right buttocks that had healed and per patients daughter area has came back, open area and draining        SUBJECTIVE:  Patient is tolerating medications. No reported adverse drug reactions. No nausea, vomiting, diarrhea. Review of systems:  As stated above in the chief complaint, otherwise negative. Medications:  Scheduled Meds:   amoxicillin-clavulanate  1,800 mg Oral 2 times per day    insulin glargine  0.15 Units/kg Subcutaneous Nightly    insulin lispro  0.05 Units/kg Subcutaneous TID WC    collagenase   Topical BID    sodium chloride flush  10 mL Intravenous 2 times per day    enoxaparin  40 mg Subcutaneous Daily    allopurinol  100 mg Oral Daily    amLODIPine  10 mg Oral Daily    aspirin EC  81 mg Oral Daily    atenolol  50 mg Oral Daily    DULoxetine  60 mg Oral Daily    lisinopril  40 mg Oral Daily    pregabalin  50 mg Oral BID    rosuvastatin  10 mg Oral Nightly    sodium chloride flush  10 mL Intravenous 2 times per day    enoxaparin  40 mg Subcutaneous Daily    insulin lispro  0-6 Units Subcutaneous TID WC    insulin lispro  0-3 Units Subcutaneous Nightly     Continuous Infusions:   sodium chloride 75 mL/hr at 20 1002    dextrose      sodium chloride 25 mL (20 0945)     PRN Meds:sodium chloride flush, sodium chloride flush, acetaminophen **OR** acetaminophen, polyethylene glycol, promethazine **OR** ondansetron, glucose, dextrose, glucagon (rDNA), dextrose    OBJECTIVE:  BP (!) 150/72   Pulse 73   Temp 98 °F (36.7 °C) (Oral)   Resp 18   Ht 5' 4\" (1.626 m)   Wt 262 lb (118.8 kg)   SpO2 95%   BMI 44.97 kg/m²   Temp  Av.9 °F (36.6 °C)  Min: 97.7 °F (36.5 °C)  Max: 98 °F (36.7 °C)  Constitutional: The patient is awake, alert, and oriented. Skin: Warm and dry. No rashes were noted.

## 2020-07-16 NOTE — PROGRESS NOTES
[address] [facility] Hospitalist   Progress Note    Admitting Date and Time: 7/12/2020  5:43 PM  Admit Dx: Decubitus ulcer of sacral region [L89.159]  Decubitus ulcer of sacral region [L89.159]    Subjective:    Pt feels good this morning. S/P bedside debridement with General Surgery. Pt states her mattress at home was too hard. This mattress is much better. Per RN: No new concerns noted    ROS: denies fever, chills, cp, sob, n/v, HA unless stated above.  insulin glargine  0.15 Units/kg Subcutaneous Nightly    insulin lispro  0.05 Units/kg Subcutaneous TID WC    collagenase   Topical BID    sodium chloride flush  10 mL Intravenous 2 times per day    enoxaparin  40 mg Subcutaneous Daily    allopurinol  100 mg Oral Daily    amLODIPine  10 mg Oral Daily    aspirin EC  81 mg Oral Daily    atenolol  50 mg Oral Daily    DULoxetine  60 mg Oral Daily    lisinopril  40 mg Oral Daily    pregabalin  50 mg Oral BID    rosuvastatin  10 mg Oral Nightly    sodium chloride flush  10 mL Intravenous 2 times per day    enoxaparin  40 mg Subcutaneous Daily    insulin lispro  0-6 Units Subcutaneous TID WC    insulin lispro  0-3 Units Subcutaneous Nightly    piperacillin-tazobactam  3.375 g Intravenous Q8H     sodium chloride flush, 10 mL, PRN  sodium chloride flush, 10 mL, PRN  acetaminophen, 650 mg, Q6H PRN    Or  acetaminophen, 650 mg, Q6H PRN  polyethylene glycol, 17 g, Daily PRN  promethazine, 12.5 mg, Q6H PRN    Or  ondansetron, 4 mg, Q6H PRN  glucose, 15 g, PRN  dextrose, 12.5 g, PRN  glucagon (rDNA), 1 mg, PRN  dextrose, 100 mL/hr, PRN         Objective:    BP (!) 150/72   Pulse 73   Temp 98 °F (36.7 °C) (Oral)   Resp 18   Ht 5' 4\" (1.626 m)   Wt 262 lb (118.8 kg)   SpO2 95%   BMI 44.97 kg/m²   General Appearance: alert and oriented to person, place and time and in no acute distress  Skin: warm and dry.  Stage 3 decubitus   Head: normocephalic and atraumatic  Eyes: pupils equal, round, and reactive to light, extraocular eye movements intact, conjunctivae normal  Neck: neck supple and non tender without mass   Pulmonary/Chest: clear to auscultation bilaterally- no wheezes, rales or rhonchi, normal air movement, no respiratory distress  Cardiovascular: normal rate, normal S1 and S2 and no carotid bruits  Abdomen: soft, non-tender, non-distended, normal bowel sounds, no masses or organomegaly  Extremities: no cyanosis, no clubbing and no edema  Neurologic: no cranial nerve deficit and speech normal      Recent Labs     07/14/20  0427 07/14/20  1330 07/15/20  0322 07/16/20  0354     --  144 142   K 2.7* 2.9* 3.2* 4.0     --  109* 108*   CO2 25  --  23 22   BUN 7*  --  5* 6*   CREATININE 0.6  --  0.7 0.7   GLUCOSE 194*  --  81 69*   CALCIUM 8.2*  --  8.6 8.5*       No results for input(s): ALKPHOS, PROT, LABALBU, BILITOT, AST, ALT in the last 72 hours. No results for input(s): WBC, RBC, HGB, HCT, MCV, MCH, MCHC, RDW, PLT, MPV in the last 72 hours. Radiology:   US DUP LOWER EXTREMITY RIGHT LIZZ   Final Result      Venous structures below the knee are not demonstrated. No evidence for thrombus within the common femoral, superficial   femoral or popliteal veins. Assessment:  Active Problems:    Decubitus ulcer of sacral region    Hypokalemia  Resolved Problems:    * No resolved hospital problems. *      Plan:  1. Decubitus ulcer of sacral region- S/P bedside debridement with general surgery. Tolerated well. Dressing changes per surgery. Continue Zosyn. Surgical cultures Proteus Mirabilis. Wound clinic follow up upon discharge. 2. Hypokalemia- Resolved. K+ 4.0.  Follow replete repeat BMP    3. DM II-  A1c 7.0.  Glucose 80. Continue SSI/Lantus.  Carb controlled diet once diet is resumed.  Hypoglycemic protocol. 4. HTN- currently controlled.  Continue current regimen.  Adjust as needed. 5. HLD- continue statin therapy.       6.  Hx functional

## 2020-07-17 VITALS
DIASTOLIC BLOOD PRESSURE: 59 MMHG | HEART RATE: 81 BPM | OXYGEN SATURATION: 96 % | BODY MASS INDEX: 44.73 KG/M2 | WEIGHT: 262 LBS | HEIGHT: 64 IN | RESPIRATION RATE: 18 BRPM | SYSTOLIC BLOOD PRESSURE: 127 MMHG | TEMPERATURE: 97.9 F

## 2020-07-17 LAB
ANAEROBIC CULTURE: ABNORMAL
ANAEROBIC CULTURE: ABNORMAL
ANION GAP SERPL CALCULATED.3IONS-SCNC: 12 MMOL/L (ref 7–16)
BASOPHILS ABSOLUTE: 0.03 E9/L (ref 0–0.2)
BASOPHILS RELATIVE PERCENT: 0.3 % (ref 0–2)
BLOOD CULTURE, ROUTINE: NORMAL
BUN BLDV-MCNC: 15 MG/DL (ref 8–23)
CALCIUM SERPL-MCNC: 8.6 MG/DL (ref 8.6–10.2)
CHLORIDE BLD-SCNC: 104 MMOL/L (ref 98–107)
CO2: 24 MMOL/L (ref 22–29)
CREAT SERPL-MCNC: 0.8 MG/DL (ref 0.5–1)
CULTURE, BLOOD 2: NORMAL
EOSINOPHILS ABSOLUTE: 0.1 E9/L (ref 0.05–0.5)
EOSINOPHILS RELATIVE PERCENT: 1 % (ref 0–6)
GFR AFRICAN AMERICAN: >60
GFR NON-AFRICAN AMERICAN: >60 ML/MIN/1.73
GLUCOSE BLD-MCNC: 159 MG/DL (ref 74–99)
HCT VFR BLD CALC: 27.2 % (ref 34–48)
HEMOGLOBIN: 8.2 G/DL (ref 11.5–15.5)
IMMATURE GRANULOCYTES #: 0.09 E9/L
IMMATURE GRANULOCYTES %: 0.9 % (ref 0–5)
LYMPHOCYTES ABSOLUTE: 2.1 E9/L (ref 1.5–4)
LYMPHOCYTES RELATIVE PERCENT: 21.7 % (ref 20–42)
MAGNESIUM: 1.4 MG/DL (ref 1.6–2.6)
MAGNESIUM: 1.8 MG/DL (ref 1.6–2.6)
MCH RBC QN AUTO: 23.4 PG (ref 26–35)
MCHC RBC AUTO-ENTMCNC: 30.1 % (ref 32–34.5)
MCV RBC AUTO: 77.5 FL (ref 80–99.9)
METER GLUCOSE: 158 MG/DL (ref 74–99)
METER GLUCOSE: 184 MG/DL (ref 74–99)
METER GLUCOSE: 251 MG/DL (ref 74–99)
MONOCYTES ABSOLUTE: 0.66 E9/L (ref 0.1–0.95)
MONOCYTES RELATIVE PERCENT: 6.8 % (ref 2–12)
NEUTROPHILS ABSOLUTE: 6.69 E9/L (ref 1.8–7.3)
NEUTROPHILS RELATIVE PERCENT: 69.3 % (ref 43–80)
ORGANISM: ABNORMAL
PDW BLD-RTO: 17.4 FL (ref 11.5–15)
PLATELET # BLD: 395 E9/L (ref 130–450)
PMV BLD AUTO: 9 FL (ref 7–12)
POTASSIUM REFLEX MAGNESIUM: 2.9 MMOL/L (ref 3.5–5)
RBC # BLD: 3.51 E12/L (ref 3.5–5.5)
SODIUM BLD-SCNC: 140 MMOL/L (ref 132–146)
WBC # BLD: 9.7 E9/L (ref 4.5–11.5)

## 2020-07-17 PROCEDURE — 83735 ASSAY OF MAGNESIUM: CPT

## 2020-07-17 PROCEDURE — 6360000002 HC RX W HCPCS: Performed by: NURSE PRACTITIONER

## 2020-07-17 PROCEDURE — 85025 COMPLETE CBC W/AUTO DIFF WBC: CPT

## 2020-07-17 PROCEDURE — 99239 HOSP IP/OBS DSCHRG MGMT >30: CPT | Performed by: INTERNAL MEDICINE

## 2020-07-17 PROCEDURE — 6370000000 HC RX 637 (ALT 250 FOR IP): Performed by: INTERNAL MEDICINE

## 2020-07-17 PROCEDURE — 80048 BASIC METABOLIC PNL TOTAL CA: CPT

## 2020-07-17 PROCEDURE — 82962 GLUCOSE BLOOD TEST: CPT

## 2020-07-17 PROCEDURE — 6370000000 HC RX 637 (ALT 250 FOR IP): Performed by: NURSE PRACTITIONER

## 2020-07-17 PROCEDURE — 6370000000 HC RX 637 (ALT 250 FOR IP): Performed by: SPECIALIST

## 2020-07-17 PROCEDURE — APPSS45 APP SPLIT SHARED TIME 31-45 MINUTES: Performed by: NURSE PRACTITIONER

## 2020-07-17 PROCEDURE — 6360000002 HC RX W HCPCS: Performed by: INTERNAL MEDICINE

## 2020-07-17 PROCEDURE — 36415 COLL VENOUS BLD VENIPUNCTURE: CPT

## 2020-07-17 PROCEDURE — 2580000003 HC RX 258: Performed by: INTERNAL MEDICINE

## 2020-07-17 RX ORDER — POTASSIUM CHLORIDE 20 MEQ/1
40 TABLET, EXTENDED RELEASE ORAL ONCE
Status: COMPLETED | OUTPATIENT
Start: 2020-07-17 | End: 2020-07-17

## 2020-07-17 RX ORDER — POTASSIUM CHLORIDE 20 MEQ/1
40 TABLET, EXTENDED RELEASE ORAL
Status: DISCONTINUED | OUTPATIENT
Start: 2020-07-17 | End: 2020-07-17 | Stop reason: HOSPADM

## 2020-07-17 RX ORDER — MAGNESIUM SULFATE IN WATER 40 MG/ML
2 INJECTION, SOLUTION INTRAVENOUS ONCE
Status: COMPLETED | OUTPATIENT
Start: 2020-07-17 | End: 2020-07-17

## 2020-07-17 RX ORDER — MAGNESIUM OXIDE 400 MG/1
400 TABLET ORAL DAILY
Qty: 4 TABLET | Refills: 0 | Status: SHIPPED | OUTPATIENT
Start: 2020-07-17 | End: 2020-07-21

## 2020-07-17 RX ORDER — LISINOPRIL 40 MG/1
40 TABLET ORAL DAILY
Qty: 30 TABLET | Refills: 3 | Status: SHIPPED
Start: 2020-07-18 | End: 2020-07-23 | Stop reason: SDUPTHER

## 2020-07-17 RX ADMIN — POTASSIUM CHLORIDE 40 MEQ: 20 TABLET, EXTENDED RELEASE ORAL at 17:15

## 2020-07-17 RX ADMIN — SODIUM CHLORIDE: 9 INJECTION, SOLUTION INTRAVENOUS at 00:16

## 2020-07-17 RX ADMIN — INSULIN LISPRO 1 UNITS: 100 INJECTION, SOLUTION INTRAVENOUS; SUBCUTANEOUS at 12:00

## 2020-07-17 RX ADMIN — LISINOPRIL 40 MG: 20 TABLET ORAL at 08:22

## 2020-07-17 RX ADMIN — PREGABALIN 50 MG: 50 CAPSULE ORAL at 08:22

## 2020-07-17 RX ADMIN — Medication 10 ML: at 08:25

## 2020-07-17 RX ADMIN — INSULIN LISPRO 6 UNITS: 100 INJECTION, SOLUTION INTRAVENOUS; SUBCUTANEOUS at 17:15

## 2020-07-17 RX ADMIN — INSULIN LISPRO 3 UNITS: 100 INJECTION, SOLUTION INTRAVENOUS; SUBCUTANEOUS at 17:17

## 2020-07-17 RX ADMIN — INSULIN LISPRO 6 UNITS: 100 INJECTION, SOLUTION INTRAVENOUS; SUBCUTANEOUS at 12:02

## 2020-07-17 RX ADMIN — ASPIRIN 81 MG: 81 TABLET, COATED ORAL at 08:22

## 2020-07-17 RX ADMIN — COLLAGENASE SANTYL: 250 OINTMENT TOPICAL at 12:03

## 2020-07-17 RX ADMIN — AMOXICILLIN AND CLAVULANATE POTASSIUM 1800 MG: 600; 42.9 POWDER, FOR SUSPENSION ORAL at 10:15

## 2020-07-17 RX ADMIN — ALLOPURINOL 100 MG: 100 TABLET ORAL at 08:22

## 2020-07-17 RX ADMIN — ATENOLOL 50 MG: 50 TABLET ORAL at 08:22

## 2020-07-17 RX ADMIN — AMLODIPINE BESYLATE 10 MG: 10 TABLET ORAL at 08:22

## 2020-07-17 RX ADMIN — MAGNESIUM SULFATE 2 G: 2 INJECTION INTRAVENOUS at 12:00

## 2020-07-17 RX ADMIN — INSULIN LISPRO 6 UNITS: 100 INJECTION, SOLUTION INTRAVENOUS; SUBCUTANEOUS at 08:18

## 2020-07-17 RX ADMIN — POTASSIUM CHLORIDE 40 MEQ: 20 TABLET, EXTENDED RELEASE ORAL at 11:23

## 2020-07-17 RX ADMIN — ENOXAPARIN SODIUM 40 MG: 40 INJECTION SUBCUTANEOUS at 08:21

## 2020-07-17 RX ADMIN — Medication 1 CAPSULE: at 08:22

## 2020-07-17 RX ADMIN — INSULIN LISPRO 1 UNITS: 100 INJECTION, SOLUTION INTRAVENOUS; SUBCUTANEOUS at 08:19

## 2020-07-17 RX ADMIN — DULOXETINE HYDROCHLORIDE 60 MG: 60 CAPSULE, DELAYED RELEASE ORAL at 08:22

## 2020-07-17 ASSESSMENT — PAIN SCALES - GENERAL: PAINLEVEL_OUTOF10: 0

## 2020-07-17 NOTE — FLOWSHEET NOTE
Inpatient Wound Care    Admit Date: 7/12/2020  5:43 PM    Reason for consult:  R sacrum    Significant history:  Admitted with sacral decubitus ulcer s/p debridement. Wound history:  POA    Findings:       07/17/20 1303   Wound 07/13/20 Coccyx Right   Date First Assessed/Time First Assessed: 07/13/20 0000   Present on Hospital Admission: Yes  Location: Coccyx  Wound Location Orientation: Right   Wound Image    Wound Pressure Stage  4   Dressing Status Changed   Dressing Changed Changed/New   Dressing/Treatment Moist to moist   Wound Cleansed Rinsed/Irrigated with saline   Dressing Change Due 07/18/20   Wound Length (cm) 4.5 cm   Wound Width (cm) 5.5 cm   Wound Depth (cm) 4 cm   Wound Surface Area (cm^2) 24.75 cm^2   Change in Wound Size % (l*w) -23.75   Wound Volume (cm^3) 99 cm^3   Wound Assessment Red;Slough       Impression:  Stage 4 pressure injury R sacrum    Interventions in place:  Wound cleansed with NSS. Applied Santyl to wound base then packed with NSS moistened kerlix. Covered with foam dressing. Plan: Daily dressing changes, Home care once discharged.        Evans Plasencia 7/17/2020 1:09 PM

## 2020-07-17 NOTE — PLAN OF CARE
Problem: Pain:  Goal: Pain level will decrease  Description: Pain level will decrease  7/17/2020 1240 by Jemima Lemons RN  Outcome: Completed  7/17/2020 0416 by Melodie Syed RN  Outcome: Met This Shift  Goal: Control of acute pain  Description: Control of acute pain  7/17/2020 1240 by Jemima Lemons RN  Outcome: Completed  7/17/2020 0416 by Melodie Syed RN  Outcome: Met This Shift  Goal: Control of chronic pain  Description: Control of chronic pain  7/17/2020 1240 by Jemima Lemons RN  Outcome: Completed  7/17/2020 0416 by Melodie Syed RN  Outcome: Met This Shift     Problem: Falls - Risk of:  Goal: Will remain free from falls  Description: Will remain free from falls  7/17/2020 1240 by Jemima Lemons RN  Outcome: Completed  7/17/2020 0416 by Melodie Syed RN  Outcome: Met This Shift  Goal: Absence of physical injury  Description: Absence of physical injury  7/17/2020 1240 by Jemima Lemons RN  Outcome: Completed  7/17/2020 0416 by Melodie Syed RN  Outcome: Met This Shift     Problem: Skin Integrity:  Goal: Will show no infection signs and symptoms  Description: Will show no infection signs and symptoms  7/17/2020 1240 by Jemima Lemons RN  Outcome: Completed  7/17/2020 0416 by Melodie Syed RN  Outcome: Met This Shift  Goal: Absence of new skin breakdown  Description: Absence of new skin breakdown  7/17/2020 1240 by Jemima Lemons RN  Outcome: Completed  7/17/2020 0416 by Melodie Syed RN  Outcome: Met This Shift

## 2020-07-17 NOTE — PROGRESS NOTES
[address] [place] [address] [facility]  Progress Note      Chief Complaint   Patient presents with    Wound Check     patient bedbound, had a small wound on right buttocks that had healed and per patients daughter area has came back, open area and draining        SUBJECTIVE:  Patient is tolerating medications. No reported adverse drug reactions. No nausea, vomiting, diarrhea. Review of systems:  As stated above in the chief complaint, otherwise negative.     Medications:  Scheduled Meds:   potassium chloride  40 mEq Oral Once    potassium chloride  40 mEq Oral Daily with breakfast    magnesium sulfate  2 g Intravenous Once    amoxicillin-clavulanate  1,800 mg Oral 2 times per day    lactobacillus  1 capsule Oral Daily    insulin glargine  0.15 Units/kg Subcutaneous Nightly    insulin lispro  0.05 Units/kg Subcutaneous TID WC    collagenase   Topical BID    enoxaparin  40 mg Subcutaneous Daily    allopurinol  100 mg Oral Daily    amLODIPine  10 mg Oral Daily    aspirin EC  81 mg Oral Daily    atenolol  50 mg Oral Daily    DULoxetine  60 mg Oral Daily    lisinopril  40 mg Oral Daily    pregabalin  50 mg Oral BID    rosuvastatin  10 mg Oral Nightly    sodium chloride flush  10 mL Intravenous 2 times per day    enoxaparin  40 mg Subcutaneous Daily    insulin lispro  0-6 Units Subcutaneous TID WC    insulin lispro  0-3 Units Subcutaneous Nightly     Continuous Infusions:   sodium chloride 75 mL/hr at 20 0016    dextrose       PRN Meds:sodium chloride flush, acetaminophen **OR** acetaminophen, polyethylene glycol, promethazine **OR** ondansetron, glucose, dextrose, glucagon (rDNA), dextrose    OBJECTIVE:  BP (!) 127/59   Pulse 81   Temp 97.9 °F (36.6 °C) (Oral)   Resp 18   Ht 5' 4\" (1.626 m)   Wt 262 lb (118.8 kg)   SpO2 96%   BMI 44.97 kg/m²   Temp  Av °F (36.7 °C)  Min: 97.9 °F (36.6 °C)  Max: 98.1 °F (36.7 °C)  Constitutional: The patient is awake, alert, and oriented. Skin: Warm and dry. No rashes were noted. HEENT: Round and reactive pupils. Moist mucous membranes. No ulcerations or thrush. Neck: Supple to movements. Chest: No use of accessory muscles to breathe. Symmetrical expansion. No wheezing, crackles or rhonchi. Cardiovascular: S1 and S2 are rhythmic and regular. No murmurs appreciated. Abdomen: Positive bowel sounds to auscultation. Benign to palpation. No masses felt. No hepatosplenomegaly. Genitourinary: Female  Extremities: No clubbing, no cyanosis, no edema.   Lines: peripheral    Laboratory and Tests Review:  Lab Results   Component Value Date    WBC 9.7 07/17/2020    WBC 17.2 (H) 07/13/2020    WBC 18.3 (H) 07/12/2020    HGB 8.2 (L) 07/17/2020    HCT 27.2 (L) 07/17/2020    MCV 77.5 (L) 07/17/2020     07/17/2020     Lab Results   Component Value Date    NEUTROABS 6.69 07/17/2020    NEUTROABS 13.49 (H) 07/13/2020    NEUTROABS 14.78 (H) 07/12/2020     No results found for: [facility]  Lab Results   Component Value Date    ALT 9 07/12/2020    AST 9 07/12/2020    ALKPHOS 78 07/12/2020    BILITOT 0.4 07/12/2020     Lab Results   Component Value Date     07/17/2020    K 2.9 07/17/2020     07/17/2020    CO2 24 07/17/2020    BUN 15 07/17/2020    CREATININE 0.8 07/17/2020    CREATININE 0.7 07/16/2020    CREATININE 0.7 07/15/2020    GFRAA >60 07/17/2020    LABGLOM >60 07/17/2020    GLUCOSE 159 07/17/2020    GLUCOSE 147 03/07/2012    PROT 7.5 07/12/2020    LABALBU 2.9 07/12/2020    LABALBU 4.1 03/07/2012    CALCIUM 8.6 07/17/2020    BILITOT 0.4 07/12/2020    ALKPHOS 78 07/12/2020    AST 9 07/12/2020    ALT 9 07/12/2020     Lab Results   Component Value Date    CRP 8.0 (H) 07/15/2020    CRP 0.8 (H) 09/19/2017    CRP 9.6 (H) 10/12/2015     Lab Results   Component Value Date    SEDRATE 111 (H) 07/15/2020    SEDRATE 62 (H) 09/19/2017    SEDRATE 69 (H) 10/12/2015     Radiology:      Microbiology:   Lab Results   Component Value Date BC 24 Hours no growth 07/12/2020    BC 5 Days- no growth 03/09/2017    ORG Proteus mirabilis 07/14/2020    ORG Gram positive cocci 07/14/2020    ORG Proteus mirabilis 07/14/2020     Lab Results   Component Value Date    BLOODCULT2 24 Hours no growth 07/12/2020    BLOODCULT2 5 Days- no growth 03/09/2017    ORG Proteus mirabilis 07/14/2020    ORG Gram positive cocci 07/14/2020    ORG Proteus mirabilis 07/14/2020     WOUND/ABSCESS   Date Value Ref Range Status   07/14/2020 Growth present, evaluating for:  Strep species   (A)  Preliminary   07/14/2020   Preliminary    Moderate growth  Identification and sensitivity to follow     07/14/2020   Preliminary    Moderate growth  Identification and sensitivity to follow       No results found for: RESPSMEAR  No results found for: MPNEUMO, CLAMYDCU, LABLEGI, AFBCX, FUNGSM, LABFUNG  No results found for: CULTRESP  No results found for: CXCATHTIP  No results found for: BFCS  Culture Surgical   Date Value Ref Range Status   07/14/2020   Preliminary    Heavy growth  Identification and sensitivity to follow       Urine Culture, Routine   Date Value Ref Range Status   03/09/2017 >100,000 CFU/ml  Final     MRSA Culture Only   Date Value Ref Range Status   07/14/2020 Methicillin resistant Staph aureus not isolated  Final       ASSESSMENT:  · Skin and soft tissue infection  · Sacral decubitus infected-Proteus and strep    PLAN:  · Continue Augmentin ES suspension  · Check final cultures  · Monitor labs  · Okay to discharge follow-up in the office    Taylor Rico  11:56 AM  7/17/2020

## 2020-07-17 NOTE — PLAN OF CARE
Problem: Pain:  Goal: Pain level will decrease  Description: Pain level will decrease  Outcome: Met This Shift     Problem: Falls - Risk of:  Goal: Will remain free from falls  Description: Will remain free from falls  Outcome: Met This Shift     Problem: Skin Integrity:  Goal: Will show no infection signs and symptoms  Description: Will show no infection signs and symptoms  Outcome: Met This Shift

## 2020-07-17 NOTE — CARE COORDINATION
After d/w with ID, planning for discharge to home today with son Rachel Linda on oral ATB's/HHC/DME. Aliyah Beck DME/HHC aware of [otentiial d/c today; await d/c orders. Transport to be arranged through [employer]. Will follow. Real Au.
Per Kaity at [facility]; pt's PCP , Rosario Flower, will not follow for any HHC orders post discharge as pt has not been to office to see him.wants to see in office first. Alex Andrade; she agrees to follow for wound care at home but needs to see her in follow -up at the wound care clinic next Tuesday at [facility]. Kaity at [facility] notified. Joby Renner.
Pt for sacral debridement today; iv atb's; plan is for home at discharge with son; DME orders on chart for [facility]; hospital bed with gel overlay and adriana lift. Will follow. Deneen Gonzales.
Pt now on oral atb's . [facility] [place] orders on chart;d/w omar roman NP plan is for discharge in am to trina nelson; Krista Goncalves at [facility] DME [address] aware. Rachelle Chacon made aware. Will follow in am for discharge. trina Chacon states pt is transported through [facility].
Pt s/p bedside I&D sacral decub. DME equipment ordered through [employer] [facility] as per prior note. Dana Moseley has agreed to follow for [facility] wound care needs should she go home at discharge. Should pt require IV atb's at discharge, per Kaity at [facility], pt would be covered at 100%. However either surgery or ID would need to agree to follow as pt's PCP , Bonilla Kirk, will not follow the pt as he hasn't seen her in the office for  quite some time and needs to see her first (per Kaity at [facility]). SW to d/w trina Phipps; poss GERARDO post discharge d/t her many issues/ needs. Michael Baldwin. Addendum;   [facility]ayed to me trina Masters wishes to take pt home at discharge. Updated  by phone re; this. Discussed that, if pt should require IV atb's at discharge, that either ID or surgery will need to provide follow-up care for that service, as PCP will not provide at this time until pt sees him in the office to re- establish with him.will follow. Michael Baldwin.
Social Work:    Per RN Aida, Physician ambulance transfer was moved to 7:00 p.m. Social work notified [facility], patient's son.     Electronically signed by CLEOPATRA Torres on 7/17/2020 at 2:02 PM
Social Work:    Spoke with Wicho Garner, patient's son, about possible IVATB need, snf vs HHC. Wicho Garner still prefers original plan for home and states his wife is a nurse and can do the IV's as well. Social work advised Wciho Garner that Dr. Jhoan Barnett agreed to follow [facility] for wound care, however, we will need to see if he or ID physician will follow IVATB care (if needed) until Mrs. [address] gets into see Dr. Jil Temple, her family physician. Wicho Garner advised that he would take his mother to Dr. Jil Temple ASAP if needed for the IVATB coverage at home. Social work also spoke to Bradley at [employer] and all DME was approved. She will need called prior to discharge to ensure delivery of all DME prior to patient's return home.     Electronically signed by CLEOPATRA Salas on 7/15/2020 at 2:28 PM
Care. Upon discharge, patient will transfer to Brittney Cheney' home at [place] [address] in [facility] ([phone]) and will need an ambulance ride home. Brittney [facility] requested a hospital bed with gel overlay, adriana lift, and 3-1 commode, as well as [facility]. Choices for both [place] [address] & DME were provided and Brittney Cheney prefers [facility]. Social work called a referral to [facility].        Electronically signed by CLEOPATRA Harper on 7/13/20 at 10:20 AM EDT

## 2020-07-17 NOTE — DISCHARGE SUMMARY
[facility] Physician Discharge Summary       Valencia Villarreal, [address] [place] [phone]    Schedule an appointment as soon as possible for a visit in 1 week  Follow up post hosipital stay appointment    [address] [facility] [address] [facility], [address] [facility] [address]  Suryarusty Mauro, [address] [facility]  [phone]  Schedule an appointment as soon as possible for a visit in 2 weeks  For wound re-check    Matthew Montemayor MD  [facility]  [address] [facility]  [address] [facility] [phone]    In 2 weeks  For wound re-check      Activity level: As tolerated      Diet: DIET CARB CONTROL; Dietary Nutrition Supplements: Wound Healing Oral Supplement  Dietary Nutrition Supplements: Diabetic Oral Supplement    Labs: BMP, mag    Condition at discharge: Stable    Dispo: Home with home health    Patient ID:  Juan Carlos Cevallos  [id number]  78 y.o.  1950    Admit date: 7/12/2020    Discharge date and time:  7/17/2020  5:16 PM    Admission Diagnoses: Active Problems:    Decubitus ulcer of sacral region    Hypokalemia    Leukocytosis  Resolved Problems:    * No resolved hospital problems. *      Discharge Diagnoses: Active Problems:    Decubitus ulcer of sacral region    Hypokalemia    Leukocytosis  Resolved Problems:    * No resolved hospital problems. *      Consults:  IP CONSULT TO SOCIAL WORK  IP CONSULT TO GENERAL SURGERY  IP CONSULT TO INFECTIOUS DISEASES  IP CONSULT TO HOME CARE NEEDS    Procedures:   7/14/2020 excisional debridement skin and subcutaneous tissue, wound measurements: 7 cm x 6 cm x 5 cm deep.--    Hospital Course:     [age]-year-old female with PMH morbid obesity, HTN, DM II, sciatica, and functional paraplegia presented to [address] [place] [address] ED with new ischio decubitus ulcer on right that is foul-smelling.   Family and patient state it was not there approximately 2 weeks ago. Patient was admitted for debridement of wound. General surgery/ID consulted. 7/14/2020 excisional debridement skin and subcutaneous tissue performed at bedside per Dr. Jesenia Uriarte. Wound cultures obtained. Sacral decubitus infected Proteus and strep. Patient was initially treated with IV antibiotics and changed to Augmentin ES suspension upon discharge. Electrolytes replaced. Patient remained hemodynamically stable and will return home with home health upon discharge. Patient and family will be instructed to follow-up with general surgery/ID/PCP/and wound care clinic upon discharge. Discharge Exam:  Vitals:    07/16/20 0805 07/16/20 1441 07/16/20 1944 07/17/20 0730   BP: (!) 150/72  131/64 (!) 127/59   Pulse: 73  89 81   Resp: 18  18 18   Temp: 98 °F (36.7 °C)  98.1 °F (36.7 °C) 97.9 °F (36.6 °C)   TempSrc: Oral  Oral Oral   SpO2: 95%  96% 96%   Weight:       Height:  5' 4\" (1.626 m)       General Appearance: alert and oriented to person, place and time and in no acute distress  Skin: warm and dry. Right ischial  wound  Head: normocephalic and atraumatic  Eyes: pupils equal, round, and reactive to light, extraocular eye movements intact, conjunctivae normal  Neck: neck supple and non tender without mass   Pulmonary/Chest: clear to auscultation bilaterally- no wheezes, rales or rhonchi, normal air movement, no respiratory distress  Cardiovascular: normal rate, normal S1 and S2 no rubs, gallops, murmur noted. Abdomen: soft, non-tender, non-distended, normal bowel sounds, no masses or organomegaly  Extremities: no cyanosis, no clubbing and no edema  Neurologic: no cranial nerve deficit and speech normal  I/O last 3 completed shifts:  In: -   Out: 2300 [Urine:2300]  No intake/output data recorded.       LABS:  Recent Labs     07/15/20  0322 07/16/20  0354 07/17/20  0324    142 140   K 3.2* 4.0 2.9*   * 108* 104   CO2 23 22 24   BUN 5* 6* 15   CREATININE 0.7 0.7 0.8   GLUCOSE 81 69* 159* CALCIUM 8.6 8.5* 8.6       Recent Labs     07/17/20  0324   WBC 9.7   RBC 3.51   HGB 8.2*   HCT 27.2*   MCV 77.5*   MCH 23.4*   MCHC 30.1*   RDW 17.4*      MPV 9.0       No results for input(s): POCGLU in the last 72 hours. Imaging:  Us Dup Lower Extremity Right Ishan    Result Date: 7/12/2020  Real-time and Doppler sonography of the deep venous structures of the right lower extremity. Grayscale, color Doppler, and spectral Doppler analysis. There is adequate and spontaneous blood flow, compressibility and augmentation within the right common femoral, superficial femoral and popliteal veins. Below the knee, the venous structures were not demonstrated. Venous structures below the knee are not demonstrated. No evidence for thrombus within the common femoral, superficial femoral or popliteal veins. Patient Instructions:   Current Discharge Medication List      START taking these medications    Details   collagenase 250 UNIT/GM ointment Apply topically daily.   Qty: 1 Tube, Refills: 0      magnesium oxide (MAG-OX) 400 MG tablet Take 1 tablet by mouth daily for 4 days  Qty: 4 tablet, Refills: 0      amoxicillin-clavulanate (AUGMENTIN-ES) 600-42.9 MG/5ML suspension Take 15 mLs by mouth every 12 hours for 15 days  Qty: 450 mL, Refills: 0      Probiotic Acidophilus (FLORANEX) TABS Take 2 tablets by mouth 2 times daily  Qty: 60 tablet, Refills: 3         CONTINUE these medications which have CHANGED    Details   lisinopril (PRINIVIL;ZESTRIL) 40 MG tablet Take 1 tablet by mouth daily  Qty: 30 tablet, Refills: 3         CONTINUE these medications which have NOT CHANGED    Details   metFORMIN (GLUCOPHAGE) 1000 MG tablet take 1 tablet by mouth twice a day WITH MEALS  Qty: 60 tablet, Refills: 0      atenolol (TENORMIN) 50 MG tablet take 1 tablet by mouth twice a day  Qty: 60 tablet, Refills: 1    Associated Diagnoses: Essential hypertension      amLODIPine (NORVASC) 10 MG tablet take 1 tablet by mouth once osteoarthitis  Qty: 1 kit, Refills: 0      !! Misc. [address] bed mattress for severe osteoarthritis , morbid obesity , pressure sores  and difficulty ambulating  Qty: 1 Device, Refills: 0      !! Misc. Devices MISC Chair lift for difficulty ambulating , severe knee osteoarthritis and  morbid obesity  Qty: 1 Device, Refills: 0      !! Misc. Devices MISC Wheel chair to assist with ADL with diagnosis of OA and full thickness knee meniscal tear  Qty: 1 Device, Refills: 0    Associated Diagnoses: Osteoarthritis; History of meniscal tear      !! Misc. Devices MISC Grab bar  diagnosis osteoarthritis and full thickness knee meniscal tear  Qty: 1 Device, Refills: 0    Associated Diagnoses: Osteoarthritis; History of meniscal tear      !! Misc. [address] bed  Qty: 1 Device, Refills: 0    Comments: Osteoarthritis and full thickness meniscal tear  Associated Diagnoses: Osteoarthritis      ! ! Misc. Devices MISC wheelchair  Qty: 1 Device, Refills: 0    Comments: Osteoarthritis and full thickness meniscal tear  Associated Diagnoses: Osteoarthritis      ! ! Misc. Devices MISC Bedside commode. Dx osteoarthritis 715.90. Height 5'4.5\". Weight 245 lbs. Qty: 1 Device, Refills: 0    Associated Diagnoses: Osteoarthritis       ! ! - Potential duplicate medications found. Please discuss with provider. STOP taking these medications       aspirin EC 81 MG EC tablet Comments:   Reason for Stopping:         furosemide (LASIX) 20 MG tablet Comments:   Reason for Stopping:         naproxen (NAPROSYN) 375 MG tablet Comments:   Reason for Stopping:         loratadine (CLARITIN) 10 MG tablet Comments:   Reason for Stopping:                 Note that more than 30 minutes was spent in preparing discharge papers, discussing discharge with patient, medication review, etc.    NOTE: This report was transcribed using voice recognition software.  Every effort was made to ensure accuracy; however, inadvertent computerized transcription errors may be present.     Signed:  Electronically signed by Dejan Jasso CNP on 7/17/2020 at 5:16 PM   HOSPITALIST ATTENDING PHYSICIAN NOTE 7/17/2020 2128PM:    Details of the evaluation - subjective assessment (including medication profile, past medical, family and social history when applicable), examination, review of lab and test data, diagnostic impressions and medical decision making - performed by Dejan Jasso CNP, were discussed with me on the date of service and I agree with clinical information herein unless otherwise noted. The patient has been evaluated by me personally earlier today. Pt reports no fevers, chills,n/v     Exam: heart reg at rate of 80,lungs cta, abd pos bs soft nt, ext neg for le edema    I agree with the assessment and plan of Dejan Jasso CNP    Decubitus ulcer of sacral region infection with cellulitis of sacral area  Dm type 2 controlled  htn  Hyperlipidemia  Hypokalemia   hypomagnesemia      Total time for discharge is 37 min    Electronically signed by Savanah Balderas D.O.   Hospitalist  4M Hospitalist Service at [facility]

## 2020-07-17 NOTE — PROGRESS NOTES
[facility] Quality Flow/Interdisciplinary Rounds Progress Note        Quality Flow Rounds held on July 17, 2020    Disciplines Attending:  Bedside Nurse, ,  and Nursing Unit [address] was admitted on 7/12/2020  5:43 PM    Anticipated Discharge Date:       Disposition:    Jayden Score:  Jayden Scale Score: 14    Readmission Risk              Risk of Unplanned Readmission:        13           Discussed patient goal for the day, patient clinical progression, and barriers to discharge.   The following Goal(s) of the Day/Commitment(s) have been identified:  d/c home w/ [address] July 17, 2020

## 2020-07-18 LAB
CULTURE SURGICAL: ABNORMAL
CULTURE SURGICAL: ABNORMAL
ORGANISM: ABNORMAL
WOUND/ABSCESS: ABNORMAL
WOUND/ABSCESS: ABNORMAL

## 2020-07-20 ENCOUNTER — HOSPITAL ENCOUNTER (OUTPATIENT)
Age: 70
Discharge: HOME | End: 2020-07-22
Payer: COMMERCIAL

## 2020-07-20 LAB
ANION GAP SERPL CALCULATED.3IONS-SCNC: 17 MMOL/L (ref 7–16)
BUN BLDV-MCNC: 11 MG/DL (ref 8–23)
CALCIUM SERPL-MCNC: 9.3 MG/DL (ref 8.6–10.2)
CHLORIDE BLD-SCNC: 100 MMOL/L (ref 98–107)
CO2: 23 MMOL/L (ref 22–29)
CREAT SERPL-MCNC: 0.7 MG/DL (ref 0.5–1)
GFR AFRICAN AMERICAN: >60
GFR NON-AFRICAN AMERICAN: >60 ML/MIN/1.73
GLUCOSE BLD-MCNC: 108 MG/DL (ref 74–99)
MAGNESIUM: 1.7 MG/DL (ref 1.6–2.6)
POTASSIUM SERPL-SCNC: 3.4 MMOL/L (ref 3.5–5)
SODIUM BLD-SCNC: 140 MMOL/L (ref 132–146)

## 2020-07-20 PROCEDURE — 80048 BASIC METABOLIC PNL TOTAL CA: CPT

## 2020-07-20 PROCEDURE — 83735 ASSAY OF MAGNESIUM: CPT

## 2020-07-21 ENCOUNTER — TELEPHONE (OUTPATIENT)
Dept: SURGERY | Age: 70
End: 2020-07-21

## 2020-07-21 NOTE — TELEPHONE ENCOUNTER
PT NEEDS TO BE SET UP AT THE WOUND CLINIC AND SHE IS NOT ANSWERING HER PHONE PER FELIZ. I TRIED TODAY AND GOT SAME RESULT. PT' S DAUGHTER [address] IS ON HIPPA AND I WAS ABLE TO LEAVE A VOICE MAIL ON HER PHONE AND LEFT MY OFFICE NUMBER AND THE [facility] # URGING HER TO PLEASE CALL TO GET APPOINTMENT FOR CONTINUED CARE OF MOTHER'S WOUND.

## 2020-07-23 ENCOUNTER — OFFICE VISIT (OUTPATIENT)
Dept: INTERNAL MEDICINE | Age: 70
End: 2020-07-23
Payer: COMMERCIAL

## 2020-07-23 VITALS
TEMPERATURE: 97.9 F | WEIGHT: 260 LBS | RESPIRATION RATE: 16 BRPM | BODY MASS INDEX: 44.39 KG/M2 | HEIGHT: 64 IN | HEART RATE: 92 BPM | DIASTOLIC BLOOD PRESSURE: 91 MMHG | SYSTOLIC BLOOD PRESSURE: 147 MMHG

## 2020-07-23 PROCEDURE — 99212 OFFICE O/P EST SF 10 MIN: CPT | Performed by: INTERNAL MEDICINE

## 2020-07-23 PROCEDURE — 99213 OFFICE O/P EST LOW 20 MIN: CPT | Performed by: INTERNAL MEDICINE

## 2020-07-23 RX ORDER — ATENOLOL 50 MG/1
TABLET ORAL
Qty: 60 TABLET | Refills: 3 | Status: ON HOLD
Start: 2020-07-23 | End: 2020-01-01 | Stop reason: HOSPADM

## 2020-07-23 RX ORDER — GLIPIZIDE 5 MG/1
TABLET, FILM COATED, EXTENDED RELEASE ORAL
Qty: 30 TABLET | Refills: 3 | Status: ON HOLD
Start: 2020-07-23 | End: 2020-01-01 | Stop reason: HOSPADM

## 2020-07-23 RX ORDER — AMLODIPINE BESYLATE 10 MG/1
TABLET ORAL
Qty: 30 TABLET | Refills: 3 | Status: SHIPPED
Start: 2020-07-23 | End: 2020-01-01 | Stop reason: ALTCHOICE

## 2020-07-23 RX ORDER — LISINOPRIL 40 MG/1
40 TABLET ORAL DAILY
Qty: 30 TABLET | Refills: 3 | Status: ON HOLD
Start: 2020-07-23 | End: 2020-01-01 | Stop reason: HOSPADM

## 2020-07-23 RX ORDER — DULOXETIN HYDROCHLORIDE 60 MG/1
60 CAPSULE, DELAYED RELEASE ORAL DAILY
Qty: 90 CAPSULE | Refills: 3 | Status: ON HOLD
Start: 2020-07-23 | End: 2020-01-01 | Stop reason: HOSPADM

## 2020-07-23 RX ORDER — ALLOPURINOL 100 MG/1
100 TABLET ORAL DAILY
Qty: 30 TABLET | Refills: 3 | Status: ON HOLD
Start: 2020-07-23 | End: 2020-01-01 | Stop reason: SDUPTHER

## 2020-07-23 RX ORDER — ROSUVASTATIN CALCIUM 10 MG/1
TABLET, COATED ORAL
Qty: 90 TABLET | Refills: 2 | Status: ON HOLD
Start: 2020-07-23 | End: 2020-01-01 | Stop reason: HOSPADM

## 2020-07-23 RX ORDER — EXENATIDE 250 UG/ML
INJECTION SUBCUTANEOUS
Qty: 2.4 PEN | Refills: 3 | Status: ON HOLD
Start: 2020-07-23 | End: 2020-01-01 | Stop reason: HOSPADM

## 2020-07-23 ASSESSMENT — ENCOUNTER SYMPTOMS
ANAL BLEEDING: 0
DIARRHEA: 0
VOMITING: 0
SINUS PRESSURE: 0
ABDOMINAL PAIN: 0
BACK PAIN: 0

## 2020-07-23 NOTE — PATIENT INSTRUCTIONS
Patient Education   -Please continue taking your meds as prescribed     RTC in 3 months     Pressure Injuries: Care Instructions  Your Care Instructions     A pressure injury on the skin is caused by constant pressure to that area. These injuries--also called decubitus ulcers or bedsores--may happen when you lie in bed or sit in a wheelchair for a long time. The constant pressure blocks the blood supply to the skin. This causes skin cells to die and creates a sore. Pressure injuries usually occur over bony areas, such as the hips, lower back, elbows, heels, and shoulders. They also can occur in places where the skin folds over on itself. You may have mild redness or open sores that are harder to heal.  Good care at home can help heal pressure injuries. You or your caregiver needs to check your skin every day for sores. You need good nutrition and plenty of fluids to keep your skin healthy and prevent new pressure injuries. Follow-up care is a key part of your treatment and safety. Be sure to make and go to all appointments, and call your doctor if you are having problems. It's also a good idea to know your test results and keep a list of the medicines you take. How can you care for yourself at home? · If your doctor prescribed a medicated ointment or cream, use it exactly as prescribed. Call your doctor if you think you are having a problem with your medicine. · Wash pressure injuries every day, or as often as your doctor recommends. Most tap water is safe, but follow the advice of your doctor or nurse. He or she may recommend that you use a saline solution. This is a salt and water solution that you can buy over the counter. · Put on bandages as your doctor or wound care specialist says. · Keep healthy tissue around the sore clean and dry. · Check your skin every day for sores (or have a caregiver do it).   · If you know what is causing the pressure that caused the sore, find a way to remove that pressure. To prevent pressure injuries  · Change your position or have your caregiver help you change your position often. You may need to do this every 2 hours if you are in bed or every 15 minutes if you are in a wheelchair. This lowers the chance of making sores worse and getting new sores. · Use special mattresses or other support. These may include low-pressure mattresses or cushions made of foam that can be filled with air, water, beads, or fiber. · Eat healthy foods with plenty of protein to help heal damaged skin and to help new skin grow. · Try to stay at a healthy weight. Being overweight can lead to more pressure on your skin. · Do not slide across sheets or slump in a chair or bed. · Do not smoke. Smoking dries the skin and reduces its blood supply. If you need help quitting, talk to your doctor about stop-smoking programs and medicines. These can increase your chances of quitting for good. When should you call for help? Call your doctor now or seek immediate medical care if:  · You have signs of infection, such as:  ? Increased pain, swelling, warmth, or redness. ? Red streaks leading from the sore. ? Pus draining from the sore. ? A fever. Watch closely for changes in your health, and be sure to contact your doctor if:  · Your pressure injuries are not healing. · You have new pressure injuries. · You need help changing positions in bed or in a chair. · Your caregiver needs help to move you. Where can you learn more? Go to https://[employer]. org and sign in to your [employer] account. Enter F114 in the [facility] box to learn more about \"Pressure Injuries: Care Instructions. \"     If you do not have an account, please click on the \"Sign Up Now\" link. Current as of: March 4, 2020               Content Version: 12.5  © 5109-1724 Healthwise, Incorporated. Care instructions adapted under license by [facility] ([facility]).  If you have questions about a medical condition or this instruction, always ask your healthcare professional. [place] [address] any warranty or liability for your use of this information.

## 2020-07-23 NOTE — PROGRESS NOTES
Attending Physician Statement  I have discussed the case, including pertinent history and exam findings with the resident. I reviewed medical, surg, soc histories, meds and any pertinent labs. I agree with the assessment, plan and orders as documented by the resident. Hx HTN, DM, not on insulin, fairly well controlled,  obesity, wheel chair bound, here for follow up. Had debridement of wounds (sacral decubitus ulcers). Patient currently on augmentin, has f/u with ID and wound care.

## 2020-07-23 NOTE — PROGRESS NOTES
[facility]  InternalMedicine Residency Program  ACC Note      SUBJECTIVE:  CC: had concerns including Hypertension and Medication Refill. Esthela Reza is a 79 yo F with PMH morbid obesity, essential hypertension, diabetes mellitus type 2 with Neuropathy, sciatica, functional paraplegia (bed bound for 3 years) presented to the [facility] for a routine visit. Patient was recently discharged from the hospital for infected ischial wounds, s/p excisional debridement, ID followed, treated with Abx (Zosyn and Vanc), switched to Augmentin prior to discharge home with [facility]    Patient is here today for a hospital follow up with the son who is the caregiver, who noted that her wounds are getting better, still taking Augmentin and next Apt with ID is next week    Review of Systems   Constitutional: Negative for activity change, appetite change, diaphoresis, fever and unexpected weight change. HENT: Negative for ear discharge, nosebleeds, postnasal drip, sinus pressure and tinnitus. Gastrointestinal: Negative for abdominal pain, anal bleeding, diarrhea and vomiting. Endocrine: Negative for polyuria. Genitourinary: Negative for enuresis, frequency and hematuria. Musculoskeletal: Negative for arthralgias, back pain, myalgias and neck pain. Neurological: Negative for dizziness, tremors, light-headedness and headaches. Hematological: Negative for adenopathy.        Outpatient Medications Marked as Taking for the 7/23/20 encounter (Office Visit) with Noé Rodriguez MD   Medication Sig Dispense Refill    lisinopril (PRINIVIL;ZESTRIL) 40 MG tablet Take 1 tablet by mouth daily 30 tablet 3    amoxicillin-clavulanate (AUGMENTIN-ES) 600-42.9 MG/5ML suspension Take 15 mLs by mouth every 12 hours for 15 days 450 mL 0    metFORMIN (GLUCOPHAGE) 1000 MG tablet take 1 tablet by mouth twice a day WITH MEALS 60 tablet 0    atenolol (TENORMIN) 50 MG tablet take 1 tablet by mouth twice a day 60 tablet 1    amLODIPine (NORVASC) 10 MG tablet take 1 tablet by mouth once daily      glipiZIDE (GLUCOTROL XL) 5 MG extended release tablet take 1 tablet by mouth once daily      DULoxetine (CYMBALTA) 60 MG extended release capsule Take 1 capsule by mouth daily 90 capsule 0    mineral oil-hydrophilic petrolatum (HYDROPHOR) ointment Apply topically as needed. 396 g 1    rosuvastatin (CRESTOR) 10 MG tablet take 1 tablet by mouth once daily 90 tablet 0    exenatide (BYETTA 10 MCG PEN) 10 MCG/0.04ML injection inject . 04 milliliters subcutaneously twice a day with food 2.4 pen 3    acetaminophen (TYLENOL) 325 MG tablet Take 2 tablets by mouth every 4 hours as needed for Pain 120 tablet 3    vitamin D (ERGOCALCIFEROL) 1.25 MG (92284 UT) CAPS capsule Take 1 capsule by mouth once a week 8 capsule 2    pregabalin (LYRICA) 50 MG capsule Take 50 mg by mouth 2 times daily. .         I have reviewed all pertinent PMHx, PSHx, FamHx, SocialHx, medications, and allergiesand updated history as appropriate. OBJECTIVE:    VS: BP (!) 147/91   Pulse 92   Temp 97.9 °F (36.6 °C) (Oral)   Resp 16   Ht 5' 4\" (1.626 m)   Wt 260 lb (117.9 kg)   BMI 44.63 kg/m²   Physical Exam  Constitutional:       Appearance: Normal appearance. HENT:      Head: Normocephalic and atraumatic. Nose: Nose normal.      Mouth/Throat:      Mouth: Mucous membranes are moist.   Eyes:      Extraocular Movements: Extraocular movements intact. Conjunctiva/sclera: Conjunctivae normal.      Pupils: Pupils are equal, round, and reactive to light. Neck:      Musculoskeletal: Normal range of motion and neck supple. Cardiovascular:      Rate and Rhythm: Normal rate and regular rhythm. Pulmonary:      Effort: Pulmonary effort is normal.      Breath sounds: Normal breath sounds. Abdominal:      General: Abdomen is flat. Bowel sounds are normal.      Palpations: Abdomen is soft. Musculoskeletal: Normal range of motion.       Comments: Wheel chair bound   Skin:     General: Skin is warm. Neurological:      General: No focal deficit present. Mental Status: She is alert. Mental status is at baseline. PLAN:  There are no diagnoses linked to this encounter. Hospital follow up for infected ischial wounds  -S/p I&D per GS, IV Zosyn, Vanc.  Was discharged on Augmentin   -Continuing on Augmentin  -Wounds look better  -ID follow up next week    DM  -Metformin 1000 BID, Glipizide 5mg BID, Exenatide 0.04ml BID  -A1c 7 as of 7/2020, BG never >200 at home, no hypoglycemia  -Duloxetine and Lyrica   -Pa/Cr ratio to be checked   -Podiatry >> seen this year   -Eye exam > last year     HTN  -Lisinopril 40mg, Atenolol 50mg BID, Amlodipine 10mg  -Bp 147/91 >> has not taken her meds this am   -No orthostasis  -Will continue the same      I have reviewed my findings and recommendations with Evelio Barth and Dr Olivia Dumas MD PGY-3  7/23/2020 1:05 PM

## 2020-07-23 NOTE — PROGRESS NOTES
Discharge instructions reviewed with patient per . Follow up appt scheduled and AVS given to patient along with lab work.

## 2020-07-28 ENCOUNTER — HOSPITAL ENCOUNTER (OUTPATIENT)
Dept: WOUND CARE | Age: 70
Discharge: HOME | End: 2020-07-28
Payer: COMMERCIAL

## 2020-08-04 ENCOUNTER — HOSPITAL ENCOUNTER (OUTPATIENT)
Dept: WOUND CARE | Age: 70
Discharge: HOME | End: 2020-08-04
Payer: COMMERCIAL

## 2020-08-04 VITALS
HEART RATE: 100 BPM | RESPIRATION RATE: 18 BRPM | SYSTOLIC BLOOD PRESSURE: 132 MMHG | WEIGHT: 202.4 LBS | HEIGHT: 65 IN | DIASTOLIC BLOOD PRESSURE: 72 MMHG | TEMPERATURE: 95.6 F | BODY MASS INDEX: 33.72 KG/M2

## 2020-08-04 PROCEDURE — 11042 DBRDMT SUBQ TIS 1ST 20SQCM/<: CPT

## 2020-08-04 PROCEDURE — 99213 OFFICE O/P EST LOW 20 MIN: CPT

## 2020-08-04 PROCEDURE — 11045 DBRDMT SUBQ TISS EACH ADDL: CPT | Performed by: SURGERY

## 2020-08-04 PROCEDURE — 87186 SC STD MICRODIL/AGAR DIL: CPT

## 2020-08-04 PROCEDURE — 87077 CULTURE AEROBIC IDENTIFY: CPT

## 2020-08-04 PROCEDURE — 87070 CULTURE OTHR SPECIMN AEROBIC: CPT

## 2020-08-04 PROCEDURE — 87205 SMEAR GRAM STAIN: CPT

## 2020-08-04 PROCEDURE — 11042 DBRDMT SUBQ TIS 1ST 20SQCM/<: CPT | Performed by: SURGERY

## 2020-08-04 PROCEDURE — 11045 DBRDMT SUBQ TISS EACH ADDL: CPT

## 2020-08-04 PROCEDURE — 87075 CULTR BACTERIA EXCEPT BLOOD: CPT

## 2020-08-04 RX ORDER — LIDOCAINE HYDROCHLORIDE 40 MG/ML
SOLUTION TOPICAL ONCE
Status: DISCONTINUED | OUTPATIENT
Start: 2020-08-04 | End: 2020-08-05 | Stop reason: HOSPADM

## 2020-08-04 NOTE — PROGRESS NOTES
[facility] Followup Visit Note    Referring Physician : MD Anais Santana THE [facility]  MEDICAL RECORD NUMBER:  [id number]  AGE: 78 y.o. GENDER: female  : 1950  EPISODE DATE:  2020    Subjective:     Chief Complaint   Patient presents with    Wound Check     right buttock ulce      HISTORY of PRESENT ILLNESS HPI   Sofía Hernandez is a 78 y.o. female who presents today in regards to follow up evaluation and treatment of wound/ulcer. That patient's past medical, family and social hx were reviewed and changes were made if present. History of Wound Context:  The patient has a stage 3 sacral decubitus of the buttock which was debrided in the hospital in 2020. Wound/Ulcer Pain Timing/Severity: intermittent  Quality of pain: dull  Severity:  2 / 10   Modifying Factors: Pain is relieved/improved with rest  Associated Signs/Symptoms: pain    Ulcer Identification:  Ulcer Type: pressure  Contributing Factors: chronic pressure and decreased mobility    Diabetic/Pressure/Non Pressure Ulcers only:  Ulcer: Pressure ulcer, Stage 3    Wound: N/A        PAST MEDICAL HISTORY      Diagnosis Date    Cataract, right     DM (diabetes mellitus), type 2 ([address] 11/3/2010    HTN (hypertension) 11/3/2010    Hyperlipidemia 11/3/2010    Obesity     Osteoarthritis 11/3/2010    Sinusitis chronic     seasonal    Wheelchair confinement     can pivot with assistance;  uses a lift chair     Past Surgical History:   Procedure Laterality Date    HYSTERECTOMY      UT XCAPSL CTRC RMVL INSJ IO LENS PROSTH W/O ECP Right 8/10/2018    RIGHT EYE CATARACT EXTRACTION WITH  IOL IMPLANT performed by Bettye Pendleton MD at [address]     History reviewed. No pertinent family history.   Social History     Tobacco Use    Smoking status: Never Smoker    Smokeless tobacco: Never Used   Substance Use Topics    Alcohol use: No     Alcohol/week: 0.0 standard drinks    Drug use: No     No Known Allergies  Current not apply route daily as needed (leg edema) Compression stockings for bilateral leg swelling 1 kit 0    miconazole (MICOTIN) 2 % powder Apply topically 2 times daily. 45 g 1    Misc. [address] Bed with gel overlay dsp # 1  Pt has Osteoarthritis and is Morbidly Obese 1 Device 0    Misc. Devices KIT 1 kit by Does not apply route daily as needed Foam mattress for bilateral knees osteoarthritis , degenerative back osteoarthitis 1 kit 0    Misc. Devices MISC Wheel chair to assist with ADL with diagnosis of OA and full thickness knee meniscal tear 1 Device 0    Misc. Devices MISC Grab bar  diagnosis osteoarthritis and full thickness knee meniscal tear 1 Device 0    Misc. [address] bed 1 Device 0    Misc. Devices MISC wheelchair 1 Device 0    Misc. Devices MISC Bedside commode. Dx osteoarthritis 715.90. Height 5'4.5\". Weight 245 lbs. 1 Device 0    Misc. Devices (MATTRESS PAD) MISC 1 each by Does not apply route once for 1 dose 1 each 0     No current facility-administered medications on file prior to encounter.         REVIEW OF SYSTEMS See HPI    Objective:    /72   Pulse 100   Temp 95.6 °F (35.3 °C) (Temporal)   Resp 18   Ht 5' 4.5\" (1.638 m)   Wt 202 lb 6.4 oz (91.8 kg)   BMI 34.21 kg/m²   Wt Readings from Last 3 Encounters:   08/04/20 202 lb 6.4 oz (91.8 kg)   07/23/20 260 lb (117.9 kg)   07/16/20 262 lb (118.8 kg)     PHYSICAL EXAM  CONSTITUTIONAL:   Awake, alert, cooperative   EYES:  lids and lashes normal   ENT: external ears and nose without lesions   NECK:  supple, symmetrical, trachea midline   SKIN:  Open wound Present    Assessment:     Problem List Items Addressed This Visit     None          Pre Debridement Measurements:  Are located in the [facility]  Documentation Flow Sheet  Post Debridement Measurements:  Wound/Ulcer Descriptions are Pre Debridement except measurements:     Wound 07/14/20 Abdomen Lateral;Lower (Active)   Dressing/Treatment Open to air 07/17/20 0800   Wound Cleansed Rinsed/Irrigated with saline; Wound cleanser 07/14/20 1939   Wound Length (cm) 5 cm 07/14/20 0115   Wound Width (cm) 0.2 cm 07/14/20 0115   Wound Depth (cm) 0.1 cm 07/14/20 0115   Wound Surface Area (cm^2) 1 cm^2 07/14/20 0115   Wound Volume (cm^3) 0.1 cm^3 07/14/20 0115   Tunneling Position ___ O'Clock 0 07/14/20 1939   Undermining Starts ___ O'Clock 0 07/14/20 1939   Undermining Ends___ O'Clock 0 07/14/20 1939   Undermining Maxium Distance (cm) 0 07/14/20 1939   Wound Assessment Pink;Red 07/17/20 0413   Drainage Amount None 07/17/20 0413   Odor Mild 07/16/20 0212   Svetlana-wound Assessment Excoriated 07/16/20 0212   [place]%Wound Bed 100 07/16/20 0212   Number of days: 21       Wound 08/04/20 Buttocks Right #1 stage III acq: july 15, 2020 (Active)   Wound Image   08/04/20 1518   Wound Pressure Stage  3 08/04/20 1518   Wound Length (cm) 4.2 cm 08/04/20 1518   Wound Width (cm) 3.7 cm 08/04/20 1518   Wound Depth (cm) 3 cm 08/04/20 1518   Wound Surface Area (cm^2) 15.54 cm^2 08/04/20 1518   Wound Volume (cm^3) 46.62 cm^3 08/04/20 1518   Undermining Starts ___ O'Clock 10 08/04/20 1518   Undermining Ends___ O'Clock 12 08/04/20 1518   Undermining Maxium Distance (cm) 2.5 at 11 o'clock 08/04/20 1518   Wound Assessment Slough;Red;Pink 08/04/20 1518   Drainage Amount Moderate 08/04/20 1518   Drainage Description Serosanguinous; Serous 08/04/20 1518   Odor None 08/04/20 1518   Svetlana-wound Assessment Fragile; Purple 08/04/20 1518   Number of days: 0          Procedure Note  Indications:  Based on my examination of this patient's wound(s)/ulcer(s) today, debridement is required to promote healing and evaluate the wound base.     Performed by: Henrietta Gomez MD    Consent obtained:  Yes    Time out taken:  Yes    Pain Control: Anesthetic  Anesthetic: 4% Lidocaine Liquid Topical     Debridement:Excisional Debridement    Using curette the wound(s)/ulcer(s) was/were sharply debrided down through and including the removal of subcutaneous tissue. Devitalized Tissue Debrided:  fibrin, biofilm, slough, exudate and callus to stimulate bleeding to promote healing, post debridement good bleeding base and wound edges noted    Wound/Ulcer #: 1    Percent of Wound/Ulcer Debrided: 100%    Total Surface Area Debrided:  46.62 sq cm     Estimated Blood Loss:  Minimal  Hemostasis Achieved:  by pressure    Procedural Pain:  7  / 10   Post Procedural Pain:  2 / 10     Response to treatment:  Well tolerated by patient. Plan:   Treatment Note please see attached Discharge Instructions    Written patient dismissal instructions given to patient and signed by patient or POA. Discharge Instructions       Visit Discharge/Physician Orders    Discharge condition: Stable    Assessment of pain at discharge:    Anesthetic used: 4% lidocaine solutioin    Discharge to: Home    Left via:Private automobile    Accompanied by: accompanied by SON    ECF/HHA: [address]    Dressing Orders:    Treatment Orders:    [facility] followup visit _____________________________  (Please note your next appointment above and if you are unable to keep, kindly give a 24 hour notice. Thank you.)    Physician signature:__________________________      If you experience any of the following, please call the [address] during business hours:    * Increase in Pain  * Temperature over 101  * Increase in drainage from your wound  * Drainage with a foul odor  * Bleeding  * Increase in swelling  * Need for compression bandage changes due to slippage, breakthrough drainage. If you need medical attention outside of the business hours of the [address] please contact your PCP or go to the nearest emergency room.     Contact your doctor if you have a temperature above 100.4 with any of the following:               Persistent cough             Shortness of breath            Chills            Fatigue            Chest pain or pressure            Difficulty breathing            Decreased consciousness of confusion             Headache     Recommend:                Wash hands often and for 20 seconds or more             Avoid touching eyes, nose, mouth and face             Social distance ( 6 feet)             Stay at home as much as possible             Call health care provider with any concerns                  Electronically signed by Jennifer German MD on 8/4/2020 at 3:36 PM

## 2020-08-11 NOTE — TELEPHONE ENCOUNTER
[facility] nurse said that the patients family said she needed a urine. They will be seeing her tomorrow and can obtain the specimen but need an order.

## 2020-08-18 NOTE — PROGRESS NOTES
 miconazole (MICOTIN) 2 % powder Apply topically 2 times daily. 45 g 1    Misc. [address] Bed with gel overlay dsp # 1  Pt has Osteoarthritis and is Morbidly Obese 1 Device 0    Misc. Devices KIT 1 kit by Does not apply route daily as needed Foam mattress for bilateral knees osteoarthritis , degenerative back osteoarthitis 1 kit 0    Misc. Devices MISC Wheel chair to assist with ADL with diagnosis of OA and full thickness knee meniscal tear 1 Device 0    Misc. Devices MISC Grab bar  diagnosis osteoarthritis and full thickness knee meniscal tear 1 Device 0    Misc. [address] bed 1 Device 0    Misc. Devices MISC wheelchair 1 Device 0    Misc. Devices MISC Bedside commode. Dx osteoarthritis 715.90. Height 5'4.5\". Weight 245 lbs. 1 Device 0    Misc. Devices (MATTRESS PAD) MISC 1 each by Does not apply route once for 1 dose 1 each 0     No current facility-administered medications on file prior to encounter.         REVIEW OF SYSTEMS See HPI    Objective:    BP (!) 140/72   Pulse 92   Temp 98.5 °F (36.9 °C) (Temporal)   Resp 16   Ht 5' 4.5\" (1.638 m)   Wt 202 lb 6.4 oz (91.8 kg)   BMI 34.21 kg/m²   Wt Readings from Last 3 Encounters:   08/18/20 202 lb 6.4 oz (91.8 kg)   08/04/20 202 lb 6.4 oz (91.8 kg)   07/23/20 260 lb (117.9 kg)     PHYSICAL EXAM  CONSTITUTIONAL:   Awake, alert, cooperative   EYES:  lids and lashes normal   ENT: external ears and nose without lesions   NECK:  supple, symmetrical, trachea midline   SKIN:  Open wound Present    Assessment:     Problem List Items Addressed This Visit     * (Principal) Wound of right buttock    Relevant Orders    COMPREHENSIVE METABOLIC PANEL    CBC Auto Differential      Other Visit Diagnoses     Decubitus ulcer of sacral region, stage 4 ([address]    -  Primary    Relevant Orders    CT ABDOMEN PELVIS W IV CONTRAST Additional Contrast? None          Pre Debridement Measurements:  Are located in the [place]  Documentation Flow Sheet  Post Debridement Measurements:  Wound/Ulcer Descriptions are Pre Debridement except measurements:     Wound 07/14/20 Abdomen Lateral;Lower (Active)   Number of days: 35       Wound 08/04/20 Buttocks Right #1 stage III acq: july 15, 2020 (Active)   Wound Image   08/04/20 1518   Wound Pressure Stage  3 08/04/20 1518   Dressing Changed Changed/New 08/04/20 1608   Dressing/Treatment Moist to dry;ABD 08/04/20 1608   Wound Cleansed Rinsed/Irrigated with saline 08/04/20 1608   Wound Length (cm) 3.6 cm 08/18/20 1456   Wound Width (cm) 2.1 cm 08/18/20 1456   Wound Depth (cm) 2.8 cm 08/18/20 1456   Wound Surface Area (cm^2) 7.56 cm^2 08/18/20 1456   Change in Wound Size % (l*w) 51.35 08/18/20 1456   Wound Volume (cm^3) 21.17 cm^3 08/18/20 1456   Wound Healing % 55 08/18/20 1456   Post-Procedure Length (cm) 3.7 cm 08/18/20 1526   Post-Procedure Width (cm) 2.2 cm 08/18/20 1526   Post-Procedure Depth (cm) 2.9 cm 08/18/20 1526   Post-Procedure Surface Area (cm^2) 8.14 cm^2 08/18/20 1526   Post-Procedure Volume (cm^3) 23.61 cm^3 08/18/20 1526   Undermining Starts ___ O'Clock 8 08/18/20 1456   Undermining Ends___ O'Clock 1 08/18/20 1456   Undermining Maxium Distance (cm) 4.1 @ 10 OCLOCK  08/18/20 1456   Wound Assessment Red;Yellow 08/18/20 1456   Drainage Amount Large 08/18/20 1456   Drainage Description Serosanguinous; Serous 08/18/20 1456   Odor None 08/18/20 1456   Svetlana-wound Assessment Intact; Pink 08/18/20 1456   Culture Taken Yes 08/04/20 1537   Number of days: 14          Procedure Note  Indications:  Based on my examination of this patient's wound(s)/ulcer(s) today, debridement is required to promote healing and evaluate the wound base.     Performed by: Ani Arango MD    Consent obtained:  Yes    Time out taken:  Yes    Pain Control: Anesthetic  Anesthetic: 4% Lidocaine Liquid Topical     Debridement:Excisional Debridement    Using curette the wound(s)/ulcer(s) was/were sharply debrided down through and including the removal of subcutaneous tissue. Devitalized Tissue Debrided:  fibrin, biofilm, slough, exudate and callus to stimulate bleeding to promote healing, post debridement good bleeding base and wound edges noted    Wound/Ulcer #: 1    Percent of Wound/Ulcer Debrided: 100%    Total Surface Area Debrided: 8.14 sq cm     Estimated Blood Loss:  Minimal  Hemostasis Achieved:  by pressure    Procedural Pain:  7  / 10   Post Procedural Pain:  2 / 10     Response to treatment:  Well tolerated by patient. CBC with diff, CMP, CT abd pelvis  Follow up with ID  Antibiotics for positive cultures    Plan:   Treatment Note please see attached Discharge Instructions    Written patient dismissal instructions given to patient and signed by patient or POA. Discharge Instructions       Visit Discharge/Physician Orders     Discharge condition: Stable     Assessment of pain at discharge:     Anesthetic used: 4% lidocaine solutioin     Discharge to: Home     Left via:Private automobile     Accompanied by: accompanied by SON     ECF/HHA: [address]     Dressing Orders:RT BUTTOCK: CLEANSE WOUND WITH NORMAL STERILE SALINE. Apply santyl to wound bed, nickel thickness,  Cover with moist dressing, dry dressing  And secure. Change daily.     Treatment Orders: REFERRAL TO INFECTIOUS DISEASE          [facility] followup visit __________1 WEEK ___________________  (Please note your next appointment above and if you are unable to keep, kindly give a 24 hour notice.  Thank you.)     Physician signature:__________________________        If you experience any of the following, please call the [facility] [address] during business hours:     * Increase in Pain  * Temperature over 101  * Increase in drainage from your wound  * Drainage with a foul odor  * Bleeding  * Increase in swelling  * Need for compression bandage changes due to slippage, breakthrough drainage.     If you need medical attention outside of the business hours of the [facility] please contact your PCP or go to the nearest emergency room.     Contact your doctor if you have a temperature above 100.4 with any of the following:               Persistent cough             Shortness of breath            Chills            Fatigue            Chest pain or pressure            Difficulty breathing            Decreased consciousness of confusion             Headache     Recommend:                Wash hands often and for 20 seconds or more             Avoid touching eyes, nose, mouth and face             Social distance ( 6 feet)             Stay at home as much as possible             Call health care provider with any concerns                                           Electronically signed by Jennifer German MD on 8/18/2020 at 3:32 PM

## 2020-08-18 NOTE — TELEPHONE ENCOUNTER
Message left for patient asking for refills on the following medications:  Norvasc, Atenolol, Cymbaltam Glipizide, Ibuprofen 800 mg , Lisinopril, Metformin & Vitamin D 1.25 mg. Asking for refills to be sent to [facility] on [address]     Norvasc, Atenolol, Cymbalta, Glipizide, Lisinopril and Metformin were all sent to [facility] on 7/23/20 with refills. Unable to reach patient at home phone number listed to notify of same. Voicemail message left for patient's daughter to return call. They will need notified of need to contact [facility]'s pharmacy to have scripts transferred. Please advise if the Vit D 50,000 weekly dose needs refills.

## 2020-08-18 NOTE — TELEPHONE ENCOUNTER
Ibuprofen refilled, normal renal function. Need Vit D 25 level prior to refills, last 2017 was 17.     Electronically signed by Daya Carreon DO on 8/18/2020 at 2:11 PM

## 2020-08-19 NOTE — TELEPHONE ENCOUNTER
Spoke with pt's daughter Fani Medina. Notified of new order for lab work prior to refill of Vit D. Also notified of need to call pharmacy to transfer medications if new pharmacy is still needed. Lab scripts placed in mail for patient.

## 2020-08-24 NOTE — TELEPHONE ENCOUNTER
Call transfer from [facility] Nurse Triage, pt's son called, mother had an episode on 8/22, had eye and facial drooping, tongue protruding, was fine a few hrs later, is fine now. Son Bin Bianchi would like to see if an order for a CT of the head can be ordered for tomorrow, pt is scheduled for a CT of Pelvis on 8/25 at [facility], he would appreciate a call back concerning this matter at [phone].   Pt's PCP is Dr Pedro Goncalves.

## 2020-08-24 NOTE — TELEPHONE ENCOUNTER
Call transfer from [facility], pt's son called, mother had episode on 8/22, possible mini stroke, called to request a CT order for CT of head, [facility] sent high priority msg to Dr Sharma Lanes at [facility].

## 2020-08-24 NOTE — TELEPHONE ENCOUNTER
[facility]    Pre-visit planning call to discuss patient care during COVID-19 pandemic. A. Patient did not answer the phone. about virtual visits through [employer] yajaira or D[employer]. me link to facilitate patient care continuity. Last office visit date: 07/23/2020  Outstanding labs/imaging: Hb 10.1  Medication refill needs:   [employer] activity: Yes / No   Patient transport: community transport (remind patient to call to cancel transport) / private vehicle    B. Result of call: Patient did not answer the phone. C. COVID-19 screening: Patient did not answer the phone. Advised patient if coming for Face to Face office visit to enter by the Internal Medicine entrance on [facility]. for screening of viral symptoms, temperature check and mask.     Sia Starkey MD  8/24/20

## 2020-08-24 NOTE — TELEPHONE ENCOUNTER
Received call from Nicko Camargo in [facility]. Patient's son, Paul Dekcer, is on the phone. The patient's son states on Saturday the patient might have had a mini stroke- speaking out of the side of her mouth, tongue protruding and saliva pooled in mouth, not talking right, eyes did not look right . After a few hours the patient was back to normal. No weakness or numbness noted to either side of the body or the face. Patient able to swallow without difficulty. No headache noted. Today the patient is not experiencing anything out of the ordinary. Patient is scheduled for a CT scan of her pelvis tomorrow at 1100. Patient's son wants a CT scan of her head at the same time to check to see if mini stroke on Saturday occurred. No triage performed as patient is asymptomatic. Care advice shared. Call soft transferred to Merritt Beltran in [facility] to put a message in for the patient. Please do not reply to the triage nurse through this encounter. Any subsequent communication should be directly with the patient.     Reason for Disposition   [1] Caller requesting NON-URGENT health information AND [2] PCP's office is the best resource    Protocols used: INFORMATION ONLY CALL - NO TRIAGE-ADULT-

## 2020-08-25 NOTE — TELEPHONE ENCOUNTER
Pt's son called upset due to not being able to come through hospital to get to clinic  I explained it was due to covid and explained the entrance to come pt began swearing at me and hung up

## 2020-08-25 NOTE — TELEPHONE ENCOUNTER
Notified per front office staff that pt could not come to her appt via the bridge after her CT scan and her son canceled her appt. Attempted to reach patient at the home phone number listed and no answer noted. Voicemail message left for patient's daughter Valentino Idania to call office to see if able to do a virtual visit.

## 2020-09-08 NOTE — TELEPHONE ENCOUNTER
Received message on refill line that pt needs refills on her Atenolol, Cymbalta and Byetta. Called and spoke with pharmacist via phone to confirm they have scripts on file from 7/23/20. States they do have the scripts and that family had also called. Will get meds ready that haven't already been processed. Unable to reach patient at the phone number that was left or leave a voicemail message.

## 2020-09-15 NOTE — PROGRESS NOTES
not apply route daily as needed Foam mattress for bilateral knees osteoarthritis , degenerative back osteoarthitis 1 kit 0    Misc. Devices MISC Wheel chair to assist with ADL with diagnosis of OA and full thickness knee meniscal tear 1 Device 0    Misc. Devices MISC Grab bar  diagnosis osteoarthritis and full thickness knee meniscal tear 1 Device 0    Misc. [address] bed 1 Device 0    Misc. Devices MISC wheelchair 1 Device 0    Misc. Devices MISC Bedside commode. Dx osteoarthritis 715.90. Height 5'4.5\". Weight 245 lbs. 1 Device 0    allopurinol (ZYLOPRIM) 100 MG tablet Take 1 tablet by mouth daily 30 tablet 3    acetaminophen (TYLENOL) 325 MG tablet Take 2 tablets by mouth every 4 hours as needed for Pain 120 tablet 3    Misc. Devices (MATTRESS PAD) MISC 1 each by Does not apply route once for 1 dose 1 each 0     No current facility-administered medications on file prior to encounter.         REVIEW OF SYSTEMS See HPI    Objective:    /60   Pulse 104   Temp 96 °F (35.6 °C) (Temporal)   Resp 16   Ht 5' 4.5\" (1.638 m)   Wt 202 lb (91.6 kg)   BMI 34.14 kg/m²   Wt Readings from Last 3 Encounters:   09/15/20 202 lb (91.6 kg)   08/18/20 202 lb 6.4 oz (91.8 kg)   08/04/20 202 lb 6.4 oz (91.8 kg)     PHYSICAL EXAM  CONSTITUTIONAL:   Awake, alert, cooperative   EYES:  lids and lashes normal   ENT: external ears and nose without lesions   NECK:  supple, symmetrical, trachea midline   SKIN:  Open wound Present    Assessment:     Problem List Items Addressed This Visit     * (Principal) Wound of right buttock - Primary          Pre Debridement Measurements:  Are located in the [facility]  Documentation Flow Sheet  Post Debridement Measurements:  Wound/Ulcer Descriptions are Pre Debridement except measurements:     Wound 07/14/20 Abdomen Lateral;Lower (Active)   Number of days: 63       Wound 08/04/20 Buttocks Right #1 stage III acq: july 15, 2020 (Active)   Wound Image   08/04/20 1518   Wound 6.76 sq cm     Estimated Blood Loss:  Minimal  Hemostasis Achieved:  by pressure    Procedural Pain:  7  / 10   Post Procedural Pain:  2 / 10     Response to treatment:  Well tolerated by patient. CT abd pelvis:     Impression    Decubitus ulcer at the level the sacrum suspicious for osteomyelitis    Limited evaluation of the left side of the abdomen, portion of the    left-sided abdomen is not included on the examination                  Consult ID - appointment next week  Wound vac - continue    Plan:   Treatment Note please see attached Discharge Instructions    Written patient dismissal instructions given to patient and signed by patient or POA. Discharge Instructions       Visit Discharge/Physician Orders    Discharge condition: Stable     Assessment of pain at discharge:     Anesthetic used: 4% lidocaine solutioin     Discharge to: Home     Left via:Private automobile     Accompanied by: accompanied by SON     ECF/[facility]HA: [address]     Dressing Orders:RT BUTTOCK: CLEANSE WOUND WITH NORMAL STERILE SALINE. Apply santyl to wound bed, nickel thickness,  Cover with moist dressing, dry dressing  And secure. Change daily. WHEN AVAILABLE: CLEANSE WOUND WITH NORMAL STERILE SALINE.  APPLY WOUND VAC FOLLOWING  MANUFACTURE GUIDELINES USING BLACK FOAM AND DRAPE WITH CONT SUCTION  @125MM, DRAPE, SPONGE TO BE CHANGED  THREE TIMES A WEEK . CANISTER TO BE CHANGED WEEKLY AND PRN   MAY USE SKIN PREP, ADHESIVE REMOVER, THIN DUODERM TO PROTECT SHAYAN WOUND    IF WOUND VAC FAILS, NOTIFY PHYSICIAN AND VAC COMPANY, REMOVE VAC AFTER 2 HOURS  AND APPLY: DRESSING TO RT BUTTOCKS: CLEANSE  WOUND WITH NORMAL STERILE SALINE, AND PLACE MOIST TO MOIST NSS MARY LOU DRESSING AND SECURE WITH TAPE. CHANGE DAILY    LOW AIR LOSS MATTRESS TO BED. TURN AND REPOSITION EVERY 1-2 HOURS. KEEP PRESSURE OFF WOUND. USE PILLOWS FOR POSITIONING.  MONITOR INFLATION OF MATTRESS- IF MATTRESS DEFLATES OR SAGS IMMEDIATELY NOTIFY MANUFACTURE AND REMOVE PATIENT FROM THE BED. LIMIT TIME IN CHAIR     Treatment Orders: REFERRAL TO INFECTIOUS DISEASE 9/24/2020 2pm    [facility] followup visit __________1 WEEK ___________________  (Please note your next appointment above and if you are unable to keep, kindly give a 24 hour notice.  Thank you.)     Physician signature:__________________________        If you experience any of the following, please call the [address] [place] [address] during business hours:     * Increase in Pain  * Temperature over 101  * Increase in drainage from your wound  * Drainage with a foul odor  * Bleeding  * Increase in swelling  * Need for compression bandage changes due to slippage, breakthrough drainage.     If you need medical attention outside of the business hours of the [address] [place] [address] [employer] please contact your PCP or go to the nearest emergency room.     Contact your doctor if you have a temperature above 100.4 with any of the following:               Persistent cough             Shortness of breath            Chills            Fatigue            Chest pain or pressure            Difficulty breathing            Decreased consciousness of confusion             Headache     Recommend:                Wash hands often and for 20 seconds or more             Avoid touching eyes, nose, mouth and face             Social distance ( 6 feet)             Stay at home as much as possible             Call health care provider with any concerns                        Electronically signed by Claudetta Lank, MD on 9/15/2020 at 2:06 PM

## 2020-09-22 NOTE — PROGRESS NOTES
[facility] Followup Visit Note    Referring Physician : MD Lo Panchal Che [facility]  MEDICAL RECORD NUMBER:  [id number]  AGE: 78 y.o. GENDER: female  : 1950  EPISODE DATE:  2020    Subjective:     Chief Complaint   Patient presents with    Wound Check      HISTORY of PRESENT ILLNESS HPI   Amanda Padilla is a 78 y.o. female who presents today in regards to follow up evaluation and treatment of wound/ulcer. That patient's past medical, family and social hx were reviewed and changes were made if present. History of Wound Context:  The patient has a stage 3 sacral decubitus of the buttock which was debrided in the hospital in 2020.      20 CT showed osteomyelitis of the sacrum. Per her son, she has a lot of drainage. Wound/Ulcer Pain Timing/Severity: intermittent  Quality of pain: dull  Severity:  2 / 10   Modifying Factors: Pain is relieved/improved with rest  Associated Signs/Symptoms: pain    Ulcer Identification:  Ulcer Type: pressure  Contributing Factors: chronic pressure and decreased mobility    Diabetic/Pressure/Non Pressure Ulcers only:  Ulcer: Pressure ulcer, Stage 3    Wound: N/A        PAST MEDICAL HISTORY      Diagnosis Date    Cataract, right     DM (diabetes mellitus), type 2 ([address] 11/3/2010    HTN (hypertension) 11/3/2010    Hyperlipidemia 11/3/2010    Obesity     Osteoarthritis 11/3/2010    Sinusitis chronic     seasonal    Wheelchair confinement     can pivot with assistance;  uses a lift chair     Past Surgical History:   Procedure Laterality Date    HYSTERECTOMY      WI XCAPSL CTRC RMVL INSJ IO LENS PROSTH W/O ECP Right 8/10/2018    RIGHT EYE CATARACT EXTRACTION WITH  IOL IMPLANT performed by Jeromy Pino MD at [facility]     History reviewed. No pertinent family history.   Social History     Tobacco Use    Smoking status: Never Smoker    Smokeless tobacco: Never Used   Substance Use Topics    Alcohol use: No     Alcohol/week: 0.0 standard drinks    Drug use: No     No Known Allergies  Current Outpatient Medications on File Prior to Encounter   Medication Sig Dispense Refill    ibuprofen (ADVIL;MOTRIN) 800 MG tablet Take 1 tablet by mouth every 8 hours as needed for Pain 120 tablet 1    atenolol (TENORMIN) 50 MG tablet take 1 tablet by mouth twice a day 60 tablet 3    lisinopril (PRINIVIL;ZESTRIL) 40 MG tablet Take 1 tablet by mouth daily 30 tablet 3    metFORMIN (GLUCOPHAGE) 1000 MG tablet take 1 tablet by mouth twice a day WITH MEALS 60 tablet 3    glipiZIDE (GLUCOTROL XL) 5 MG extended release tablet take 1 tablet by mouth once daily 30 tablet 3    DULoxetine (CYMBALTA) 60 MG extended release capsule Take 1 capsule by mouth daily 90 capsule 3    rosuvastatin (CRESTOR) 10 MG tablet take 1 tablet by mouth once daily 90 tablet 2    exenatide (BYETTA 10 MCG PEN) 10 MCG/0.04ML injection inject . 04 milliliters subcutaneously twice a day with food 2.4 pen 3    allopurinol (ZYLOPRIM) 100 MG tablet Take 1 tablet by mouth daily 30 tablet 3    RA Alcohol Swabs 70 % PADS USE WITH INJECTIONS TWICE A DAY AND CHECKING BLOOD SUGAR TWICE A  each 1    blood glucose test strips (ACCU-CHEK RUI PLUS) strip TEST twice a day FASTING BEFORE BREAKFAST AND SUPPER 100 strip 2    ACCU-CHEK MULTICLIX LANCETS MISC Check BS Twice daily 200 each 1    acetaminophen (TYLENOL) 325 MG tablet Take 2 tablets by mouth every 4 hours as needed for Pain 120 tablet 3    Insulin Pen Needle (B-D UF III MINI PEN NEEDLES) 31G X 5 MM MISC use twice a day 100 each 5    Insulin Pen Needle (B-D ULTRAFINE III SHORT PEN) 31G X 8 MM MISC Inject 1 kit into the skin 2 times daily 100 each 5    vitamin D (ERGOCALCIFEROL) 1.25 MG (99817 UT) CAPS capsule Take 1 capsule by mouth once a week 8 capsule 2    Misc. Devices MISC Wheelchair cushion 1 Device 0    Misc.  Devices KIT 1 kit by Does not apply route daily as needed (leg edema) Compression stockings for bilateral 1518   Dressing Status Clean;Dry; Intact 09/22/20 1626   Dressing Changed Changed/New 09/22/20 1626   Dressing/Treatment Alginate;Dry dressing 09/22/20 1626   Wound Cleansed Rinsed/Irrigated with saline 09/22/20 1626   Wound Length (cm) 2.5 cm 09/22/20 1536   Wound Width (cm) 0.6 cm 09/22/20 1536   Wound Depth (cm) 4.1 cm 09/22/20 1536   Wound Surface Area (cm^2) 1.5 cm^2 09/22/20 1536   Change in Wound Size % (l*w) 90.35 09/22/20 1536   Wound Volume (cm^3) 6.15 cm^3 09/22/20 1536   Wound Healing % 87 09/22/20 1536   Post-Procedure Length (cm) 2.6 cm 09/22/20 1622   Post-Procedure Width (cm) 0.7 cm 09/22/20 1622   Post-Procedure Depth (cm) 4.8 cm 09/22/20 1622   Post-Procedure Surface Area (cm^2) 1.82 cm^2 09/22/20 1622   Post-Procedure Volume (cm^3) 8.74 cm^3 09/22/20 1622   Undermining Starts ___ O'Clock 10 09/22/20 1536   Undermining Ends___ O'Clock 1 09/22/20 1536   Undermining Maxium Distance (cm) 3.4 @12  09/22/20 1536   Wound Assessment Yellow;Pink;Red 09/22/20 1536   Drainage Amount Large 09/22/20 1536   Drainage Description Yellow; Tan 09/22/20 1536   Odor None 09/15/20 1308   Exposed structure Bone 09/22/20 1536   Svetlana-wound Assessment Intact 09/15/20 1308   Culture Taken Yes 09/22/20 1622   Number of days: 50          Procedure Note  Indications:  Based on my examination of this patient's wound(s)/ulcer(s) today, debridement is required to promote healing and evaluate the wound base. Performed by: Ghada Davison MD    Consent obtained:  Yes    Time out taken:  Yes    Pain Control:       Debridement:Excisional Debridement    Using curette the wound(s)/ulcer(s) was/were sharply debrided down through and including the removal of subcutaneous tissue.     Devitalized Tissue Debrided:  fibrin, biofilm, slough, exudate and callus to stimulate bleeding to promote healing, post debridement good bleeding base and wound edges noted    Wound/Ulcer #: 1    Percent of Wound/Ulcer Debrided: 100%    Total POSITIONING. MONITOR INFLATION OF MATTRESS- IF MATTRESS DEFLATES OR SAGS IMMEDIATELY NOTIFY MANUFACTURE AND REMOVE PATIENT FROM THE BED. LIMIT TIME IN CHAIR    CULTURE OBTAINED  [facility],4[address]. 9/22/2020     Treatment Orders: REFERRAL TO INFECTIOUS DISEASE 9/24/2020 2pm     [address] [facility] followup visit __________1 WEEK ___________________  (Please note your next appointment above and if you are unable to keep, kindly give a 24 hour notice.  Thank you.)     Physician signature:__________________________        If you experience any of the following, please call the [facility] during business hours:     * Increase in Pain  * Temperature over 101  * Increase in drainage from your wound  * Drainage with a foul odor  * Bleeding  * Increase in swelling  * Need for compression bandage changes due to slippage, breakthrough drainage.     If you need medical attention outside of the business hours of the [facility] please contact your PCP or go to the nearest emergency room.     Contact your doctor if you have a temperature above 100.4 with any of the following:               Persistent cough             Shortness of breath            Chills            Fatigue            Chest pain or pressure            Difficulty breathing            Decreased consciousness of confusion             Headache     Recommend:                Wash hands often and for 20 seconds or more             Avoid touching eyes, nose, mouth and face             Social distance ( 6 feet)             Stay at home as much as possible             Call health care provider with any concerns                                   Electronically signed by Digna Bedolla MD on 9/23/2020 at 4:36 PM

## 2020-09-24 PROBLEM — R41.82 AMS (ALTERED MENTAL STATUS): Status: ACTIVE | Noted: 2020-01-01

## 2020-09-25 PROBLEM — E43 SEVERE PROTEIN-CALORIE MALNUTRITION (HCC): Chronic | Status: ACTIVE | Noted: 2020-01-01

## 2020-09-25 PROBLEM — N39.0 UTI (URINARY TRACT INFECTION): Status: ACTIVE | Noted: 2020-01-01

## 2020-09-25 NOTE — HOME CARE
Patient is active with [facility] for SN/PT. Will need VIPIN prior to discharge if patient becomes inpatient status. Intake to follow.    Andre Rosado LPN/HC

## 2020-09-25 NOTE — PROGRESS NOTES
Chart reviewed , patient seen /examined . She was seen by night physician  earlier this am , I have d/w her. Current problems are -     AMS possibly sec to UTI - noted in er , after IVF ,IV abx much better ,now at baseline. On Iv Rocephin , follow cxs. BS on lower side - metformin on hold , follow and cover with ISS. ID is consulted. Deg OA  Multiple jts - WC bound, continue to provide supportive care.     DM II - hold metformin ,continue glipizideplace on ISS    HTN - on Lisinopril ,atenolol   HPL - on statin

## 2020-09-25 NOTE — H&P
tablet take 1 tablet by mouth twice a day WITH MEALS 7/23/20  Yes Antony Walsh MD   glipiZIDE (GLUCOTROL XL) 5 MG extended release tablet take 1 tablet by mouth once daily 7/23/20  Yes Antony Walsh MD   rosuvastatin (CRESTOR) 10 MG tablet take 1 tablet by mouth once daily 7/23/20  Yes Antony Walsh MD   allopurinol (ZYLOPRIM) 100 MG tablet Take 1 tablet by mouth daily 7/23/20 11/20/20 Yes Antony Walsh MD   acetaminophen (TYLENOL) 325 MG tablet Take 2 tablets by mouth every 4 hours as needed for Pain 11/6/19  Yes Nicola Abdullahi MD   DULoxetine (CYMBALTA) 60 MG extended release capsule Take 1 capsule by mouth daily 7/23/20   Antony Walsh MD   exenatide (BYETTA 10 MCG PEN) 10 MCG/0.04ML injection inject . 04 milliliters subcutaneously twice a day with food 7/23/20   Antony Walsh MD   RA Alcohol Swabs 70 % PADS USE WITH INJECTIONS TWICE A DAY AND CHECKING BLOOD SUGAR TWICE A DAY 2/17/20   Nicola Abdullahi MD   blood glucose test strips (ACCU-CHEK RUI PLUS) strip TEST twice a day FASTING BEFORE BREAKFAST AND SUPPER 11/6/19   Nicola Abdullahi MD   ACCU-CHEK MULTICLIX LANCETS MISC Check BS Twice daily 11/6/19   Nicola Abdullahi MD   Insulin Pen Needle (B-D UF III MINI PEN NEEDLES) 31G X 5 MM MISC use twice a day 11/6/19   Nicola Abdullahi MD   Insulin Pen Needle (B-D ULTRAFINE III SHORT PEN) 31G X 8 MM MISC Inject 1 kit into the skin 2 times daily 11/6/19   Nicola Abdullahi MD   vitamin D (ERGOCALCIFEROL) 1.25 MG (79728 UT) CAPS capsule Take 1 capsule by mouth once a week 11/6/19   MD Zoe Willsonc. Devices MISC Wheelchair cushion 6/5/17   Colletta Chris, MD   Misc. Devices (MATTRESS PAD) MISC 1 each by Does not apply route once for 1 dose 6/5/17 6/5/17  Colletta Chris, MD Misc. Devices KIT 1 kit by Does not apply route daily as needed (leg edema) Compression stockings for bilateral leg swelling 6/5/17   Colletta Chris, MD   [facility].  [address] Bed with gel overlay dsp # 1  Pt has Osteoarthritis and is Morbidly Obese 4/4/16   Cristopher Dunnings, DO   Misc. Devices KIT 1 kit by Does not apply route daily as needed Foam mattress for bilateral knees osteoarthritis , degenerative back osteoarthitis 3/31/16   Stefania Elizondo MD   [facility] Devices [facility] chair to assist with ADL with diagnosis of OA and full thickness knee meniscal tear 2/24/15   Ron Luz MD   Misc. Devices MISC Grab bar  diagnosis osteoarthritis and full thickness knee meniscal tear 2/24/15   Ron Luz MD   [facility] [address] bed 11/7/14   Morelia Quarles MD   [facility] Devices [address] [facility] wheelchair 11/7/14   Morelia Quarles MD   Mis. Devices [facility] Bedside commode. Dx osteoarthritis 715.90. Height 5'4.5\". Weight 245 lbs. 9/17/14   Stefania Elizondo MD       Allergies:    Patient has no known allergies. Social History:    reports that she has never smoked. She has never used smokeless tobacco. She reports that she does not drink alcohol or use drugs. Family History:   family history is not on file. She is not able to give further information regarding her family history      PHYSICAL EXAM:  Vitals:  BP (!) 140/78   Pulse 69   Temp 97.7 °F (36.5 °C) (Oral)   Resp 16   Ht 5' 4\" (1.626 m)   Wt 192 lb (87.1 kg)   SpO2 100%   BMI 32.96 kg/m²     General Appearance: alert and oriented to person, place and time and in no acute distress asking for food.   Skin: warm and dry  Head: normocephalic and atraumatic  Eyes: pupils equal, round, and reactive to light, extraocular eye movements intact, conjunctivae normal  Neck: neck supple and non tender without mass   Pulmonary/Chest: clear to auscultation bilaterally- no wheezes, rales or rhonchi, normal air movement, no respiratory distress  Cardiovascular: normal rate, normal S1 and S2 and no carotid bruits  Abdomen: soft, non-tender, non-distended, normal bowel sounds, no masses or organomegaly  Extremities: no cyanosis, no clubbing and no edema  Neurologic: no cranial nerve deficit and speech normal        LABS:  Recent Labs     09/24/20 1900 09/24/20 1907     --    K 3.5  --      --    CO2 22  --    BUN 22  --    CREATININE 1.0  --    GLUCOSE 114* 115   CALCIUM 8.5*  --        Recent Labs     09/24/20 1900   WBC 16.5*   RBC 4.31   HGB 10.5*   HCT 34.2   MCV 79.4*   MCH 24.4*   MCHC 30.7*   RDW 18.7*      MPV 9.4       No results for input(s): POCGLU in the last 72 hours. Radiology:   XR CHEST PORTABLE   Final Result      No airspace opacities or pleural effusion. CT HEAD WO CONTRAST   Final Result      No evidence of acute intracranial hemorrhage or edema. If  clinical   concern exists for acute stroke, MRI brain with diffusion-weighted   imaging could be helpful for further evaluation. EKG:   Normal sinus rhythm  Low voltage QRS  Nonspecific ST and T wave abnormality  Abnormal ECG    ASSESSMENT:      Active Problems:    Primary osteoarthritis involving multiple joints    AMS (altered mental status)    UTI (urinary tract infection)  Resolved Problems:    * No resolved hospital problems. *      PLAN:      1. Primary osteoarthritis involving multiple joints-she is wheelchair-bound he will need nursing assistance to assist her when she is out of bed. 2.  AMS (altered mental status)-in the emergency room she was noted to be alert and oriented x3 she was found to have urinary tract infection was started on treatment with Rocephin. Blood sugar is lowish. She will be covered with sliding scale her metformin will be held while she is in the hospital    3.  UTI (urinary tract infection) she has been started on treatment with Rocephin in the emergency room and is to be continued. Code Status full  DVT prophylaxis: Lovenox      NOTE: This report was transcribed using voice recognition software. Every effort was made to ensure accuracy; however, inadvertent computerized transcription errors may be present.   Electronically signed by Raffi Akins Beverly Bowie MD on 9/25/2020 at 2:08 AM

## 2020-09-25 NOTE — PROGRESS NOTES
Physical Therapy    [address]      NAME: Marco Moralez  : 1950  MRN: [id number]    Date of Service: 2020    PT consult received, Per chart review Pt is long standing paraplegic. Pt has home aides daily and they use adriana lift for transfers and pt is dependent for WC mobility. Pt has no skilled PT needs at this time as she is at her functional baseline. Pt also has sacral wound that would limit ability to sit in chair.  .  Will discharge pt from our service at this time  [facility] PT [id number]

## 2020-09-25 NOTE — CONSULTS
Comprehensive Nutrition Assessment    Type and Reason for Visit:  Initial, Positive Nutrition Screen, Consult    Nutrition Recommendations/Plan: Continue current diet, Start Glucerna BID, Earl BID    Nutrition Assessment:  pt is nutritionally compromised d/t severe malnutrition w/ noted 27% wt loss in 3 months. Pt noted w/ chronic sacral wound w/ increased nutrient needs for healing. Pt admit w/ AMS. Will add ONS to aid in healing and monitor. Malnutrition Assessment:  Malnutrition Status:  Severe malnutrition    Context:  Chronic Illness     Findings of the 6 clinical characteristics of malnutrition:  Energy Intake:  7 - 75% or less estimated energy requirements for 1 month or longer  Weight Loss:  7 - Greater than 7.5% over 3 months(27% wt loss x 3 months)     Body Fat Loss:  Unable to assess     Muscle Mass Loss:  Unable to assess    Fluid Accumulation:  No significant fluid accumulation     Strength:  Not Performed    Estimated Daily Nutrient Needs:  Energy (kcal):  4948-2645; Weight Used for Energy Requirements:  Current     Protein (g):  75-90; Weight Used for Protein Requirements:  Adjusted(1.5-1.8)        Fluid (ml/day):  8775-9365; Weight Used for Fluid Requirements:  Current      Nutrition Related Findings:  AMS, paraplegic, +2 edema      Wounds:  Open Wounds, Wound Care Consult Pending       Current Nutrition Therapies:    DIET CARB CONTROL; Anthropometric Measures:  · Height: 5' 4\" (162.6 cm)  · Current Body Weight: 192 lb (87.1 kg)   · Usual Body Weight: 262 lb (118.8 kg)(7/2020 bed scale, per EMR)     · Ideal Body Weight: 120 lbs; % Ideal Body Weight 160 %   · BMI: 32.9  · Adjusted Body Weight: 206.4; Paraplegia   · Adjusted BMI: 35.4    · BMI Categories: Obese Class 1 (BMI 30.0-34. 9)       Nutrition Diagnosis:   · Severe malnutrition, In context of chronic illness related to inadequate protein-energy intake(2/2 bedbound w/ chronic wound) as evidenced by intake 0-25%, poor intake prior

## 2020-09-25 NOTE — PROGRESS NOTES
Occupational Therapy  OT order received and chart reviewed. Pt is dependent at baseline with use of adriana for transfers and assist in wc mobility. Pt has total assist/care for ADLs in home environment. No skilled OT services indicated at this time as pt is functioning at baseline. OT order discharged. SW notified of pt being at baseline with no skilled therapy needs. Thank you.     Aidan Moran OTR/L  [id number]

## 2020-09-25 NOTE — CONSULTS
[address] [place] [address] [facility]  [facility]  Consultation Note     Admit Date: 9/24/2020  6:07 PM    Reason for Consult:   osteomyelitis sacrum    Attending Physician:  Nguyen Elizalde MD    HISTORY OF PRESENT ILLNESS:             The history is obtained from extensive review of available past medical records. The patient is a 78 y.o. female with significant past medical history of morbid obesity, essential hypertension, diabetes mellitus type 2, sciatica, functional paraplegia that she states is due to her sciatica, bed bound for past 3 years with caregivers at home who presents with AMS. She is know to infectious disease. She was seen in our office yesterday per [address] [facility] physician for concern of osteomyelitis. She was doing well up in wheelchair at that time and her son was present. She later presented to ED after being unresponsive for 30 minutes. EMS states that she has been responsive to commands and alert and oriented for them. She was in the emergency department is alert and oriented x3 in the ED. However caretaker ddid state that she seems a little altered than usual. Patient stated she does not really have any pain except for her sacral ulcer she has and some burning with urination. She was admitted due to elevated WBC, lactic acid, troponin and UTI on UA mirco. She did not have fever. CT head and chest xray negative. She is on ceftriaxone. She is currently resting in bed with eyes closed awakens easily but does fall back to sleep during our conversation. Denies pain, fever, chill, sob. She reports burning with urination and diarrhea at home. She knows the month and year but is disoriented to place and events. Per my note yesterday   Infectious disease saw her in hospital for sacral wound infection growing proteus and strep in July. She is being followed by wound care who sent her to us.   Per wound care physician note CT showed possible osteo of the sacrum 9/1/2020 and she has been having increased drainage to wound per her son. Also per wound care physcian note 9/22/2020 bone is exposed and yellow/tan drainage present with yellow/pink wound bed. She lives with him and his wife who are her caregivers. She was treated for wound infection with Levaquin for pseudo and kleb in August as well. She has [facility] coming in. She has a wound vac to wound. He reports wound seems to be getting smaller in circumfence but remains deep. The wound has not had an odor or purulent drainage that he is aware of. She has pain to her right lower extremity at times however her son is not sure if it is related to her wound or not. The patient is alert but allows her son to do most of the talking. She came to us in a wheelchair with a adriana pad under her. We do not have a adriana lift and patient has not stood in 2 months. Therefore I am unable to examine it today. She is alwasy transported in  ambulette. He does not believe her insurance would cover ambulance transport. She goes to wound clinic once a week and they have equipment to tranfers her over to a hospital bed/cart. She denies any fever, chills, nausea, vomiting, diarrhea or pain at this time. She had wound culture done 9/22/2020 growing corynebacterium. No bone biopsy done. Discussed with son treatment for osteomyelitis and he is agreeable for antibiotics at home or ECF if needed.     Past Medical History:        Diagnosis Date    Cataract, right     DM (diabetes mellitus), type 2 ([facility] [address] 11/3/2010    HTN (hypertension) 11/3/2010    Hyperlipidemia 11/3/2010    Obesity     Osteoarthritis 11/3/2010    Sinusitis chronic     seasonal    Wheelchair confinement     can pivot with assistance;  uses a lift chair     Past Surgical History:        Procedure Laterality Date    HYSTERECTOMY      NV XCAPSL CTRC RMVL INSJ IO LENS PROSTH W/O ECP Right 8/10/2018    RIGHT EYE CATARACT EXTRACTION WITH  IOL IMPLANT performed by Collin Buckner MD at [facility] Medications:   Scheduled Meds:   allopurinol  100 mg Oral Daily    atenolol  50 mg Oral Daily    DULoxetine  60 mg Oral Daily    lisinopril  40 mg Oral Daily    glipiZIDE  10 mg Oral QAM AC    rosuvastatin  10 mg Oral Nightly    vitamin D  50,000 Units Oral Weekly    insulin lispro  0-12 Units Subcutaneous TID WC    insulin lispro  0-6 Units Subcutaneous Nightly    sodium chloride flush  10 mL Intravenous 2 times per day    enoxaparin  40 mg Subcutaneous Daily    cefTRIAXone (ROCEPHIN) IV  1 g Intravenous Q24H    magnesium sulfate  2 g Intravenous Once     Continuous Infusions:   dextrose       PRN Meds:ibuprofen, glucose, dextrose, glucagon (rDNA), dextrose, sodium chloride flush, potassium chloride **OR** potassium alternative oral replacement **OR** potassium chloride, magnesium sulfate, acetaminophen **OR** acetaminophen, magnesium hydroxide, promethazine **OR** ondansetron    Allergies:  Patient has no known allergies.     Social History:   Social History     Socioeconomic History    Marital status: Single     Spouse name: None    Number of children: None    Years of education: None    Highest education level: None   Occupational History    None   Social Needs    Financial resource strain: Very hard    Food insecurity     Worry: Sometimes true     Inability: Sometimes true    Transportation needs     Medical: None     Non-medical: None   Tobacco Use    Smoking status: Never Smoker    Smokeless tobacco: Never Used   Substance and Sexual Activity    Alcohol use: No     Alcohol/week: 0.0 standard drinks    Drug use: No    Sexual activity: None   Lifestyle    Physical activity     Days per week: None     Minutes per session: None    Stress: None   Relationships    Social connections     Talks on phone: None     Gets together: None     Attends Church service: None     Active member of club or organization: None     Attends meetings of clubs or organizations: None     Relationship CRP 9.6 (H) 10/12/2015      No results found for: CRPHS  Lab Results   Component Value Date    SEDRATE 111 (H) 07/15/2020    SEDRATE 62 (H) 09/19/2017    SEDRATE 69 (H) 10/12/2015     Lab Results   Component Value Date    ALT 7 09/24/2020    AST 12 09/24/2020    ALKPHOS 89 09/24/2020    BILITOT 0.2 09/24/2020     Lab Results   Component Value Date     09/24/2020    K 3.5 09/24/2020     09/24/2020    CO2 22 09/24/2020    BUN 22 09/24/2020    CREATININE 1.0 09/24/2020    GFRAA >60 09/24/2020    LABGLOM 55 09/24/2020    GLUCOSE 115 09/24/2020    GLUCOSE 147 03/07/2012    PROT 6.9 09/24/2020    LABALBU 2.7 09/24/2020    LABALBU 4.1 03/07/2012    CALCIUM 8.5 09/24/2020    BILITOT 0.2 09/24/2020    ALKPHOS 89 09/24/2020    AST 12 09/24/2020    ALT 7 09/24/2020       No results found for: PROTIME, INR    Lab Results   Component Value Date    TSH 5.130 03/09/2017       Lab Results   Component Value Date    COLORU Yellow 09/25/2020    PHUR 5.5 09/25/2020    WBCUA >20 09/25/2020    RBCUA 1-3 09/25/2020    YEAST Present 09/24/2020    BACTERIA MODERATE 09/25/2020    CLARITYU SL CLOUDY 09/25/2020    SPECGRAV >=1.030 09/25/2020    LEUKOCYTESUR MODERATE 09/25/2020    UROBILINOGEN 0.2 09/25/2020    BILIRUBINUR SMALL 09/25/2020    BLOODU MODERATE 09/25/2020    GLUCOSEU Negative 09/25/2020       No results found for: HCO3, BE, O2SAT, PH, THGB, PCO2, PO2, TCO2  Radiology:  Noted    Microbiology:  Pending  No results for input(s): BC in the last 72 hours. Recent Labs     09/22/20  1608   ORG Corynebacterium species*     No results for input(s): BLOODCULT2 in the last 72 hours. No results for input(s): STREPNEUMAGU in the last 72 hours. No results for input(s): LP1UAG in the last 72 hours. No results for input(s): ASO in the last 72 hours. No results for input(s): CULTRESP in the last 72 hours. No results for input(s): PROCAL in the last 72 hours.   sacral wound infection growing proteus and strep in July 2020  wound infection with Levaquin for pseudo and kleb in August, 2020  wound culture done 9/22/2020 growing corynebacterium  urine cx yeast, bacteria    Assessment:  · Chronic decubitus sacral ulcer stage III  · R/o osteomyelitis sacrum  · UTI    Plan:    · Continue rocephin for UTI  · To adequately treat sacral wound would need an ostomy, will discuss with family, hold off on antibiotics for now  · Check cultures, baseline ESR, CRP  · Monitor labs  · Will follow with you    Thank you for having us see this patient in consultation. Jimmy Keith CNP  10:19 AM  9/25/2020   Pt seen and examined. Above discussed agree with advanced practice nurse. Labs, cultures, and radiographs reviewed. Face to Face encounter occurred. Changes made as necessary.      Dee Monaco MD

## 2020-09-25 NOTE — FLOWSHEET NOTE
Inpatient Wound Care    Admit Date: 9/24/2020  6:07 PM    Reason for consult:  Coccyx    Significant history:  Admitted with AMS. Wound history:  POA    Findings:       09/25/20 1458   Wound 09/24/20 Coccyx Mid wound appears to be approx size of a quarter round approx 3 cm deep and appears to be tunneling, does have packing   Date First Assessed/Time First Assessed: 09/24/20 1921   Present on Hospital Admission: Yes  Primary Wound Type: Pressure Injury  Location: Coccyx  Wound Location Orientation: Mid  Wound Description (Comments): wound appears to be approx size of a krama. .. Wound Image    Wound Pressure Stage  4   Dressing Status Changed   Dressing Changed Changed/New   Dressing/Treatment Alginate   Wound Cleansed Rinsed/Irrigated with saline   Dressing Change Due 09/26/20   Wound Length (cm) 3.5 cm   Wound Width (cm) 2 cm   Wound Depth (cm) 3 cm   Wound Surface Area (cm^2) 7 cm^2   Change in Wound Size % (l*w) -25   Wound Volume (cm^3) 21 cm^3   Wound Healing % -241   Wound Assessment Red   Drainage Amount Small   Drainage Description Serosanguinous   Odor Mild   Svetlana-wound Assessment Fragile       Impression:  Stage 4 coccyx pressure injury    Interventions in place:  Wound cleansed with NSS. Packed with Opticell then covered with foam. Son at the bedside. Folds moist. Recommend Aloe Vesta. Plan: Daily dressing changes to coccyx. Aloe Vesta to skin folds. Low air loss bed, SOS precautions. Pt would benefit with a colostomy.        Sandi Mariscal 9/25/2020 3:00 PM

## 2020-09-25 NOTE — PROGRESS NOTES
8: 40 AM  Consult placed to Jennifer Flores in [facility] office regarding this patient.   Mackenzie Mcleod, BECKY/KELTON  9/25/2020

## 2020-09-25 NOTE — ED PROVIDER NOTES
18-year-old female presents the ED with complaint of altered mental status and unresponsiveness per EMS. EMS states that the family said that the patient was unresponsive throughout 30 minutes but then when they shook her she did awake. EMS states that she has been responsive to commands and alert and oriented for them. Patient here in the emergency department is alert and oriented x3 caretaker does state that she seems a little altered than usual..  Patient states she does not really have any pain except for her sacral ulcer she has and some burning with urination. Otherwise patient is feeling fine. Patient states past medical history of diabetes and high blood pressure. Review of Systems   Constitutional: Negative for chills, fatigue and fever. HENT: Negative for congestion, sinus pressure, sinus pain and sore throat. Respiratory: Negative for cough, chest tightness and shortness of breath. Cardiovascular: Negative for chest pain and leg swelling. Gastrointestinal: Negative for abdominal distention, abdominal pain, constipation, diarrhea, nausea and vomiting. Endocrine: Negative for polyuria. Genitourinary: Negative for difficulty urinating, dysuria, frequency, hematuria, urgency, vaginal bleeding and vaginal discharge. Musculoskeletal: Positive for back pain. Negative for arthralgias. Skin: Negative for color change and pallor. Neurological: Negative for dizziness and weakness. Psychiatric/Behavioral: Positive for confusion. Physical Exam  Vitals signs and nursing note reviewed. Constitutional:       Appearance: Normal appearance. She is normal weight. HENT:      Head: Normocephalic and atraumatic. Eyes:      Conjunctiva/sclera: Conjunctivae normal.   Cardiovascular:      Rate and Rhythm: Normal rate and regular rhythm. Pulses: Normal pulses. Heart sounds: Normal heart sounds. No murmur. No gallop.     Pulmonary:      Effort: Pulmonary effort is normal. No respiratory distress. Breath sounds: Normal breath sounds. No wheezing or rales. Abdominal:      General: Abdomen is flat. Bowel sounds are normal. There is no distension. Palpations: Abdomen is soft. Tenderness: There is no abdominal tenderness. There is no guarding. Skin:     General: Skin is warm and dry. Capillary Refill: Capillary refill takes less than 2 seconds. Neurological:      Mental Status: She is alert and oriented to person, place, and time. Procedures     MDM  Number of Diagnoses or Management Options  Altered mental status, unspecified altered mental status type:   Elevated troponin:   Urinary tract infection with hematuria, site unspecified:   Diagnosis management comments: 18-year-old female presents ED with complaint of altered mental status per EMS. Did get head CT which was unremarkable chest x-ray also unremarkable patient did have positive urinalysis for urinary tract infection which also clinically limited with her burning with urination. Did start Rocephin 2 g for her here in the emergency department. I did had elevated white count which could be due to the urinary tract infection also replaced her low magnesium while here in the department. Patient's EKG was unremarkable for any acute ST elevations however patient did have elevated troponin patient denies any chest pain currently or during her altered mental status event. Will start aspirin for her as well though for this. To family saying she is still little bit altered along with her elevated troponin urinary tract infection decision was made to admit the patient. Patient was agreeable with this decision report. ED Course as of Sep 24 2129   Thu Sep 24, 2020   1024 EKG: This EKG is signed by emergency department physician.     Rate: 67  Rhythm: Sinus  Interpretation: no acute changes, normal axis, mode of artifact hard to determine needs to leads III leads aVL leads aVF  Comparison: stable as compared to patient's most recent EKG         [CB]   2003 Elevated lactic acid elevated white count    [CB]   2011 Chest x-ray unremarkable    [CB]   2017 Low magnesium. We will replace. We will also start Rocephin for suspected UTI seen on urinalysis    [CB]   2017 Also his troponin 0.05.    [CB]   2032 EKG: This EKG is signed by emergency department physician. Rate: 73  Rhythm: Sinus  Interpretation: no acute changes, normal axis  Comparison: stable as compared to patient's most recent EKG         [CB]   2043 CT head unremarkable. [CB]   2101 With Dr. Nallely Briceno about the patient and need for admission she was agreeable. [CB]      ED Course User Index  [CB] Yen Caldwell MD      ED Course as of Sep 24 2129   Thu Sep 24, 2020   9257 EKG: This EKG is signed by emergency department physician. Rate: 67  Rhythm: Sinus  Interpretation: no acute changes, normal axis, mode of artifact hard to determine needs to leads III leads aVL leads aVF  Comparison: stable as compared to patient's most recent EKG         [CB]   2003 Elevated lactic acid elevated white count    [CB]   2011 Chest x-ray unremarkable    [CB]   2017 Low magnesium. We will replace. We will also start Rocephin for suspected UTI seen on urinalysis    [CB]   2017 Also his troponin 0.05.    [CB]   2032 EKG: This EKG is signed by emergency department physician. Rate: 73  Rhythm: Sinus  Interpretation: no acute changes, normal axis  Comparison: stable as compared to patient's most recent EKG         [CB]   2043 CT head unremarkable. [CB]   2101 With Dr. Nallely Briceno about the patient and need for admission she was agreeable.     [CB]      ED Course User Index  [CB] Yen Caldwell MD       --------------------------------------------- PAST HISTORY ---------------------------------------------  Past Medical History:  has a past medical history of Cataract, right, [facility] HTN (hypertension), Hyperlipidemia, Obesity, Osteoarthritis, Sinusitis chronic, and Wheelchair confinement. Past Surgical History:  has a past surgical history that includes Hysterectomy and pr xcapsl ctrc rmvl insj io lens prosth w/o ecp (Right, 8/10/2018). Social History:  reports that she has never smoked. She has never used smokeless tobacco. She reports that she does not drink alcohol or use drugs. Family History: family history is not on file. The patients home medications have been reviewed. Allergies: Patient has no known allergies.     -------------------------------------------------- RESULTS -------------------------------------------------    LABS:  Results for orders placed or performed during the hospital encounter of 09/24/20   CBC Auto Differential   Result Value Ref Range    WBC 16.5 (H) 4.5 - 11.5 E9/L    RBC 4.31 3.50 - 5.50 E12/L    Hemoglobin 10.5 (L) 11.5 - 15.5 g/dL    Hematocrit 34.2 34.0 - 48.0 %    MCV 79.4 (L) 80.0 - 99.9 fL    MCH 24.4 (L) 26.0 - 35.0 pg    MCHC 30.7 (L) 32.0 - 34.5 %    RDW 18.7 (H) 11.5 - 15.0 fL    Platelets 504 286 - 556 E9/L    MPV 9.4 7.0 - 12.0 fL    Neutrophils % 80.2 (H) 43.0 - 80.0 %    Immature Granulocytes % 1.3 0.0 - 5.0 %    Lymphocytes % 11.7 (L) 20.0 - 42.0 %    Monocytes % 6.1 2.0 - 12.0 %    Eosinophils % 0.5 0.0 - 6.0 %    Basophils % 0.2 0.0 - 2.0 %    Neutrophils Absolute 13.18 (H) 1.80 - 7.30 E9/L    Immature Granulocytes # 0.21 E9/L    Lymphocytes Absolute 1.93 1.50 - 4.00 E9/L    Monocytes Absolute 1.01 (H) 0.10 - 0.95 E9/L    Eosinophils Absolute 0.08 0.05 - 0.50 E9/L    Basophils Absolute 0.04 0.00 - 0.20 E9/L   Comprehensive Metabolic Panel w/ Reflex to MG   Result Value Ref Range    Sodium 135 132 - 146 mmol/L    Potassium reflex Magnesium 3.5 3.5 - 5.0 mmol/L    Chloride 100 98 - 107 mmol/L    CO2 22 22 - 29 mmol/L    Anion Gap 13 7 - 16 mmol/L    Glucose 114 (H) 74 - 99 mg/dL    BUN 22 8 - 23 mg/dL    CREATININE 1.0 0.5 - 1.0 mg/dL    GFR Non- 55 >=60 mL/min/1.73    GFR African American >60     Calcium 8.5 (L) 8.6 - 10.2 mg/dL    Total Protein 6.9 6.4 - 8.3 g/dL    Alb 2.7 (L) 3.5 - 5.2 g/dL    Total Bilirubin 0.2 0.0 - 1.2 mg/dL    Alkaline Phosphatase 89 35 - 104 U/L    ALT 7 0 - 32 U/L    AST 12 0 - 31 U/L   Lipase   Result Value Ref Range    Lipase 28 13 - 60 U/L   Troponin   Result Value Ref Range    Troponin 0.05 (H) 0.00 - 0.03 ng/mL   Brain Natriuretic Peptide   Result Value Ref Range    Pro- (H) 0 - 125 pg/mL   Urinalysis, reflex to microscopic   Result Value Ref Range    Color, UA Yellow Straw/Yellow    Clarity, UA CLOUDY (A) Clear    Glucose, Ur Negative Negative mg/dL    Bilirubin Urine SMALL (A) Negative    Ketones, Urine TRACE (A) Negative mg/dL    Specific Gravity, UA 1.025 1.005 - 1.030    Blood, Urine LARGE (A) Negative    pH, UA 5.5 5.0 - 9.0    Protein,  (A) Negative mg/dL    Urobilinogen, Urine 0.2 <2.0 E.U./dL    Nitrite, Urine Negative Negative    Leukocyte Esterase, Urine MODERATE (A) Negative   Lactate, Sepsis   Result Value Ref Range    Lactic Acid, Sepsis 2.8 (H) 0.5 - 1.9 mmol/L   Microscopic Urinalysis   Result Value Ref Range    WBC, UA 10-20 (A) 0 - 5 /HPF    RBC, UA 5-10 (A) 0 - 2 /HPF    Epithelial Cells, UA RARE /HPF    Bacteria, UA FEW (A) None Seen /HPF    Yeast, UA Present (A) None Seen /HPF   Magnesium   Result Value Ref Range    Magnesium 1.3 (L) 1.6 - 2.6 mg/dL   POCT Glucose   Result Value Ref Range    Glucose 115 mg/dL    QC OK? ok    EKG 12 Lead   Result Value Ref Range    Ventricular Rate 67 BPM    Atrial Rate 67 BPM    P-R Interval 138 ms    QRS Duration 66 ms    Q-T Interval 450 ms    QTc Calculation (Bazett) 475 ms    P Axis 76 degrees    R Axis 9 degrees    T Axis 64 degrees       RADIOLOGY:  XR CHEST PORTABLE   Final Result      No airspace opacities or pleural effusion. CT HEAD WO CONTRAST   Final Result      No evidence of acute intracranial hemorrhage or edema. If  clinical   concern exists for acute stroke, MRI brain with diffusion-weighted   imaging could be helpful for further evaluation.                        ------------------------- NURSING NOTES AND VITALS REVIEWED ---------------------------  Date / Time Roomed:  9/24/2020  6:07 PM  ED Bed Assignment:  27/27    The nursing notes within the ED encounter and vital signs as below have been reviewed. Patient Vitals for the past 24 hrs:   BP Temp Temp src Pulse Resp SpO2 Height Weight   09/24/20 2122 (!) 113/93 96.8 °F (36 °C) Oral 74 16 100 % -- --   09/24/20 1918 123/67 -- -- 72 16 100 % -- --   09/24/20 1847 111/68 96.8 °F (36 °C) Rectal 65 16 96 % 5' 4\" (1.626 m) 192 lb (87.1 kg)       Oxygen Saturation Interpretation: Normal    ------------------------------------------ PROGRESS NOTES ------------------------------------------  Re-evaluation(s):  Time: 1900  Patients symptoms show no change  Repeat physical examination is not changed    Counseling:  I have spoken with the patient and discussed todays results, in addition to providing specific details for the plan of care and counseling regarding the diagnosis and prognosis. Their questions are answered at this time and they are agreeable with the plan of admission.    --------------------------------- ADDITIONAL PROVIDER NOTES ---------------------------------  Consultations:  Time: 1958. Spoke with Dr. Qiu. Discussed case. They will admit the patient. This patient's ED course included: a personal history and physicial examination, re-evaluation prior to disposition, multiple bedside re-evaluations, IV medications, cardiac monitoring and continuous pulse oximetry    This patient has remained hemodynamically stable during their ED course. Diagnosis:  1. Altered mental status, unspecified altered mental status type    2. Urinary tract infection with hematuria, site unspecified    3.  Elevated troponin        Disposition:  Patient's disposition: Admit to telemetry  Patient's condition is stable. Juan Manuel Garcia MD  Resident  09/24/20 2129    ATTENDING PROVIDER ATTESTATION:     Angela Friedman presented to the emergency department for evaluation of Altered Mental Status (unresponsive at home)    I have reviewed and discussed the case, including pertinent history (medical, surgical, family and social) and exam findings with the Resident and the Nurse assigned to Angela Friedman. I have personally performed and/or participated in the history, exam, medical decision making, and procedures and agree with all pertinent clinical information. I have reviewed my findings and recommendations with Angela Friedman and members of family present at the time of disposition. I, Dr. Freddy Carey am the primary physician of record for this patient. MDM: The patient is 78 y.o. female  with a past medical history of       Diagnosis Date    Cataract, right     DM [facility]diabetes mellitus), type 2 ([facility] 11/3/2010    HTN (hypertension) 11/3/2010    Hyperlipidemia 11/3/2010    Obesity     Osteoarthritis 11/3/2010    Sinusitis chronic     seasonal    Wheelchair confinement     can pivot with assistance;  uses a lift chair     presenting to the emergency department with a chief complaint of altered mental status. Differential diagnosis includes but not limited to, syncope, intracranial hemorrhage, electrolyte derangement, urinary tract infection the patient did have labs and imaging which were reviewed. The patient did have a CBC remarkable for leukocytosis of 16.5, CMP fairly unremarkable, troponin mildly elevated at .05, there were no ischemic changes on EKG. The patient was found to have urinary tract infection as well as hypomagnesemia. Patient was treated symptomatically. The patient was given antibiotics as well as had a potassium replacement. The patient will be admitted for further treatment and evaluation. My findings/plan:  The primary encounter diagnosis was Altered mental status, unspecified altered mental status type. Diagnoses of Urinary tract infection with hematuria, site unspecified and Elevated troponin were also pertinent to this visit.   Current Discharge Medication List        Kirk Molina, [address], DO  09/27/20 6552

## 2020-09-25 NOTE — PROGRESS NOTES
Clarified with night RN Tasia: Rich to attempt AM labs, blood cultures, and lactate. 5S TL Qasim notified. Also, spoke with [facility],  and ED entrance have been ntofied that only 1 family member is permitted. Family were notifed by CNM on nights when patient was ED that only 1 family member is permitted to visit patient per day. Domitila Jacobo updated on this information.   Kesha OSCAR  8:15 AM

## 2020-09-26 NOTE — PLAN OF CARE
Problem: Falls - Risk of:  Goal: Will remain free from falls  Description: Will remain free from falls  Outcome: Met This Shift  Goal: Absence of physical injury  Description: Absence of physical injury  Outcome: Met This Shift     Problem: Malnutrition  (NI-5.2)  Goal: Food and/or Nutrient Delivery  Description: Individualized approach for food/nutrient provision.   9/25/2020 1448 by Shamika Bhatia, MS, RD, LD  Outcome: Met This Shift

## 2020-09-26 NOTE — PROGRESS NOTES
[facility] Progress Note    Admitting Date and Time: 9/24/2020  6:07 PM  Admit Dx: AMS (altered mental status) [R41.82]  AMS (altered mental status) [R41.82]  UTI (urinary tract infection) [N39.0]    Subjective:  Patient is being followed for AMS (altered mental status) [R41.82]  AMS (altered mental status) [R41.82]  UTI (urinary tract infection) [N39.0]     Patient awake, alert, resting in bed in no acute distress  Remains very confused  Stating that \" she needs to get home to her family\"  Denies pain      ROS: denies fever, chills, cp, sob, n/v, HA unless stated above.  fluconazole  200 mg Oral Daily    miconazole   Topical BID    allopurinol  100 mg Oral Daily    atenolol  50 mg Oral Daily    DULoxetine  60 mg Oral Daily    lisinopril  40 mg Oral Daily    glipiZIDE  10 mg Oral QAM AC    rosuvastatin  10 mg Oral Nightly    vitamin D  50,000 Units Oral Weekly    insulin lispro  0-12 Units Subcutaneous TID WC    insulin lispro  0-6 Units Subcutaneous Nightly    sodium chloride flush  10 mL Intravenous 2 times per day    enoxaparin  40 mg Subcutaneous Daily    miconazole nitrate   Topical BID    magnesium sulfate  2 g Intravenous Once     ibuprofen, 800 mg, Q6H PRN  glucose, 15 g, PRN  dextrose, 12.5 g, PRN  glucagon (rDNA), 1 mg, PRN  dextrose, 100 mL/hr, PRN  sodium chloride flush, 10 mL, PRN  acetaminophen, 650 mg, Q6H PRN    Or  acetaminophen, 650 mg, Q6H PRN  magnesium hydroxide, 30 mL, Daily PRN  promethazine, 12.5 mg, Q6H PRN    Or  ondansetron, 4 mg, Q6H PRN  melatonin, 3 mg, Nightly PRN  haloperidol lactate, 1 mg, Q4H PRN         Objective:    BP (!) 146/89   Pulse 90   Temp 97.8 °F (36.6 °C) (Oral)   Resp 16   Ht 5' 4\" (1.626 m)   Wt 199 lb 7 oz (90.5 kg)   SpO2 100%   BMI 34.23 kg/m²   General Appearance: alert and oriented to person only.  Confused to place/ time  Skin: warm and dry  Head: normocephalic and atraumatic  Neck: neck supple and non tender without mass   Pulmonary/Chest: clear to auscultation bilaterally  Cardiovascular: normal rate, normal S1 and S2 and no carotid bruits  Abdomen: soft, non-tender, non-distended, normal bowel sounds, no masses or organomegaly  Extremities: no cyanosis, no clubbing and no edema  Neurologic: speech normal       Recent Labs     09/24/20 1900 09/24/20 1907 09/25/20  1405 09/26/20  1155     --  140 140   K 3.5  --  2.8* 4.2  4.2     --  105 108*   CO2 22  --  23 22   BUN 22  --  25* 35*   CREATININE 1.0  --  1.1* 1.0   GLUCOSE 114* 115 105* 139*   CALCIUM 8.5*  --  8.6 8.5*       Recent Labs     09/24/20 1900 09/26/20  1155   WBC 16.5* 12.3*   RBC 4.31 3.95   HGB 10.5* 9.4*   HCT 34.2 31.5*   MCV 79.4* 79.7*   MCH 24.4* 23.8*   MCHC 30.7* 29.8*   RDW 18.7* 18.9*    335   MPV 9.4 9.1           Assessment:    Active Problems:    Primary osteoarthritis involving multiple joints    AMS (altered mental status)    UTI (urinary tract infection)    Severe protein-calorie malnutrition (HCC)  Resolved Problems:    * No resolved hospital problems. *      Plan:  1. Encephalopathy: pt presents from home with c/o altered mental status. Per EMS- family states that patient was unresponsive for 30 minutes. When EMS shook her- she woke up. Noted that she was alert and oriented in ER. CT head with no acute findings. Continue to treat UTI/ r/o OM of sacrum. ID following. Continue antibiotics. Patient remains very confused today. Monitor mentation closely. 2. UTI: UA reviewed- ID following. Continue IV ceftriaxone- changed to fluconazole    3. Chronic decubitus sacral ulcer stage III- r/o OM sacrum: ID following. Continue IV antibiotics    4. Leukocytosis; monitor     5. Wheelchair bound    6. HTN; continue meds    7. DM: hg a1c 7.0. On glucotrol. Insulin ss    8. Hypokalemia: supplement       NOTE: This report was transcribed using voice recognition software.  Every effort was made to ensure accuracy; however, inadvertent computerized transcription errors may be present.   Electronically signed by DIXIE Jackson on 9/26/2020 at 3:11 PM

## 2020-09-26 NOTE — PROGRESS NOTES
[address] [place] [address] [facility]  Progress Note    SUBJECTIVE:  Chief Complaint   Patient presents with    Altered Mental Status     unresponsive at home     Patient is tolerating medications. No reported adverse drug reactions. No nausea, vomiting, diarrhea. C/o burning right leg. No fevers. Little cough. No sob    Review of systems:  As stated above in the chief complaint, otherwise negative. Medications:  Scheduled Meds:   allopurinol  100 mg Oral Daily    atenolol  50 mg Oral Daily    DULoxetine  60 mg Oral Daily    lisinopril  40 mg Oral Daily    glipiZIDE  10 mg Oral QAM AC    rosuvastatin  10 mg Oral Nightly    vitamin D  50,000 Units Oral Weekly    insulin lispro  0-12 Units Subcutaneous TID WC    insulin lispro  0-6 Units Subcutaneous Nightly    sodium chloride flush  10 mL Intravenous 2 times per day    enoxaparin  40 mg Subcutaneous Daily    cefTRIAXone (ROCEPHIN) IV  1 g Intravenous Q24H    miconazole nitrate   Topical BID    magnesium sulfate  2 g Intravenous Once     Continuous Infusions:   dextrose      0.9% NaCl with KCl 20 mEq 75 mL/hr at 20 1546     PRN Meds:ibuprofen, glucose, dextrose, glucagon (rDNA), dextrose, sodium chloride flush, acetaminophen **OR** acetaminophen, magnesium hydroxide, promethazine **OR** ondansetron, melatonin, haloperidol lactate    OBJECTIVE:  BP (!) 146/89   Pulse 90   Temp 97.8 °F (36.6 °C) (Oral)   Resp 16   Ht 5' 4\" (1.626 m)   Wt 199 lb 7 oz (90.5 kg)   SpO2 100%   BMI 34.23 kg/m²   Temp  Av.2 °F (36.8 °C)  Min: 97.8 °F (36.6 °C)  Max: 98.5 °F (36.9 °C)  Constitutional: The patient is awake, alert, and oriented. Lying in bed in NAD  Skin: Warm and dry. Candidiasis under abd pannus  HEENT: Round and reactive pupils. Moist mucous membranes. No ulcerations or thrush. Neck: Supple to movements. Chest: No use of accessory muscles to breathe. Symmetrical expansion.   No wheezing, crackles or rhonchi. Cardiovascular: S1 and S2 are rhythmic and regular. No murmurs appreciated. Abdomen: Positive bowel sounds to auscultation. Soft, obese, nontender  Extremities: No clubbing, no cyanosis, no edema.   Lines: peripheral    Laboratory and Tests Review:  Lab Results   Component Value Date    WBC 12.3 (H) 09/26/2020    WBC 16.5 (H) 09/24/2020    WBC 10.1 08/19/2020    HGB 9.4 (L) 09/26/2020    HCT 31.5 (L) 09/26/2020    MCV 79.7 (L) 09/26/2020     09/26/2020     Lab Results   Component Value Date    NEUTROABS 8.90 (H) 09/26/2020    NEUTROABS 13.18 (H) 09/24/2020    NEUTROABS 7.53 (H) 08/19/2020     No results found for: [facility]  Lab Results   Component Value Date    ALT 9 09/26/2020    AST 13 09/26/2020    ALKPHOS 98 09/26/2020    BILITOT 0.2 09/26/2020     Lab Results   Component Value Date     09/26/2020    K 4.2 09/26/2020     09/26/2020    CO2 22 09/26/2020    BUN 35 09/26/2020    CREATININE 1.0 09/26/2020    CREATININE 1.1 09/25/2020    CREATININE 1.0 09/24/2020    GFRAA >60 09/26/2020    LABGLOM 55 09/26/2020    GLUCOSE 139 09/26/2020    GLUCOSE 147 03/07/2012    PROT 6.8 09/26/2020    LABALBU 2.5 09/26/2020    LABALBU 4.1 03/07/2012    CALCIUM 8.5 09/26/2020    BILITOT 0.2 09/26/2020    ALKPHOS 98 09/26/2020    AST 13 09/26/2020    ALT 9 09/26/2020     Lab Results   Component Value Date    CRP 8.0 (H) 07/15/2020    CRP 0.8 (H) 09/19/2017    CRP 9.6 (H) 10/12/2015     Lab Results   Component Value Date    SEDRATE 111 (H) 07/15/2020    SEDRATE 62 (H) 09/19/2017    SEDRATE 69 (H) 10/12/2015     Radiology:      Microbiology:   Microbiology:  sacral wound infection growing proteus and strep in July 2020  wound infection with Levaquin for pseudo and kleb in August, 2020  wound culture done 9/22/2020 growing corynebacterium  urine cx yeast, bacteria    Blood cx no growth  9/22 urine cx C albicans    Assessment:  · Chronic decubitus sacral ulcer stage III  · R/o osteomyelitis sacrum  · UTI  · Leukocytosis - improving    Plan:     · Change ceftriaxone to fluconazole   · Miconazole powder  · To adequately treat sacral wound would diverting colostomy  · Monitor labs  · Will follow with you      Luanne De Oliveira  12:39 PM  9/26/2020  Pt seen and examined. Above discussed agree with advanced practice nurse. Labs, cultures, and radiographs reviewed. Face to Face encounter occurred. Changes made as necessary.      Jason Urrutia MD

## 2020-09-26 NOTE — PROGRESS NOTES
2 RNs and PCA at bedside. Patient readjusted in bed, when nurse tried to give patient medication, patient became very agitated and refusing medication. Patient pulling at santos catheter, when nurses try to distract patient patient starts hitting staff and cussing. Son Mukesh Joaquin called per patient's request, he will be coming up to the hospital- and okay'd by the clinical manager. Santos catheter removed by RN at the bedside. Daughter Martin Laureano said it was placed due to her wounds on her coccyx. Son now at bedside with the patient. Will continue to monitor.     Electronically signed by Melany Martel RN on 9/25/2020 at 10:51 PM

## 2020-09-27 NOTE — PROGRESS NOTES
[address] [place] [address] [facility]  Progress Note    SUBJECTIVE:  Chief Complaint   Patient presents with    Altered Mental Status     unresponsive at home     Patient is tolerating medications. No reported adverse drug reactions. No nausea, vomiting, diarrhea. Feeling better. States leg pain better than yesterday. No fevers. No sob    Review of systems:  As stated above in the chief complaint, otherwise negative. Medications:  Scheduled Meds:   fluconazole  200 mg Oral Daily    miconazole   Topical BID    allopurinol  100 mg Oral Daily    atenolol  50 mg Oral Daily    DULoxetine  60 mg Oral Daily    lisinopril  40 mg Oral Daily    glipiZIDE  10 mg Oral QAM AC    rosuvastatin  10 mg Oral Nightly    vitamin D  50,000 Units Oral Weekly    insulin lispro  0-12 Units Subcutaneous TID WC    insulin lispro  0-6 Units Subcutaneous Nightly    sodium chloride flush  10 mL Intravenous 2 times per day    enoxaparin  40 mg Subcutaneous Daily    miconazole nitrate   Topical BID    magnesium sulfate  2 g Intravenous Once     Continuous Infusions:   dextrose      0.9% NaCl with KCl 20 mEq 75 mL/hr at 20 0616     PRN Meds:ibuprofen, glucose, dextrose, glucagon (rDNA), dextrose, sodium chloride flush, acetaminophen **OR** acetaminophen, magnesium hydroxide, promethazine **OR** ondansetron, melatonin, haloperidol lactate    OBJECTIVE:  /76   Pulse 68   Temp 97.8 °F (36.6 °C) (Oral)   Resp 16   Ht 5' 4\" (1.626 m)   Wt 199 lb 7 oz (90.5 kg)   SpO2 100%   BMI 34.23 kg/m²   Temp  Av.1 °F (36.7 °C)  Min: 97.8 °F (36.6 °C)  Max: 98.3 °F (36.8 °C)  Constitutional: The patient is awake, alert, and oriented. Lying in bed in NAD  Skin: Warm and dry. Candidiasis under abd pannus  HEENT: Round and reactive pupils. Moist mucous membranes. No ulcerations or thrush. Neck: Supple to movements. Chest: No use of accessory muscles to breathe. Symmetrical expansion.   No wheezing, sacrum  · UTI  · Leukocytosis - resolved    Plan:     · Continue fluconazole   · Miconazole powder  · To adequately treat sacral wound would diverting colostomy  · Monitor labs  · Will follow with you      April [address] [facility],[address]  12:01 PM  9/27/2020  Pt seen and examined. Above discussed agree with advanced practice nurse. Labs, cultures, and radiographs reviewed. Face to Face encounter occurred. Changes made as necessary.      Tirso Ash MD

## 2020-09-27 NOTE — PROGRESS NOTES
[facility] Progress Note    Admitting Date and Time: 9/24/2020  6:07 PM  Admit Dx: AMS (altered mental status) [R41.82]  AMS (altered mental status) [R41.82]  UTI (urinary tract infection) [N39.0]    Subjective:  Patient is being followed for AMS (altered mental status) [R41.82]  AMS (altered mental status) [R41.82]  UTI (urinary tract infection) [N39.0]     Patient awake, alert, resting in bed in no acute distress  Appears much more awake/ alert today  Following commands  Denies complaints        ROS: denies fever, chills, cp, sob, n/v, HA unless stated above.       fluconazole  200 mg Oral Daily    miconazole   Topical BID    allopurinol  100 mg Oral Daily    atenolol  50 mg Oral Daily    DULoxetine  60 mg Oral Daily    lisinopril  40 mg Oral Daily    glipiZIDE  10 mg Oral QAM AC    rosuvastatin  10 mg Oral Nightly    vitamin D  50,000 Units Oral Weekly    insulin lispro  0-12 Units Subcutaneous TID WC    insulin lispro  0-6 Units Subcutaneous Nightly    sodium chloride flush  10 mL Intravenous 2 times per day    enoxaparin  40 mg Subcutaneous Daily    miconazole nitrate   Topical BID    magnesium sulfate  2 g Intravenous Once     ibuprofen, 800 mg, Q6H PRN  glucose, 15 g, PRN  dextrose, 12.5 g, PRN  glucagon (rDNA), 1 mg, PRN  dextrose, 100 mL/hr, PRN  sodium chloride flush, 10 mL, PRN  acetaminophen, 650 mg, Q6H PRN    Or  acetaminophen, 650 mg, Q6H PRN  magnesium hydroxide, 30 mL, Daily PRN  promethazine, 12.5 mg, Q6H PRN    Or  ondansetron, 4 mg, Q6H PRN  melatonin, 3 mg, Nightly PRN  haloperidol lactate, 1 mg, Q4H PRN         Objective:    /76   Pulse 68   Temp 97.8 °F (36.6 °C) (Oral)   Resp 16   Ht 5' 4\" (1.626 m)   Wt 205 lb (93 kg)   SpO2 100%   BMI 35.19 kg/m²   General Appearance: alert and oriented to x 3   Skin: warm and dry  Head: normocephalic and atraumatic  Neck: neck supple and non tender without mass   Pulmonary/Chest: clear to auscultation bilaterally  Cardiovascular: normal rate, normal S1 and S2 and no carotid bruits  Abdomen: soft, non-tender, non-distended, normal bowel sounds, no masses or organomegaly  Extremities: no cyanosis, no clubbing and no edema  Neurologic: speech normal       Recent Labs     09/25/20  1405 09/26/20  1155 09/27/20  0922    140 138   K 2.8* 4.2  4.2 4.0    108* 106   CO2 23 22 21*   BUN 25* 35* 31*   CREATININE 1.1* 1.0 0.7   GLUCOSE 105* 139* 144*   CALCIUM 8.6 8.5* 8.6       Recent Labs     09/24/20  1900 09/26/20  1155 09/27/20  0922   WBC 16.5* 12.3* 9.8   RBC 4.31 3.95 4.03   HGB 10.5* 9.4* 9.6*   HCT 34.2 31.5* 32.6*   MCV 79.4* 79.7* 80.9   MCH 24.4* 23.8* 23.8*   MCHC 30.7* 29.8* 29.4*   RDW 18.7* 18.9* 19.1*    335 286   MPV 9.4 9.1 9.4           Assessment:    Active Problems:    Primary osteoarthritis involving multiple joints    AMS (altered mental status)    UTI (urinary tract infection)    Severe protein-calorie malnutrition (HCC)  Resolved Problems:    * No resolved hospital problems. *      Plan:  1. Encephalopathy: pt presents from home with c/o altered mental status. Per EMS- family states that patient was unresponsive for 30 minutes. When EMS shook her- she woke up. Noted that she was alert and oriented in ER. CT head with no acute findings. Continue to treat UTI/ r/o OM of sacrum. ID following. Continue antibiotics. Patient much more awake/ alert today - less confused. Appears to be resolving.      2. UTI: UA reviewed- ID following. Continue IV ceftriaxone- changed to fluconazole     3. Chronic decubitus sacral ulcer stage III- r/o OM sacrum: ID following. Continue IV antibiotics ? May need diverting colostomy per ID      4. Leukocytosis; monitor -resolving      5. Wheelchair bound     6. HTN; continue meds     7. DM: hg a1c 7.0. On glucotrol. Insulin ss     8.  Hypokalemia: supplement      9. Acidosis: monitor       NOTE: This report was transcribed using voice recognition software. Every effort was made to ensure accuracy; however, inadvertent computerized transcription errors may be present.   Electronically signed by DIXIE Rowland on 9/27/2020 at 2:01 PM

## 2020-09-28 NOTE — DISCHARGE SUMMARY
[facility] EMERGENCY SERVICE Physician Discharge Summary       [facility]  [place], [address] [address]  [phone]    Call in 1 week      Valentin Morton MD  [address] [place] [address] [address]  [address] [phone]    Call        Activity level: As tolerated    Diet: DIET CARB CONTROL; Dietary Nutrition Supplements: Diabetic Oral Supplement  Dietary Nutrition Supplements: Wound Healing Oral Supplement    Dispo:Home    Condition at discharge: Stable    Patient ID:  Lissa Short  [id number]  78 y.o.  1950    Admit date: 9/24/2020    Discharge date and time:  9/28/2020  3:07 PM    Admission Diagnoses: Active Problems:    Primary osteoarthritis involving multiple joints    AMS (altered mental status)    UTI (urinary tract infection)    Severe protein-calorie malnutrition (HCC)  Resolved Problems:    * No resolved hospital problems. *      Discharge Diagnoses: Active Problems:    Primary osteoarthritis involving multiple joints    AMS (altered mental status)    UTI (urinary tract infection)    Severe protein-calorie malnutrition (HCC)  Resolved Problems:    * No resolved hospital problems. *      Consults:  IP CONSULT TO INFECTIOUS DISEASES  IP CONSULT TO DIETITIAN  IP CONSULT TO IV TEAM  IP CONSULT TO IV TEAM  IP CONSULT TO HOME CARE NEEDS    Procedures:   None    Hospital Course: Patient was admitted with AMS (altered mental status) [R41.82]  AMS (altered mental status) [R41.82]  UTI (urinary tract infection) [N39.0]  UTI (urinary tract infection) [N39.0]. Patient is a 69-year-old female with a past medical history of diabetes mellitus, hypertension, hyperlipidemia, obesity, osteoarthritis, wheelchair bound who presented to the ED on 9-25-20 complaining of altered mental status.   Patient was found to have a urinary tract infection upon admission, he was consulted, cultures did grow Candida albicans, initially treated with ceftriaxone now changed to fluconazole. She does have chronic decubitus sacral ulcer, ID is following and states patient may need diverting colostomy for optimal treatment. Wound cultures previously did grow Corynebacterium. ID states patient is okay for discharge. Did have hypomagnesemia of 1 4 on day of discharge. I did discuss with Dr. Miranda Stands will discharge patient on mag oxide 400 mg x 7 days. Patient will be discharged home today to follow-up with primary care doctor and ID within 1 week. Discharge Exam:  Vitals:    09/28/20 0434 09/28/20 0603 09/28/20 0900 09/28/20 1200   BP: (!) 147/63  (!) 165/72    Pulse: 65  88 79   Resp: 16  16 16   Temp: 97.5 °F (36.4 °C)  97.9 °F (36.6 °C) 97.3 °F (36.3 °C)   TempSrc: Oral  Oral Oral   SpO2: 100%  100%    Weight:  206 lb 14.4 oz (93.8 kg)     Height:           General Appearance: in no acute distress and alert  Skin: warm and dry, no rash or erythema  Pulmonary/Chest: clear to auscultation bilaterally- no wheezes, rales or rhonchi, normal air movement, no respiratory distress  Cardiovascular: normal rate, regular rhythm, normal S1 and S2, no murmurs, no gallops and no JVD  Abdomen: soft, non-tender, non-distended, normal bowel sounds, no masses or organomegaly  Extremities: no cyanosis, no clubbing and no edema  I/O last 3 completed shifts: In: 934.2 [I.V.:934.2]  Out: -   No intake/output data recorded.       LABS:  Recent Labs     09/26/20  1155 09/27/20  0922 09/28/20  0815    138 139   K 4.2  4.2 4.0 4.3   * 106 110*   CO2 22 21* 21*   BUN 35* 31* 30*   CREATININE 1.0 0.7 0.7   GLUCOSE 139* 144* 143*   CALCIUM 8.5* 8.6 8.9       Recent Labs     09/26/20  1155 09/27/20  0922 09/28/20  0815   WBC 12.3* 9.8 8.3   RBC 3.95 4.03 4.10   HGB 9.4* 9.6* 9.8*   HCT 31.5* 32.6* 33.3*   MCV 79.7* 80.9 81.2   MCH 23.8* 23.8* 23.9*   MCHC 29.8* 29.4* 29.4*   RDW 18.9* 19.1* 19.6*   PLT 335 286 274   MPV 9.1 9.4 9.0       No results for input(s): POCGLU in the last 72 hours. Imaging:   XR CHEST PORTABLE   Final Result      No airspace opacities or pleural effusion. CT HEAD WO CONTRAST   Final Result      No evidence of acute intracranial hemorrhage or edema. If  clinical   concern exists for acute stroke, MRI brain with diffusion-weighted   imaging could be helpful for further evaluation. Patient Instructions:      Medication List      START taking these medications    fluconazole 200 MG tablet  Commonly known as:  DIFLUCAN  Take 1 tablet by mouth daily for 20 days  Start taking on:  September 29, 2020     Magnesium Oxide 400 MG Caps  Take 400 mg by mouth daily for 7 days     miconazole 2 % powder  Commonly known as:  MICOTIN  Apply topically 2 times daily. CONTINUE taking these medications    Accu-Chek Multiclix Lancets Misc  Check BS Twice daily     acetaminophen 325 MG tablet  Commonly known as:  TYLENOL  Take 2 tablets by mouth every 4 hours as needed for Pain     allopurinol 100 MG tablet  Commonly known as:  Zyloprim  Take 1 tablet by mouth daily     atenolol 50 MG tablet  Commonly known as:  TENORMIN  take 1 tablet by mouth twice a day     blood glucose test strips strip  Commonly known as:  Accu-Chek Elizabeth Plus  TEST twice a day FASTING BEFORE BREAKFAST AND SUPPER     [place]etta 10 MCG Pen 10 MCG/0.04ML injection  Generic drug:  exenatide  inject . 04 milliliters subcutaneously twice a day with food     DULoxetine 60 MG extended release capsule  Commonly known as:  CYMBALTA  Take 1 capsule by mouth daily     glipiZIDE 5 MG extended release tablet  Commonly known as:  GLUCOTROL XL  take 1 tablet by mouth once daily     ibuprofen 800 MG tablet  Commonly known as:  ADVIL;MOTRIN  Take 1 tablet by mouth every 8 hours as needed for Pain     * Insulin Pen Needle 31G X 5 MM Misc  Commonly known as:  B-D UF III MINI PEN NEEDLES  use twice a day     * Insulin Pen Needle 31G X 8 MM Misc  Commonly known as:  B-D ULTRAFINE III SHORT PEN  Inject 1 kit into the skin 2 times daily     lisinopril 40 MG tablet  Commonly known as:  PRINIVIL;ZESTRIL  Take 1 tablet by mouth daily     metFORMIN 1000 MG tablet  Commonly known as:  GLUCOPHAGE  take 1 tablet by mouth twice a day WITH MEALS     * Misc. Devices Misc  Bedside commode. Dx osteoarthritis 715.90. Height 5'4.5\". Weight 245 lbs. * [address]. [address] bed     * Misc. Devices Misc  wheelchair     * Misc. Devices Misc  Wheel chair to assist with ADL with diagnosis of OA and full thickness knee meniscal tear     * Misc. Devices Misc  Grab bar  diagnosis osteoarthritis and full thickness knee meniscal tear     * Misc. Devices Kit  1 kit by Does not apply route daily as needed Foam mattress for bilateral knees osteoarthritis , degenerative back osteoarthitis     * Misc. [address] Bed with gel overlay dsp # 1  Pt has Osteoarthritis and is Morbidly Obese     * Misc. Devices Misc  Wheelchair cushion     * Mattress Pad Misc  1 each by Does not apply route once for 1 dose     * Misc. Devices Kit  1 kit by Does not apply route daily as needed (leg edema) Compression stockings for bilateral leg swelling     RA Alcohol Swabs 70 % Pads  USE WITH INJECTIONS TWICE A DAY AND CHECKING BLOOD SUGAR TWICE A DAY     rosuvastatin 10 MG tablet  Commonly known as:  CRESTOR  take 1 tablet by mouth once daily     vitamin D 1.25 MG (72987 UT) Caps capsule  Commonly known as:  ERGOCALCIFEROL  Take 1 capsule by mouth once a week         * This list has 12 medication(s) that are the same as other medications prescribed for you. Read the directions carefully, and ask your doctor or other care provider to review them with you.                Where to Get Your Medications      These medications were sent to [address] [facility] [address] [facility] [address]    Phone:  [phone]   · Magnesium Oxide 400 MG Caps     You can get these medications from any pharmacy    Bring a paper prescription for each of these medications  · fluconazole 200 MG tablet  · miconazole 2 % powder           Note that more than 30 minutes was spent in preparing discharge papers, discussing discharge with patient, medication review, etc.    Signed:  Electronically signed by Anjel Arboleda PA-C on 9/28/2020 at 3:07 PM    NOTE: This report was transcribed using voice recognition software. Every effort was made to ensure accuracy; however, inadvertent computerized transcription errors may be present.

## 2020-09-28 NOTE — CARE COORDINATION
D/c order noted;pt to transport to [address] [place] [address] at 7pm tonite via [facility]. Transport forms on chart. [facility] aware; son Adrianne Bobby notified via phone. Agreeable to plan . Jeanette Thompson.

## 2020-09-28 NOTE — PROGRESS NOTES
[address] [place] [address] [facility]  [facility]  Progress Note    SUBJECTIVE:  Chief Complaint   Patient presents with    Altered Mental Status     unresponsive at home     Patient is tolerating medications. No reported adverse drug reactions. No nausea, vomiting, diarrhea. Feeling better. States leg pain better than yesterday. No fevers. No sob    Review of systems:  As stated above in the chief complaint, otherwise negative. Medications:  Scheduled Meds:   fluconazole  200 mg Oral Daily    miconazole   Topical BID    allopurinol  100 mg Oral Daily    atenolol  50 mg Oral Daily    DULoxetine  60 mg Oral Daily    lisinopril  40 mg Oral Daily    glipiZIDE  10 mg Oral QAM AC    rosuvastatin  10 mg Oral Nightly    vitamin D  50,000 Units Oral Weekly    insulin lispro  0-12 Units Subcutaneous TID WC    insulin lispro  0-6 Units Subcutaneous Nightly    sodium chloride flush  10 mL Intravenous 2 times per day    enoxaparin  40 mg Subcutaneous Daily    miconazole nitrate   Topical BID    magnesium sulfate  2 g Intravenous Once     Continuous Infusions:   dextrose      0.9% NaCl with KCl 20 mEq Stopped (20 0800)     PRN Meds:ibuprofen, glucose, dextrose, glucagon (rDNA), dextrose, sodium chloride flush, acetaminophen **OR** acetaminophen, magnesium hydroxide, promethazine **OR** ondansetron, melatonin, haloperidol lactate    OBJECTIVE:  BP (!) 165/72   Pulse 79   Temp 97.3 °F (36.3 °C) (Oral)   Resp 16   Ht 5' 4\" (1.626 m)   Wt 206 lb 14.4 oz (93.8 kg)   SpO2 100%   BMI 35.51 kg/m²   Temp  Av.6 °F (36.4 °C)  Min: 97.3 °F (36.3 °C)  Max: 97.9 °F (36.6 °C)  Constitutional: The patient is awake, alert, and oriented. Lying in bed in NAD  Skin: Warm and dry. Candidiasis under abd pannus              HEENT: Round and reactive pupils. Moist mucous membranes. No ulcerations or thrush. Neck: Supple to movements. Chest: No use of accessory muscles to breathe. Symmetrical expansion. III  · R/o osteomyelitis sacrum  · UTI  · Leukocytosis - resolved    Plan:     · Continue fluconazole   · Miconazole powder  · To adequately treat sacral wound would suggest diverting colostomy  · Monitor labs  · Will follow with you      Candie Villalta  1:11 PM  9/28/2020

## 2020-09-28 NOTE — CONSULTS
I spoke with Charge Nurse, Cynthia Leon, re that this patient is very difficult venous access and may need a Midline or PICC; if continued IV access is needed. She states she will speak with Dr and get back to us.    Burt Edward RN, CPUI, [facility]

## 2020-09-28 NOTE — PLAN OF CARE
Problem: Falls - Risk of:  Goal: Will remain free from falls  Description: Will remain free from falls  9/28/2020 1846 by Claire Chen RN  Outcome: Completed  9/28/2020 0543 by Sriram Underwood RN  Outcome: Met This Shift  Goal: Absence of physical injury  Description: Absence of physical injury  9/28/2020 1846 by Claire Chen RN  Outcome: Completed  9/28/2020 0543 by Sriram Underwood RN  Outcome: Met This Shift     Problem: Skin Integrity:  Goal: Will show no infection signs and symptoms  Description: Will show no infection signs and symptoms  9/28/2020 1846 by Claire Chen RN  Outcome: Completed  9/28/2020 0543 by Sriram Underwood RN  Outcome: Met This Shift  Goal: Absence of new skin breakdown  Description: Absence of new skin breakdown  9/28/2020 1846 by Claire Chen RN  Outcome: Completed  9/28/2020 0543 by Sriram Underwood RN  Outcome: Met This Shift     Problem: Pain:  Goal: Pain level will decrease  Description: Pain level will decrease  9/28/2020 1846 by Claire Chen RN  Outcome: Completed  9/28/2020 0543 by Sriram Underwood RN  Outcome: Met This Shift  Goal: Control of acute pain  Description: Control of acute pain  9/28/2020 1846 by Claire Chen RN  Outcome: Completed  9/28/2020 0543 by Sriram Underwood RN  Outcome: Met This Shift  Goal: Control of chronic pain  Description: Control of chronic pain  9/28/2020 1846 by Claire Chen RN  Outcome: Completed  9/28/2020 0543 by Sriram Underwood RN  Outcome: Met This Shift

## 2020-09-29 NOTE — ADT AUTH CERT
Urinary Tract Infection (UTI) - Care Day 4 (9/27/2020) by Anson Rivas RN         Review Status  Review Entered    Completed  9/27/2020 14:19        Criteria Review       Care Day: 4 Care Date: 9/27/2020 Level of Care: Inpatient Floor    Guideline Day 1    Clinical Status    ( ) * Clinical Indications met [H]    * Milestone    Additional Notes    9/27/2020 Upgrade to IP -PA Recommendation       Vitals: BP: 115/76, T: 97.8, P: 68, R: 16, SPO2: 100%    Abnormal labs: CO2 21, BUN 31, Albumin 2.3, Glucose 144, Hgb 9.6, Hct 32.6       Medications: Zyloprim 100mg, Tenormin 50mg, Cymbalta 60mg, Lovenox 40mg, Glucotrol 10mg, Humalog SS, Lisinopril 40mg, Crestor 10mg, 0.9% NaCl w/KCl 20meq IV @ 75/h       Orders: Tele       Internal Medicine:    1.  Encephalopathy: pt presents from home with c/o altered mental status. Per EMS- family states that patient was unresponsive for 30 minutes. When EMS shook her- she woke up. Noted that she was alert and oriented in ER. CT head with no acute findings. Continue to treat UTI/ r/o OM of sacrum. ID following. Continue antibiotics. Patient much more awake/ alert today - less confused. Appears to be resolving.         2. UTI: UA reviewed- ID following. Continue IV ceftriaxone- changed to fluconazole         3. Chronic decubitus sacral ulcer stage III- r/o OM sacrum: ID following. Continue IV antibiotics ? May need diverting colostomy per ID         4. Leukocytosis; monitor -resolving         5. Wheelchair bound         6. HTN; continue meds         7. DM: hg a1c 7.0.  On glucotrol. Insulin ss         8. Hypokalemia: supplement          9.  Acidosis: monitor       Infectious Disease:    Plan:      Continue fluconazole**    Miconazole Powder**    To adequately treat sacral wound would diverting colostomy** **    · Monitor labs        PA Referral by Anson Rivas RN         Review Status  Review Entered    In Primary  9/27/2020 11:32        Additional Notes    Note type: Physician Advisor Note level: Hospital Account    Note text: slr obs to in       We recommend that the following pt's current hospitalization under OBSERVATION    status is upgraded to INPATIENT; if you agree, please place a new ADMIT order    in [facility] as recommended.  .          Name: Yuly Hadley    : 1950    CSN: [id number]    INSURANCE: [address] [place] [address] [facility]             Clinical summary Sirs criteria met on admission due to UTI, requiring iv    antibiotics    Vitals stabilizing    Labs and Imaging stabilizing    MCG criteria applies yes    Comments          This chart was reviewed at 11:18 AM 2020       [address] [facility]              Urinary Tract Infection (UTI) - Care Day 3 (2020) by Suzan Fraga RN         Review Status  Review Entered    Completed  2020 08:50        Criteria Review       Care Day: 3 Care Date: 2020 Level of Care: Inpatient Floor    Guideline Day 1    Level Of Care    (X) Floor [G]    2020 8:50 AM EDT by Gaby Kruse      ms w/ tele    Clinical Status    ( ) * Clinical Indications met [H]    Routes    (X) IV fluids, medications    2020 8:50 AM EDT by Gaby Kruse      0.9% NaCl with KCl 20 mEq infusion    Rate: 75 mL/hr Freq: CONTINUOUS Route: IV    potassium chloride 10 mEq/100 mL IVPB (Peripheral Line)  x1    (X) Diet as tolerated    2020 8:50 AM EDT by Gaby Kruse      Carb controlled    Interventions    (X) Renal function tests, CBC    2020 8:50 AM EDT by Gaby Kruse      2020 11:55  WBC: 12.3 (H)  RBC: 3.95  Hemoglobin Quant: 9.4 (L)  Hematocrit: 31.5 (L)    Medications    (X) Antibiotics    2020 8:50 AM EDT by Gaby Kruse      cefTRIAXone (ROCEPHIN) 1 g in sterile water 10 mL IV syringe    * Milestone    Additional Notes        BP (!) 146/89   Pulse 90   Temp 97.8 °F (36.6 °C) (Oral)   Resp 16   Ht 5' 4\" (1.626 m)   Wt 199 lb 7 oz (90.5 kg)   SpO2 100%   BMI 34.23 kg/m²       IM    Plan:    1.  Encephalopathy: pt presents from home with c/o altered mental status. Per EMS- family states that patient was unresponsive for 30 minutes. When EMS shook her- she woke up. Noted that she was alert and oriented in ER. CT head with no acute findings. Continue to treat UTI/ r/o OM of sacrum. ID following. Continue antibiotics. Patient remains very confused today. Monitor mentation closely.         2. UTI: UA reviewed- ID following. Continue IV ceftriaxone- changed to fluconazole         3. Chronic decubitus sacral ulcer stage III- r/o OM sacrum: ID following. Continue IV antibiotics         4. Leukocytosis; monitor         5. Wheelchair bound         6. HTN; continue meds         7. DM: hg a1c 7.0.  On glucotrol. Insulin ss         8.  Hypokalemia: supplement    -------------------    ID    Assessment:    · Chronic decubitus sacral ulcer stage III    · R/o osteomyelitis sacrum    · UTI    · Leukocytosis - improving    Plan:      · Change ceftriaxone to fluconazole    · Miconazole powder    · To adequately treat sacral wound would diverting colostomy    · Monitor labs    · Will follow with you     --------------------       9/26/2020 06:04    Meter Glucose: 114 (H)       9/26/2020 11:55    Potassium: 4.2    Lactic Acid: 1.3    Total Protein: 6.8    CRP: 10.0 (H)    Albumin: 2.5 (L)    Alk Phos: 98    ALT: 9    AST: 13    Bilirubin: 0.2    Bilirubin, Direct: <0.2    Bilirubin, Indirect: see below    WBC: 12.3 (H)    RBC: 3.95    Hemoglobin Quant: 9.4 (L)    Hematocrit: 31.5 (L)    MCV: 79.7 (L)    MCH: 23.8 (L)    MCHC: 29.8 (L)    MPV: 9.1    RDW: 18.9 (H)    Platelet Count: 075    Neutrophils %: 72.3    Immature Granulocytes %: 0.8    Lymphocyte %: 19.1 (L)    Monocytes %: 7.0    Eosinophils %: 0.5    Basophils %: 0.3    Neutrophils Absolute: 8.90 (H)    Immature Granulocytes #: 0.10    Lymphocytes Absolute: 2.35    Monocytes Absolute: 0.86    Eosinophils Absolute: 0.06 Basophils Absolute: 0.04    Sed Rate: 80 (H)       9/26/2020 11:55    Sodium: 140    Potassium: 4.2    Chloride: 108 (H)    CO2: 22    BUN: 35 (H)    Creatinine: 1.0    Anion Gap: 10    GFR Non-: 55    GFR African American: >60    Glucose: 139 (H)    Calcium: 8.5 (L)       9/26/2020 12:00    Meter Glucose: 145 (H)       9/26/2020 16:22    Meter Glucose: 188 (H)       9/26/2020 20:34    Meter Glucose: 94    -------------------                Scheduled Medications    · fluconazole 200 mg Oral Daily    · miconazole Topical BID    · allopurinol 100 mg Oral Daily    · atenolol 50 mg Oral Daily    · DULoxetine 60 mg Oral Daily    · lisinopril 40 mg Oral Daily    · glipiZIDE 10 mg Oral QAM AC    · rosuvastatin 10 mg Oral Nightly    · vitamin D 50,000 Units Oral Weekly    · insulin lispro 0-12 Units Subcutaneous TID WC    · insulin lispro 0-6 Units Subcutaneous Nightly    · sodium chloride flush 10 mL Intravenous 2 times per day    · enoxaparin 40 mg Subcutaneous Daily    · miconazole nitrate Topical BID    · magnesium sulfate 2 g Intravenous Once             PRN Medications              Urinary Tract Infection (UTI) - Care Day 2 (9/25/2020) by Uyen Hagan RN         Review Status  Review Entered    Completed  9/25/2020 07:55        Criteria Review       Care Day: 2 Care Date: 9/25/2020 Level of Care:    Guideline Day 1    Clinical Status    ( ) * Clinical Indications met [H]    * Milestone    Additional Notes    09/25/2020 obs    VS T 97.8 P 67 R 16 /75 spo2 99% RA       Meds: allopurinol 100mg po daily, atenolol 50mg po daily, rocephin 1gm IV Q24hrs, cymbalta 60mg po daily, lovenox 40mg subq daily, glucotrol 10mg po daily, insulin lispro ss subq four times daily, lisinopril 40mg po daily, crestor 10mg po nightly,          H&P IM         CHIEF COMPLAINT: Used at home         History of Present Illness: Provided to the hospital because she seemed altered EMS transported her from her home to the emergency room upon arrival in the emergency room she seems alert and oriented and is asking for food her work-up was benign her CT of the head was normal chest x-ray was normal magnesium was slightly low and was replaced in the emergency room.  Analysis was noted to be abnormal by the emergency department. Debbie Fulton is admitted to the hospital for treatment of the urinary tract infection       General Appearance: alert and oriented to person, place and time and in no acute distress asking for food. Skin: warm and dry    Head: normocephalic and atraumatic    Eyes: pupils equal, round, and reactive to light, extraocular eye movements intact, conjunctivae normal    Neck: neck supple and non tender without mass    Pulmonary/Chest: clear to auscultation bilaterally- no wheezes, rales or rhonchi, normal air movement, no respiratory distress    Cardiovascular: normal rate, normal S1 and S2 and no carotid bruits    Abdomen: soft, non-tender, non-distended, normal bowel sounds, no masses or organomegaly    Extremities: no cyanosis, no clubbing and no edema    Neurologic: no cranial nerve deficit and speech normal            CT HEAD WO CONTRAST    Final Result         No evidence of acute intracranial hemorrhage or edema. If  clinical    concern exists for acute stroke, MRI brain with diffusion-weighted    imaging could be helpful for further evaluation.                                     EKG:   Normal sinus rhythm    Low voltage QRS    Nonspecific ST and T wave abnormality    Abnormal ECG         ASSESSMENT:           Active Problems:      Primary osteoarthritis involving multiple joints      AMS (altered mental status)      UTI (urinary tract infection)    Resolved Problems:      * No resolved hospital problems.  *            PLAN:              1.    Primary osteoarthritis involving multiple joints-she is wheelchair-bound he will need nursing assistance to assist her when she is out of bed.         2.  AMS (altered mental status)-in the emergency room she was noted to be alert and oriented x3 she was found to have urinary tract infection was started on treatment with Rocephin.  Blood sugar is lowish. Mace  will be covered with sliding scale her metformin will be held while she is in the hospital         3.  UTI (urinary tract infection) she has been started on treatment with Rocephin in the emergency room and is to be continued.                   Code Status full    DVT prophylaxis: Lovenox

## 2020-10-02 PROBLEM — J18.9 COMMUNITY ACQUIRED PNEUMONIA: Status: ACTIVE | Noted: 2020-01-01

## 2020-10-02 PROBLEM — J96.90 RESPIRATORY FAILURE (HCC): Status: ACTIVE | Noted: 2020-01-01

## 2020-10-02 PROBLEM — R82.90 ABNORMAL URINALYSIS: Status: ACTIVE | Noted: 2020-01-01

## 2020-10-02 PROBLEM — J96.01 ACUTE HYPOXEMIC RESPIRATORY FAILURE (HCC): Status: ACTIVE | Noted: 2020-01-01

## 2020-10-02 PROBLEM — Z20.822 SUSPECTED COVID-19 VIRUS INFECTION: Status: ACTIVE | Noted: 2020-01-01

## 2020-10-02 NOTE — ED NOTES
Came in to  Roll patient to look at any pressure wounds, Patient remains unresponsive, rolled patient, cleaned patient of soft stool, Hemoccult (negative) removed soiled dressings, appeared to be a wound vac dressing. Two coccyx wounds appeared to be in fair healing stages. No drainage noted. Rectal temp taken 94.4. Blood sugar tested 98 ( Dr. Annie Funes notified) Blood cultures completed. Medicated as ordered. Rakesh Bears turned off for trending  low BP.       Adriano Andersen RN  10/02/20 [id number]

## 2020-10-02 NOTE — ED NOTES
Assumed care of patient. Introduced self to patient as RN. Patient is unresponsive. Awaiting going to CT and other diagnostic tests to be resulted. Continue to closely monitor.       Lazarus Keys, RN  10/02/20 2037

## 2020-10-02 NOTE — ED NOTES
Report given to [facility] ext Fayestephbelkisury, [address] [facility]  10/02/20 [id number]

## 2020-10-02 NOTE — ED NOTES
Dr Jocelynn Mcguire called to room , BP dropping. Bear hugger ordered and placed on patient, blood glucose recheck 94. Fluids started on patient. Patient is now intermittently opening eyes but not following commands.  Will be calling report to intensive Dennie Members, RN  10/02/20 [id number]

## 2020-10-02 NOTE — ED NOTES
Bed: 26  Expected date:   Expected time:   Means of arrival:   Comments:  EMS     Mindi Barclay RN  17/70/38 [id number]

## 2020-10-02 NOTE — ED PROVIDER NOTES
This is a 71-year-old female with history of diabetes, HTN, obesity, presenting to the emergency department for unresponsiveness, hypoglycemia. EMS report that blood sugar was initially in the 30s, patient was given oral glucose by family member, upon arrival at patient's home EMS report patient was gurgling, rales were heard in bilateral lung fields, patient was satting in the 70s on room air. Patient was placed on nonrebreather and brought to the emergency department. Upon presentation to the emergency department patient was unresponsive, with slow spontaneous respirations. Later discussion with son who was with patient at the time of her hypoglycemic event reports that she has not been feeling ill recently since her hospital discharge, has not had any fevers, chills, abdominal pain, cough, shortness of breath. The history is provided by the EMS personnel and medical records. The history is limited by the condition of the patient. Review of Systems   Unable to perform ROS: Patient unresponsive        Physical Exam  Vitals signs and nursing note reviewed. Constitutional:       General: She is in acute distress. Appearance: She is obese. She is ill-appearing. She is not diaphoretic. Comments: Obese -American female laying in bed, unresponsive. HENT:      Head: Normocephalic and atraumatic. Right Ear: External ear normal.      Left Ear: External ear normal.      Nose: Nose normal.      Mouth/Throat:      Mouth: Mucous membranes are moist.      Pharynx: Oropharynx is clear. Comments: Poor dentition  Eyes:      General:         Right eye: No discharge. Left eye: No discharge. Conjunctiva/sclera: Conjunctivae normal.      Pupils: Pupils are equal, round, and reactive to light. Neck:      Musculoskeletal: No neck rigidity. Cardiovascular:      Rate and Rhythm: Normal rate and regular rhythm. Pulses: Normal pulses.       Heart sounds: Normal heart sounds. Pulmonary:      Effort: Respiratory distress present. Breath sounds: Rhonchi present. No wheezing. Comments: Rhonchi throughout bilateral lung fields  Abdominal:      General: There is no distension. Tenderness: There is no abdominal tenderness. There is no guarding. Comments: Round, soft. Genitourinary:     Rectum: Guaiac result negative. Musculoskeletal:         General: No deformity. Right lower leg: No edema. Left lower leg: No edema. Lymphadenopathy:      Cervical: No cervical adenopathy. Skin:     General: Skin is warm and dry. Capillary Refill: Capillary refill takes 2 to 3 seconds. Neurological:      Comments: Unresponsive   Psychiatric:      Comments: unResponsive          Procedures   Intubation Procedure Note    Indication: airway compromise and airway protection    Consent: Unable to be obtained due to the emergent nature of this procedure. Medications Used: etomidate (20mg) intravenously and succinycholine (100mg) intravenously    Procedure: The patient was placed in the appropriate position. Cricoid pressure was not required. Intubation was performed by indirect laryngoscopy using a glidescope and a 7.5 cuffed endotracheal tube. The cuff was then inflated and the tube was secured appropriately at a distance of 22 cm to the dental ridge. Initial confirmation of placement included bilateral breath sounds, absence of sounds over the stomach, tube fogging, adequate chest rise and adequate pulse oximetry reading. A chest x-ray to verify correct placement of the tube was ordered and demonstrated distal tip of tube at level of ritesh, tube was retracted by 2 to 3 cm. The patient tolerated the procedure well. Complications: None      MDM  Number of Diagnoses or Management Options  Acute respiratory failure with hypercapnia ([address]): Altered mental status, unspecified altered mental status type:    Anemia, unspecified type:   Pneumonia due to COVID-19 virus:   Pneumonia due to organism:   Sacral decubitus ulcer, stage III [facility]):   Diagnosis management comments: This is a 79-year-old female with history of hypertension, diabetes presenting to the emergency department for unresponsiveness, hypoglycemia. EMS report initial blood glucose was in the 30s, patient was given oral glucose by family member and after this appeared to aspirate, rhonchi were heard lung fields. Patient was hypoxic on room air, placed on nonrebreather with improvement. Patient was given D10 by EMS, given amp of D50 in the emergency department with no improvement. She was unresponsive, was intubated. Given possibility of aspiration, patient will be worked up for sepsis, will get CT head to evaluate for possible treatment because of altered mental status. Patient's work-up remarkable for severe anemia not requiring transfusion. Patient's COVID testing was negative however CT of the chest demonstrates findings concerning for COVID-19 pneumonia. It is unclear where patient is losing blood, Hemoccult was negative, CT of the abdomen was unremarkable. Given findings consistent with atypical pneumonia, patient was covered for this, will also receive Decadron given possibility of COVID pneumonia. Patient does have severe sacral decubitus ulcer with possible osteomyelitis, she was recently admitted to the hospital for this, it is possible but less likely that this is contributing to her sepsis, unresponsiveness. Blood cultures were obtained initially. Patient will be admitted to the ICU for further monitoring, work-up, treatment, management. Family is comfortable with this plan, all questions were answered. ED Course as of Oct 02 1615   Fri Oct 02, 2020   2526 X-ray reveals new groundglass opacities, patient is to be negative. Will obtain CT of the chest to further evaluate.     [RH]   0534 Given anemia, will obtain CT of abdomen    [RH]   2925 Reevaluated, son at bedside. Son updated on results of labs, imaging, prognosis, current plan. Daughter on speaker phone, updated on results, plan, prognosis. [RH]   2467 Reevaluated, she has not been receiving propofol. She is resting comfortably on ventilator. pressures have been low 87/46, she has not been given fluid, will try this at this time. Hemoccult negative, will order type and screen now given her low hemoglobin. We will need to recheck her glucose soon after fluids. Patient's rectal temp was low per nursing, patient has warmers in place    [RH]      ED Course User Index  [RH] [address], DO      ED Course as of Oct 02 1615   Fri Oct 02, 2020   1824 X-ray reveals new groundglass opacities, patient is to be negative. Will obtain CT of the chest to further evaluate. [RH]   7538 Given anemia, will obtain CT of abdomen    [RH]   0836 Reevaluated, son at bedside. Son updated on results of labs, imaging, prognosis, current plan. Daughter on speaker phone, updated on results, plan, prognosis. [RH]   9560 Reevaluated, she has not been receiving propofol. She is resting comfortably on ventilator. pressures have been low 87/46, she has not been given fluid, will try this at this time. Hemoccult negative, will order type and screen now given her low hemoglobin. We will need to recheck her glucose soon after fluids. Patient's rectal temp was low per nursing, patient has warmers in place    [RH]      ED Course User Index  [RH] [address], DO       --------------------------------------------- PAST HISTORY ---------------------------------------------  Past Medical History:  has a past medical history of Cataract, right, DM (diabetes mellitus), type 2 ([address] HTN (hypertension), Hyperlipidemia, Obesity, Osteoarthritis, Sinusitis chronic, and Wheelchair confinement.     Past Surgical History:  has a past surgical history that includes Hysterectomy and pr xcapsl ctrc rmvl insj io lens prosth w/o ecp (Right, 8/10/2018). Social History:  reports that she has never smoked. She has never used smokeless tobacco. She reports that she does not drink alcohol or use drugs. Family History: family history is not on file. The patients home medications have been reviewed. Allergies: Patient has no known allergies.     -------------------------------------------------- RESULTS -------------------------------------------------    Lab  Results for orders placed or performed during the hospital encounter of 10/02/20   Troponin   Result Value Ref Range    Troponin 0.03 0.00 - 0.03 ng/mL   CBC auto differential   Result Value Ref Range    WBC 10.0 4.5 - 11.5 E9/L    RBC 3.03 (L) 3.50 - 5.50 E12/L    Hemoglobin 7.3 (L) 11.5 - 15.5 g/dL    Hematocrit 24.8 (L) 34.0 - 48.0 %    MCV 81.8 80.0 - 99.9 fL    MCH 24.1 (L) 26.0 - 35.0 pg    MCHC 29.4 (L) 32.0 - 34.5 %    RDW 19.6 (H) 11.5 - 15.0 fL    Platelets 982 593 - 490 E9/L    MPV 9.6 7.0 - 12.0 fL    Neutrophils % 75.8 43.0 - 80.0 %    Immature Granulocytes % 1.3 0.0 - 5.0 %    Lymphocytes % 17.6 (L) 20.0 - 42.0 %    Monocytes % 4.4 2.0 - 12.0 %    Eosinophils % 0.6 0.0 - 6.0 %    Basophils % 0.3 0.0 - 2.0 %    Neutrophils Absolute 7.58 (H) 1.80 - 7.30 E9/L    Immature Granulocytes # 0.13 E9/L    Lymphocytes Absolute 1.76 1.50 - 4.00 E9/L    Monocytes Absolute 0.44 0.10 - 0.95 E9/L    Eosinophils Absolute 0.06 0.05 - 0.50 E9/L    Basophils Absolute 0.03 0.00 - 0.20 E9/L   Comprehensive Metabolic Panel   Result Value Ref Range    Sodium 134 132 - 146 mmol/L    Potassium 3.7 3.5 - 5.0 mmol/L    Chloride 106 98 - 107 mmol/L    CO2 22 22 - 29 mmol/L    Anion Gap 6 (L) 7 - 16 mmol/L    Glucose 408 (H) 74 - 99 mg/dL    BUN 18 8 - 23 mg/dL    CREATININE 0.6 0.5 - 1.0 mg/dL    GFR Non-African American >60 >=60 mL/min/1.73    GFR African American >60     Calcium 7.5 (L) 8.6 - 10.2 mg/dL    Total Protein 5.0 (L) 6.4 - 8.3 g/dL    Alb 1.8 (L) 3.5 - 5.2 g/dL    Total Bilirubin <0.2 0.0 - 1.2 mg/dL    Alkaline Phosphatase 75 35 - 104 U/L    ALT 10 0 - 32 U/L    AST 11 0 - 31 U/L   Lactate, Sepsis   Result Value Ref Range    Lactic Acid, Sepsis 1.1 0.5 - 1.9 mmol/L   COVID-19   Result Value Ref Range    SARS-CoV-2, NAAT Not Detected Not Detected   Blood Gas, Arterial   Result Value Ref Range    Date Analyzed [id number]     Time Analyzed 0723     Source: Blood Arterial     pH, Blood Gas 7.267 (L) 7.350 - 7.450    PCO2 47.7 (H) 35.0 - 45.0 mmHg    PO2 273.8 (H) 75.0 - 100.0 mmHg    HCO3 21.3 (L) 22.0 - 26.0 mmol/L    B.E. -5.4 (L) -3.0 - 3.0 mmol/L    O2 Sat 99.4 (H) 92.0 - 98.5 %    PO2/FIO2 2.74 mmHg/%    AaDO2 366.5 mmHg    RI(T) 134 %    O2Hb 98.5 (H) 94.0 - 97.0 %    COHb 0.8 0.0 - 1.5 %    MetHb 0.1 0.0 - 1.5 %    O2 Content 12.1 mL/dL    HHb 0.6 0.0 - 5.0 %    tHb (est) 8.2 (L) 11.5 - 16.5 g/dL    Mode AC     FIO2 100.0 %    Rr Mechanical 12.0 b/min    Vt Mechanical 400.0 mL    Peep/Cpap 5.0 cmH2O    Date Of Collection      Time Collected      Pt Temp 37.0 C     ID [id number]     Lab [id number]     Critical(s) Notified .  No Critical Values    Urinalysis with Microscopic   Result Value Ref Range    Color, UA Yellow Straw/Yellow    Clarity, UA Clear Clear    Glucose, Ur 100 (A) Negative mg/dL    Bilirubin Urine Negative Negative    Ketones, Urine Negative Negative mg/dL    Specific Gravity, UA 1.010 1.005 - 1.030    Blood, Urine Negative Negative    pH, UA 6.0 5.0 - 9.0    Protein, UA Negative Negative mg/dL    Urobilinogen, Urine 0.2 <2.0 E.U./dL    Nitrite, Urine Negative Negative    Leukocyte Esterase, Urine TRACE (A) Negative    WBC, UA 2-5 0 - 5 /HPF    RBC, UA 1-3 0 - 2 /HPF    Epithelial Cells, UA FEW /HPF    Bacteria, UA RARE (A) None Seen /HPF   Procalcitonin   Result Value Ref Range    Procalcitonin 0.09 (H) 0.00 - 0.08 ng/mL   POCT glucose   Result Value Ref Range    Glucose 195 mg/dL    QC OK? ok    POCT Glucose   Result Value Ref Range    Meter Glucose 195 (H) 74 - 99 mg/dL   POCT Glucose   Result Value Ref Range    Meter Glucose 98 74 - 99 mg/dL   POCT Glucose   Result Value Ref Range    Meter Glucose 94 74 - 99 mg/dL   POCT Glucose   Result Value Ref Range    Meter Glucose 57 (L) 74 - 99 mg/dL   POCT Glucose   Result Value Ref Range    Meter Glucose 168 (H) 74 - 99 mg/dL   POCT Glucose   Result Value Ref Range    Meter Glucose 167 (H) 74 - 99 mg/dL   EKG 12 Lead   Result Value Ref Range    Ventricular Rate 70 BPM    Atrial Rate 70 BPM    P-R Interval 132 ms    QRS Duration 70 ms    Q-T Interval 434 ms    QTc Calculation (Bazett) 468 ms    P Axis 71 degrees    R Axis 17 degrees    T Axis 77 degrees   TYPE AND SCREEN   Result Value Ref Range    ABO/Rh B POS     Antibody Screen NEG        Radiology  CT Head WO Contrast   Final Result   No acute intracranial abnormality. CT CHEST WO CONTRAST   Final Result   Extensive ground-glass and consolidative opacities throughout both lungs, in   a pattern highly suspicious for COVID pneumonia. Please note that the   endotracheal tube terminates at the level of the ritesh. Recommend   retracting the endotracheal tube by 3-4 cm. No acute inflammatory or obstructive process in the abdomen or pelvis. Cholelithiasis without evidence of acute cholecystitis. Diffusely heterogeneous and relatively hyperdense vertebral bodies, a   nonspecific finding. Diffuse metastatic disease cannot be excluded. Recommend clinical correlation. Correlation could also be made with nuclear   medicine bone scan, if clinically appropriate. Decubitus ulcer with at least 1 gas locule that may come into contact with   the lower most portion of the coccyx. Osteomyelitis cannot be excluded at   this level. CT ABDOMEN PELVIS WO CONTRAST Additional Contrast? None   Final Result   Extensive ground-glass and consolidative opacities throughout both lungs, in   a pattern highly suspicious for COVID pneumonia.   Please note that the endotracheal tube terminates at the level of the ritesh. Recommend   retracting the endotracheal tube by 3-4 cm. No acute inflammatory or obstructive process in the abdomen or pelvis. Cholelithiasis without evidence of acute cholecystitis. Diffusely heterogeneous and relatively hyperdense vertebral bodies, a   nonspecific finding. Diffuse metastatic disease cannot be excluded. Recommend clinical correlation. Correlation could also be made with nuclear   medicine bone scan, if clinically appropriate. Decubitus ulcer with at least 1 gas locule that may come into contact with   the lower most portion of the coccyx. Osteomyelitis cannot be excluded at   this level. XR ABDOMEN FOR NG/OG/NE TUBE PLACEMENT   Final Result   Nasogastric tube tip is in the proximal stomach         XR CHEST PORTABLE   Final Result   New patchy bilateral ground-glass airspace opacities      Endotracheal tube tip projects 12 mm superior to the ritesh         XR CHEST PORTABLE    (Results Pending)       EKG: This EKG is signed and interpreted by me. Rate: 70  Rhythm: Sinus  Interpretation: no acute changes  Comparison: stable as compared to patient's most recent EKG      ------------------------- NURSING NOTES AND VITALS REVIEWED ---------------------------  Date / Time Roomed:  10/2/2020  6:36 AM  ED Bed Assignment:  [id number]    The nursing notes within the ED encounter and vital signs as below have been reviewed.    Patient Vitals for the past 24 hrs:   BP Temp Temp src Pulse Resp SpO2 Height Weight   10/02/20 1500 (!) 161/84 -- -- 99 9 94 % -- --   10/02/20 1433 -- -- -- 96 22 99 % -- --   10/02/20 1415 (!) 88/53 -- -- 83 22 97 % -- --   10/02/20 1400 112/70 -- -- 85 21 99 % -- --   10/02/20 1336 112/70 (!) 90.2 °F (32.3 °C) Axillary 85 22 100 % -- 211 lb 10.3 oz (96 kg)   10/02/20 1300 (!) 91/38 -- -- 85 -- 100 % -- --   10/02/20 1257 (!) 91/38 94.4 °F (34.7 °C) -- 83 18 100 % -- --   10/02/20 6684 (!) 71/42 94.4 °F (34.7 °C) -- 82 18 100 % -- --   10/02/20 1224 (!) 87/46 -- -- 82 18 100 % -- --   10/02/20 1112 (!) 161/93 -- -- 68 16 99 % -- --   10/02/20 1035 -- -- -- 70 -- 100 % -- --   10/02/20 0949 119/70 -- -- 69 16 100 % -- --   10/02/20 0834 124/83 -- -- 65 16 100 % -- --   10/02/20 0833 -- -- -- 65 -- 99 % -- --   10/02/20 0755 -- -- -- 70 -- 100 % -- --   10/02/20 0715 (!) 145/74 -- -- 70 16 100 % -- --   10/02/20 0650 -- -- -- 70 -- 100 % -- --   10/02/20 0648 109/65 -- -- 73 16 100 % -- --   10/02/20 0636 129/77 -- -- 65 18 97 % 5' 4\" (1.626 m) 206 lb (93.4 kg)       Oxygen Saturation Interpretation: Normal      ------------------------------------------ PROGRESS NOTES ------------------------------------------  Re-evaluation(s):  See ED COURSE    I have spoken with the son and daughter and discussed todays results, in addition to providing specific details for the plan of care and counseling regarding the diagnosis and prognosis. Their questions are answered at this time and they are agreeable with the plan.      --------------------------------- ADDITIONAL PROVIDER NOTES ---------------------------------  Consultations:  Dr. Sandoval Parents with Dr. Patricia Delcid,  They will admit this patient. Dr. Mejia Shelley spoke with Guerita, intensivist, they will consult on the patient. This patient's ED course included: a personal history and physicial examination, re-evaluation prior to disposition, multiple bedside re-evaluations, IV medications, cardiac monitoring, continuous pulse oximetry and complex medical decision making and emergency management    This patient has initially deteriorated, but then stabilized during their ED course. Please note that the withdrawal or failure to initiate urgent interventions for this patient would likely result in a life threatening deterioration or permanent disability.       Accordingly this patient received 30 minutes of critical care time, excluding separately billable procedures. Clinical Impression  1. Pneumonia due to COVID-19 virus    2. Pneumonia due to organism    3. Anemia, unspecified type    4. Sacral decubitus ulcer, stage III ([address]    5. Altered mental status, unspecified altered mental status type    6. Acute respiratory failure with hypercapnia ([address]          Disposition  Patient's disposition: Admit to CCU/ICU  Patient's condition is critical.         [address] DO  Resident  10/02/20 1610    ATTENDING PROVIDER ATTESTATION:     Maximus Wynn presented to the emergency department for evaluation of No chief complaint on file. and was initially evaluated by the Medical Resident. See Original ED Note for H&P and ED course above. Please note that the withdrawal or failure to initiate urgent interventions for this patient would likely result in a life threatening deterioration or permanent disability. Accordingly this patient received 30 minutes of critical care time, excluding separately billable procedures. I have reviewed and discussed the case, including pertinent history (medical, surgical, family and social) and exam findings with the Medical Resident assigned to Maximus Kingsley. I have personally performed and/or participated in the history, exam, medical decision making, and procedures and agree with all pertinent clinical information. I have reviewed my findings and recommendations with the assigned Medical Resident, Maximus Wynn and members of family present at the time of disposition. My findings/plan: The primary encounter diagnosis was Pneumonia due to COVID-19 virus.  Diagnoses of Pneumonia due to organism, Anemia, unspecified type, Sacral decubitus ulcer, stage III ([address] Altered mental status, unspecified altered mental status type, Acute respiratory failure with hypercapnia ([address] and Gout involving toe, unspecified cause, unspecified chronicity, unspecified laterality were also pertinent to

## 2020-10-02 NOTE — H&P
[facility] History and Physical    Admission Date  10/2/2020  6:36 AM  Chief Complaint Unresponsive  Admit Dx   Respiratory failure ([facility] [address] [J96.90]    Subjective  History of Present Illness  10/2 Abdon Molina is a 48-year-old female with medical history pertinent for diabetes, hypertension, hyperlipidemia, and wheelchair-bound status who was found to be unresponsive by EMS and brought to the ER with hypoglycemia, hypoxia, coarse breath sounds. Patient was unable to maintain her airway and was endotracheally intubated in the emergency department. Work-up showed hypothermia with temperature 94.4, hypotension with blood pressure 71/42. Labs showed chronic anemia, abnormal UA with leukocyte esterase and rare bacteria only, chest x-ray with patchy bilateral groundglass opacities. CT of the head showed no acute pathology, follow-up CT of the chest demonstrated extensive groundglass and consolidative opacities bilaterally. Patient admitted to the ICU for further evaluation and treatment. Review of Systems - unable to obtain    Past Medical History:   Diagnosis Date    Cataract, right     DM (diabetes mellitus), type 2 ([facility] [address] 11/3/2010    HTN (hypertension) 11/3/2010    Hyperlipidemia 11/3/2010    Obesity     Osteoarthritis 11/3/2010    Sinusitis chronic     seasonal    Wheelchair confinement     can pivot with assistance;  uses a lift chair     Past Surgical History:   Procedure Laterality Date    HYSTERECTOMY      ND XCAPSL CTRC RMVL INSJ IO LENS PROSTH W/O ECP Right 8/10/2018    RIGHT EYE CATARACT EXTRACTION WITH  IOL IMPLANT performed by Mahendra Santos MD at [address]     Prior to Admission medications    Medication Sig Start Date End Date Taking? Authorizing Provider   miconazole (MICOTIN) 2 % powder Apply topically 2 times daily.  9/28/20   Jorge Duque MD   fluconazole (DIFLUCAN) 200 MG tablet Take 1 tablet by mouth daily for 20 days 9/29/20 10/19/20  Jorge Duque MD Magnesium Oxide 400 MG CAPS Take 400 mg by mouth daily for 7 days 9/28/20 10/5/20  Priya Laureano PA-C   ibuprofen (ADVIL;MOTRIN) 800 MG tablet Take 1 tablet by mouth every 8 hours as needed for Pain 8/18/20   Richardson Ramirez DO   atenolol (TENORMIN) 50 MG tablet take 1 tablet by mouth twice a day 7/23/20   Jaquelin Canela MD   lisinopril (PRINIVIL;ZESTRIL) 40 MG tablet Take 1 tablet by mouth daily 7/23/20   Jaquelin Canela MD   metFORMIN (GLUCOPHAGE) 1000 MG tablet take 1 tablet by mouth twice a day WITH MEALS 7/23/20   Jaquelin Canela MD   glipiZIDE (GLUCOTROL XL) 5 MG extended release tablet take 1 tablet by mouth once daily 7/23/20   Jaquelin Canela MD   DULoxetine (CYMBALTA) 60 MG extended release capsule Take 1 capsule by mouth daily 7/23/20   Jaquelin Canela MD   rosuvastatin (CRESTOR) 10 MG tablet take 1 tablet by mouth once daily 7/23/20   Jaquelin Canela MD   exenatide (BYETTA 10 MCG PEN) 10 MCG/0.04ML injection inject . 04 milliliters subcutaneously twice a day with food 7/23/20   Jaquelin Canela MD   allopurinol (ZYLOPRIM) 100 MG tablet Take 1 tablet by mouth daily 7/23/20 11/20/20  Jqauelin Canela MD   RA Alcohol Swabs 70 % PADS USE WITH INJECTIONS TWICE A DAY AND CHECKING BLOOD SUGAR TWICE A DAY 2/17/20   Vandana Mello MD   blood glucose test strips (ACCU-CHEK RUI PLUS) strip TEST twice a day FASTING BEFORE BREAKFAST AND SUPPER 11/6/19   Vandana Mello MD   ACCU-CHEK MULTICLIX LANCETS MISC Check BS Twice daily 11/6/19   Vandana Mello MD   acetaminophen (TYLENOL) 325 MG tablet Take 2 tablets by mouth every 4 hours as needed for Pain 11/6/19   Vandana Mello MD   Insulin Pen Needle (B-D UF III MINI PEN NEEDLES) 31G X 5 MM MISC use twice a day 11/6/19   Vandana Mello MD   Insulin Pen Needle (B-D ULTRAFINE III SHORT PEN) 31G X 8 MM MISC Inject 1 kit into the skin 2 times daily 11/6/19   Vandana Mello MD   vitamin D (ERGOCALCIFEROL) 1.25 MG (17532 UT) CAPS capsule Take 1 capsule by mouth once a week 11/6/19 Souleymane Moraes MD   Misc. Devices MISC Wheelchair cushion 6/5/17   Aguila Menon MD   [facility] Devices (MATTRESS PAD) MISC 1 each by Does not apply route once for 1 dose 6/5/17 6/5/17  Aguila Menon MD   Misc. Devices KIT 1 kit by Does not apply route daily as needed (leg edema) Compression stockings for bilateral leg swelling 6/5/17   Aguila Menon MD   [facility]. [address] Bed with gel overlay dsp # 1  Pt has Osteoarthritis and is Morbidly Obese 4/4/16   Anita Mukherjee DO   Misc. Devices KIT 1 kit by Does not apply route daily as needed Foam mattress for bilateral knees osteoarthritis , degenerative back osteoarthitis 3/31/16   Ashli Donaldson MD   [facility]. Devices [address] [facility] chair to assist with ADL with diagnosis of OA and full thickness knee meniscal tear 2/24/15   Genna Atkinson MD   Misc. Devices MISC Grab bar  diagnosis osteoarthritis and full thickness knee meniscal tear 2/24/15   Genna Atkinson MD   [facility]. [address] bed 11/7/14   Dereck Rudolph MD   [facility] Devices [id number] [facility] wheelchair 11/7/14   Dereck Rudolph MD   [facility] Devices [facility] Bedside commode. Dx osteoarthritis 715.90. Height 5'4.5\". Weight 245 lbs. 9/17/14   Ashli Donaldson MD     Allergies  Patient has no known allergies. Social History   reports that she has never smoked. She has never used smokeless tobacco. She reports that she does not drink alcohol or use drugs. Family History  family history is not on file.  pt cannot provide history    Objective  Physical Exam   General: obese, ill-appearing, intubated  Skin: warm, dry, intact, normal color without cyanosis  HEENT: normocephalic, atraumatic, mucous membranes dry  Respiratory: mechanical / coarse breath sounds without respiratory distress  Cardiovascular: regular rate and rhythm without murmur / rub / gallop  Abdominal: soft, nontender, nondistended, normoactive bowel sounds  Extremities: no mottling, pulses intact, no edema  Neurologic: intubated / sedated  Psychiatric: unable to assess    Labs  Recent Labs     10/02/20  0705 10/02/20  0834     --    K 3.7  --    CO2 22  --    BUN 18  --    CREATININE 0.6  --    GLUCOSE 408* 195   CALCIUM 7.5*  --    WBC 10.0  --    RBC 3.03*  --    HGB 7.3*  --    HCT 24.8*  --      --      Radiology  CT Head WO Contrast   Final Result   No acute intracranial abnormality. CT CHEST WO CONTRAST   Preliminary Result   Extensive ground-glass and consolidative opacities throughout both lungs, in   a pattern highly suspicious for COVID pneumonia. Please note that the   endotracheal tube terminates at the level of the ritesh. Recommend   retracting the endotracheal tube by 3-4 cm. No acute inflammatory or obstructive process in the abdomen or pelvis. Cholelithiasis without evidence of acute cholecystitis. Diffusely heterogeneous and relatively hyperdense vertebral bodies, a   nonspecific finding. Diffuse metastatic disease cannot be excluded. Recommend clinical correlation. Correlation could also be made with nuclear   medicine bone scan, if clinically appropriate. Decubitus ulcer with at least 1 gas locule that may come into contact with   the lower most portion of the coccyx. Osteomyelitis cannot be excluded at   this level. CT ABDOMEN PELVIS WO CONTRAST Additional Contrast? None   Preliminary Result   Extensive ground-glass and consolidative opacities throughout both lungs, in   a pattern highly suspicious for COVID pneumonia. Please note that the   endotracheal tube terminates at the level of the ritesh. Recommend   retracting the endotracheal tube by 3-4 cm. No acute inflammatory or obstructive process in the abdomen or pelvis. Cholelithiasis without evidence of acute cholecystitis. Diffusely heterogeneous and relatively hyperdense vertebral bodies, a   nonspecific finding. Diffuse metastatic disease cannot be excluded. Recommend clinical correlation.   Correlation could also be made with nuclear   medicine bone scan, if clinically appropriate. Decubitus ulcer with at least 1 gas locule that may come into contact with   the lower most portion of the coccyx. Osteomyelitis cannot be excluded at   this level.          XR ABDOMEN FOR NG/OG/NE TUBE PLACEMENT   Final Result   Nasogastric tube tip is in the proximal stomach         XR CHEST PORTABLE   Final Result   New patchy bilateral ground-glass airspace opacities      Endotracheal tube tip projects 12 mm superior to the ritesh             Assessment / Plan  Unresponsive episode, questionable etiology, most likely metabolic   CT head showed no acute pathology   No hx CNS pathology   No arrhythmia noted   Troponin 0.03 in ED   See below    Acute hypoxic respiratory failure due to community acquired pneumonia from suspected COVID-19 infection   Diagnostics: Testing   10/2 (rapid swab) negative  Patient currently afebrile, actually hypOthermic     CXR showing patchy b/l GGO     CT chest showing extensive b/l GGO     CBC shows mild lymphopenia     CMP shows no transaminitis     Lactic / procal unremarkable     Check CRP, d-dimer, ferritin     -------------------------------------------------------------------   Treatment: ID for possible remdesivir     Decadron 6 mg IV daily     Tylenol / ice packs for fever     Demerol if needed for shaking chills refractory to above     -------------------------------------------------------------------   Supportive tx: Droplet Plus isolation     Wean vent as able - pulm / critical care following     Avoid nebulized breathing tx, sub with MDI medications     Steroids not shown to be beneficial with COVID-19     Avoid IVF if possible; if necessary boluses > infusions     Avoid NSAIDs    Decubitus ulcer   Wound vac noted on wound in ED and removed d/t soiling   Wound care consult   ID will be following as well, as above    Anemia, acute-on-chronic   Baseline hgb ~10-11 g/dL per EMR   Hgb 7.3 in ED, follow w

## 2020-10-02 NOTE — PROGRESS NOTES
Attending Physician Attestation: Dr. Aron Shepherd    Thank you very much for allowing me to see this patient in consultation and follow up. I personally saw, examined and provided care for the patient. Radiographs, labs and medication list were reviewed by me independently. I spoke with bedside nursing, respiratory therapists and consultants. Critical care services and times documented are independent of procedures and multidisciplinary rounds with Residents. Additionally comprehensive, multidisciplinary rounds were conducted with the MICU team. The case was discussed in detail and plans for care were established. Review of Residents documentation was conducted and revisions were made as appropriate. I agree with the the above documented information. Physical Examination:  Vitals:   Vitals:    10/02/20 1400 10/02/20 1415 10/02/20 1433 10/02/20 1500   BP: 112/70 (!) 88/53  (!) 161/84   Pulse: 85 83 96 99   Resp: 21 22 22 9   Temp:       TempSrc:       SpO2: 99% 97% 99% 94%   Weight:       Height:          General: No Acute Distress  HEENT: normocephalic, atruamatic  CVS: RRR, S1, S2, No S3 or S4  Respiratory: decreased breath sounds at lung bases, no focal wheezes noted  Extremities: no clubbing/cyanosis/edema    ASSESSMENT:  1.) Acute Respiratory Failure with Hypoxia   2.) Aspiration Pneumonia vs HCAP  3.) R/O COVID    In addition the following applies:    Check: pan culture   Medication Alterations: vancomycin, meropenem   Procedures: N/A  Imaging: reviewed  New Consultations: ID  VENT: ACVC (DAY 1)    Access: Peripheral, TLC  Consults: ID  ABX: vancomycin, meropenem     Thank you for allowing me to participate in the care of this patient. Care reviewed with nursing staff, medical and surgical specialty care, primary care and the patient's family as available. Restraints are ordered when the patient can do harm to him/herself by pulling out devices.     Critical Care Time: > 35 minutes excluding

## 2020-10-02 NOTE — PROCEDURES
Arterial Line Placement Procedure Note                     Indication: arterial blood gases and severe hypotension    Consent: Unable to be obtained due to the emergent nature of this procedure. Mj's Test: Normal    Procedure: The skin over the left radial artery was prepped with betadine and draped in a sterile fashion. Local anesthesia was not performed due to the emergent nature of this procedure. A 20 gauge arterial line catheter was then inserted, using a modified Seldinger technique, into the vessel. The transducer set was then attached and securely fastened to the skin with sutures. Waveforms on the monitor were observed and found to be adequate. The patient had good distal perfusion after the procedure. The site was then dressed in a sterile fashion. The patient tolerated the procedure well.      Complications: None

## 2020-10-02 NOTE — CONSULTS
Critical Care Admit/Consult Note         Patient - Juli Stern   MRN -  [id number]   Acct # - [de-identified]   - 1950      Date of Admission -  10/2/2020  6:36 AM  Date of evaluation -  10/2/2020  0212/0212-A   Hospital Day - 0            ADMIT/CONSULT DETAILS     Reason for Admit/Consult   Pneumonia, concern for COVID-19 infection     Consulting Wily Joseph DO  Primary Care Physician - Corona Cintron MD         HPI   The patient is a 78 y.o. female with significant past medical history of hypertension, hyperlipidemia, type 2 diabetes mellitus, osteoarthritis and sacral decubitus ulcer who presented to the ER earlier today unresponsive and hypoglycemic. Initial blood sugar was reportedly in the 30s. The patient was given oral glucose prior to arrival in the ER. Initial SPO2 was in the 70s on room air. Patient was placed on a nonrebreather and her saturation improved appropriately. Of note, the patient was recently admitted to the hospital for a urinary tract infection and altered mental status. She improved with IV fluids and antibiotics and was discharged on .     Past Medical History         Diagnosis Date    Cataract, right     DM (diabetes mellitus), type 2 ([address] 11/3/2010    HTN (hypertension) 11/3/2010    Hyperlipidemia 11/3/2010    Obesity     Osteoarthritis 11/3/2010    Sinusitis chronic     seasonal    Wheelchair confinement     can pivot with assistance;  uses a lift chair        Past Surgical History           Procedure Laterality Date    HYSTERECTOMY      NJ XCAPSL CTRC RMVL INSJ IO LENS PROSTH W/O ECP Right 8/10/2018    RIGHT EYE CATARACT EXTRACTION WITH  IOL IMPLANT performed by Jay Vieyra MD at [address] [place] [address] and Devices      Central: RIJ on 10/2  Arterial:  Left radial on 10/2    Current Medications   Current Medications    vancomycin  20 mg/kg Intravenous Once    meropenem  2 g Intravenous Once    allopurinol  100 mg Oral Daily    DULoxetine  60 mg Oral Daily    rosuvastatin  10 mg Oral Nightly    sodium chloride flush  10 mL Intravenous 2 times per day    insulin glargine  0.25 Units/kg Subcutaneous Nightly    insulin lispro  0.08 Units/kg Subcutaneous TID WC    insulin lispro  0-12 Units Subcutaneous Q4H    dexamethasone  6 mg Intravenous Daily    sodium chloride  1,000 mL Intravenous Once    meropenem  1 g Intravenous Q8H     sodium chloride flush, acetaminophen **OR** acetaminophen, polyethylene glycol, promethazine **OR** ondansetron, glucose, dextrose, glucagon (rDNA), dextrose  IV Drips/Infusions   propofol Stopped (10/02/20 1115)    dextrose      sodium chloride      fentaNYL 5 mcg/ml in 0.9%  ml infusion       Home Medications  Medications Prior to Admission: miconazole (MICOTIN) 2 % powder, Apply topically 2 times daily. fluconazole (DIFLUCAN) 200 MG tablet, Take 1 tablet by mouth daily for 20 days  Magnesium Oxide 400 MG CAPS, Take 400 mg by mouth daily for 7 days  ibuprofen (ADVIL;MOTRIN) 800 MG tablet, Take 1 tablet by mouth every 8 hours as needed for Pain  atenolol (TENORMIN) 50 MG tablet, take 1 tablet by mouth twice a day  lisinopril (PRINIVIL;ZESTRIL) 40 MG tablet, Take 1 tablet by mouth daily  metFORMIN (GLUCOPHAGE) 1000 MG tablet, take 1 tablet by mouth twice a day WITH MEALS  glipiZIDE (GLUCOTROL XL) 5 MG extended release tablet, take 1 tablet by mouth once daily  DULoxetine (CYMBALTA) 60 MG extended release capsule, Take 1 capsule by mouth daily  rosuvastatin (CRESTOR) 10 MG tablet, take 1 tablet by mouth once daily  exenatide (BYETTA 10 MCG PEN) 10 MCG/0.04ML injection, inject . 04 milliliters subcutaneously twice a day with food  allopurinol (ZYLOPRIM) 100 MG tablet, Take 1 tablet by mouth daily  RA Alcohol Swabs 70 % PADS, USE WITH INJECTIONS TWICE A DAY AND CHECKING BLOOD SUGAR TWICE A DAY  blood glucose test strips (ACCU-CHEK RUI PLUS) strip, TEST twice a day FASTING BEFORE BREAKFAST AND SUPPER  ACCU-CHEK MULTICLIX LANCETS MISC, Check BS Twice daily  acetaminophen (TYLENOL) 325 MG tablet, Take 2 tablets by mouth every 4 hours as needed for Pain  Insulin Pen Needle (B-D UF III MINI PEN NEEDLES) 31G X 5 MM MISC, use twice a day  Insulin Pen Needle (B-D ULTRAFINE III SHORT PEN) 31G X 8 MM MISC, Inject 1 kit into the skin 2 times daily  vitamin D (ERGOCALCIFEROL) 1.25 MG (32847 UT) CAPS capsule, Take 1 capsule by mouth once a week  Misc. Devices MISC, Wheelchair cushion  Misc. Devices (MATTRESS PAD) MISC, 1 each by Does not apply route once for 1 dose  Misc. Devices KIT, 1 kit by Does not apply route daily as needed (leg edema) Compression stockings for bilateral leg swelling  Misc. Devices [place] 4 Bed with gel overlay dsp # 1 Pt has Osteoarthritis and is Morbidly Obese  Misc. Devices KIT, 1 kit by Does not apply route daily as needed Foam mattress for bilateral knees osteoarthritis , degenerative back osteoarthitis  Misc. Devices MISC, Wheel chair to assist with ADL with diagnosis of OA and full thickness knee meniscal tear  Misc. Devices MISC, Grab bar  diagnosis osteoarthritis and full thickness knee meniscal tear  Misc. Devices [place] 4 bed  Misc. Devices MISC, wheelchair  Misc. Devices MISC, Bedside commode. Dx osteoarthritis 715.90. Height 5'4.5\". Weight 245 lbs. Diet/Nutrition   Diet NPO Effective Now    Allergies   Patient has no known allergies. Social History   Tobacco   reports that she has never smoked. She has never used smokeless tobacco.    Alcohol     reports no history of alcohol use. Occupational history :    Family History   No family history on file.     Sleep History     ROS     REVIEW OF SYSTEMS:  Review of Systems   Unable to perform ROS: Intubated       Mechanical Ventilation Data   VENT SETTINGS (Comprehensive)  Vent Information  Skin Assessment: Clean, dry, & intact  Equipment ID: [id number]  Vent Type: [id number]  Vent Mode: AC/VC  Vt Ordered: 400 mL  Rate Set: 12 bmp  Peak Flow: 40 L/min  Pressure Support: 0 cmH20  FiO2 : 50 %  SpO2: 94 %  SpO2/FiO2 ratio: 188  Sensitivity: 3  PEEP/CPAP: 5  I Time/ I Time %: 0 s  Humidification Source: HME  Additional Respiratory  Assessments  Pulse: 99  Resp: 9  SpO2: 94 %  Position: Semi-[facility]  Humidification Source: HME  Oral Care: Mouth suctioned, Mouth swabbed  Airway Type: ET  Airway Size: 7.5    ABG  Lab Results   Component Value Date    PH 7.267 10/02/2020    PCO2 47.7 10/02/2020    PO2 273.8 10/02/2020    HCO3 21.3 10/02/2020    O2SAT 99.4 10/02/2020     Lab Results   Component Value Date    MODE AC 10/02/2020       Vitals    height is 5' 4\" (1.626 m) and weight is 211 lb 10.3 oz (96 kg). Her axillary temperature is 90.2 °F (32.3 °C) (abnormal). Her blood pressure is 161/84 (abnormal) and her pulse is 99. Her respiration is 9 and oxygen saturation is 94%.        Temperature Range: Temp: (!) 90.2 °F (32.3 °C) Temp  Av °F (33.9 °C)  Min: 90.2 °F (32.3 °C)  Max: 94.4 °F (34.7 °C)  BP Range:  Systolic (03NLE), UHJ:969 , Min:71 , GTA:677     Diastolic (11ANW), QOZ:84, Min:38, Max:93    Pulse Range: Pulse  Av.1  Min: 65  Max: 99  Respiration Range: Resp  Av.7  Min: 9  Max: 22  Current Pulse Ox[de-identified]  SpO2: 94 %  24HR Pulse Ox Range:  SpO2  Av.2 %  Min: 94 %  Max: 100 %  Oxygen Amount and Delivery:        I/O (24 Hours)    Patient Vitals for the past 8 hrs:   BP Temp Temp src Pulse Resp SpO2 Weight   10/02/20 1500 (!) 161/84 -- -- 99 9 94 % --   10/02/20 1433 -- -- -- 96 22 99 % --   10/02/20 1415 (!) 88/53 -- -- 83 22 97 % --   10/02/20 1400 112/70 -- -- 85 21 99 % --   10/02/20 1336 112/70 (!) 90.2 °F (32.3 °C) Axillary 85 22 100 % 211 lb 10.3 oz (96 kg)   10/02/20 1300 (!) 91 -- -- 85 -- 100 % --   10/02/20 1257 (!)  94.4 °F (34.7 °C) -- 83 18 100 % --   10/02/20 1247 (!) 71/42 94.4 °F (34.7 °C) -- 82 18 100 % --   10/02/20 1224 (!) 87/46 -- -- 82 18 100 % --   10/02/20 1112 (!) 161/93 -- -- 68 16 99 % --   10/02/20 1035 -- -- -- 70 -- 100 % --   10/02/20 0949 119/70 -- -- 69 16 100 % --       Intake/Output Summary (Last 24 hours) at 10/2/2020 1654  Last data filed at 10/2/2020 1400  Gross per 24 hour   Intake --   Output 900 ml   Net -900 ml     I/O last 3 completed shifts:  In: -   Out: 900 [Urine:900]   Date 10/02/20 0000 - 10/02/20 2359   Shift 6398-7400 3571-8154 2687-6668 24 Hour Total   INTAKE   Shift Total(mL/kg)       OUTPUT   Urine(mL/kg/hr)  900(1.2)  900   Shift Total(mL/kg)  900(9.4)  900(9.4)   Weight (kg) 93.4 96 96 96     Patient Vitals for the past 96 hrs (Last 3 readings):   Weight   10/02/20 1336 211 lb 10.3 oz (96 kg)   10/02/20 0636 206 lb (93.4 kg)       Drains/Tubes Outputs  Castano: placed 10/2    Exam       PHYSICAL EXAM:    General appearance -patient is intubated and unresponsive on no sedation  Mental status -unresponsive on no sedation  Eyes - pupils equal and reactive, extraocular eye movements intact, sclera anicteric  Ears - external ear normal  Nose - normal and patent, no erythema, discharge or polyps  Mouth -patient is intubated  Neck - supple, no significant adenopathy  Chest - bilateral crackles noted, decreased air movement noted throughout, symmetric air entry  Heart - normal rate, regular rhythm, normal S1, S2, no murmurs, rubs, clicks or gallops  Abdomen - soft, nondistended, no masses or organomegaly  Neurological - intubated and sedated  Extremities - peripheral pulses normal, no clubbing or cyanosis. No edema.   Skin - normal coloration and turgor, no rashes, no suspicious skin lesions noted     Data   Old records and images have been reviewed    Lab Results   CBC     Lab Results   Component Value Date    WBC 10.0 10/02/2020    RBC 3.03 10/02/2020    HGB 7.3 10/02/2020    HCT 24.8 10/02/2020     10/02/2020    MCV 81.8 10/02/2020    MCH 24.1 10/02/2020    MCHC 29.4 10/02/2020    RDW 19.6 10/02/2020    LYMPHOPCT 17.6 10/02/2020    MONOPCT 4.4 10/02/2020    BASOPCT 0.3 10/02/2020    MONOSABS 0.44 10/02/2020    LYMPHSABS 1.76 10/02/2020    EOSABS 0.06 10/02/2020    BASOSABS 0.03 10/02/2020       BMP   Lab Results   Component Value Date     10/02/2020    K 3.7 10/02/2020    K 4.0 09/27/2020     10/02/2020    CO2 22 10/02/2020    BUN 18 10/02/2020    CREATININE 0.6 10/02/2020    GLUCOSE 195 10/02/2020    GLUCOSE 147 03/07/2012    CALCIUM 7.5 10/02/2020       LFTS  Lab Results   Component Value Date    ALKPHOS 75 10/02/2020    ALT 10 10/02/2020    AST 11 10/02/2020    PROT 5.0 10/02/2020    BILITOT <0.2 10/02/2020    BILIDIR <0.2 09/27/2020    IBILI see below 09/27/2020    LABALBU 1.8 10/02/2020    LABALBU 4.1 03/07/2012       INR  No results for input(s): PROTIME, INR in the last 72 hours. APTT  No results for input(s): APTT in the last 72 hours. Lactic Acid  Lab Results   Component Value Date    LACTA 1.3 09/26/2020    LACTA 2.6 07/13/2020    LACTA 4.8 07/12/2020        BNP   No results for input(s): BNP in the last 72 hours. Cultures     No results for input(s): BC in the last 72 hours. No results for input(s): [place] Dorcas in the last 72 hours. No results for input(s): LABURIN in the last 72 hours. Radiology   Ct Abdomen Pelvis Wo Contrast Additional Contrast? None    Result Date: 10/2/2020  EXAMINATION: CT OF THE CHEST WITHOUT CONTRAST; CT OF THE ABDOMEN AND PELVIS WITHOUT CONTRAST 10/2/2020 8:55 am TECHNIQUE: CT of the chest was performed without the administration of intravenous contrast. Multiplanar reformatted images are provided for review. Dose modulation, iterative reconstruction, and/or weight based adjustment of the mA/kV was utilized to reduce the radiation dose to as low as reasonably achievable.; CT of the abdomen and pelvis was performed without the administration of intravenous contrast. Multiplanar reformatted images are provided for review.  Dose modulation, iterative reconstruction, and/or weight based adjustment of the mA/kV was utilized to reduce the radiation dose to as low as reasonably achievable. COMPARISON: None. Correlated with chest radiograph from the same day. HISTORY: ORDERING SYSTEM PROVIDED HISTORY: recent suspected aspiration, new ground glass opacities on CXR. TECHNOLOGIST PROVIDED HISTORY: Reason for exam:->recent suspected aspiration, new ground glass opacities on CXR. What reading provider will be dictating this exam?->CRC; ORDERING SYSTEM PROVIDED HISTORY: hemoglobin 7.2 TECHNOLOGIST PROVIDED HISTORY: Reason for exam:->hemoglobin 7.2 Additional Contrast?->None What reading provider will be dictating this exam?->CRC FINDINGS: Chest: Mediastinum: An enteric tube terminates in the body of the stomach. The endotracheal tube terminates immediately cranial to the ritesh. Recommend retraction of 3-4 mm. Heart size is within normal limits. Coronary artery calcifications are present, potentially a marker for coronary artery disease. Trace pericardial fluid is present. The thoracic esophagus is decompressed, limiting assessment. No obvious esophageal abnormality. Scattered visible but nonenlarged mediastinal lymph nodes are present. Lungs/pleura: The central airways are patent. There is no evidence of a pleural effusion. There are extensive ground-glass and consolidative opacities throughout both lungs. No evidence of a pneumothorax. Soft Tissues/Bones: No acute osseous abnormality. Bones are diffusely osteopenic and there is moderate osteophyte formation and intervertebral disc space narrowing throughout the thoracic spine. Moderate degenerative changes involve the shoulders bilaterally, right greater than left. Abdomen/Pelvis: Organs: The gallbladder contains a single large peripherally calcified stone. No gallbladder dilation or obvious wall thickening. The unenhanced liver is normal in appearance. The spleen and adrenal glands are normal in size.   The pancreas demonstrates diffuse fatty atrophy but is otherwise clinical correlation. Correlation could also be made with nuclear medicine bone scan, if clinically appropriate. Decubitus ulcer with at least 1 gas locule that may come into contact with the lower most portion of the coccyx. Osteomyelitis cannot be excluded at this level. Ct Head Wo Contrast    Result Date: 10/2/2020  EXAMINATION: CT OF THE HEAD WITHOUT CONTRAST  10/2/2020 7:55 am TECHNIQUE: CT of the head was performed without the administration of intravenous contrast. Dose modulation, iterative reconstruction, and/or weight based adjustment of the mA/kV was utilized to reduce the radiation dose to as low as reasonably achievable. COMPARISON: None. 2020 HISTORY: ORDERING SYSTEM PROVIDED HISTORY: AMS TECHNOLOGIST PROVIDED HISTORY: Reason for exam:->AMS Has a \"code stroke\" or \"stroke alert\" been called? ->No FINDINGS: BRAIN/VENTRICLES: There is no acute intracranial hemorrhage, mass effect or midline shift. No abnormal extra-axial fluid collection. The gray-white differentiation is maintained without evidence of an acute infarct. Ventricles and sulci are within normal limits in size. There are nonspecific areas of hypoattenuation within the periventricular and subcortical white matter, which likely represent chronic microvascular ischemic change. ORBITS: The visualized portion of the orbits demonstrate no acute abnormality. SINUSES: The visualized paranasal sinuses and mastoid air cells demonstrate no acute abnormality. SOFT TISSUES/SKULL: No acute abnormality of the visualized skull or soft tissues. No acute intracranial abnormality.      Ct Head Wo Contrast    Result Date: 2020  Patient MRN:  [id number] : 1950 Age: 78 years Gender: Female Order Date:  2020 7:27 PM EXAM: CT HEAD WO CONTRAST COMPARISON: October 10, 2015 INDICATION:  altered mental status altered mental status TECHNIQUE: Axial unenhanced CT scanning was performed through the head without the use of intravenous exam:->hemoglobin 7.2 Additional Contrast?->None What reading provider will be dictating this exam?->CRC FINDINGS: Chest: Mediastinum: An enteric tube terminates in the body of the stomach. The endotracheal tube terminates immediately cranial to the ritesh. Recommend retraction of 3-4 mm. Heart size is within normal limits. Coronary artery calcifications are present, potentially a marker for coronary artery disease. Trace pericardial fluid is present. The thoracic esophagus is decompressed, limiting assessment. No obvious esophageal abnormality. Scattered visible but nonenlarged mediastinal lymph nodes are present. Lungs/pleura: The central airways are patent. There is no evidence of a pleural effusion. There are extensive ground-glass and consolidative opacities throughout both lungs. No evidence of a pneumothorax. Soft Tissues/Bones: No acute osseous abnormality. Bones are diffusely osteopenic and there is moderate osteophyte formation and intervertebral disc space narrowing throughout the thoracic spine. Moderate degenerative changes involve the shoulders bilaterally, right greater than left. Abdomen/Pelvis: Organs: The gallbladder contains a single large peripherally calcified stone. No gallbladder dilation or obvious wall thickening. The unenhanced liver is normal in appearance. The spleen and adrenal glands are normal in size. The pancreas demonstrates diffuse fatty atrophy but is otherwise unremarkable. The kidneys have a lobulated surface contour bilaterally. No hydronephrosis or radiopaque calculus. GI/Bowel: The stomach and duodenal sweep are within normal limits. No evidence of a bowel obstruction, free air or pneumatosis. Portions the colon are decompressed, limiting assessment for mucosal based abnormalities. The appendix is normal. Pelvis: The urinary bladder is decompressed around a Castano catheter.   The uterus and ovaries are not well visualized and are either surgically absent or severely atrophic. No free fluid is identified in the pelvis and there is no pelvic lymphadenopathy. Scattered visible but nonenlarged pelvic lymph nodes are present. Peritoneum/Retroperitoneum: The abdominal aorta is normal in caliber with scattered atherosclerotic plaques. No retroperitoneal lymphadenopathy. No enlarged mesenteric lymph nodes are identified. Bones/Soft Tissues: There is a decubitus ulcer noted inferiorly, measuring 3.6 x 1.3 cm. This appears to extend to the level of the coccyx, with a small locule of gas that appears to abut or come in contact with the inferior most portion of the coccyx. There are advanced degenerative changes involving the hips bilaterally. Moderate to severe degenerative changes involve the bilateral sacroiliac joints and lower lumbar spine. Vertebral bodies have a heterogeneous attenuation pattern but are predominantly hyperdense. Extensive ground-glass and consolidative opacities throughout both lungs, in a pattern highly suspicious for COVID pneumonia. Please note that the endotracheal tube terminates at the level of the ritesh. Recommend retracting the endotracheal tube by 3-4 cm. No acute inflammatory or obstructive process in the abdomen or pelvis. Cholelithiasis without evidence of acute cholecystitis. Diffusely heterogeneous and relatively hyperdense vertebral bodies, a nonspecific finding. Diffuse metastatic disease cannot be excluded. Recommend clinical correlation. Correlation could also be made with nuclear medicine bone scan, if clinically appropriate. Decubitus ulcer with at least 1 gas locule that may come into contact with the lower most portion of the coccyx. Osteomyelitis cannot be excluded at this level.      Xr Chest Portable    Result Date: 10/2/2020  EXAMINATION: ONE XRAY VIEW OF THE CHEST 10/2/2020 6:09 am COMPARISON: 09/24/2020 HISTORY: ORDERING SYSTEM PROVIDED HISTORY: post intubation TECHNOLOGIST PROVIDED HISTORY: Reason for exam:->post intubation What reading provider will be dictating this exam?->CRC FINDINGS: Endotracheal tube tip projects 12 mm superior to the ritesh. Nasogastric tube tip is in the upper abdomen. Cardiac silhouette is stable. There are new predominantly perihilar patchy airspace opacities. No large effusion or pneumothorax. No acute osseous abnormality. New patchy bilateral ground-glass airspace opacities Endotracheal tube tip projects 12 mm superior to the ritesh     Xr Chest Portable    Result Date: 2020  Patient MRN:  [id number] : 1950 Age: 78 years Gender: Female Order Date:  2020 7:30 PM EXAM: XR CHEST PORTABLE COMPARISON: 2017 INDICATION:  other other FINDINGS: The heart is normal in size. No focal airspace opacity. There is no pleural effusion. There is no pneumothorax. No free air beneath the diaphragms. No airspace opacities or pleural effusion. Xr Abdomen For Ng/og/ne Tube Placement    Result Date: 10/2/2020  EXAMINATION: ONE SUPINE XRAY VIEW(S) OF THE ABDOMEN 10/2/2020 6:23 am COMPARISON: CT abdomen pelvis 2020 HISTORY: ORDERING SYSTEM PROVIDED HISTORY: Confirm G-tube placement TECHNOLOGIST PROVIDED HISTORY: Reason for exam:->Confirm G-tube placement What reading provider will be dictating this exam?->CRC FINDINGS: Nasogastric tube tip is in the proximal stomach. Bowel gas pattern is nonobstructive. No abnormal calcifications are visualized. Degenerative changes of the lumbar spine are noted. Nasogastric tube tip is in the proximal stomach        SYSTEMS ASSESSMENT    Neuro   AMS  Intubated and sedated    - propofol initially started, but patient became hypotensive   - Fentanyl gtt    Respiratory   Acute hypoxic respiratory failure   - intubated   - vent settings as above  Pneumonia   - Aspiration vs COVID vs HAP   - patient started on Merrem and Vanco   - ID following  Wean oxygen as tolerated.  Keep O2 sat 90-92%  AB.27 / 47.7 / 273.8 / 21.3    Cardiovascular   Hx HTN, but hypotensive here   - monitor pressures closely   - Art line placed    Gastrointestinal   PPx: Protonix 40 mg QD    Renal   Monitor electrolytes closely, replete as necessary  Monitor I&O     Infectious Disease   No leukocytosis   - trend CBC  Pneumonia   - Aspiration vs COVID   - initial COVID negative, will repeat  Decadron 6 mg QD  Procalcitonin = 0.09  Blood Cultures x2 drawn   - pending  Urine culture pending    Hematology/Oncology   H&H: 7.3/24.8   - continue to monitor closely   - transfuse as necessary    Endocrine   Hx DMT2   - hypoglycemic PTA   - continue to monitor closely and treat as needed   - SSI    Social/Spiritual/DNR/Other   CODE STATUS: Full    PLAN:     WEAN PER PROTOCOL:  [x] No   [x] Yes  [] N/A    DISCONTINUE ANY LABS:   [x] No   [] Yes    ICU PROPHYLAXIS:  Stress ulcer:  [x] PPI Agent  [] [id number] [] Sucralfate  [] Other:  VTE:   [] Enoxaparin  [] Unfract. Heparin Subcut  [x] EPC Cuffs    NUTRITION:  [x] NPO [] Tube Feeding (Specify: ) [] TPN  [] PO (Diet: Diet NPO Effective Now)    HOME MEDICATIONS RECONCILED: [x] No  [] Yes    INSULIN DRIP:   [x] No   [] Yes    CONSULTATION NEEDED:  [x] No   [] Yes    FAMILY UPDATED:    [x] No   [] Yes    TRANSFER OUT OF ICU:   [x] No   [] Yes    ADDITIONAL PLAN:    1. Juany Stahl DO, PGY2.                       10/2/2020, 4:54 PM    Attending Physician Attestation: Dr. Starla Sanchez you very much for allowing me to see this patient in consultation and follow up.     I personally saw, examined and provided care for the patient. Radiographs, labs and medication list were reviewed by me independently. I spoke with bedside nursing, respiratory therapists and consultants. Critical care services and times documented are independent of procedures and multidisciplinary rounds with Residents.  Additionally comprehensive, multidisciplinary rounds were conducted with the MICU team. The case was discussed in detail

## 2020-10-02 NOTE — PROCEDURES
Central Line Placement Procedure Note    Indication: vascular access, hypovolemia and centrally administered medications    Consent: Unable to be obtained due to the emergent nature of this procedure. Procedure: The patient was positioned appropriately and the skin over the right internal jugular vein was prepped with betadine and draped in a sterile fashion. Local anesthesia was obtained by infiltration using 1% Lidocaine without epinephrine. A large bore needle was used to identify the vein. A guide wire was then inserted into the vein through the needle. A triple lumen catheter was then inserted into the vessel over the guide wire using the Seldinger technique. All ports showed good, free flowing blood return and were flushed with saline solution. The catheter was then securely fastened to the skin with suture at 16 cm. Two sutures were placed into the a suture end from each of the securing sutures was extended around the catheter and tied to the proximal eyelets as an added measure to prevent dislodgement. An antibiotic disk was placed and the site was then covered with a sterile dressing. A post procedure X-ray was ordered and is still pending at this time. The patient tolerated the procedure well    Complications: None    Attending Physician Attestation: Dr. Pradeep Hanson    Thank you very much for allowing me to see this patient in consultation and follow up. I personally saw, examined and provided care for the patient. Radiographs, labs and medication list were reviewed by me independently. I spoke with bedside nursing, respiratory therapists and consultants. Critical care services and times documented are independent of procedures and multidisciplinary rounds with Residents. Additionally comprehensive, multidisciplinary rounds were conducted with the MICU team. The case was discussed in detail and plans for care were established.  Review of Residents documentation was conducted and revisions were made as appropriate. I agree with the the above documented information. I was personally present during the completion of procedure.     Rowe Habermann  10/2/2020  4:01 PM

## 2020-10-03 NOTE — PROGRESS NOTES
Physician Progress Note      Abdiel Gruber  CSN #:                  [id number]  :                       1950  ADMIT DATE:       10/2/2020 6:36 AM  DISCH DATE:  RESPONDING  PROVIDER #:        Milagros York DO          QUERY TEXT:    Dear Attending,    Pt admitted with \". ..unresponsive episode. Lorean Snare Lorean Snare \"  per H&P. Noted in ID consult   note on 10/3, \". Lorean Snare Lorean Snare Septic shock. Lorean Snare Lorean Snare Aspiration pneumonia. Lorean Snare Lorean Snare \". If possible,   please document in progress notes and discharge summary if you are treating   the following: The medical record reflects the following:  Risk Factors: pneumonia;  Clinical Indicators: on admission:  temp 94.4->90.2; BP 71/42; WBC   10.0(10/2)->28.1(10/3); procal 0.09; lactic 1.1; per ED provider notes,   \". ..EMS report initial blood glucose was in the 30s, patient was given oral   glucose by family member and after this appeared to aspirate, rhonchi were   heard lung madrigal. Lorean Snare Lorean Snare Given possibility of aspiration, patient will be worked up   for sepsis. Lorean Snare Lorean Snare \";  per ID note, \". Lorean Snare Lorean Snare Septic shock. Lorean Snare Lorean Snare Aspiration   pneumonia. Lorean Snare Lorean Snare Leukocytosis from sepsis-28.1. Lorean Snare Lorean Snare Keep on meropenem plus   Levaquin. Lorean Snare Lorean Snare \";  Treatment: Levophed; IVF bolus; IV Levaquin; IV Merrem;  Options provided:  -- Sepsis with septic shock present at the time of the order to admit to the   hospital  -- Sepsis with septic shock was not present on admission and developed during   the inpatient stay  -- Other - I will add my own diagnosis  -- Disagree - Not applicable / Not valid  -- Disagree - Clinically unable to determine / Unknown  -- Refer to Clinical Documentation Reviewer    PROVIDER RESPONSE TEXT:    Sepsis with septic shock was not present on admission and developed during the   inpatient stay.     Query created by: Yuliya Roque on 10/3/2020 1:29 PM      Electronically signed by:  Milagros York DO 10/3/2020 1:31 PM

## 2020-10-03 NOTE — PROGRESS NOTES
Dr. Jack Lazaro notified of patients decrease in BP 75/56 MAP 67. New orders to start levophed for a MAP greater than 65

## 2020-10-03 NOTE — CONSULTS
[facility]     Consult Note                                 [address] [facility] [address], [place], [address]                   Phone [phone]     Fax [phone]        Date:   10/3/2020  Patient name:  Brayan Matthews  Date of admission:  10/2/2020  6:36 AM  MRN:   [id number]  YOB: 1950    Reason for Consult: Recent admission, new pneumonia with history of ESBL    CC: No chief complaint on file. Unresponsive and hypoglycemic    HISTORY OF PRESENT ILLNESS:                The patient is a 78 y.o. female known to infectious disease service was seen recently by Dr. Rose Marie Metcalf for her sacral decubitus ulcers. She presented with hypoglycemia unresponsive hypoxia. She was intubated currently in ICU. She was also found to be hypothermic hypotensive. On pressors, blue BC count of 10.0 on admission which repeat today is 28.1, procalcitonin on admission is 0.09, has been tested negative for COVID twice. Her UA is not suggestive of UTI, CT chest without contrast done yesterday shows extensive groundglass and consolidative opacities ,  looks more concerning for an aspiration pneumonia. Currently she is on vancomycin plus meropenem as well as azithromycin    Past Medical History:   has a past medical history of Cataract, right, DM (diabetes mellitus), type 2 ([address] HTN (hypertension), Hyperlipidemia, Obesity, Osteoarthritis, Sinusitis chronic, and Wheelchair confinement. Past Surgical History:   has a past surgical history that includes Hysterectomy and pr xcapsl ctrc rmvl insj io lens prosth w/o ecp (Right, 8/10/2018). Home Medications:    Prior to Admission medications    Medication Sig Start Date End Date Taking? Authorizing Provider   miconazole (MICOTIN) 2 % powder Apply topically 2 times daily.  9/28/20   Jorge Duque MD   fluconazole (DIFLUCAN) 200 MG tablet Take 1 tablet by mouth CAPS capsule Take 1 capsule by mouth once a week 11/6/19   Navin Daniel MD   Misc. Devices MISC Wheelchair cushion 6/5/17   Bob Mcgrath MD   Misc. Devices (MATTRESS PAD) MISC 1 each by Does not apply route once for 1 dose 6/5/17 6/5/17  Bob Mcgrath MD   Misc. Devices KIT 1 kit by Does not apply route daily as needed (leg edema) Compression stockings for bilateral leg swelling 6/5/17   Bob Mcgrath MD   Misc. [address] Bed with gel overlay dsp # 1  Pt has Osteoarthritis and is Morbidly Obese 4/4/16   Gurinder Son,    [facility]. Devices KIT 1 kit by Does not apply route daily as needed Foam mattress for bilateral knees osteoarthritis , degenerative back osteoarthitis 3/31/16   Asiya sOuna MD   [facility]. Devices [address] [facility] chair to assist with ADL with diagnosis of OA and full thickness knee meniscal tear 2/24/15   Orlin Arredondo MD   Misc. Devices MISC Grab bar  diagnosis osteoarthritis and full thickness knee meniscal tear 2/24/15   Orlin Arredondo MD   [facility] [address] bed 11/7/14   Yissel Gil MD   [facility]. Devices [address] wheelchair 11/7/14   Yissel Gil MD   [facility] Bedside commode. Dx osteoarthritis 715.90. Height 5'4.5\". Weight 245 lbs. 9/17/14   Asiya Osuna MD       Allergies:  Patient has no known allergies. Social History:   reports that she has never smoked. She has never used smokeless tobacco. She reports that she does not drink alcohol or use drugs. Family History: family history is not on file.    REVIEW OF SYSTEMS:    Cannot be obtained as patient is on ventilator        PHYSICAL EXAM:    BP (!) 97/58   Pulse 74   Temp 98.1 °F (36.7 °C) (Esophageal)   Resp 11   Ht 5' 4\" (1.626 m)   Wt 227 lb 8.2 oz (103.2 kg)   SpO2 100%   BMI 39.05 kg/m²    VENT SETTINGS:   Vent Information  $Ventilation: $Subsequent Day  Skin Assessment: Clean, dry, & intact  Equipment ID: [id number]  Vent Type: [id number]  Vent Mode: AC/VC+  Vt Ordered: 400 mL  Rate Set: 12 bmp  Peak Flow: 0 L/min  Pressure Support: 0 cmH20  FiO2 : 50 %  SpO2: 100 %  SpO2/FiO2 ratio: 200  Sensitivity: 3  PEEP/CPAP: 5  I Time/ I Time %: 1 s  Humidification Source: HME    General appearance: Opens her eyes but not following commands, ventilated   skin: Warm and dry  Eyes: Pink palpebral conjunctivae. PERRL  Ears: External ears normal.  Nose/Sinuses: Nares normal. Septum midline. Mucosa normal. No sinus tenderness. Oropharynx: Oropharynx clear with no exudates seen  Neck: Neck supple. No jugular venous distension, lymphadenopathy or bilateral crackles present at the lower lungs   l  Heart: S1 S2  Regular rate and rhythm. No rub, murmur or gallop  Abdomen: Abdomen soft, non-tender. BS normal. No masses, organomegaly  Extremities: 1+ lower extremity edema,  peripheral pulses palpable  Musculoskeletal: No joint effusion, tenderness, swelling or warmth  Right neck central line in place    DATA:    Labs:     Last 3 CBC:  Recent Labs     10/02/20  0705 10/03/20  0546   WBC 10.0 28.1*   RBC 3.03* 3.58   HGB 7.3* 8.8*    371   MPV 9.6 9.3       Last 3 BMP  Recent Labs     10/02/20  0705 10/02/20  0834 10/03/20  0546     --  133   K 3.7  --  4.6     --  106   CO2 22  --  19*   BUN 18  --  20   CREATININE 0.6  --  1.0   GLUCOSE 408* 195 170*   CALCIUM 7.5*  --  7.8*       LIVER PROFILE:  Recent Labs     10/02/20  0705 10/03/20  0546   AST 11 11   ALT 10 12   LABALBU 1.8* 2.1*       URINARY CATHETER OUTPUT (Castano):  Urethral Catheter Non-latex 16 fr-Output (mL): 25 mL  Microbiology :  No results for input(s): BC in the last 72 hours. No results for input(s): Layne [place] in the last 72 hours. No results for input(s): LABURIN in the last 72 hours. No results for input(s): CULTRESP in the last 72 hours. No results for input(s): WNDABS in the last 72 hours. Radiology :  XR CHEST PORTABLE   Final Result   1. No interval change in extensive bilateral multifocal infiltrates more   prominent within the right lung. XR CHEST PORTABLE   Final Result   1. Slightly limited exam.      2.   New, right-internal-jugular-approach, single-lumen central venous   catheter terminating at the SVC/RA junction. Negative for gross   pneumothorax. An endotracheal tube has been slightly withdrawn, now   terminating approximately 3.8 cm above the ritesh. 3.   Bilateral multifocal consolidations and intermixed semi-solid patchy   opacities, as described, appear overall not significantly changed, given   differences in technique. 4.   Nonspecific, diffuse heterogeneous increased density throughout all   visualized osseous structures. When clinically possible, consider nuclear   scintigraphy, as directed clinically. .         CT Head WO Contrast   Final Result   No acute intracranial abnormality. CT CHEST WO CONTRAST   Final Result   Extensive ground-glass and consolidative opacities throughout both lungs, in   a pattern highly suspicious for COVID pneumonia. Please note that the   endotracheal tube terminates at the level of the ritesh. Recommend   retracting the endotracheal tube by 3-4 cm. No acute inflammatory or obstructive process in the abdomen or pelvis. Cholelithiasis without evidence of acute cholecystitis. Diffusely heterogeneous and relatively hyperdense vertebral bodies, a   nonspecific finding. Diffuse metastatic disease cannot be excluded. Recommend clinical correlation. Correlation could also be made with nuclear   medicine bone scan, if clinically appropriate. Decubitus ulcer with at least 1 gas locule that may come into contact with   the lower most portion of the coccyx. Osteomyelitis cannot be excluded at   this level. CT ABDOMEN PELVIS WO CONTRAST Additional Contrast? None   Final Result   Extensive ground-glass and consolidative opacities throughout both lungs, in   a pattern highly suspicious for COVID pneumonia.   Please note that the   endotracheal tube terminates at the level of the ritesh. Recommend   retracting the endotracheal tube by 3-4 cm. No acute inflammatory or obstructive process in the abdomen or pelvis. Cholelithiasis without evidence of acute cholecystitis. Diffusely heterogeneous and relatively hyperdense vertebral bodies, a   nonspecific finding. Diffuse metastatic disease cannot be excluded. Recommend clinical correlation. Correlation could also be made with nuclear   medicine bone scan, if clinically appropriate. Decubitus ulcer with at least 1 gas locule that may come into contact with   the lower most portion of the coccyx. Osteomyelitis cannot be excluded at   this level.          XR ABDOMEN FOR NG/OG/NE TUBE PLACEMENT   Final Result   Nasogastric tube tip is in the proximal stomach         XR CHEST PORTABLE   Final Result   New patchy bilateral ground-glass airspace opacities      Endotracheal tube tip projects 12 mm superior to the ritesh         XR CHEST PORTABLE    (Results Pending)           Assessment and Plan:      · Septic shock  · Aspiration pneumonia  · Leukocytosis from sepsis-28.1  · Coccygeal ulcer not infected    PLAN  -Keep on meropenem plus Levaquin  -Legionella urine antigen  -Respiratory culture  -We will follow the patient closely      Thank you for your consult, please call for any qs              Steve Amaya MD

## 2020-10-03 NOTE — PROGRESS NOTES
dry  Respiratory: mechanical / coarse breath sounds without respiratory distress  Cardiovascular: regular rate and rhythm without murmur / rub / gallop  Abdominal: soft, nontender, nondistended, normoactive bowel sounds  Extremities: no mottling, pulses intact, no edema  Neurologic: intubated / sedated  Psychiatric: unable to assess    Assessment / Plan  Unresponsive episode, questionable etiology, most likely metabolic              CT head showed no acute pathology              No hx CNS pathology              No arrhythmia noted              Troponin 0.03 in ED              See below     Acute hypoxic respiratory failure due to community acquired pneumonia              Diagnostics:    Testing  · 10/2 (rapid swab) negative  · 10/2 repeat negative  Patient currently afebrile, actually hypOthermic                                      CXR showing patchy b/l GGO                                      CT chest showing extensive b/l GGO                                      CBC shows mild lymphopenia                                      CMP shows no transaminitis                                      Lactic / procal unremarkable                                      Check CRP, d-dimer, ferritin      CRP 8.6      D-dimer 3113      Ferritin 283                                      -------------------------------------------------------------------              Treatment:      ID following - meropenem + Levaquin                                      Decadron 6 mg IV daily now discontinued                                      Tylenol / ice packs for fever                                      Demerol if needed for shaking chills refractory to above                                      -------------------------------------------------------------------              Supportive tx:  Droplet Plus isolation                                      Wean vent as able - pulm / critical care following                                      Avoid nebulized breathing tx, sub with MDI medications                                      Avoid IVF if possible; if necessary boluses > infusions                                      Avoid NSAIDs     Decubitus ulcer              Wound vac noted on wound in ED and removed d/t soiling              Wound care consult              ID will be following as well, as above     Anemia, acute-on-chronic              Baseline hgb ~10-11 g/dL per EMR              Hgb 7.3 in ED, follow w labs              MCV wnl / MCHC low              Transfuse as needed to keep hgb > 7 g/dL              Hold chemical DVT prophylaxis for now              Check FOBT, LDH, iron profile, B12, folate        Iron / %sat / TIBC / transferrin / folate all low - would start repletion     IV Ferrlecit protocol ordered 10/3    Folate low - start supplement    B12 ok              Does not appear to be actively bleeding     Abnormal UA              UA 10/2 showed rare bacteria and trace LE              Likely does not represent UTI              Cx sent from ED - Gram-pos organism, <10k CFU/mL     IDDM              Hold any home oral antihyperglycemics for now              ISS and BG checks q6 for now              Hypoglycemic protocol              Check hemoglobin A1c to assess long-term glucose control - 5.3%              Monitor BG with goal 140-180     HTN              Currently hypOtensive              Hold home atenolol, lisinopril for now   Currently on Levo @ 1 mcg/hr     HPL - Continue rosuvastatin     Code status               Full  DVT prophylaxis       SCDs  Disposition                To be determined; from home    Electronically signed by Hailey Feliciano DO on 10/3/2020 at 12:27 PM

## 2020-10-03 NOTE — PROGRESS NOTES
10/3/2020  9:54 AM      Comprehensive Nutrition Assessment    Type and Reason for Visit:  Initial, Positive Nutrition Screen    Nutrition Recommendations/Plan: If pt remains intubated and EN needed for nutrition support, recommend Immune Enhancing TF (Pivot 1.5) 2/2 added arginine to promote wound healing. TF RECOMMENDATION:  Immune Enhancing TF (Pivot 1.5) to goal rate 40 ml/hr (flushes per Critical Care)   This will provide: 960 ml/d, 1440 zander, 90 g pro, 729 ml free water  This regimen will meet 100% calorie and protein needs    Nutrition Assessment:  Pt admit unresponsive/resp failure. Intubated/sedated w/PMH DM and coccyx wounds w/wound VAC dressings PTA (removed in ED/soiled). Will provide EN recommendation,until pt can be weaned. Malnutrition Assessment:  Malnutrition Status: At risk for malnutrition (Comment)    Context:  Acute Illness     Findings of the 6 clinical characteristics of malnutrition:  Energy Intake:  Mild decrease in energy intake (Comment)(NPO now s/p intubation)  Weight Loss:  No significant weight loss     Body Fat Loss:  No significant body fat loss     Muscle Mass Loss:  No significant muscle mass loss    Fluid Accumulation:  No significant fluid accumulation     Strength:  Not Performed    Estimated Daily Nutrient Needs:  Energy (kcal):  ; Weight Used for Energy Requirements:  Admission     Protein (g):  80-90 (1.4-1.6 g/kg);  Weight Used for Protein Requirements:  Ideal        Fluid (ml/day):  Per Critical Care; Weight Used for Fluid Requirements:  Admission      Nutrition Related Findings:  intubated/sedated, NGT to LIS, pressor(MAP 81), I/O WNL, abd WDL, missing teeth, non-ambulatory/wheelchair      Wounds:  Multiple, Wound Care Consult Pending(2 wounds on coccyx in fair healing stages per ED-Wound VAC dressings off now)       Current Nutrition Therapies:    Diet NPO Effective Now    Anthropometric Measures:  · Height: 5' 4\" (162.6 cm)  · Current Body Weight: 227 lb (103 kg)(10/3)   · Admission Body Weight: 211 lb (95.7 kg)(10/2 [facility])    · Usual Body Weight: 202 lb (91.6 kg)(per EMR x 2 mo ago)     · Ideal Body Weight: 120 lbs; % Ideal Body Weight 189.2 %   · BMI: 38.9  · BMI Categories: Obese Class 2 (BMI 35.0 -39.9)(36.2 at admit wt)       Nutrition Diagnosis:   · Inadequate oral intake related to impaired respiratory function(intubated) as evidenced by intubation, NPO or clear liquid status due to medical condition, wounds      Nutrition Interventions:   Food and/or Nutrient Delivery:  Continue NPO(If pt remains intubated, recommend TF nutrition support)  Nutrition Education/Counseling:  No recommendation at this time   Coordination of Nutrition Care:  Continued Inpatient Monitoring    Goals:  Nutrition progression       Nutrition Monitoring and Evaluation:   Food/Nutrient Intake Outcomes:  Diet Advancement/Tolerance  Physical Signs/Symptoms Outcomes:  Biochemical Data, Fluid Status or Edema, Hemodynamic Status, GI Status, Weight, Skin, Nutrition Focused Physical Findings     Discharge Planning:     Too soon to determine     Electronically signed by John Emerson RD, CNSC, LD on 10/3/20 at 9:54 AM EDT    Contact: [phone]

## 2020-10-03 NOTE — PLAN OF CARE
Delivery  Description: Individualized approach for food/nutrient provision.   10/3/2020 0953 by Jose Cano RD, CNSC, LD  Outcome: Ongoing

## 2020-10-03 NOTE — HOME CARE
Patient is active with [facility] for skilled nursing. Will need new home care orders at discharge.  Arlys January lpn

## 2020-10-03 NOTE — PLAN OF CARE
Problem: Inadequate oral food/beverage intake (NI-2.1)  Goal: Food and/or Nutrient Delivery  Pt will tolerate Nutrition progression  Description: Individualized approach for food/nutrient provision.   Outcome: Ongoing

## 2020-10-03 NOTE — PROGRESS NOTES
Critical Care Team - Daily Progress Note      Date and time: 10/3/2020 7:11 AM  Patient's name:  Spencer Wagoner Record Number: [id number]  Patient's account/billing number: [de-identified]  Patient's YOB: 1950  Age: 78 y.o. Date of Admission: 10/2/2020  6:36 AM  Length of stay during current admission: 1      Primary Care Physician: Feroz Schmidt MD  ICU Attending Physician: Dr. Allyson Haley    Code Status: Full Code    Reason for ICU admission: Acute Respiratory Failure with Hypoxia      SUBJECTIVE:   The patient is a 78 y.o. female with significant past medical history of hypertension, hyperlipidemia, type 2 diabetes mellitus, osteoarthritis and sacral decubitus ulcer who presented to the ER earlier today unresponsive and hypoglycemic. Initial blood sugar was reportedly in the 30s. The patient was given oral glucose prior to arrival in the ER. Initial SPO2 was in the 70s on room air. Patient was placed on a nonrebreather and her saturation improved appropriately. Of note, the patient was recently admitted to the hospital for a urinary tract infection and altered mental status. She improved with IV fluids and antibiotics and was discharged on 9/28. OVERNIGHT EVENTS:         10/03/2020  Patient required Levophed overnight. No changes in responsiveness.     AWAKE & FOLLOWING COMMANDS:  [x] No   [] Yes    CURRENT VENTILATION STATUS:     [x] Ventilator  [] BIPAP  [] Nasal Cannula [] Room Air      IF INTUBATED, ET TUBE MARKING AT LOWER LIP:       cms    SECRETIONS Amount:  [] Small [] Moderate  [] Large  [x] None  Color:     [] White [] Colored  [] Bloody    SEDATION:  RAAS Score:  [x] Fentanyl gtt  [] Versed gtt  [] Ativan gtt   [] No Sedation    PARALYZED:  [x] No    [] Yes    DIARRHEA:                [x] No                [] Yes  (C. Difficile status: [] positive                                                                                                                       [] negative                                                                                                                     [] pending)    VASOPRESSORS:  [] No    [x] Yes    If yes -   [x] Levophed       [] Dopamine     [] Vasopressin       [] Dobutamine  [] Phenylephrine         [] Epinephrine    CENTRAL LINES:     [] No   [] Yes   (Date of Insertion: 10/02  )           If yes -     [x] Right IJ     [] Left IJ [] Right Femoral [] Left Femoral                   [] Right Subclavian [] Left Subclavian       THURMAN'S CATHETER:   [] No   [x] Yes  (Date of Insertion: 10/02  )     URINE OUTPUT:            [] Good   [x] Low              [] Anuric      OBJECTIVE:     VITAL SIGNS:  /68   Pulse 64   Temp 97.5 °F (36.4 °C) (Esophageal)   Resp 10   Ht 5' 4\" (1.626 m)   Wt 227 lb 8.2 oz (103.2 kg)   SpO2 100%   BMI 39.05 kg/m²   Tmax over 24 hours:  Temp (24hrs), Av.3 °F (35.2 °C), Min:90.2 °F (32.3 °C), Max:97.5 °F (36.4 °C)      Patient Vitals for the past 6 hrs:   BP Temp Temp src Pulse Resp SpO2 Weight   10/03/20 0600 115/68 -- -- 64 10 100 % --   10/03/20 0500 105/61 -- -- 68 12 100 % --   10/03/20 0400 102/65 97.5 °F (36.4 °C) Esophageal 75 12 100 % 227 lb 8.2 oz (103.2 kg)   10/03/20 0320 -- -- -- 77 12 100 % --   10/03/20 0300 109/66 -- -- 81 13 -- --   10/03/20 0200 91/67 -- -- 84 14 99 % --         Intake/Output Summary (Last 24 hours) at 10/3/2020 0711  Last data filed at 10/3/2020 0500  Gross per 24 hour   Intake 1123 ml   Output 1085 ml   Net 38 ml     Wt Readings from Last 2 Encounters:   10/03/20 227 lb 8.2 oz (103.2 kg)   20 206 lb 14.4 oz (93.8 kg)     Body mass index is 39.05 kg/m². PHYSICAL EXAMINATION:    Physical Exam  Vitals signs reviewed. Constitutional:       Interventions: She is sedated and intubated. Comments: Not following commands, minimal response to pain   Cardiovascular:      Rate and Rhythm: Normal rate and regular rhythm.       Heart sounds: Normal heart cmH20  FiO2 : 50 %  SpO2: 100 %  SpO2/FiO2 ratio: 200  Sensitivity: 3  PEEP/CPAP: 5  I Time/ I Time %: 1 s  Humidification Source: HME  Additional Respiratory  Assessments  Pulse: 64  Resp: 10  SpO2: 100 %  Position: Semi-[facility]  Humidification Source: HME  Oral Care: Lip moisturizer applied, Mouth moisturizer, Mouth suctioned  Subglottic Suction Done?: Yes  Airway Type: ET  Airway Size: 7.5  Cuff Pressure (cm H2O): 29 cm H2O    ABGs:     Recent Labs     10/03/20  0541   PH 7.379   PCO2 31.6*   PO2 132.3*   HCO3 18.2*   BE -6.1*   O2SAT 98.7*         Laboratory findings:    Complete Blood Count:   Recent Labs     10/02/20  0705 10/03/20  0546   WBC 10.0 28.1*   HGB 7.3* 8.8*   HCT 24.8* 28.3*    371        Last 3 Blood Glucose:   Recent Labs     10/02/20  0705 10/02/20  0834 10/03/20  0546   GLUCOSE 408* 195 170*        PT/INR:  No results found for: PROTIME, INR  PTT:  No results found for: APTT, PTT    Comprehensive Metabolic Profile:   Recent Labs     10/02/20  0705 10/02/20  0834 10/03/20  0546     --  133   K 3.7  --  4.6     --  106   CO2 22  --  19*   BUN 18  --  20   CREATININE 0.6  --  1.0   GLUCOSE 408* 195 170*   CALCIUM 7.5*  --  7.8*   PROT 5.0*  --  6.1*   LABALBU 1.8*  --  2.1*   BILITOT <0.2  --  <0.2   ALKPHOS 75  --  96   AST 11  --  11   ALT 10  --  12      Magnesium:   Lab Results   Component Value Date    MG 1.2 10/03/2020     Phosphorus:   Lab Results   Component Value Date    PHOS 3.8 10/03/2020     Ionized Calcium: No results found for: CAION     Urinalysis:     Troponin:   Recent Labs     10/02/20  0705 10/02/20  1745 10/02/20  2118   TROPONINI 0.03 0.02 0.02       Microbiology:    Cultures during this admission:     Blood cultures:                 [] None drawn      [] Negative             []  Positive (Details:  )  Urine Culture:                   [] None drawn      [] Negative             []  Positive (Details:  )  Sputum Culture:               [x] None drawn       [] Negative             []  Positive (Details:  )   Endotracheal aspirate:     [x] None drawn       [] Negative             []  Positive (Details:  )     Other pertinent Labs:   COVID neg x 2    Radiology/Imaging:   Ct Abdomen Pelvis Wo Contrast Additional Contrast? None    Result Date: 10/2/2020  EXAMINATION: CT OF THE CHEST WITHOUT CONTRAST; CT OF THE ABDOMEN AND PELVIS WITHOUT CONTRAST 10/2/2020 8:55 am TECHNIQUE: CT of the chest was performed without the administration of intravenous contrast. Multiplanar reformatted images are provided for review. Dose modulation, iterative reconstruction, and/or weight based adjustment of the mA/kV was utilized to reduce the radiation dose to as low as reasonably achievable.; CT of the abdomen and pelvis was performed without the administration of intravenous contrast. Multiplanar reformatted images are provided for review. Dose modulation, iterative reconstruction, and/or weight based adjustment of the mA/kV was utilized to reduce the radiation dose to as low as reasonably achievable. COMPARISON: None. Correlated with chest radiograph from the same day. HISTORY: ORDERING SYSTEM PROVIDED HISTORY: recent suspected aspiration, new ground glass opacities on CXR. TECHNOLOGIST PROVIDED HISTORY: Reason for exam:->recent suspected aspiration, new ground glass opacities on CXR. What reading provider will be dictating this exam?->CRC; ORDERING SYSTEM PROVIDED HISTORY: hemoglobin 7.2 TECHNOLOGIST PROVIDED HISTORY: Reason for exam:->hemoglobin 7.2 Additional Contrast?->None What reading provider will be dictating this exam?->CRC FINDINGS: Chest: Mediastinum: An enteric tube terminates in the body of the stomach. The endotracheal tube terminates immediately cranial to the ritesh. Recommend retraction of 3-4 mm. Heart size is within normal limits. Coronary artery calcifications are present, potentially a marker for coronary artery disease. Trace pericardial fluid is present.   The thoracic esophagus is decompressed, limiting assessment. No obvious esophageal abnormality. Scattered visible but nonenlarged mediastinal lymph nodes are present. Lungs/pleura: The central airways are patent. There is no evidence of a pleural effusion. There are extensive ground-glass and consolidative opacities throughout both lungs. No evidence of a pneumothorax. Soft Tissues/Bones: No acute osseous abnormality. Bones are diffusely osteopenic and there is moderate osteophyte formation and intervertebral disc space narrowing throughout the thoracic spine. Moderate degenerative changes involve the shoulders bilaterally, right greater than left. Abdomen/Pelvis: Organs: The gallbladder contains a single large peripherally calcified stone. No gallbladder dilation or obvious wall thickening. The unenhanced liver is normal in appearance. The spleen and adrenal glands are normal in size. The pancreas demonstrates diffuse fatty atrophy but is otherwise unremarkable. The kidneys have a lobulated surface contour bilaterally. No hydronephrosis or radiopaque calculus. GI/Bowel: The stomach and duodenal sweep are within normal limits. No evidence of a bowel obstruction, free air or pneumatosis. Portions the colon are decompressed, limiting assessment for mucosal based abnormalities. The appendix is normal. Pelvis: The urinary bladder is decompressed around a Castano catheter. The uterus and ovaries are not well visualized and are either surgically absent or severely atrophic. No free fluid is identified in the pelvis and there is no pelvic lymphadenopathy. Scattered visible but nonenlarged pelvic lymph nodes are present. Peritoneum/Retroperitoneum: The abdominal aorta is normal in caliber with scattered atherosclerotic plaques. No retroperitoneal lymphadenopathy. No enlarged mesenteric lymph nodes are identified. Bones/Soft Tissues: There is a decubitus ulcer noted inferiorly, measuring 3.6 x 1.3 cm.   This appears to extend to the level of the coccyx, with a small locule of gas that appears to abut or come in contact with the inferior most portion of the coccyx. There are advanced degenerative changes involving the hips bilaterally. Moderate to severe degenerative changes involve the bilateral sacroiliac joints and lower lumbar spine. Vertebral bodies have a heterogeneous attenuation pattern but are predominantly hyperdense. Extensive ground-glass and consolidative opacities throughout both lungs, in a pattern highly suspicious for COVID pneumonia. Please note that the endotracheal tube terminates at the level of the ritesh. Recommend retracting the endotracheal tube by 3-4 cm. No acute inflammatory or obstructive process in the abdomen or pelvis. Cholelithiasis without evidence of acute cholecystitis. Diffusely heterogeneous and relatively hyperdense vertebral bodies, a nonspecific finding. Diffuse metastatic disease cannot be excluded. Recommend clinical correlation. Correlation could also be made with nuclear medicine bone scan, if clinically appropriate. Decubitus ulcer with at least 1 gas locule that may come into contact with the lower most portion of the coccyx. Osteomyelitis cannot be excluded at this level. Ct Head Wo Contrast    Result Date: 10/2/2020  EXAMINATION: CT OF THE HEAD WITHOUT CONTRAST  10/2/2020 7:55 am TECHNIQUE: CT of the head was performed without the administration of intravenous contrast. Dose modulation, iterative reconstruction, and/or weight based adjustment of the mA/kV was utilized to reduce the radiation dose to as low as reasonably achievable. COMPARISON: None. 09/24/2020 HISTORY: ORDERING SYSTEM PROVIDED HISTORY: AMS TECHNOLOGIST PROVIDED HISTORY: Reason for exam:->AMS Has a \"code stroke\" or \"stroke alert\" been called? ->No FINDINGS: BRAIN/VENTRICLES: There is no acute intracranial hemorrhage, mass effect or midline shift. No abnormal extra-axial fluid collection.   The images are provided for review. Dose modulation, iterative reconstruction, and/or weight based adjustment of the mA/kV was utilized to reduce the radiation dose to as low as reasonably achievable.; CT of the abdomen and pelvis was performed without the administration of intravenous contrast. Multiplanar reformatted images are provided for review. Dose modulation, iterative reconstruction, and/or weight based adjustment of the mA/kV was utilized to reduce the radiation dose to as low as reasonably achievable. COMPARISON: None. Correlated with chest radiograph from the same day. HISTORY: ORDERING SYSTEM PROVIDED HISTORY: recent suspected aspiration, new ground glass opacities on CXR. TECHNOLOGIST PROVIDED HISTORY: Reason for exam:->recent suspected aspiration, new ground glass opacities on CXR. What reading provider will be dictating this exam?->CRC; ORDERING SYSTEM PROVIDED HISTORY: hemoglobin 7.2 TECHNOLOGIST PROVIDED HISTORY: Reason for exam:->hemoglobin 7.2 Additional Contrast?->None What reading provider will be dictating this exam?->CRC FINDINGS: Chest: Mediastinum: An enteric tube terminates in the body of the stomach. The endotracheal tube terminates immediately cranial to the ritesh. Recommend retraction of 3-4 mm. Heart size is within normal limits. Coronary artery calcifications are present, potentially a marker for coronary artery disease. Trace pericardial fluid is present. The thoracic esophagus is decompressed, limiting assessment. No obvious esophageal abnormality. Scattered visible but nonenlarged mediastinal lymph nodes are present. Lungs/pleura: The central airways are patent. There is no evidence of a pleural effusion. There are extensive ground-glass and consolidative opacities throughout both lungs. No evidence of a pneumothorax. Soft Tissues/Bones: No acute osseous abnormality.   Bones are diffusely osteopenic and there is moderate osteophyte formation and intervertebral disc space narrowing throughout the thoracic spine. Moderate degenerative changes involve the shoulders bilaterally, right greater than left. Abdomen/Pelvis: Organs: The gallbladder contains a single large peripherally calcified stone. No gallbladder dilation or obvious wall thickening. The unenhanced liver is normal in appearance. The spleen and adrenal glands are normal in size. The pancreas demonstrates diffuse fatty atrophy but is otherwise unremarkable. The kidneys have a lobulated surface contour bilaterally. No hydronephrosis or radiopaque calculus. GI/Bowel: The stomach and duodenal sweep are within normal limits. No evidence of a bowel obstruction, free air or pneumatosis. Portions the colon are decompressed, limiting assessment for mucosal based abnormalities. The appendix is normal. Pelvis: The urinary bladder is decompressed around a Castano catheter. The uterus and ovaries are not well visualized and are either surgically absent or severely atrophic. No free fluid is identified in the pelvis and there is no pelvic lymphadenopathy. Scattered visible but nonenlarged pelvic lymph nodes are present. Peritoneum/Retroperitoneum: The abdominal aorta is normal in caliber with scattered atherosclerotic plaques. No retroperitoneal lymphadenopathy. No enlarged mesenteric lymph nodes are identified. Bones/Soft Tissues: There is a decubitus ulcer noted inferiorly, measuring 3.6 x 1.3 cm. This appears to extend to the level of the coccyx, with a small locule of gas that appears to abut or come in contact with the inferior most portion of the coccyx. There are advanced degenerative changes involving the hips bilaterally. Moderate to severe degenerative changes involve the bilateral sacroiliac joints and lower lumbar spine. Vertebral bodies have a heterogeneous attenuation pattern but are predominantly hyperdense.      Extensive ground-glass and consolidative opacities throughout both lungs, in a pattern highly suspicious for COVID pneumonia. Please note that the endotracheal tube terminates at the level of the ritesh. Recommend retracting the endotracheal tube by 3-4 cm. No acute inflammatory or obstructive process in the abdomen or pelvis. Cholelithiasis without evidence of acute cholecystitis. Diffusely heterogeneous and relatively hyperdense vertebral bodies, a nonspecific finding. Diffuse metastatic disease cannot be excluded. Recommend clinical correlation. Correlation could also be made with nuclear medicine bone scan, if clinically appropriate. Decubitus ulcer with at least 1 gas locule that may come into contact with the lower most portion of the coccyx. Osteomyelitis cannot be excluded at this level. Xr Chest Portable    Result Date: 10/2/2020  EXAMINATION: ONE XRAY VIEW OF THE CHEST 10/2/2020 3:55 pm COMPARISON: Noncontrast chest CT 10/02/2020 HISTORY: ORDERING SYSTEM PROVIDED HISTORY: line placement TECHNOLOGIST PROVIDED HISTORY: Reason for exam:->line placement FINDINGS: This exam is slightly limited by AP portable, semi-upright technique and multiple overlying support devices. There is a new, right-internal-jugular-approach, single-lumen central venous catheter terminating at the SVC/RA junction. Negative for gross pneumothorax. An endotracheal tube has been slightly withdrawn, now terminating approximately 3.8 cm above the ritesh. An enteric tube courses below the left hemidiaphragm, off the inferior edge of the image. Given differences in technique, previously-existing, bibasilar and bilateral perihilar multifocal consolidations and intermixed semi-solid patchy opacities, appear overall not significantly changed, given differences in technique. On this exam this is most conspicuous within the right suprahilar region. Nonspecific, diffuse heterogeneous increased density throughout all visualized osseous structures and scattered osseous degenerative disease have not significantly changed.  No ABDOMEN 10/2/2020 6:23 am COMPARISON: CT abdomen pelvis 08/25/2020 HISTORY: ORDERING SYSTEM PROVIDED HISTORY: Confirm G-tube placement TECHNOLOGIST PROVIDED HISTORY: Reason for exam:->Confirm G-tube placement What reading provider will be dictating this exam?->CRC FINDINGS: Nasogastric tube tip is in the proximal stomach. Bowel gas pattern is nonobstructive. No abnormal calcifications are visualized. Degenerative changes of the lumbar spine are noted. Nasogastric tube tip is in the proximal stomach        Chest Xray (10/3/2020):  1. No interval change in extensive bilateral multifocal infiltrates more    prominent within the right lung.         ASSESSMENT:     Patient Active Problem List    Diagnosis Date Noted    Respiratory failure ([address] 10/02/2020    Acute hypoxemic respiratory failure ([address] 10/02/2020    Community acquired pneumonia 10/02/2020    Suspected COVID-19 virus infection 10/02/2020    Abnormal urinalysis 10/02/2020    UTI (urinary tract infection) 09/25/2020    Severe protein-calorie malnutrition ([address] 09/25/2020    AMS (altered mental status) 09/24/2020    Cellulitis of sacral region     Hypomagnesemia     Leukocytosis     Hypokalemia     Decubitus ulcer of sacral region 07/12/2020    Decubitus ulcer of right buttock, stage 3 ([address] 06/20/2017    Wound of right buttock 06/13/2017    General weakness     Morbid obesity due to excess calories (HCC)     Normochromic normocytic anemia     Frequent falls     Primary osteoarthritis involving multiple joints     Eczema 07/07/2011    Chronic sinusitis 11/04/2010    Essential hypertension 11/03/2010    Type 2 diabetes mellitus with hyperglycemia, without long-term current use of insulin ([address] 11/03/2010    Osteoarthritis 11/03/2010    Hyperlipidemia 11/03/2010       Additional assessment:    Neuro   AMS  Intubated and sedated               - propofol initially started, but patient became hypotensive              - Fentanyl gtt  Minimal responsiveness to painful stimulation     Respiratory   Acute hypoxic respiratory failure              - intubated              - vent settings as above  Pneumonia              - Aspiration vs COVID vs HAP              - Patient started on Merrem d2; given 1 dose vanco              - ID following  Wean oxygen as tolerated. Keep O2 sat 90-92%  AB.37 / 31.6 / 132.3 / 18.2     Cardiovascular   Hx HTN, but hypotensive here              - monitor pressures closely              - Art line placed 10/02    - Levophed started overnight     Gastrointestinal   PPx: Protonix 40 mg QD     Renal   Monitor electrolytes closely, replete as necessary  Monitor I&O     Infectious Disease   Leuckocytosis              -CBC daily    -WBC 28.1 today   -On merrem and vanc day 2  Pneumonia              - Aspiration vs COVID              - COVID negative x 2  Decadron 6 mg QD  Procalcitonin = 0.09  Blood Cultures x2 drawn              - pending  Urine culture pending     Hematology/Oncology   H&H: 8.8/28.3              - Continue to monitor closely              - Transfuse < 7     Endocrine   Hx DMT2              - hypoglycemic PTA              - continue to monitor closely and treat as needed              - MDSSI     Social/Spiritual/DNR/Other   CODE STATUS: Full          PLAN:     WEAN PER PROTOCOL:  [] No   [x] Yes  [] N/A    DISCONTINUE ANY LABS:   [x] No   [] Yes    ICU PROPHYLAXIS:  Stress ulcer:  [x] PPI Agent  [] [id number] [] Sucralfate  [] Other:  VTE:   [x] Enoxaparin  [] Unfract.  Heparin Subcut  [] EPC Cuffs    NUTRITION:  [x] NPO [] Tube Feeding (Specify: ) [] TPN  [] PO (Diet: Diet NPO Effective Now)    HOME MEDICATIONS RECONCILED: [x] No  [] Yes    INSULIN DRIP:   [x] No   [] Yes    CONSULTATION NEEDED:  [x] No   [] Yes    FAMILY UPDATED:    [x] No   [] Yes    TRANSFER OUT OF ICU:   [x] No   [] Yes    ADDITIONAL PLAN:    Arpan Sims MD, PGY-2.                       10/3/2020, 7:11 AM    Attending Physician Attestation: Dr. Aron Shepherd    Thank you very much for allowing me to see this patient in consultation and follow up. I personally saw, examined and provided care for the patient. Radiographs, labs and medication list were reviewed by me independently. I spoke with bedside nursing, respiratory therapists and consultants. Critical care services and times documented are independent of procedures and multidisciplinary rounds with Residents. Additionally comprehensive, multidisciplinary rounds were conducted with the MICU team. The case was discussed in detail and plans for care were established. Review of Residents documentation was conducted and revisions were made as appropriate. I agree with the the above documented information. Physical Examination:  Vitals:   Vitals:    10/03/20 0943 10/03/20 0945 10/03/20 1000 10/03/20 1015   BP:   (!) 99/57 (!) 97/58   Pulse:  71 72 74   Resp:  13 16 11   Temp:       TempSrc:       SpO2:  100%     Weight:       Height: 5' 4\" (1.626 m)         General: No Acute Distress, intubated  HEENT: normocephalic, atruamatic  CVS: RRR, S1, S2, No S3 or S4  Respiratory: decreased breath sounds at lung bases, no focal wheezes noted  Extremities: no clubbing/cyanosis/edema     ASSESSMENT:  1.) Severe Sepsis with Septic Shock - aspiration vs HCAP    2. ) Acute Respiratory Failure with Hypoxia  3.) Aspiration Pneumonia vs HCAP     In addition the following applies:     Check: pan culture   Medication Alterations: vancomycin, meropenem   Procedures: N/A  Imaging: reviewed  New Consultations: ID  VENT: ACVC (DAY 2) 12/400/50/5     Access: Peripheral, Right IJ TLC, Left Radial Arterial Line   Consults: ID  Drips: Fentanyl, Levophed   ABX: Levaquin, meropenem     - change decadron to solu-cortef   - case reviewed with ID in ICU     Thank you for allowing me to participate in the care of this patient.     Care reviewed with nursing staff, medical and surgical specialty care, primary care and the patient's family as available. Restraints are ordered when the patient can do harm to him/herself by pulling out devices. Critical Care Time: > 35 minutes excluding procedures    MARYLOU Macedo  10/3/2020  12:38 PM

## 2020-10-03 NOTE — PATIENT CARE CONFERENCE
Intensive Care Daily Quality Rounding Checklist      ICU Team Members: Dr. Shameka Mcgovern, resdients charge nurse, bedside nurse and respiratory therapy    ICU Day #: 2    Intubation Date: October 2,2020    Ventilator Day #: NUMBER: 2    Central Line Insertion Date: October 2,2020        Day #: NUMBER: 2     Arterial Line Insertion Date: October 2,2020      Day #: NUMBER: 2    DVT Prophylaxis:    GI Prophylaxis: protonix    Castano Catheter Insertion Date:  September 28,2020       Day #: 6      Continued need (if yes, reason documented and discussed with physician): strict Is and Os    Skin Issues/ Wounds and ordered treatment discussed on rounds:     Goals/ Plans for the Day: monitor labs, wean vent. Solu-cortef and lovenox ordered.  Wean sedation and vent

## 2020-10-03 NOTE — PLAN OF CARE
Problem: Airway Clearance - Ineffective  Goal: Achieve or maintain patent airway  10/3/2020 0041 by Tal Dacosta RN  Outcome: Met This Shift  10/2/2020 1417 by Anika Potter RN  Outcome: Ongoing     Problem: Gas Exchange - Impaired  Goal: Absence of hypoxia  10/2/2020 1417 by Anika Potter RN  Outcome: Met This Shift     Problem: Body Temperature -  Risk of, Imbalanced  Goal: Ability to maintain a body temperature within defined limits  10/3/2020 0041 by Tal Dacosta RN  Outcome: Met This Shift  10/2/2020 1417 by Anika Potter RN  Outcome: Not Met This Shift     Problem: Isolation Precautions - Risk of Spread of Infection  Goal: Prevent transmission of infection  10/3/2020 0041 by Tal Dacosta RN  Outcome: Met This Shift  Note: Two negative COVID test  10/2/2020 1417 by Anika Potter RN  Outcome: Met This Shift     Problem: Patient Education: Go to Patient Education Activity  Goal: Patient/Family Education  10/2/2020 1417 by Anika Potter RN  Outcome: Met This Shift     Problem: Falls - Risk of:  Goal: Will remain free from falls  Description: Will remain free from falls  Outcome: Met This Shift     Problem: Gas Exchange - Impaired  Goal: Promote optimal lung function  10/2/2020 1417 by Anika Potter RN  Outcome: Ongoing     Problem: Breathing Pattern - Ineffective  Goal: Ability to achieve and maintain a regular respiratory rate  10/2/2020 1417 by Anika Potter RN  Outcome: Ongoing     Problem: Body Temperature -  Risk of, Imbalanced  Goal: Complications related to the disease process, condition or treatment will be avoided or minimized  10/2/2020 1417 by Anika Potter RN  Outcome: Ongoing     Problem:  Body Temperature -  Risk of, Imbalanced  Goal: Will regain or maintain usual level of consciousness  10/2/2020 1417 by Anika Potter RN  Outcome: Not Met This Shift

## 2020-10-04 NOTE — PROGRESS NOTES
Wean parameter done on PSV 5    VT= 475 mls  F= 20 B/M  V= 9.5 l/m  NIF=-45 cmH2O  VC= 1.082 L  RSBI = 42

## 2020-10-04 NOTE — PROGRESS NOTES
[facility]) Hospitalist Progress Note    Admission Date         10/2/2020  6:36 AM  Chief Complaint        Unresponsive  Admit Dx                    Respiratory failure ([facility] [address] [J96.90]     Subjective  History of Present Illness  10/2 Cm Porter is a 70-year-old female with medical history pertinent for diabetes, hypertension, hyperlipidemia, and wheelchair-bound status who was found to be unresponsive by EMS and brought to the ER with hypoglycemia, hypoxia, coarse breath sounds. Patient was unable to maintain her airway and was endotracheally intubated in the emergency department. Work-up showed hypothermia with temperature 94.4, hypotension with blood pressure 71/42. Labs showed chronic anemia, abnormal UA with leukocyte esterase and rare bacteria only, chest x-ray with patchy bilateral groundglass opacities. CT of the head showed no acute pathology, follow-up CT of the chest demonstrated extensive groundglass and consolidative opacities bilaterally. Patient admitted to the ICU for further evaluation and treatment. 10/3 Patient seen at bedside in the ICU. She remains intubated and sedated. Breath sounds are clear, she has potentially trace lower extremity edema. Otherwise, exam largely unchanged compared to yesterday. It was noted the patient was recently admitted for altered mental status that was found to be due to urinary tract infection. Urine culture is showing gram-positive organisms, though less than 10,000 colony-forming units per milliliter; other cultures are currently pending. 10/4 Pt seen at bedside in ICU. Remains intubated / sedated. CXR today did show some improved aeration in the RUL.   1 bottle from blood cx positive for GPC in clusters; continues on Levaquin and meropenem per ID.       Review of Systems - unable to obtain    Objective  Physical Exam  Vitals: BP (!) 144/71   Pulse 84   Temp 97.9 °F (36.6 °C) (Bladder)   Resp 12   Ht 5' 4\" (1.626 m)   Wt 232 lb 9.4 oz Demerol if needed for shaking chills refractory to above                                      -------------------------------------------------------------------              Supportive tx:  Droplet Plus isolation                                      Wean vent as able - pulm / critical care following                                      Avoid nebulized breathing tx, sub with MDI medications                                      Avoid IVF if possible; if necessary boluses > infusions                                      Avoid NSAIDs     Decubitus ulcer              Wound vac noted on wound in ED and removed d/t soiling              Wound care consult              ID will be following as well, as above     Anemia, acute-on-chronic              Baseline hgb ~10-11 g/dL per EMR              Hgb 7.3 in ED, follow w labs              MCV wnl / MCHC low              Transfuse as needed to keep hgb > 7 g/dL              Hold chemical DVT prophylaxis for now              Check FOBT, LDH, iron profile, B12, folate        Iron / %sat / TIBC / transferrin / folate all low - would start repletion     IV Ferrlecit protocol ordered 10/3    Folate low - start supplement    B12 ok              Does not appear to be actively bleeding     Abnormal UA              UA 10/2 showed rare bacteria and trace LE              Likely does not represent UTI              Cx sent from ED - Gram-pos organism, <10k CFU/mL + yeast     IDDM              Hold any home oral antihyperglycemics for now              ISS and BG checks q6 for now              Hypoglycemic protocol              Check hemoglobin A1c to assess long-term glucose control - 5.3%              Monitor BG with goal 140-180     HTN              Currently hypOtensive              Hold home atenolol, lisinopril for now   Currently on Levo @ 1 mcg/hr     HPL - Continue rosuvastatin     Code status               Full  DVT prophylaxis       SCDs  Disposition To be determined; from home    Electronically signed by Noreen Soriano DO on 10/4/2020 at 12:40 PM

## 2020-10-04 NOTE — PROGRESS NOTES
Critical Care Team - Daily Progress Note      Date and time: 10/4/2020 12:52 PM  Patient's name:  Spencer Wagoner Record Number: [id number]  Patient's account/billing number: [de-identified]  Patient's YOB: 1950  Age: 78 y.o. Date of Admission: 10/2/2020  6:36 AM  Length of stay during current admission: 2      Primary Care Physician: Adri Arteaga MD  ICU Attending Physician: Dr. Maurizio Palmer    Code Status: Full Code    Reason for ICU admission: Acute Respiratory Failure with Hypoxia      SUBJECTIVE:   The patient is a 69 y.o. female with significant past medical history of hypertension, hyperlipidemia, type 2 diabetes mellitus, osteoarthritis and sacral decubitus ulcer who presented to the ER earlier today unresponsive and hypoglycemic.  Initial blood sugar was reportedly in the 30s.  The patient was given oral glucose prior to arrival in the ER.  Initial SPO2 was in the 70s on room air.  Patient was placed on a nonrebreather and her saturation improved appropriately.  Of note, the patient was recently admitted to the hospital for a urinary tract infection and altered mental status.  She improved with IV fluids and antibiotics and was discharged on 9/28. OVERNIGHT EVENTS:      10/4/2020: No acute events.     AWAKE & FOLLOWING COMMANDS:  [] No   [x] Yes    CURRENT VENTILATION STATUS:     [x] Ventilator  [] BIPAP  [] Nasal Cannula [] Room Air      IF INTUBATED, ET TUBE MARKING AT LOWER LIP:       cms    SECRETIONS Amount:  [] Small [] Moderate  [] Large  [x] None  Color:     [] White [] Colored  [] Bloody    SEDATION:  RAAS Score: -1  [] Propofol gtt  [] Versed gtt  [] Ativan gtt   [] No Sedation  Fentanyl 50 mcg/hr  PARALYZED:  [x] No    [] Yes    DIARRHEA:                [x] No                [] Yes  (C. Difficile status: [] positive                                                                                                                       [] negative [] pending)    VASOPRESSORS:  [x] No    [] Yes    If yes -   [] Levophed       [] Dopamine     [] Vasopressin       [] Dobutamine  [] Phenylephrine         [] Epinephrine    CENTRAL LINES:     [] No   [x] Yes   (Date of Insertion:  10/2 )           If yes -     [x] Right IJ     [] Left IJ [] Right Femoral [] Left Femoral                   [] Right Subclavian [] Left Subclavian       THURMAN'S CATHETER:   [] No   [x] Yes  (Date of Insertion:   )         OBJECTIVE:     VITAL SIGNS:  BP (!) 144/71   Pulse 84   Temp 97.9 °F (36.6 °C) (Bladder)   Resp 12   Ht 5' 4\" (1.626 m)   Wt 232 lb 9.4 oz (105.5 kg)   SpO2 100%   BMI 39.92 kg/m²   Tmax over 24 hours:  Temp (24hrs), Av.1 °F (36.7 °C), Min:97.7 °F (36.5 °C), Max:98.6 °F (37 °C)      Patient Vitals for the past 6 hrs:   BP Temp Temp src Pulse Resp SpO2   10/04/20 1200 (!) 144/71 97.9 °F (36.6 °C) Bladder 84 12 100 %   10/04/20 1100 121/66 -- -- 85 12 --   10/04/20 1000 137/80 -- -- 90 12 100 %   10/04/20 0907 -- -- -- 84 12 100 %   10/04/20 0900 116/61 -- -- 76 11 100 %   10/04/20 0848 -- -- -- -- -- 100 %   10/04/20 0800 (!) 104/57 -- -- 82 14 100 %   10/04/20 0700 139/63 98.1 °F (36.7 °C) Bladder 89 14 100 %         Intake/Output Summary (Last 24 hours) at 10/4/2020 1252  Last data filed at 10/4/2020 0845  Gross per 24 hour   Intake 2240 ml   Output 228 ml   Net 2012 ml     Wt Readings from Last 2 Encounters:   10/04/20 232 lb 9.4 oz (105.5 kg)   20 206 lb 14.4 oz (93.8 kg)     Body mass index is 39.92 kg/m².         PHYSICAL EXAMINATION:    General appearance -patient is intubated and sedated  Mental status -Following commands this morning  Eyes - pupils equal and reactive, extraocular eye movements intact, sclera anicteric  Ears - external ear normal  Nose - normal and patent, no erythema, discharge or polyps  Mouth -patient is intubated  Neck - supple, no significant adenopathy  Chest - Scant bibasilar crackles noted, but improved; good air movement noted throughout, symmetric air entry  Heart -RRR, normal S1, S2, no murmurs, rubs, clicks or gallops  Abdomen - soft, nondistended, no masses or organomegaly  Neurological - intubated, following commands  Extremities - UE and LE distal pulses intact b/l +2/4, no clubbing or cyanosis.  Trace edema in the b/l LEs  Skin - normal coloration and turgor, no rashes, no suspicious skin lesions noted      Any additional physical findings:    MEDICATIONS:    Scheduled Meds:   furosemide        hydrocortisone sodium succinate PF  50 mg Intravenous Q6H    enoxaparin  40 mg Subcutaneous Daily    levofloxacin  750 mg Intravenous Q24H    ferric gluconate (FERRLECIT) IVPB  125 mg Intravenous Once    folic acid  1 mg Oral Daily    allopurinol  100 mg Oral Daily    DULoxetine  60 mg Oral Daily    rosuvastatin  10 mg Oral Nightly    sodium chloride flush  10 mL Intravenous 2 times per day    insulin glargine  0.25 Units/kg Subcutaneous Nightly    [Held by provider] insulin lispro  0.08 Units/kg Subcutaneous TID WC    insulin lispro  0-12 Units Subcutaneous Q4H    sodium chloride  1,000 mL Intravenous Once    meropenem  1 g Intravenous Q8H    pantoprazole  40 mg Intravenous Daily    And    sodium chloride (PF)  10 mL Intravenous Daily     Continuous Infusions:   sodium chloride 75 mL/hr at 10/04/20 0828    propofol Stopped (10/02/20 1115)    dextrose      sodium chloride 33.3 mL/hr at 10/04/20 1103    fentaNYL 5 mcg/ml in 0.9%  ml infusion Stopped (10/04/20 1044)    norepinephrine Stopped (10/03/20 1045)     PRN Meds:   sodium chloride flush, 10 mL, PRN  acetaminophen, 650 mg, Q6H PRN    Or  acetaminophen, 650 mg, Q6H PRN  polyethylene glycol, 17 g, Daily PRN  promethazine, 12.5 mg, Q6H PRN    Or  ondansetron, 4 mg, Q6H PRN  glucose, 15 g, PRN  dextrose, 12.5 g, PRN  glucagon (rDNA), 1 mg, PRN  dextrose, 100 mL/hr, PRN          VENT SETTINGS (Comprehensive) (if applicable):  Vent Information  $Ventilation: $Subsequent Day  Skin Assessment: Clean, dry, & intact  Equipment ID: [id number]  Vent Type: [phone]  Vent Mode: PS  Vt Ordered: 400 mL  Rate Set: 0 bmp  Peak Flow: 44 L/min  Pressure Support: 5 cmH20  FiO2 : 40 %  SpO2: 100 %  SpO2/FiO2 ratio: 250  Sensitivity: 3  PEEP/CPAP: 5  I Time/ I Time %: 0 s  Humidification Source: HME  Additional Respiratory  Assessments  Pulse: 84  Resp: 12  SpO2: 100 %  Position: Semi-[facility]  Humidification Source: HME  Oral Care: Mouth swabbed, Mouth moisturizer, Mouth suctioned  Subglottic Suction Done?: Yes  Airway Type: ET  Airway Size: 7.5  Cuff Pressure (cm H2O): 29 cm H2O    ABG  Lab Results   Component Value Date    PH 7.409 10/04/2020    PCO2 28.6 10/04/2020    PO2 132.0 10/04/2020    HCO3 17.7 10/04/2020    O2SAT 98.6 10/04/2020         Laboratory findings:    Complete Blood Count:   Recent Labs     10/02/20  0705 10/03/20  0546 10/04/20  0520   WBC 10.0 28.1* 15.9*   HGB 7.3* 8.8* 8.0*   HCT 24.8* 28.3* 26.3*    371 284        Lactic Acid  Lab Results   Component Value Date    LACTA 1.3 09/26/2020    LACTA 2.6 07/13/2020    LACTA 4.8 07/12/2020        Last 3 Blood Glucose:   Recent Labs     10/02/20  0834 10/03/20  0546 10/04/20  0520   GLUCOSE 195 170* 90        PT/INR:  No results found for: PROTIME, INR  PTT:  No results found for: APTT, PTT    Comprehensive Metabolic Profile:   Recent Labs     10/02/20  0705 10/02/20  0834 10/03/20  0546 10/04/20  0520     --  133 135   K 3.7  --  4.6 4.8     --  106 107   CO2 22  --  19* 18*   BUN 18  --  20 28*   CREATININE 0.6  --  1.0 1.3*   GLUCOSE 408* 195 170* 90   CALCIUM 7.5*  --  7.8* 8.1*   PROT 5.0*  --  6.1* 6.1*   LABALBU 1.8*  --  2.1* 1.9*   BILITOT <0.2  --  <0.2 <0.2   ALKPHOS 75  --  96 86   AST 11  --  11 10   ALT 10  --  12 10      Magnesium:   Lab Results   Component Value Date    MG 1.6 10/04/2020 Phosphorus:   Lab Results   Component Value Date    PHOS 4.0 10/04/2020     Ionized Calcium: No results found for: CAION       Urinalysis:     Troponin:   Recent Labs     10/02/20  0705 10/02/20  1745 10/02/20  2118   TROPONINI 0.03 0.02 0.02         Cultures     Cultures during this admission:     Blood cultures:  x2 drawn 10/2               [] None drawn      [] Negative             [x]  Positive (Details: Gram + cocci in cluster x 1 bottle )  Urine Culture:  Collected 10/2                 [] None drawn      [x] Negative             []  Positive (Details:  )  Sputum Culture:               [] None drawn       [] Negative             []  Positive (Details:  )   Endotracheal aspirate:     [] None drawn       [] Negative             []  Positive (Details:  )     Recent Labs     10/02/20  0704   BC Gram stain performed from blood culture bottle media  Gram positive cocci in clusters  *  This organism was isolated in one set. Susceptibility testing is not routinely done as this  organism frequently represents skin contamination. Additional testing can be ordered by calling the  Microbiology Department. Recent Labs     10/02/20  0704   BLOODCULT2 24 Hours no growth       Recent Labs     10/02/20  1238   LABURIN <10,000 CFU/mL  Gram positive organism  Yeast         Other pertinent Labs:   COVID negative  Urine negative for legionella antigen    Radiology/Imaging:   Ct Abdomen Pelvis Wo Contrast Additional Contrast? None    Result Date: 10/2/2020  EXAMINATION: CT OF THE CHEST WITHOUT CONTRAST; CT OF THE ABDOMEN AND PELVIS WITHOUT CONTRAST 10/2/2020 8:55 am TECHNIQUE: CT of the chest was performed without the administration of intravenous contrast. Multiplanar reformatted images are provided for review.  Dose modulation, iterative reconstruction, and/or weight based adjustment of the mA/kV was utilized to reduce the radiation dose to as low as reasonably achievable.; CT of the abdomen and pelvis was performed without the administration of intravenous contrast. Multiplanar reformatted images are provided for review. Dose modulation, iterative reconstruction, and/or weight based adjustment of the mA/kV was utilized to reduce the radiation dose to as low as reasonably achievable. COMPARISON: None. Correlated with chest radiograph from the same day. HISTORY: ORDERING SYSTEM PROVIDED HISTORY: recent suspected aspiration, new ground glass opacities on CXR. TECHNOLOGIST PROVIDED HISTORY: Reason for exam:->recent suspected aspiration, new ground glass opacities on CXR. What reading provider will be dictating this exam?->CRC; ORDERING SYSTEM PROVIDED HISTORY: hemoglobin 7.2 TECHNOLOGIST PROVIDED HISTORY: Reason for exam:->hemoglobin 7.2 Additional Contrast?->None What reading provider will be dictating this exam?->CRC FINDINGS: Chest: Mediastinum: An enteric tube terminates in the body of the stomach. The endotracheal tube terminates immediately cranial to the ritesh. Recommend retraction of 3-4 mm. Heart size is within normal limits. Coronary artery calcifications are present, potentially a marker for coronary artery disease. Trace pericardial fluid is present. The thoracic esophagus is decompressed, limiting assessment. No obvious esophageal abnormality. Scattered visible but nonenlarged mediastinal lymph nodes are present. Lungs/pleura: The central airways are patent. There is no evidence of a pleural effusion. There are extensive ground-glass and consolidative opacities throughout both lungs. No evidence of a pneumothorax. Soft Tissues/Bones: No acute osseous abnormality. Bones are diffusely osteopenic and there is moderate osteophyte formation and intervertebral disc space narrowing throughout the thoracic spine. Moderate degenerative changes involve the shoulders bilaterally, right greater than left. Abdomen/Pelvis: Organs: The gallbladder contains a single large peripherally calcified stone.  No gallbladder dilation or obvious wall thickening. The unenhanced liver is normal in appearance. The spleen and adrenal glands are normal in size. The pancreas demonstrates diffuse fatty atrophy but is otherwise unremarkable. The kidneys have a lobulated surface contour bilaterally. No hydronephrosis or radiopaque calculus. GI/Bowel: The stomach and duodenal sweep are within normal limits. No evidence of a bowel obstruction, free air or pneumatosis. Portions the colon are decompressed, limiting assessment for mucosal based abnormalities. The appendix is normal. Pelvis: The urinary bladder is decompressed around a Castano catheter. The uterus and ovaries are not well visualized and are either surgically absent or severely atrophic. No free fluid is identified in the pelvis and there is no pelvic lymphadenopathy. Scattered visible but nonenlarged pelvic lymph nodes are present. Peritoneum/Retroperitoneum: The abdominal aorta is normal in caliber with scattered atherosclerotic plaques. No retroperitoneal lymphadenopathy. No enlarged mesenteric lymph nodes are identified. Bones/Soft Tissues: There is a decubitus ulcer noted inferiorly, measuring 3.6 x 1.3 cm. This appears to extend to the level of the coccyx, with a small locule of gas that appears to abut or come in contact with the inferior most portion of the coccyx. There are advanced degenerative changes involving the hips bilaterally. Moderate to severe degenerative changes involve the bilateral sacroiliac joints and lower lumbar spine. Vertebral bodies have a heterogeneous attenuation pattern but are predominantly hyperdense. Extensive ground-glass and consolidative opacities throughout both lungs, in a pattern highly suspicious for COVID pneumonia. Please note that the endotracheal tube terminates at the level of the ritesh. Recommend retracting the endotracheal tube by 3-4 cm. No acute inflammatory or obstructive process in the abdomen or pelvis. Cholelithiasis without evidence of acute cholecystitis. Diffusely heterogeneous and relatively hyperdense vertebral bodies, a nonspecific finding. Diffuse metastatic disease cannot be excluded. Recommend clinical correlation. Correlation could also be made with nuclear medicine bone scan, if clinically appropriate. Decubitus ulcer with at least 1 gas locule that may come into contact with the lower most portion of the coccyx. Osteomyelitis cannot be excluded at this level. Ct Head Wo Contrast    Result Date: 10/2/2020  EXAMINATION: CT OF THE HEAD WITHOUT CONTRAST  10/2/2020 7:55 am TECHNIQUE: CT of the head was performed without the administration of intravenous contrast. Dose modulation, iterative reconstruction, and/or weight based adjustment of the mA/kV was utilized to reduce the radiation dose to as low as reasonably achievable. COMPARISON: None. 2020 HISTORY: ORDERING SYSTEM PROVIDED HISTORY: AMS TECHNOLOGIST PROVIDED HISTORY: Reason for exam:->AMS Has a \"code stroke\" or \"stroke alert\" been called? ->No FINDINGS: BRAIN/VENTRICLES: There is no acute intracranial hemorrhage, mass effect or midline shift. No abnormal extra-axial fluid collection. The gray-white differentiation is maintained without evidence of an acute infarct. Ventricles and sulci are within normal limits in size. There are nonspecific areas of hypoattenuation within the periventricular and subcortical white matter, which likely represent chronic microvascular ischemic change. ORBITS: The visualized portion of the orbits demonstrate no acute abnormality. SINUSES: The visualized paranasal sinuses and mastoid air cells demonstrate no acute abnormality. SOFT TISSUES/SKULL: No acute abnormality of the visualized skull or soft tissues. No acute intracranial abnormality.      Ct Head Wo Contrast    Result Date: 2020  Patient MRN:  [id number] : 1950 Age: 78 years Gender: Female Order Date:  2020 7:27 PM EXAM: CT HEAD WO CONTRAST COMPARISON: October 10, 2015 INDICATION:  altered mental status altered mental status TECHNIQUE: Axial unenhanced CT scanning was performed through the head without the use of intravenous contrast. Low-dose CT acquisition technique included one of the following options; 1. Automated exposure control, 2. Adjustment of mA and/or kV according to the patient's size or 3. Use of iterative reconstruction. FINDINGS: No evidence of acute intracranial hemorrhage or edema. Ventricles are nonenlarged. Areas of hypoattenuation are present in the periventricular white matter suggestive of areas of chronic microvascular ischemic change. No abnormal extra-axial fluid collections. Mastoid air cells are clear. No evidence of acute intracranial hemorrhage or edema. If  clinical concern exists for acute stroke, MRI brain with diffusion-weighted imaging could be helpful for further evaluation. Ct Chest Wo Contrast    Result Date: 10/2/2020  EXAMINATION: CT OF THE CHEST WITHOUT CONTRAST; CT OF THE ABDOMEN AND PELVIS WITHOUT CONTRAST 10/2/2020 8:55 am TECHNIQUE: CT of the chest was performed without the administration of intravenous contrast. Multiplanar reformatted images are provided for review. Dose modulation, iterative reconstruction, and/or weight based adjustment of the mA/kV was utilized to reduce the radiation dose to as low as reasonably achievable.; CT of the abdomen and pelvis was performed without the administration of intravenous contrast. Multiplanar reformatted images are provided for review. Dose modulation, iterative reconstruction, and/or weight based adjustment of the mA/kV was utilized to reduce the radiation dose to as low as reasonably achievable. COMPARISON: None. Correlated with chest radiograph from the same day. HISTORY: ORDERING SYSTEM PROVIDED HISTORY: recent suspected aspiration, new ground glass opacities on CXR.  TECHNOLOGIST PROVIDED HISTORY: Reason for exam:->recent suspected abnormalities. The appendix is normal. Pelvis: The urinary bladder is decompressed around a Castano catheter. The uterus and ovaries are not well visualized and are either surgically absent or severely atrophic. No free fluid is identified in the pelvis and there is no pelvic lymphadenopathy. Scattered visible but nonenlarged pelvic lymph nodes are present. Peritoneum/Retroperitoneum: The abdominal aorta is normal in caliber with scattered atherosclerotic plaques. No retroperitoneal lymphadenopathy. No enlarged mesenteric lymph nodes are identified. Bones/Soft Tissues: There is a decubitus ulcer noted inferiorly, measuring 3.6 x 1.3 cm. This appears to extend to the level of the coccyx, with a small locule of gas that appears to abut or come in contact with the inferior most portion of the coccyx. There are advanced degenerative changes involving the hips bilaterally. Moderate to severe degenerative changes involve the bilateral sacroiliac joints and lower lumbar spine. Vertebral bodies have a heterogeneous attenuation pattern but are predominantly hyperdense. Extensive ground-glass and consolidative opacities throughout both lungs, in a pattern highly suspicious for COVID pneumonia. Please note that the endotracheal tube terminates at the level of the ritesh. Recommend retracting the endotracheal tube by 3-4 cm. No acute inflammatory or obstructive process in the abdomen or pelvis. Cholelithiasis without evidence of acute cholecystitis. Diffusely heterogeneous and relatively hyperdense vertebral bodies, a nonspecific finding. Diffuse metastatic disease cannot be excluded. Recommend clinical correlation. Correlation could also be made with nuclear medicine bone scan, if clinically appropriate. Decubitus ulcer with at least 1 gas locule that may come into contact with the lower most portion of the coccyx. Osteomyelitis cannot be excluded at this level.      Xr Chest Portable    Result Date: 10/4/2020  EXAMINATION: ONE XRAY VIEW OF THE CHEST 10/4/2020 6:59 am COMPARISON: 1 day prior. HISTORY: ORDERING SYSTEM PROVIDED HISTORY: resp failure TECHNOLOGIST PROVIDED HISTORY: Reason for exam:->resp failure FINDINGS: Single frontal portable view of the chest is submitted for evaluation. There is stable positioning of support lines and tubes. The cardiomediastinal contours are unchanged. There is stable left lower lobe retrocardiac opacities. There is a persistent left upper lobe confluent airspace opacities however improved in appearance compared to the prior examination. There is no visualized pleural effusion or pneumothorax. The pulmonary vessels are within normal limits. Interval improvement in aeration right upper lobe. Xr Chest Portable    Result Date: 10/3/2020  EXAMINATION: ONE XRAY VIEW OF THE CHEST 10/3/2020 6:58 am COMPARISON: 10/2020 HISTORY: ORDERING SYSTEM PROVIDED HISTORY: resp failure TECHNOLOGIST PROVIDED HISTORY: Reason for exam:->resp failure FINDINGS: There is no interval change in the multifocal bilateral pulmonary infiltrates more prominent on the right. There is stable position of the support lines and tubes. There is no pleural effusion. The heart is not enlarged. 1. No interval change in extensive bilateral multifocal infiltrates more prominent within the right lung. Xr Chest Portable    Result Date: 10/2/2020  EXAMINATION: ONE XRAY VIEW OF THE CHEST 10/2/2020 3:55 pm COMPARISON: Noncontrast chest CT 10/02/2020 HISTORY: ORDERING SYSTEM PROVIDED HISTORY: line placement TECHNOLOGIST PROVIDED HISTORY: Reason for exam:->line placement FINDINGS: This exam is slightly limited by AP portable, semi-upright technique and multiple overlying support devices. There is a new, right-internal-jugular-approach, single-lumen central venous catheter terminating at the SVC/RA junction. Negative for gross pneumothorax.   An endotracheal tube has been slightly withdrawn, now terminating approximately 3.8 cm above the ritesh. An enteric tube courses below the left hemidiaphragm, off the inferior edge of the image. Given differences in technique, previously-existing, bibasilar and bilateral perihilar multifocal consolidations and intermixed semi-solid patchy opacities, appear overall not significantly changed, given differences in technique. On this exam this is most conspicuous within the right suprahilar region. Nonspecific, diffuse heterogeneous increased density throughout all visualized osseous structures and scattered osseous degenerative disease have not significantly changed. No other clinically-significant changes are noted. .     1. Slightly limited exam. 2.   New, right-internal-jugular-approach, single-lumen central venous catheter terminating at the SVC/RA junction. Negative for gross pneumothorax. An endotracheal tube has been slightly withdrawn, now terminating approximately 3.8 cm above the ritesh. 3.   Bilateral multifocal consolidations and intermixed semi-solid patchy opacities, as described, appear overall not significantly changed, given differences in technique. 4.   Nonspecific, diffuse heterogeneous increased density throughout all visualized osseous structures. When clinically possible, consider nuclear scintigraphy, as directed clinically. [place] Chest Portable    Result Date: 10/2/2020  EXAMINATION: ONE XRAY VIEW OF THE CHEST 10/2/2020 6:09 am COMPARISON: 09/24/2020 HISTORY: ORDERING SYSTEM PROVIDED HISTORY: post intubation TECHNOLOGIST PROVIDED HISTORY: Reason for exam:->post intubation What reading provider will be dictating this exam?->CRC FINDINGS: Endotracheal tube tip projects 12 mm superior to the ritesh. Nasogastric tube tip is in the upper abdomen. Cardiac silhouette is stable. There are new predominantly perihilar patchy airspace opacities. No large effusion or pneumothorax. No acute osseous abnormality.      New patchy bilateral ground-glass airspace opacities Endotracheal tube tip projects 12 mm superior to the ritesh     Xr Chest Portable    Result Date: 2020  Patient MRN:  [id number] : 1950 Age: 78 years Gender: Female Order Date:  2020 7:30 PM EXAM: XR CHEST PORTABLE COMPARISON: 2017 INDICATION:  other other FINDINGS: The heart is normal in size. No focal airspace opacity. There is no pleural effusion. There is no pneumothorax. No free air beneath the diaphragms. No airspace opacities or pleural effusion. Xr Abdomen For Ng/og/ne Tube Placement    Result Date: 10/2/2020  EXAMINATION: ONE SUPINE XRAY VIEW(S) OF THE ABDOMEN 10/2/2020 6:23 am COMPARISON: CT abdomen pelvis 2020 HISTORY: ORDERING SYSTEM PROVIDED HISTORY: Confirm G-tube placement TECHNOLOGIST PROVIDED HISTORY: Reason for exam:->Confirm G-tube placement What reading provider will be dictating this exam?->CRC FINDINGS: Nasogastric tube tip is in the proximal stomach. Bowel gas pattern is nonobstructive. No abnormal calcifications are visualized. Degenerative changes of the lumbar spine are noted. Nasogastric tube tip is in the proximal stomach        Chest Xray (10/4/2020):    SYSTEMS ASSESSMENT  Neuro   AMS on admission  Intubated and sedated               - propofol initially started, but patient became hypotensive              - Fentanyl gtt running at 50 mcg/hr  Patient awake and following commands today     Respiratory   Acute hypoxic respiratory failure              - remains intubated              - vent settings as above     - will attempt to wean and extubate today  Pneumonia              - Aspiration vs COVID vs HAP              - Patient on Merrem, Day 3      - Levaquin Day 2      - One dose of Vanco given previously              - ID following  Wean oxygen as tolerated.  Keep O2 sat 90-92%  AB.38 / 29.4 / 154 / 17.2     Cardiovascular   Hx HTN, but hypotensive here              - monitor pressures closely              - Art line placed 10/02              - Levophed Dc'd      - IVF running at 76 cc/hr     Gastrointestinal   PPx: Protonix 40 mg QD     Renal   PADMINI today, Cr 1.3  Patient with mild LE edema   - Will give dose of Lasix once  Monitor electrolytes closely   - replete as necessary  Monitor I&O     Infectious Disease   Leuckocytosis              -CBC daily              -WBC 15.9 today, trending down             -On Merrem and Levaquin  Pneumonia              - Aspiration vs COVID              - COVID negative x 2  Decadron Dc'd 10/3  Solucortef 50 mg IV Q6h started  Procalcitonin = 0.09  Blood Cultures x2 drawn 10/2              - Gram+ cocci in clusters in one bottle  Urine culture     - negative  Hx ESBL cultured from wound     Hematology/Oncology   H&H: 8/26. 3              - Continue to monitor closely              - Transfuse < 7     Endocrine   Hx DMT2              - hypoglycemic PTA              - SSI - medium dose      - Lantus HS and Humalog TID  Continue to monitor sugars closely     Social/Spiritual/DNR/Other   CODE STATUS: Full         Consults:   - ID    PLAN:     WEAN PER PROTOCOL:  [] No   [x] Yes  [] N/A    DISCONTINUE ANY LABS:   [x] No   [] Yes    ICU PROPHYLAXIS:  Stress ulcer:  [x] PPI Agent Protonix 40 mg QD [] [id number] [] Sucralfate  [] Other:  VTE:   [x] Enoxaparin 40 mg SC QD [] Unfract. Heparin Subcut  [] EPC Cuffs    NUTRITION:  [] NPO [] Tube Feeding  [] TPN  [] PO (Diet: Diet NPO Effective Now)    HOME MEDICATIONS RECONCILED: [x] No  [] Yes    INSULIN DRIP:   [x] No   [] Yes    CONSULTATION NEEDED:  [x] No   [] Yes    FAMILY UPDATED:    [] No   [x] Yes    TRANSFER OUT OF ICU:   [x] No   [] Yes    ADDITIONAL PLAN:    1. Rosalva Martin DO, PGY2.                       10/4/2020, 12:52 PM    Attending Physician Attestation: Dr. Hayes Barger    Thank you very much for allowing me to see this patient in consultation and follow up.     I personally saw, examined and provided care for the patient. Radiographs, labs and medication list were reviewed by me independently. I spoke with bedside nursing, respiratory therapists and consultants. Critical care services and times documented are independent of procedures and multidisciplinary rounds with Residents. Additionally comprehensive, multidisciplinary rounds were conducted with the MICU team. The case was discussed in detail and plans for care were established. Review of Residents documentation was conducted and revisions were made as appropriate. I agree with the the above documented information. Physical Examination:  Vitals:   Vitals:    10/04/20 1349 10/04/20 1400 10/04/20 1500 10/04/20 1600   BP:  (!) 140/73 (!) 143/68 (!) 140/66   Pulse:  94 85 94   Resp: 18 17 15 11   Temp:   98.2 °F (36.8 °C)    TempSrc:   Bladder    SpO2: 100% 100% 100% 100%   Weight:       Height:          General: No Acute Distress, intubated  HEENT: normocephalic, atruamatic  CVS: RRR, S1, S2, No S3 or S4  Respiratory: decreased breath sounds at lung bases, no focal wheezes noted  Extremities: no clubbing/cyanosis/edema     ASSESSMENT:  1.) Severe Sepsis with Septic Shock - aspiration vs HCAP    2. ) Acute Respiratory Failure with Hypoxia  3.) Aspiration Pneumonia vs HCAP  4.) CONS Contamination      In addition the following applies:     Check: pan culture   Medication Alterations: vancomycin, meropenem   Procedures: N/A  Imaging: reviewed  New Consultations: ID  VENT: ACVC (DAY 3) 12/400/50/5 to wean to extubation      Access: Peripheral, Right IJ TLC, Left Radial Arterial Line   Consults: ID  Drips: Fentanyl, Levophed   ABX: Levaquin, meropenem      - extubation to nasal cannula successful  - case reviewed with ID services in ICU     Thank you for allowing me to participate in the care of this patient. Care reviewed with nursing staff, medical and surgical specialty care, primary care and the patient's family as available.  Restraints are ordered when the patient can do harm to him/herself by pulling out devices. Critical Care Time: > 35 minutes excluding procedures    MARYLOU Roberts  10/4/2020  5:58 PM

## 2020-10-04 NOTE — PROGRESS NOTES
ID Progress Note                [address] [facility] [address], [place], [address] [facility]            Phone [phone]     Fax [phone]      Chief complaint   Unresponsive and hypoglycemic    Subjective:  Afebrile  On 40% FiO2  WBC count trending down  Chest x-ray also shows improvement  Continues to remain intubated    Discussed with the daughter at the bedside and also with the son who was on the phone        Objective:    Vitals:    10/04/20 0907   BP:    Pulse: 84   Resp: 12   Temp:    SpO2: 100%   General appearance: Opens her eyes but not following commands, ventilated   skin: Warm and dry  Eyes: Pink palpebral conjunctivae. PERRL  Ears: External ears normal.  Nose/Sinuses: Nares normal. Septum midline. Mucosa normal. No sinus tenderness. Oropharynx: Oropharynx clear with no exudates seen  Neck: Neck supple. No jugular venous distension, lymphadenopathy or bilateral crackles present at the lower lungs   l  Heart: S1 S2  Regular rate and rhythm. No rub, murmur or gallop  Abdomen: Abdomen soft, non-tender.  BS normal. No masses, organomegaly  Extremities: 1+ lower extremity edema,  peripheral pulses palpable  Musculoskeletal: No joint effusion, tenderness, swelling or warmth  Right neck central line in place      Labs:  Recent Labs     10/02/20  0705 10/03/20  0546 10/04/20  0520   WBC 10.0 28.1* 15.9*   RBC 3.03* 3.58 3.35*   HGB 7.3* 8.8* 8.0*   HCT 24.8* 28.3* 26.3*   MCV 81.8 79.1* 78.5*   MCH 24.1* 24.6* 23.9*   MCHC 29.4* 31.1* 30.4*   RDW 19.6* 19.4* 19.7*    371 284   MPV 9.6 9.3 9.6     CMP:    Lab Results   Component Value Date     10/04/2020    K 4.8 10/04/2020    K 4.0 09/27/2020     10/04/2020    CO2 18 10/04/2020    BUN 28 10/04/2020    CREATININE 1.3 10/04/2020    GFRAA 49 10/04/2020    LABGLOM 49 10/04/2020    GLUCOSE 90 10/04/2020    GLUCOSE 147 03/07/2012    PROT 6.1 10/04/2020    LABALBU 1.9 10/04/2020    LABALBU 4.1 03/07/2012    CALCIUM 8.1 10/04/2020    BILITOT <0.2 10/04/2020    ALKPHOS 86 10/04/2020    AST 10 10/04/2020    ALT 10 10/04/2020          Microbiology :  Recent Labs     10/02/20  0704   BC Gram stain performed from blood culture bottle media  Gram positive cocci in clusters  *     Recent Labs     10/02/20  0704   BLOODCULT2 24 Hours no growth     Recent Labs     10/02/20  1238   LABURIN <10,000 CFU/mL  Gram positive organism  Yeast       No results for input(s): CULTRESP in the last 72 hours. No results for input(s): WNDABS in the last 72 hours. Radiology :  XR CHEST PORTABLE   Final Result   Interval improvement in aeration right upper lobe. XR CHEST PORTABLE   Final Result   1. No interval change in extensive bilateral multifocal infiltrates more   prominent within the right lung. XR CHEST PORTABLE   Final Result   1. Slightly limited exam.      2.   New, right-internal-jugular-approach, single-lumen central venous   catheter terminating at the SVC/RA junction. Negative for gross   pneumothorax. An endotracheal tube has been slightly withdrawn, now   terminating approximately 3.8 cm above the ritesh. 3.   Bilateral multifocal consolidations and intermixed semi-solid patchy   opacities, as described, appear overall not significantly changed, given   differences in technique. 4.   Nonspecific, diffuse heterogeneous increased density throughout all   visualized osseous structures. When clinically possible, consider nuclear   scintigraphy, as directed clinically. .         CT Head WO Contrast   Final Result   No acute intracranial abnormality. CT CHEST WO CONTRAST   Final Result   Extensive ground-glass and consolidative opacities throughout both lungs, in   a pattern highly suspicious for COVID pneumonia. Please note that the   endotracheal tube terminates at the level of the ritesh. Recommend   retracting the endotracheal tube by 3-4 cm. No acute inflammatory or obstructive process in the abdomen or pelvis. Cholelithiasis without evidence of acute cholecystitis. Diffusely heterogeneous and relatively hyperdense vertebral bodies, a   nonspecific finding. Diffuse metastatic disease cannot be excluded. Recommend clinical correlation. Correlation could also be made with nuclear   medicine bone scan, if clinically appropriate. Decubitus ulcer with at least 1 gas locule that may come into contact with   the lower most portion of the coccyx. Osteomyelitis cannot be excluded at   this level. CT ABDOMEN PELVIS WO CONTRAST Additional Contrast? None   Final Result   Extensive ground-glass and consolidative opacities throughout both lungs, in   a pattern highly suspicious for COVID pneumonia. Please note that the   endotracheal tube terminates at the level of the ritesh. Recommend   retracting the endotracheal tube by 3-4 cm. No acute inflammatory or obstructive process in the abdomen or pelvis. Cholelithiasis without evidence of acute cholecystitis. Diffusely heterogeneous and relatively hyperdense vertebral bodies, a   nonspecific finding. Diffuse metastatic disease cannot be excluded. Recommend clinical correlation. Correlation could also be made with nuclear   medicine bone scan, if clinically appropriate. Decubitus ulcer with at least 1 gas locule that may come into contact with   the lower most portion of the coccyx. Osteomyelitis cannot be excluded at   this level.          XR ABDOMEN FOR NG/OG/NE TUBE PLACEMENT   Final Result   Nasogastric tube tip is in the proximal stomach         XR CHEST PORTABLE   Final Result   New patchy bilateral ground-glass airspace opacities      Endotracheal tube tip projects 12 mm superior to the ritesh         XR CHEST PORTABLE    (Results Pending)         URINARY CATHETER OUTPUT (Castano):  Urethral Catheter Non-latex 16 fr-Output (mL): 30 mL       Assessment and Plan:      · Septic shock  · Aspiration pneumonia  · Leukocytosis from sepsis-28.1  · Coccygeal ulcer not infected     PLAN  -Keep on meropenem plus Levaquin  -Legionella urine antigen  -Respiratory culture- ?   Not yet obtained  -We will follow the patient closely          Electronically signed by Vicky Felix MD on 10/4/2020 at 10:13 AM

## 2020-10-04 NOTE — PATIENT CARE CONFERENCE
Intensive Care Daily Quality Rounding Checklist        ICU Team Members: Dr. Madison Sow, residents charge nurse, bedside nurse and respiratory therapy     ICU Day #: 3     Intubation Date: October 2, 2020     Ventilator Day #: NUMBER: 3     Central Line Insertion Date: October 2, 2020                                                    Day #: NUMBER: 3      Arterial Line Insertion Date: October 2,2020                             Day #: NUMBER: 3     DVT Prophylaxis: Lovenox    GI Prophylaxis: protonix     Castano Catheter Insertion Date:  September 28,2020                                        Day #:7                             Continued need (if yes, reason documented and discussed with physician): strict Is and Os     Skin Issues/ Wounds and ordered treatment discussed on rounds: WC following     Goals/ Plans for the Day: monitor labs, wean vent as lc, monitor UO. Lasix ordered.  Extubate

## 2020-10-05 NOTE — PROGRESS NOTES
Critical Care Team - Daily Progress Note      Date and time: 10/5/2020 9:21 AM  Patient's name:  Spencer Wagoner Record Number: [id number]  Patient's account/billing number: [de-identified]  Patient's YOB: 1950  Age: 78 y.o. Date of Admission: 10/2/2020  6:36 AM  Length of stay during current admission: 3      Primary Care Physician: Boni Miller MD  ICU Attending Physician: Dr. Anton Howard Status: Full Code    Reason for ICU admission: Acute Respiratory Failure with Hypoxia      SUBJECTIVE:   The patient is a 69 y.o. female with significant past medical history of hypertension, hyperlipidemia, type 2 diabetes mellitus, osteoarthritis and sacral decubitus ulcer who presented to the ER earlier today unresponsive and hypoglycemic.  Initial blood sugar was reportedly in the 30s.  The patient was given oral glucose prior to arrival in the ER.  Initial SPO2 was in the 70s on room air.  Patient was placed on a nonrebreather and her saturation improved appropriately.  Of note, the patient was recently admitted to the hospital for a urinary tract infection and altered mental status.  She improved with IV fluids and antibiotics and was discharged on 9/28. OVERNIGHT EVENTS:        10/5/2020: No acute events. 10/4/2020: No acute events.     AWAKE & FOLLOWING COMMANDS:  [] No   [x] Yes    CURRENT VENTILATION STATUS:     [x] Ventilator  [] BIPAP  [] Nasal Cannula [] Room Air      IF INTUBATED, ET TUBE MARKING AT LOWER LIP:       cms    SECRETIONS Amount:  [] Small [] Moderate  [] Large  [x] None  Color:     [] White [] Colored  [] Bloody    SEDATION:  RAAS Score: -2  [] Propofol gtt  [] Versed gtt  [] Ativan gtt   [] No Sedation    PARALYZED:  [x] No    [] Yes    DIARRHEA:                [x] No                [] Yes  (C. Difficile status: [] positive                                                                                                                       [] negative [] pending)    VASOPRESSORS:  [x] No    [] Yes    If yes -   [] Levophed       [] Dopamine     [] Vasopressin       [] Dobutamine  [] Phenylephrine         [] Epinephrine    CENTRAL LINES:     [] No   [x] Yes   (Date of Insertion:  10/2 )           If yes -     [x] Right IJ     [] Left IJ [] Right Femoral [] Left Femoral                   [] Right Subclavian [] Left Subclavian       THURMAN'S CATHETER:   [] No   [x] Yes  (Date of Insertion:   )         OBJECTIVE:     VITAL SIGNS:  BP (!) 143/81   Pulse 91   Temp 99 °F (37.2 °C) (Bladder)   Resp 16   Ht 5' 4\" (1.626 m)   Wt 237 lb 10.5 oz (107.8 kg)   SpO2 100%   BMI 40.79 kg/m²   Tmax over 24 hours:  Temp (24hrs), Av.5 °F (36.9 °C), Min:97.9 °F (36.6 °C), Max:99 °F (37.2 °C)      Patient Vitals for the past 6 hrs:   BP Temp Temp src Pulse Resp SpO2 Weight   10/05/20 0700 (!) 143/81 -- -- 91 16 100 % 237 lb 10.5 oz (107.8 kg)   10/05/20 0600 (!) 160/90 -- -- 96 10 100 % --   10/05/20 0500 (!) 165/94 -- -- 96 19 100 % --   10/05/20 0400 (!) 168/94 99 °F (37.2 °C) Bladder 112 16 100 % --         Intake/Output Summary (Last 24 hours) at 10/5/2020 0921  Last data filed at 10/5/2020 0600  Gross per 24 hour   Intake 2605 ml   Output 2530 ml   Net 75 ml     Wt Readings from Last 2 Encounters:   10/05/20 237 lb 10.5 oz (107.8 kg)   20 206 lb 14.4 oz (93.8 kg)     Body mass index is 40.79 kg/m².         PHYSICAL EXAMINATION:    General appearance - resting; NC in place  Mental status -patient does not respond to repeated stimulation; not following commands today  Eyes - pupils equal and reactive, extraocular eye movements intact  Ears - external ear normal  Nose - normal and patent  Mouth -lips appear dry  Neck - supple, no significant adenopathy  Chest - Scant bibasilar crackles still noted; continues to have good air movement; symmetric air entry  Heart -RRR, normal S1, S2, no murmurs, rubs, clicks or gallops  Abdomen - soft, nondistended, no masses or organomegaly; no grimace to palpation  Neurological - not following commands; not responsive to repeated stimulation  Extremities - UE and LE distal pulses intact b/l +2/4, no clubbing or cyanosis.  Trace edema in the b/l LEs  Skin - normal coloration and turgor     Any additional physical findings:    MEDICATIONS:    Scheduled Meds:   potassium chloride  40 mEq Intravenous Once    potassium chloride  40 mEq Oral Once    magnesium sulfate  4 g Intravenous Once    insulin glargine  12 Units Subcutaneous Nightly    hydrocortisone sodium succinate PF  50 mg Intravenous Q6H    enoxaparin  40 mg Subcutaneous Daily    levofloxacin  750 mg Intravenous T75L    folic acid  1 mg Oral Daily    allopurinol  100 mg Oral Daily    DULoxetine  60 mg Oral Daily    rosuvastatin  10 mg Oral Nightly    sodium chloride flush  10 mL Intravenous 2 times per day    [Held by provider] insulin lispro  0.08 Units/kg Subcutaneous TID WC    insulin lispro  0-12 Units Subcutaneous Q4H    sodium chloride  1,000 mL Intravenous Once    meropenem  1 g Intravenous Q8H    pantoprazole  40 mg Intravenous Daily    And    sodium chloride (PF)  10 mL Intravenous Daily     Continuous Infusions:   dextrose 5 % and 0.9 % NaCl 75 mL/hr at 10/05/20 0400    dextrose      sodium chloride 33.3 mL/hr at 10/05/20 0345     PRN Meds:   fentanNYL, 12.5 mcg, Q3H PRN  sodium chloride flush, 10 mL, PRN  acetaminophen, 650 mg, Q6H PRN    Or  acetaminophen, 650 mg, Q6H PRN  polyethylene glycol, 17 g, Daily PRN  promethazine, 12.5 mg, Q6H PRN    Or  ondansetron, 4 mg, Q6H PRN  glucose, 15 g, PRN  dextrose, 12.5 g, PRN  glucagon (rDNA), 1 mg, PRN  dextrose, 100 mL/hr, PRN          VENT SETTINGS (Comprehensive) (if applicable):  Vent Information  $Ventilation: Off Vent  Skin Assessment: Clean, dry, & intact  Equipment ID: [id number]  Vent Type: [id number]  Vent Mode: PS  Vt Ordered: 400 mL  Rate Set: 0 bmp  Peak Flow: 44 L/min  Pressure Support: 5 cmH20  FiO2 : 40 %  SpO2: 100 %  SpO2/FiO2 ratio: 250  Sensitivity: 3  PEEP/CPAP: 5  I Time/ I Time %: 0 s  Humidification Source: HME  Additional Respiratory  Assessments  Pulse: 91  Resp: 16  SpO2: 100 %  Position: Semi-[facility]  Humidification Source: HME  Oral Care: Mouth swabbed, Mouth moisturizer, Mouth suctioned, Suction toothette  Subglottic Suction Done?: Yes  Airway Type: ET  Airway Size: 7.5  Cuff Pressure (cm H2O): 29 cm H2O    ABG  Lab Results   Component Value Date    PH 7.546 10/05/2020    PCO2 29.3 10/05/2020    PO2 109.0 10/05/2020    HCO3 24.8 10/05/2020    O2SAT 98.5 10/05/2020         Laboratory findings:    Complete Blood Count:   Recent Labs     10/03/20  0546 10/04/20  0520 10/05/20  0620   WBC 28.1* 15.9* 9.6   HGB 8.8* 8.0* 7.5*   HCT 28.3* 26.3* 24.2*    284 248        Lactic Acid  Lab Results   Component Value Date    LACTA 1.3 09/26/2020    LACTA 2.6 07/13/2020    LACTA 4.8 07/12/2020        Last 3 Blood Glucose:   Recent Labs     10/03/20  0546 10/04/20  0520 10/05/20  0620   GLUCOSE 170* 90 144*        PT/INR:  No results found for: PROTIME, INR  PTT:  No results found for: APTT, PTT    Comprehensive Metabolic Profile:   Recent Labs     10/03/20  0546 10/04/20  0520 10/05/20  0620    135 143   K 4.6 4.8 3.2*    107 108*   CO2 19* 18* 26   BUN 20 28* 19   CREATININE 1.0 1.3* 0.9   GLUCOSE 170* 90 144*   CALCIUM 7.8* 8.1* 7.6*   PROT 6.1* 6.1* 6.1*   LABALBU 2.1* 1.9* 2.2*   BILITOT <0.2 <0.2 <0.2   ALKPHOS 96 86 96   AST 11 10 27   ALT 12 10 9      Magnesium:   Lab Results   Component Value Date    MG 1.7 10/05/2020     Phosphorus:   Lab Results   Component Value Date    PHOS 2.8 10/05/2020     Ionized Calcium: No results found for: YVONNE       Urinalysis:     Troponin:   Recent Labs     10/02/20  1745 10/02/20  2118   TROPONINI 0.02 0.02         Cultures     Cultures during this admission:     Blood cultures:  x2 drawn 10/2               [] None drawn      [] Negative             [x]  Positive (Details: Gram + cocci in cluster x 1 bottle )  Urine Culture:  Collected 10/2                 [] None drawn      [x] Negative             []  Positive (Details:  )  Sputum Culture:               [] None drawn       [] Negative             []  Positive (Details:  )   Endotracheal aspirate:     [] None drawn       [] Negative             []  Positive (Details:  )     No results for input(s): BC in the last 72 hours. No results for input(s): Ashia Holes in the last 72 hours. Recent Labs     10/02/20  1238   LABURIN <10,000 CFU/mL  Gram positive organism  Yeast         Other pertinent Labs:   COVID negative  Urine negative for legionella antigen    Radiology/Imaging:   Ct Abdomen Pelvis Wo Contrast Additional Contrast? None    Result Date: 10/2/2020  EXAMINATION: CT OF THE CHEST WITHOUT CONTRAST; CT OF THE ABDOMEN AND PELVIS WITHOUT CONTRAST 10/2/2020 8:55 am TECHNIQUE: CT of the chest was performed without the administration of intravenous contrast. Multiplanar reformatted images are provided for review. Dose modulation, iterative reconstruction, and/or weight based adjustment of the mA/kV was utilized to reduce the radiation dose to as low as reasonably achievable.; CT of the abdomen and pelvis was performed without the administration of intravenous contrast. Multiplanar reformatted images are provided for review. Dose modulation, iterative reconstruction, and/or weight based adjustment of the mA/kV was utilized to reduce the radiation dose to as low as reasonably achievable. COMPARISON: None. Correlated with chest radiograph from the same day. HISTORY: ORDERING SYSTEM PROVIDED HISTORY: recent suspected aspiration, new ground glass opacities on CXR. TECHNOLOGIST PROVIDED HISTORY: Reason for exam:->recent suspected aspiration, new ground glass opacities on CXR.  What reading provider will be dictating this exam?->CRC; ORDERING SYSTEM PROVIDED HISTORY: hemoglobin 7.2 TECHNOLOGIST PROVIDED HISTORY: Reason for exam:->hemoglobin 7.2 Additional Contrast?->None What reading provider will be dictating this exam?->CRC FINDINGS: Chest: Mediastinum: An enteric tube terminates in the body of the stomach. The endotracheal tube terminates immediately cranial to the ritesh. Recommend retraction of 3-4 mm. Heart size is within normal limits. Coronary artery calcifications are present, potentially a marker for coronary artery disease. Trace pericardial fluid is present. The thoracic esophagus is decompressed, limiting assessment. No obvious esophageal abnormality. Scattered visible but nonenlarged mediastinal lymph nodes are present. Lungs/pleura: The central airways are patent. There is no evidence of a pleural effusion. There are extensive ground-glass and consolidative opacities throughout both lungs. No evidence of a pneumothorax. Soft Tissues/Bones: No acute osseous abnormality. Bones are diffusely osteopenic and there is moderate osteophyte formation and intervertebral disc space narrowing throughout the thoracic spine. Moderate degenerative changes involve the shoulders bilaterally, right greater than left. Abdomen/Pelvis: Organs: The gallbladder contains a single large peripherally calcified stone. No gallbladder dilation or obvious wall thickening. The unenhanced liver is normal in appearance. The spleen and adrenal glands are normal in size. The pancreas demonstrates diffuse fatty atrophy but is otherwise unremarkable. The kidneys have a lobulated surface contour bilaterally. No hydronephrosis or radiopaque calculus. GI/Bowel: The stomach and duodenal sweep are within normal limits. No evidence of a bowel obstruction, free air or pneumatosis. Portions the colon are decompressed, limiting assessment for mucosal based abnormalities. The appendix is normal. Pelvis:  The urinary bladder is decompressed around a Castano catheter. The uterus and ovaries are not well visualized and are either surgically absent or severely atrophic. No free fluid is identified in the pelvis and there is no pelvic lymphadenopathy. Scattered visible but nonenlarged pelvic lymph nodes are present. Peritoneum/Retroperitoneum: The abdominal aorta is normal in caliber with scattered atherosclerotic plaques. No retroperitoneal lymphadenopathy. No enlarged mesenteric lymph nodes are identified. Bones/Soft Tissues: There is a decubitus ulcer noted inferiorly, measuring 3.6 x 1.3 cm. This appears to extend to the level of the coccyx, with a small locule of gas that appears to abut or come in contact with the inferior most portion of the coccyx. There are advanced degenerative changes involving the hips bilaterally. Moderate to severe degenerative changes involve the bilateral sacroiliac joints and lower lumbar spine. Vertebral bodies have a heterogeneous attenuation pattern but are predominantly hyperdense. Extensive ground-glass and consolidative opacities throughout both lungs, in a pattern highly suspicious for COVID pneumonia. Please note that the endotracheal tube terminates at the level of the ritesh. Recommend retracting the endotracheal tube by 3-4 cm. No acute inflammatory or obstructive process in the abdomen or pelvis. Cholelithiasis without evidence of acute cholecystitis. Diffusely heterogeneous and relatively hyperdense vertebral bodies, a nonspecific finding. Diffuse metastatic disease cannot be excluded. Recommend clinical correlation. Correlation could also be made with nuclear medicine bone scan, if clinically appropriate. Decubitus ulcer with at least 1 gas locule that may come into contact with the lower most portion of the coccyx. Osteomyelitis cannot be excluded at this level.      Ct Head Wo Contrast    Result Date: 10/2/2020  EXAMINATION: CT OF THE HEAD WITHOUT CONTRAST  10/2/2020 7:55 am areas of chronic microvascular ischemic change. No abnormal extra-axial fluid collections. Mastoid air cells are clear. No evidence of acute intracranial hemorrhage or edema. If  clinical concern exists for acute stroke, MRI brain with diffusion-weighted imaging could be helpful for further evaluation. Ct Chest Wo Contrast    Result Date: 10/2/2020  EXAMINATION: CT OF THE CHEST WITHOUT CONTRAST; CT OF THE ABDOMEN AND PELVIS WITHOUT CONTRAST 10/2/2020 8:55 am TECHNIQUE: CT of the chest was performed without the administration of intravenous contrast. Multiplanar reformatted images are provided for review. Dose modulation, iterative reconstruction, and/or weight based adjustment of the mA/kV was utilized to reduce the radiation dose to as low as reasonably achievable.; CT of the abdomen and pelvis was performed without the administration of intravenous contrast. Multiplanar reformatted images are provided for review. Dose modulation, iterative reconstruction, and/or weight based adjustment of the mA/kV was utilized to reduce the radiation dose to as low as reasonably achievable. COMPARISON: None. Correlated with chest radiograph from the same day. HISTORY: ORDERING SYSTEM PROVIDED HISTORY: recent suspected aspiration, new ground glass opacities on CXR. TECHNOLOGIST PROVIDED HISTORY: Reason for exam:->recent suspected aspiration, new ground glass opacities on CXR. What reading provider will be dictating this exam?->CRC; ORDERING SYSTEM PROVIDED HISTORY: hemoglobin 7.2 TECHNOLOGIST PROVIDED HISTORY: Reason for exam:->hemoglobin 7.2 Additional Contrast?->None What reading provider will be dictating this exam?->CRC FINDINGS: Chest: Mediastinum: An enteric tube terminates in the body of the stomach. The endotracheal tube terminates immediately cranial to the ritesh. Recommend retraction of 3-4 mm. Heart size is within normal limits.   Coronary artery calcifications are present, potentially a marker for coronary artery disease. Trace pericardial fluid is present. The thoracic esophagus is decompressed, limiting assessment. No obvious esophageal abnormality. Scattered visible but nonenlarged mediastinal lymph nodes are present. Lungs/pleura: The central airways are patent. There is no evidence of a pleural effusion. There are extensive ground-glass and consolidative opacities throughout both lungs. No evidence of a pneumothorax. Soft Tissues/Bones: No acute osseous abnormality. Bones are diffusely osteopenic and there is moderate osteophyte formation and intervertebral disc space narrowing throughout the thoracic spine. Moderate degenerative changes involve the shoulders bilaterally, right greater than left. Abdomen/Pelvis: Organs: The gallbladder contains a single large peripherally calcified stone. No gallbladder dilation or obvious wall thickening. The unenhanced liver is normal in appearance. The spleen and adrenal glands are normal in size. The pancreas demonstrates diffuse fatty atrophy but is otherwise unremarkable. The kidneys have a lobulated surface contour bilaterally. No hydronephrosis or radiopaque calculus. GI/Bowel: The stomach and duodenal sweep are within normal limits. No evidence of a bowel obstruction, free air or pneumatosis. Portions the colon are decompressed, limiting assessment for mucosal based abnormalities. The appendix is normal. Pelvis: The urinary bladder is decompressed around a Castano catheter. The uterus and ovaries are not well visualized and are either surgically absent or severely atrophic. No free fluid is identified in the pelvis and there is no pelvic lymphadenopathy. Scattered visible but nonenlarged pelvic lymph nodes are present. Peritoneum/Retroperitoneum: The abdominal aorta is normal in caliber with scattered atherosclerotic plaques. No retroperitoneal lymphadenopathy. No enlarged mesenteric lymph nodes are identified. Bones/Soft Tissues:  There is a decubitus ulcer noted inferiorly, measuring 3.6 x 1.3 cm. This appears to extend to the level of the coccyx, with a small locule of gas that appears to abut or come in contact with the inferior most portion of the coccyx. There are advanced degenerative changes involving the hips bilaterally. Moderate to severe degenerative changes involve the bilateral sacroiliac joints and lower lumbar spine. Vertebral bodies have a heterogeneous attenuation pattern but are predominantly hyperdense. Extensive ground-glass and consolidative opacities throughout both lungs, in a pattern highly suspicious for COVID pneumonia. Please note that the endotracheal tube terminates at the level of the ritesh. Recommend retracting the endotracheal tube by 3-4 cm. No acute inflammatory or obstructive process in the abdomen or pelvis. Cholelithiasis without evidence of acute cholecystitis. Diffusely heterogeneous and relatively hyperdense vertebral bodies, a nonspecific finding. Diffuse metastatic disease cannot be excluded. Recommend clinical correlation. Correlation could also be made with nuclear medicine bone scan, if clinically appropriate. Decubitus ulcer with at least 1 gas locule that may come into contact with the lower most portion of the coccyx. Osteomyelitis cannot be excluded at this level. Xr Chest Portable    Result Date: 10/4/2020  EXAMINATION: ONE XRAY VIEW OF THE CHEST 10/4/2020 6:59 am COMPARISON: 1 day prior. HISTORY: ORDERING SYSTEM PROVIDED HISTORY: resp failure TECHNOLOGIST PROVIDED HISTORY: Reason for exam:->resp failure FINDINGS: Single frontal portable view of the chest is submitted for evaluation. There is stable positioning of support lines and tubes. The cardiomediastinal contours are unchanged. There is stable left lower lobe retrocardiac opacities. There is a persistent left upper lobe confluent airspace opacities however improved in appearance compared to the prior examination.   There is no visualized pleural effusion or pneumothorax. The pulmonary vessels are within normal limits. Interval improvement in aeration right upper lobe. Xr Chest Portable    Result Date: 10/3/2020  EXAMINATION: ONE XRAY VIEW OF THE CHEST 10/3/2020 6:58 am COMPARISON: 10/2020 HISTORY: ORDERING SYSTEM PROVIDED HISTORY: resp failure TECHNOLOGIST PROVIDED HISTORY: Reason for exam:->resp failure FINDINGS: There is no interval change in the multifocal bilateral pulmonary infiltrates more prominent on the right. There is stable position of the support lines and tubes. There is no pleural effusion. The heart is not enlarged. 1. No interval change in extensive bilateral multifocal infiltrates more prominent within the right lung. Xr Chest Portable    Result Date: 10/2/2020  EXAMINATION: ONE XRAY VIEW OF THE CHEST 10/2/2020 3:55 pm COMPARISON: Noncontrast chest CT 10/02/2020 HISTORY: ORDERING SYSTEM PROVIDED HISTORY: line placement TECHNOLOGIST PROVIDED HISTORY: Reason for exam:->line placement FINDINGS: This exam is slightly limited by AP portable, semi-upright technique and multiple overlying support devices. There is a new, right-internal-jugular-approach, single-lumen central venous catheter terminating at the SVC/RA junction. Negative for gross pneumothorax. An endotracheal tube has been slightly withdrawn, now terminating approximately 3.8 cm above the ritesh. An enteric tube courses below the left hemidiaphragm, off the inferior edge of the image. Given differences in technique, previously-existing, bibasilar and bilateral perihilar multifocal consolidations and intermixed semi-solid patchy opacities, appear overall not significantly changed, given differences in technique. On this exam this is most conspicuous within the right suprahilar region. Nonspecific, diffuse heterogeneous increased density throughout all visualized osseous structures and scattered osseous degenerative disease have not significantly changed. No other clinically-significant changes are noted. .     1. Slightly limited exam. 2.   New, right-internal-jugular-approach, single-lumen central venous catheter terminating at the SVC/RA junction. Negative for gross pneumothorax. An endotracheal tube has been slightly withdrawn, now terminating approximately 3.8 cm above the ritesh. 3.   Bilateral multifocal consolidations and intermixed semi-solid patchy opacities, as described, appear overall not significantly changed, given differences in technique. 4.   Nonspecific, diffuse heterogeneous increased density throughout all visualized osseous structures. When clinically possible, consider nuclear scintigraphy, as directed clinically. [facility] Chest Portable    Result Date: 10/2/2020  EXAMINATION: ONE XRAY VIEW OF THE CHEST 10/2/2020 6:09 am COMPARISON: 2020 HISTORY: ORDERING SYSTEM PROVIDED HISTORY: post intubation TECHNOLOGIST PROVIDED HISTORY: Reason for exam:->post intubation What reading provider will be dictating this exam?->CRC FINDINGS: Endotracheal tube tip projects 12 mm superior to the ritesh. Nasogastric tube tip is in the upper abdomen. Cardiac silhouette is stable. There are new predominantly perihilar patchy airspace opacities. No large effusion or pneumothorax. No acute osseous abnormality. New patchy bilateral ground-glass airspace opacities Endotracheal tube tip projects 12 mm superior to the ritesh     Xr Chest Portable    Result Date: 2020  Patient MRN:  [id number] : 1950 Age: 78 years Gender: Female Order Date:  2020 7:30 PM EXAM: XR CHEST PORTABLE COMPARISON: 2017 INDICATION:  other other FINDINGS: The heart is normal in size. No focal airspace opacity. There is no pleural effusion. There is no pneumothorax. No free air beneath the diaphragms. No airspace opacities or pleural effusion.      Xr Abdomen For Ng/og/ne Tube Placement    Result Date: 10/2/2020  EXAMINATION: ONE SUPINE XRAY VIEW(S) OF THE ABDOMEN 10/2/2020 6:23 am COMPARISON: CT abdomen pelvis 2020 HISTORY: ORDERING SYSTEM PROVIDED HISTORY: Confirm G-tube placement TECHNOLOGIST PROVIDED HISTORY: Reason for exam:->Confirm G-tube placement What reading provider will be dictating this exam?->CRC FINDINGS: Nasogastric tube tip is in the proximal stomach. Bowel gas pattern is nonobstructive. No abnormal calcifications are visualized. Degenerative changes of the lumbar spine are noted. Nasogastric tube tip is in the proximal stomach        Chest Xray (10/5/2020):    SYSTEMS ASSESSMENT  Neuro   AMS on admission  Extubated yesterday after successful wean   Not responding to commands or repeated stimulation  Swallow study today     Respiratory   Acute hypoxic respiratory failure              - extubated yesterday      - remains on 2 L humidified O2 via NC       - saturating well  Pneumonia              - Aspiration vs COVID vs HAP              - Patient on Merrem, Day 4      - Levaquin Day 3      - One dose of Vanco given previously      - CXR improved today              - ID following  Wean oxygen as tolerated.  Keep O2 sat 90-92%  AB.54 / 29.3 / 109 / 24.8     Cardiovascular   Hx HTN, but hypotensive here              - monitor pressures closely              - Art line placed 10/02              - Levophed Dc'd      - IVF running at 76 cc/hr     Gastrointestinal   PPx: Protonix 40 mg QD  Will consult general surgery for PEG placement  Start Tube Feeds today     Renal   PADMINI today, resolved   - Cr 0.9  Hypokalemia   - replete with 40 KCl IV + 40 KCl PO  Monitor electrolytes closely   - replete as necessary  Monitor I&O     Infectious Disease   Leuckocytosis              -CBC daily              -WBC 9.6 today, trending down             -On Merrem and Levaquin  Pneumonia              - Aspiration vs COVID              - COVID negative x 2  Solucortef 50 mg IV Q6h started  Procalcitonin = 0.09  Blood Cultures x2 drawn 10/2              - Gram+ cocci in clusters in one bottle  Urine culture     - negative  Hx ESBL cultured from wound     Hematology/Oncology   H&H: 7.5/24. 2              - Continue to monitor closely              - Transfuse < 7     Endocrine   Hx DMT2              - hypoglycemic PTA              - SSI - medium dose      - Lantus HS and Humalog TID  Continue to monitor sugars closely     Social/Spiritual/DNR/Other   CODE STATUS: Full         Consults:   - ID    PLAN:     WEAN PER PROTOCOL:  [] No   [x] Yes  [] N/A    DISCONTINUE ANY LABS:   [x] No   [] Yes    ICU PROPHYLAXIS:  Stress ulcer:  [x] PPI Agent Protonix 40 mg QD [] [id number] [] Sucralfate  [] Other:  VTE:   [x] Enoxaparin 40 mg SC QD [] Unfract. Heparin Subcut  [] EPC Cuffs    NUTRITION:  [] NPO [] Tube Feeding  [] TPN  [] PO (Diet: Diet NPO Effective Now)    HOME MEDICATIONS RECONCILED: [x] No  [] Yes    INSULIN DRIP:   [x] No   [] Yes    CONSULTATION NEEDED:  [x] No   [] Yes    FAMILY UPDATED:    [] No   [x] Yes    TRANSFER OUT OF ICU:   [] No   [x] Yes    ADDITIONAL PLAN:    1. Carmen Ortiz DO, PGY2.                       10/5/2020, 9:21 AM     I personally saw, examined and provided care for the patient. Radiographs, labs and medication list were reviewed by me independently. I spoke with bedside nursing, therapists and consultants. Critical care services and times documented are independent of procedures and multidisciplinary rounds with Residents. Additionally comprehensive, multidisciplinary rounds were conducted with the MICU team. The case was discussed in detail and plans for care were established. Review of Residents documentation was conducted and revisions were made as appropriate. I agree with the above documented exam, problem list and plan of care. Kira Freeman M.D.    Pulmonary/Critical Care Medicine   35 min cct excluding procedures

## 2020-10-05 NOTE — PROGRESS NOTES
6/5/17  Paige Estrada MD   Misc. Devices KIT 1 kit by Does not apply route daily as needed (leg edema) Compression stockings for bilateral leg swelling 6/5/17   Paige Estrada MD   Misc. [address] Bed with gel overlay dsp # 1  Pt has Osteoarthritis and is Morbidly Obese 4/4/16   Hermelinda Lyons DO   Misc. Devices KIT 1 kit by Does not apply route daily as needed Foam mattress for bilateral knees osteoarthritis , degenerative back osteoarthitis 3/31/16   Alcon Melara MD   Misc. Devices [address] [facility] chair to assist with ADL with diagnosis of OA and full thickness knee meniscal tear 2/24/15   MD Zoe Irenec. Devices MISC Grab bar  diagnosis osteoarthritis and full thickness knee meniscal tear 2/24/15   MD Zoe Irenec. [address] bed 11/7/14   Luis Angel Jaramillo MD   Mis. Devices [address] [facility] wheelchair 11/7/14   MD Zoe Brito. Devices [facility] Bedside commode. Dx osteoarthritis 715.90. Height 5'4.5\".  Weight 245 lbs. 9/17/14   Alcon Melara MD       CURRENT MEDICATIONS:  Current Facility-Administered Medications: [START ON 10/6/2020] hydrocortisone sodium succinate PF (SOLU-CORTEF) injection 50 mg, 50 mg, Intravenous, Q12H **FOLLOWED BY** [START ON 10/8/2020] hydrocortisone sodium succinate PF (SOLU-CORTEF) injection 25 mg, 25 mg, Intravenous, Q12H  insulin glargine (LANTUS) injection vial 12 Units, 12 Units, Subcutaneous, Nightly  hydrocortisone sodium succinate PF (SOLU-CORTEF) injection 50 mg, 50 mg, Intravenous, Q6H  enoxaparin (LOVENOX) injection 40 mg, 40 mg, Subcutaneous, Daily  folic acid (FOLVITE) tablet 1 mg, 1 mg, Oral, Daily  allopurinol (ZYLOPRIM) tablet 100 mg, 100 mg, Oral, Daily  DULoxetine (CYMBALTA) extended release capsule 60 mg, 60 mg, Oral, Daily  rosuvastatin (CRESTOR) tablet 10 mg, 10 mg, Oral, Nightly  sodium chloride flush 0.9 % injection 10 mL, 10 mL, Intravenous, 2 times per day  sodium chloride flush 0.9 % injection 10 mL, 10 mL, Intravenous, SpO2 100%   BMI 40.79 kg/m²   Wt Readings from Last 3 Encounters:   10/05/20 237 lb 10.5 oz (107.8 kg)   20 206 lb 14.4 oz (93.8 kg)   20 202 lb (91.6 kg)     Temp Readings from Last 3 Encounters:   10/05/20 98.4 °F (36.9 °C) (Bladder)   20 97.3 °F (36.3 °C) (Oral)   20 97.7 °F (36.5 °C) (Infrared)     TMAX:  BP Readings from Last 3 Encounters:   10/05/20 (!) 166/75   20 (!) 165/72   20 108/70     Pulse Readings from Last 3 Encounters:   10/05/20 98   20 79   20 64       CURRENT PULSE OXIMETRY: SpO2: 100 %  24HR PULSE OXIMETRY RANGE: SpO2  Av.7 %  Min: 94 %  Max: 100 %  CVP:      ________________________________________________________________________    VENTILATOR SETTINGS (if applicable):         Vent Information  $Ventilation: Off Vent  Skin Assessment: Clean, dry, & intact  Equipment ID: [id number]  Vent Type: [id number]  Vent Mode: PS  Vt Ordered: 400 mL  Rate Set: 0 bmp  Peak Flow: 44 L/min  Pressure Support: 5 cmH20  FiO2 : 40 %  SpO2: 100 %  SpO2/FiO2 ratio: 250  Sensitivity: 3  PEEP/CPAP: 5  I Time/ I Time %: 0 s  Humidification Source: HME  Additional Respiratory  Assessments  Pulse: 98  Resp: 12  SpO2: 100 %  Position: Semi-[facility]  Humidification Source: HME  Oral Care: Mouth swabbed, Mouth moisturizer, Mouth suctioned, Suction toothette  Subglottic Suction Done?: Yes  Airway Type: ET  Airway Size: 7.5  Cuff Pressure (cm H2O): 29 cm H2O  ETCO2:  Peak Inspiratory Pressure:  End-Inspiratory Plateau Pressure:    ABG:  Recent Labs     10/05/20  0656   PH 7.546*   PO2 109.0*   PCO2 29.3*   HCO3 24.8   BE 2.5   O2SAT 98.5   METHB 0.3   O2HB 97.6*   COHB 0.6   O2CON 11.5   HHB 1.5   THB 8.2*     FiO2 : 40 %  I:E Ratio: 1:3.00  ________________________________________________________________________    IV ACCESS:    NUTRITION: DIET TUBE FEED CONTINUOUS/CYCLIC NPO; Immune Enhancing; Nasogastric; 20; 40    INTAKE/OUTPUTS:  I/O last 3 completed shifts:   In: 9492 [I.V.:9178; NG/GT:60]  Out: 2530 [Urine:2530]    Intake/Output Summary (Last 24 hours) at 10/5/2020 1418  Last data filed at 10/5/2020 1400  Gross per 24 hour   Intake 3445 ml   Output 3165 ml   Net 280 ml     General Appearance: well-developed and well-nourished, in no acute distress   Eyes: pupils equal, round, and reactive to light, extraocular eye movements intact, conjunctivae normal and sclera anicteric   Neck: neck supple and non tender without mass, no thyromegaly, no thyroid nodules and no cervical adenopathy   Pulmonary/Chest: decreased breath sounds at bases, no accessory muscles of inspiration noted  Cardiovascular: normal rate, regular rhythm, normal S1 and S2, no murmurs, rubs, clicks or gallops, distal pulses intact, no carotid bruits, no murmurs, no gallops, no carotid bruits and no JVD   Abdomen: soft, non-tender, non-distended, normal bowel sounds, no masses or organomegaly   Extremities: no cyanosis, no clubbing, no edema  Musculoskeletal: normal range of motion, no joint swelling, deformity or tenderness   Neurologic: reflexes normal and symmetric, no cranial nerve deficit noted    LABS/IMAGING:    CBC:  Lab Results   Component Value Date    WBC 9.6 10/05/2020    HGB 7.5 (L) 10/05/2020    HCT 24.2 (L) 10/05/2020    MCV 78.3 (L) 10/05/2020     10/05/2020    LYMPHOPCT 6.1 (L) 10/05/2020    RBC 3.09 (L) 10/05/2020    MCH 24.3 (L) 10/05/2020    MCHC 31.0 (L) 10/05/2020    RDW 19.3 (H) 10/05/2020    NEUTOPHILPCT 87.8 (H) 10/05/2020    MONOPCT 4.9 10/05/2020    BASOPCT 0.1 10/05/2020    NEUTROABS 8.46 (H) 10/05/2020    LYMPHSABS 0.59 (L) 10/05/2020    MONOSABS 0.47 10/05/2020    EOSABS 0.00 (L) 10/05/2020    BASOSABS 0.01 10/05/2020       Recent Labs     10/05/20  0620 10/04/20  0520 10/03/20  0546   WBC 9.6 15.9* 28.1*   HGB 7.5* 8.0* 8.8*   HCT 24.2* 26.3* 28.3*   MCV 78.3* 78.5* 79.1*    284 371       BMP:   Recent Labs     10/03/20  0546 10/04/20  0520 10/05/20  0620    135 143   K 4.6 4.8 3.2*    107 108*   CO2 19* 18* 26   PHOS 3.8 4.0 2.8   BUN 20 28* 19   CREATININE 1.0 1.3* 0.9       MG:   Lab Results   Component Value Date    MG 1.7 10/05/2020     Ca/Phos:   Lab Results   Component Value Date    CALCIUM 7.6 (L) 10/05/2020    PHOS 2.8 10/05/2020     Amylase: No results found for: AMYLASE  Lipase:   Lab Results   Component Value Date    LIPASE 28 09/24/2020     LIVER PROFILE:   Recent Labs     10/03/20  0546 10/04/20  0520 10/05/20  0620   AST 11 10 27   ALT 12 10 9   BILITOT <0.2 <0.2 <0.2   ALKPHOS 96 86 96       PT/INR: No results for input(s): PROTIME, INR in the last 72 hours. APTT: No results for input(s): APTT in the last 72 hours. Cardiac Enzymes:  Lab Results   Component Value Date    CKTOTAL 58 10/02/2020    CKMB 3.4 10/02/2020    TROPONINI 0.02 10/02/2020       Hgb A1C:   Lab Results   Component Value Date    LABA1C 5.3 10/02/2020     No results found for: EAG  NASIM:   Lab Results   Component Value Date    NASIM NEGATIVE 09/19/2017     ESR:   Lab Results   Component Value Date    SEDRATE 80 (H) 09/26/2020     CRP:   Lab Results   Component Value Date    CRP 8.6 (H) 10/02/2020     D Dimer:   Lab Results   Component Value Date    DDIMER 3113 10/02/2020     Folate and B12:   Lab Results   Component Value Date    UWHGFBNI80 478 10/02/2020   ,   Lab Results   Component Value Date    FOLATE 2.3 (L) 10/02/2020       Lactic Acid:   Lab Results   Component Value Date    LACTA 1.3 09/26/2020     Ammonia:   Cortisol:  Thyroid Studies:  Lab Results   Component Value Date    TSH 5.130 (H) 03/09/2017    [id number] 98.91 03/10/2017     XR CHEST PORTABLE   Final Result   Continued interval improvement aeration right upper lobe. XR CHEST PORTABLE   Final Result   Interval improvement in aeration right upper lobe. XR CHEST PORTABLE   Final Result   1. No interval change in extensive bilateral multifocal infiltrates more   prominent within the right lung.          XR CHEST PORTABLE Final Result   1. Slightly limited exam.      2.   New, right-internal-jugular-approach, single-lumen central venous   catheter terminating at the SVC/RA junction. Negative for gross   pneumothorax. An endotracheal tube has been slightly withdrawn, now   terminating approximately 3.8 cm above the ritesh. 3.   Bilateral multifocal consolidations and intermixed semi-solid patchy   opacities, as described, appear overall not significantly changed, given   differences in technique. 4.   Nonspecific, diffuse heterogeneous increased density throughout all   visualized osseous structures. When clinically possible, consider nuclear   scintigraphy, as directed clinically. .         CT Head WO Contrast   Final Result   No acute intracranial abnormality. CT CHEST WO CONTRAST   Final Result   Extensive ground-glass and consolidative opacities throughout both lungs, in   a pattern highly suspicious for COVID pneumonia. Please note that the   endotracheal tube terminates at the level of the ritesh. Recommend   retracting the endotracheal tube by 3-4 cm. No acute inflammatory or obstructive process in the abdomen or pelvis. Cholelithiasis without evidence of acute cholecystitis. Diffusely heterogeneous and relatively hyperdense vertebral bodies, a   nonspecific finding. Diffuse metastatic disease cannot be excluded. Recommend clinical correlation. Correlation could also be made with nuclear   medicine bone scan, if clinically appropriate. Decubitus ulcer with at least 1 gas locule that may come into contact with   the lower most portion of the coccyx. Osteomyelitis cannot be excluded at   this level. CT ABDOMEN PELVIS WO CONTRAST Additional Contrast? None   Final Result   Extensive ground-glass and consolidative opacities throughout both lungs, in   a pattern highly suspicious for COVID pneumonia.   Please note that the   endotracheal tube terminates at the level of

## 2020-10-05 NOTE — PROGRESS NOTES
eyes remained open. In no distress, satting well on 2LNC. Has been off pressors since yesterday morning. Potentially out of ICU today.   No new issues identified.     Review of Systems - unable to obtain    Objective  Physical Exam  Vitals: BP (!) 164/83   Pulse 83   Temp 98.4 °F (36.9 °C) (Bladder)   Resp 11   Ht 5' 4\" (1.626 m)   Wt 237 lb 10.5 oz (107.8 kg)   SpO2 100%   BMI 40.79 kg/m²   General: obese, ill-appearing, no distress  Skin: warm, dry, intact, normal color without cyanosis  HEENT: normocephalic, atraumatic, mucous membranes dry; NCO2 in place Respiratory: mechanical / coarse breath sounds without respiratory distress  Cardiovascular: regular rate and rhythm without murmur / rub / gallop  Abdominal: soft, nontender, nondistended, normoactive bowel sounds  Extremities: no mottling, pulses intact, no edema  Neurologic: intubated / sedated  Psychiatric: unable to assess    Assessment / Plan  Unresponsive episode, questionable etiology, most likely metabolic              CT head showed no acute pathology              No hx CNS pathology              No arrhythmia noted              Troponin 0.03 in ED              See below     Acute hypoxic respiratory failure due to community acquired pneumonia              Diagnostics:    Testing  · 10/2 (rapid swab) negative  · 10/2 repeat negative  Patient currently afebrile, actually hypOthermic                                      CXR showing patchy b/l GGO                                      CT chest showing extensive b/l GGO                                      CBC shows mild lymphopenia                                      CMP shows no transaminitis                                      Lactic / procal unremarkable                                      Check CRP, d-dimer, ferritin      CRP 8.6      D-dimer 3113      Ferritin 283     Cultures      Blood 10/2 - GPC clusters in 1 bottle      Urine 10/2 - CoNS <10k CFU/mL, yeast      Sputum - ?no result      MRSA - negative                                      -------------------------------------------------------------------              Treatment:      ID following - meropenem + Levaquin                                      Decadron 6 mg IV daily now discontinued                                      Tylenol / ice packs for fever                                      Demerol if needed for shaking chills refractory to above                                      -------------------------------------------------------------------              Supportive tx:  Droplet Plus isolation                                      Avoid nebulized breathing tx, sub with MDI medications                                      Avoid IVF if possible; if necessary boluses > infusions                                      Avoid NSAIDs   Intubated on arrival (10/2), extubated  10/4     Decubitus ulcer              Wound vac noted on wound in ED and removed d/t soiling              Wound care consult              ID will be following as well, as above     Anemia, acute-on-chronic              Baseline hgb ~10-11 g/dL per EMR              Hgb 7.3 in ED, follow w labs              MCV wnl / MCHC low              Transfuse as needed to keep hgb > 7 g/dL              Hold chemical DVT prophylaxis for now              Check FOBT, LDH, iron profile, B12, folate        Iron / %sat / TIBC / transferrin / folate all low - would start repletion     IV Ferrlecit protocol ordered 10/3    Folate low - start supplement    B12 ok              Does not appear to be actively bleeding     Abnormal UA              UA 10/2 showed rare bacteria and trace LE              Likely does not represent UTI              Cx sent from ED - Gram-pos organism, <10k CFU/mL + yeast     IDDM              Hold any home oral antihyperglycemics for now              ISS and BG checks q6 for now              Hypoglycemic protocol              Check hemoglobin A1c to assess long-term glucose control - 5.3%              Monitor BG with goal 140-180     HTN              Currently hypOtensive              Hold home atenolol, lisinopril for now   Levo dc'd AM of 10/4     HPL - Continue rosuvastatin     Code status               Full  DVT prophylaxis       SCDs  Disposition                To be determined; from home    Electronically signed by Leena Hunt DO on 10/5/2020 at 12:56 PM

## 2020-10-05 NOTE — PROGRESS NOTES
Speech Language Pathology      NAME:  Denver Murphy  :  1950  DATE: 10/5/2020  ROOM:  0212/0212-A         Attempted to complete clinical swallow eval in AM. Son present. Pt did not follow simple commands. Closed lips when SLP attempted both cup and spoon and then verbalized \"stop\". No verbalizations prior to this. SLP educated pt on need to assess swallow function. Pt continued to refuse. SLP to re attempt later this date or tomorrow as able. Respiratory failure ([address] [J96.90]  Respiratory failure ([address] [J96.90]        [place]ton CALL CCC/SLP [id number]  Speech-Language Pathologist

## 2020-10-05 NOTE — PROGRESS NOTES
Call placed to [facility] distribution, order placed for 2 prevalon boots. Will be sent to us as soon as possible.

## 2020-10-05 NOTE — CARE COORDINATION
Extubated 10/4-on O2 2lNC. From home PTA-WC bound-pt has hospital bad adriana lift- non verbal at present. wound vac removed in ER. On iv abx. Active w/ [facility]- will need NEW order on discharge. Surgery consulted for PeG placement.  Will follow up w/ family tomorrow regarding discharge [place]

## 2020-10-05 NOTE — FLOWSHEET NOTE
Inpatient Wound Care    Admit Date: 10/2/2020  6:36 AM    Reason for consult:  Coccyx wound, Had wound VAc in ED  Significant history:  Resides at home  Wound history: POA  Findings: non verbal  Coccyx wound as below. rt buttocks 2 healing pink scarred edges red wound. No drainage. Castano in place  Ng for tube feeds      10/05/20 1705   Wound 09/24/20 Coccyx Mid   Date First Assessed/Time First Assessed: 09/24/20 1921   Present on Hospital Admission: Yes  Primary Wound Type: Pressure Injury  Location: Coccyx  Wound Location Orientation: Mid   Wound Image    Wound Length (cm) 4 cm   Wound Width (cm) 3 cm   Wound Depth (cm) 2 cm   Wound Surface Area (cm^2) 12 cm^2   Change in Wound Size % (l*w) -114.29   Wound Volume (cm^3) 24 cm^3   Wound Healing % -290   Undermining Starts ___ O'Clock 6   Undermining Ends___ O'Clock 12   Undermining Maxium Distance (cm) 8   Drainage Amount Small   Drainage Description Sanguinous   Odor None   Exposed structure Bone   bilateral heels boggy, left darker than right  Left abd fold with 8 cm area or parallel skin tears      All folds most, odorous      Impression:  Stage 4 coccyx wound      Plan: SOS, lo air loss, heel boots  Ns kerlex dressing, dietician following  interdry to abd folds, breasts folds  Bari Danielle.  RICHARD Meyer [place] 10/5/2020 5:09 PM

## 2020-10-05 NOTE — PROGRESS NOTES
ID Progress Note                [address] [facility] [address], [place], [address] [facility]            Phone [phone]     Fax [phone]      Chief complaint   Unresponsive and hypoglycemic    Subjective:  Afebrile  On 40% FiO2  WBC count trending down  Chest x-ray also shows improvement  Continues to remain intubated      Objective:    Vitals:    10/05/20 1000   BP: (!) 155/78   Pulse: 79   Resp: 14   Temp:    SpO2: 100%   General appearance: Opens her eyes but not following commands, ventilated   skin: Warm and dry  Eyes: Pink palpebral conjunctivae. PERRL  Ears: External ears normal.  Nose/Sinuses: Nares normal. Septum midline. Mucosa normal. No sinus tenderness. Oropharynx: Oropharynx clear with no exudates seen  Neck: Neck supple. No jugular venous distension, lymphadenopathy or bilateral crackles present at the lower lungs   l  Heart: S1 S2  Regular rate and rhythm. No rub, murmur or gallop  Abdomen: Abdomen soft, non-tender.  BS normal. No masses, organomegaly  Extremities: 1+ lower extremity edema,  peripheral pulses palpable  Musculoskeletal: No joint effusion, tenderness, swelling or warmth  Right neck central line in place      Labs:  Recent Labs     10/03/20  0546 10/04/20  0520 10/05/20  0620   WBC 28.1* 15.9* 9.6   RBC 3.58 3.35* 3.09*   HGB 8.8* 8.0* 7.5*   HCT 28.3* 26.3* 24.2*   MCV 79.1* 78.5* 78.3*   MCH 24.6* 23.9* 24.3*   MCHC 31.1* 30.4* 31.0*   RDW 19.4* 19.7* 19.3*    284 248   MPV 9.3 9.6 9.2     CMP:    Lab Results   Component Value Date     10/05/2020    K 3.2 10/05/2020    K 4.0 09/27/2020     10/05/2020    CO2 26 10/05/2020    BUN 19 10/05/2020    CREATININE 0.9 10/05/2020    GFRAA >60 10/05/2020    LABGLOM >60 10/05/2020    GLUCOSE 144 10/05/2020    GLUCOSE 147 03/07/2012    PROT 6.1 10/05/2020    LABALBU 2.2 10/05/2020    LABALBU 4.1 03/07/2012    CALCIUM 7.6 10/05/2020    BILITOT <0.2 10/05/2020    ALKPHOS 96 10/05/2020    AST 27 10/05/2020    ALT 9 10/05/2020 Microbiology :  No results for input(s): BC in the last 72 hours. No results for input(s): Miri Plan in the last 72 hours. Recent Labs     10/02/20  1238   LABURIN <10,000 CFU/mL  Gram positive organism  Yeast       No results for input(s): CULTRESP in the last 72 hours. No results for input(s): WNDABS in the last 72 hours. Radiology :  XR CHEST PORTABLE   Final Result   Continued interval improvement aeration right upper lobe. XR CHEST PORTABLE   Final Result   Interval improvement in aeration right upper lobe. XR CHEST PORTABLE   Final Result   1. No interval change in extensive bilateral multifocal infiltrates more   prominent within the right lung. XR CHEST PORTABLE   Final Result   1. Slightly limited exam.      2.   New, right-internal-jugular-approach, single-lumen central venous   catheter terminating at the SVC/RA junction. Negative for gross   pneumothorax. An endotracheal tube has been slightly withdrawn, now   terminating approximately 3.8 cm above the ritesh. 3.   Bilateral multifocal consolidations and intermixed semi-solid patchy   opacities, as described, appear overall not significantly changed, given   differences in technique. 4.   Nonspecific, diffuse heterogeneous increased density throughout all   visualized osseous structures. When clinically possible, consider nuclear   scintigraphy, as directed clinically. .         CT Head WO Contrast   Final Result   No acute intracranial abnormality. CT CHEST WO CONTRAST   Final Result   Extensive ground-glass and consolidative opacities throughout both lungs, in   a pattern highly suspicious for COVID pneumonia. Please note that the   endotracheal tube terminates at the level of the ritehs. Recommend   retracting the endotracheal tube by 3-4 cm. No acute inflammatory or obstructive process in the abdomen or pelvis. Cholelithiasis without evidence of acute cholecystitis. Diffusely heterogeneous and relatively hyperdense vertebral bodies, a   nonspecific finding. Diffuse metastatic disease cannot be excluded. Recommend clinical correlation. Correlation could also be made with nuclear   medicine bone scan, if clinically appropriate. Decubitus ulcer with at least 1 gas locule that may come into contact with   the lower most portion of the coccyx. Osteomyelitis cannot be excluded at   this level. CT ABDOMEN PELVIS WO CONTRAST Additional Contrast? None   Final Result   Extensive ground-glass and consolidative opacities throughout both lungs, in   a pattern highly suspicious for COVID pneumonia. Please note that the   endotracheal tube terminates at the level of the ritesh. Recommend   retracting the endotracheal tube by 3-4 cm. No acute inflammatory or obstructive process in the abdomen or pelvis. Cholelithiasis without evidence of acute cholecystitis. Diffusely heterogeneous and relatively hyperdense vertebral bodies, a   nonspecific finding. Diffuse metastatic disease cannot be excluded. Recommend clinical correlation. Correlation could also be made with nuclear   medicine bone scan, if clinically appropriate. Decubitus ulcer with at least 1 gas locule that may come into contact with   the lower most portion of the coccyx. Osteomyelitis cannot be excluded at   this level.          XR ABDOMEN FOR NG/OG/NE TUBE PLACEMENT   Final Result   Nasogastric tube tip is in the proximal stomach         XR CHEST PORTABLE   Final Result   New patchy bilateral ground-glass airspace opacities      Endotracheal tube tip projects 12 mm superior to the ritesh         XR CHEST PORTABLE    (Results Pending)         URINARY CATHETER OUTPUT (Castano):  Urethral Catheter Non-latex 16 fr-Output (mL): 100 mL       Assessment and Plan:      · Septic shock  · Aspiration pneumonia  · Leukocytosis from sepsis-resolved  · Coccygeal ulcer not infected  · Coag neg staph abctremia- [facility] contaminatiom     PLAN  -Keep on meropenem , d/c levaquin , urine legionella urine antigen neg  -Respiratory culture- ?   Not yet obtained  -We will follow the patient closely  - one set bld culture, no need for vancomycin          Electronically signed by Hannah Vizcarra MD on 10/5/2020 at 12:15 PM

## 2020-10-05 NOTE — CONSULTS
Pupillo, DO   atenolol (TENORMIN) 50 MG tablet take 1 tablet by mouth twice a day 7/23/20   Marie Figueroa MD   lisinopril (PRINIVIL;ZESTRIL) 40 MG tablet Take 1 tablet by mouth daily 7/23/20   Marie Figueroa MD   metFORMIN (GLUCOPHAGE) 1000 MG tablet take 1 tablet by mouth twice a day WITH MEALS 7/23/20   Marie Figueroa MD   glipiZIDE (GLUCOTROL XL) 5 MG extended release tablet take 1 tablet by mouth once daily 7/23/20   Marie Figueroa MD   DULoxetine (CYMBALTA) 60 MG extended release capsule Take 1 capsule by mouth daily 7/23/20   Marie Figueroa MD   rosuvastatin (CRESTOR) 10 MG tablet take 1 tablet by mouth once daily 7/23/20   Marie Figueroa MD   exenatide (BYETTA 10 MCG PEN) 10 MCG/0.04ML injection inject . 04 milliliters subcutaneously twice a day with food 7/23/20   Marie Figueroa MD   allopurinol (ZYLOPRIM) 100 MG tablet Take 1 tablet by mouth daily 7/23/20 11/20/20  Marie Figueroa MD   RA Alcohol Swabs 70 % PADS USE WITH INJECTIONS TWICE A DAY AND CHECKING BLOOD SUGAR TWICE A DAY 2/17/20   Avni Toledo MD   blood glucose test strips (ACCU-CHEK RUI PLUS) strip TEST twice a day FASTING BEFORE BREAKFAST AND SUPPER 11/6/19   Avni Toledo MD   ACCU-CHEK MULTICLIX LANCETS MISC Check BS Twice daily 11/6/19   Avni Toledo MD   acetaminophen (TYLENOL) 325 MG tablet Take 2 tablets by mouth every 4 hours as needed for Pain 11/6/19   Avni Toledo MD   Insulin Pen Needle (B-D UF III MINI PEN NEEDLES) 31G X 5 MM MISC use twice a day 11/6/19   Avni Toledo MD   Insulin Pen Needle (B-D ULTRAFINE III SHORT PEN) 31G X 8 MM MISC Inject 1 kit into the skin 2 times daily 11/6/19   Avni Toledo MD   vitamin D (ERGOCALCIFEROL) 1.25 MG (25201 UT) CAPS capsule Take 1 capsule by mouth once a week 11/6/19   Avni Toledo MD   Misc. Devices MISC Wheelchair cushion 6/5/17   Donny Ocampo MD   [facility]. Devices (MATTRESS PAD) MISC 1 each by Does not apply route once for 1 dose 6/5/17 6/5/17  Donny Ocampo MD   Misc.  Devices KIT 1 disease. Trace pericardial fluid is present. The thoracic esophagus is decompressed, limiting assessment. No obvious esophageal abnormality. Scattered visible but nonenlarged mediastinal lymph nodes are present. Lungs/pleura: The central airways are patent. There is no evidence of a pleural effusion. There are extensive ground-glass and consolidative opacities throughout both lungs. No evidence of a pneumothorax. Soft Tissues/Bones: No acute osseous abnormality. Bones are diffusely osteopenic and there is moderate osteophyte formation and intervertebral disc space narrowing throughout the thoracic spine. Moderate degenerative changes involve the shoulders bilaterally, right greater than left. Abdomen/Pelvis: Organs: The gallbladder contains a single large peripherally calcified stone. No gallbladder dilation or obvious wall thickening. The unenhanced liver is normal in appearance. The spleen and adrenal glands are normal in size. The pancreas demonstrates diffuse fatty atrophy but is otherwise unremarkable. The kidneys have a lobulated surface contour bilaterally. No hydronephrosis or radiopaque calculus. GI/Bowel: The stomach and duodenal sweep are within normal limits. No evidence of a bowel obstruction, free air or pneumatosis. Portions the colon are decompressed, limiting assessment for mucosal based abnormalities. The appendix is normal. Pelvis: The urinary bladder is decompressed around a Castano catheter. The uterus and ovaries are not well visualized and are either surgically absent or severely atrophic. No free fluid is identified in the pelvis and there is no pelvic lymphadenopathy. Scattered visible but nonenlarged pelvic lymph nodes are present. Peritoneum/Retroperitoneum: The abdominal aorta is normal in caliber with scattered atherosclerotic plaques. No retroperitoneal lymphadenopathy. No enlarged mesenteric lymph nodes are identified. Bones/Soft Tissues:  There is a decubitus ulcer shift. No abnormal extra-axial fluid collection. The gray-white differentiation is maintained without evidence of an acute infarct. Ventricles and sulci are within normal limits in size. There are nonspecific areas of hypoattenuation within the periventricular and subcortical white matter, which likely represent chronic microvascular ischemic change. ORBITS: The visualized portion of the orbits demonstrate no acute abnormality. SINUSES: The visualized paranasal sinuses and mastoid air cells demonstrate no acute abnormality. SOFT TISSUES/SKULL: No acute abnormality of the visualized skull or soft tissues. No acute intracranial abnormality. Ct Chest Wo Contrast    Result Date: 10/2/2020  EXAMINATION: CT OF THE CHEST WITHOUT CONTRAST; CT OF THE ABDOMEN AND PELVIS WITHOUT CONTRAST 10/2/2020 8:55 am TECHNIQUE: CT of the chest was performed without the administration of intravenous contrast. Multiplanar reformatted images are provided for review. Dose modulation, iterative reconstruction, and/or weight based adjustment of the mA/kV was utilized to reduce the radiation dose to as low as reasonably achievable.; CT of the abdomen and pelvis was performed without the administration of intravenous contrast. Multiplanar reformatted images are provided for review. Dose modulation, iterative reconstruction, and/or weight based adjustment of the mA/kV was utilized to reduce the radiation dose to as low as reasonably achievable. COMPARISON: None. Correlated with chest radiograph from the same day. HISTORY: ORDERING SYSTEM PROVIDED HISTORY: recent suspected aspiration, new ground glass opacities on CXR. TECHNOLOGIST PROVIDED HISTORY: Reason for exam:->recent suspected aspiration, new ground glass opacities on CXR.  What reading provider will be dictating this exam?->CRC; ORDERING SYSTEM PROVIDED HISTORY: hemoglobin 7.2 TECHNOLOGIST PROVIDED HISTORY: Reason for exam:->hemoglobin 7.2 Additional Contrast?->None What reading provider will be dictating this exam?->CRC FINDINGS: Chest: Mediastinum: An enteric tube terminates in the body of the stomach. The endotracheal tube terminates immediately cranial to the ritesh. Recommend retraction of 3-4 mm. Heart size is within normal limits. Coronary artery calcifications are present, potentially a marker for coronary artery disease. Trace pericardial fluid is present. The thoracic esophagus is decompressed, limiting assessment. No obvious esophageal abnormality. Scattered visible but nonenlarged mediastinal lymph nodes are present. Lungs/pleura: The central airways are patent. There is no evidence of a pleural effusion. There are extensive ground-glass and consolidative opacities throughout both lungs. No evidence of a pneumothorax. Soft Tissues/Bones: No acute osseous abnormality. Bones are diffusely osteopenic and there is moderate osteophyte formation and intervertebral disc space narrowing throughout the thoracic spine. Moderate degenerative changes involve the shoulders bilaterally, right greater than left. Abdomen/Pelvis: Organs: The gallbladder contains a single large peripherally calcified stone. No gallbladder dilation or obvious wall thickening. The unenhanced liver is normal in appearance. The spleen and adrenal glands are normal in size. The pancreas demonstrates diffuse fatty atrophy but is otherwise unremarkable. The kidneys have a lobulated surface contour bilaterally. No hydronephrosis or radiopaque calculus. GI/Bowel: The stomach and duodenal sweep are within normal limits. No evidence of a bowel obstruction, free air or pneumatosis. Portions the colon are decompressed, limiting assessment for mucosal based abnormalities. The appendix is normal. Pelvis: The urinary bladder is decompressed around a Castano catheter. The uterus and ovaries are not well visualized and are either surgically absent or severely atrophic.   No free fluid is identified in the pelvis and there is no pelvic lymphadenopathy. Scattered visible but nonenlarged pelvic lymph nodes are present. Peritoneum/Retroperitoneum: The abdominal aorta is normal in caliber with scattered atherosclerotic plaques. No retroperitoneal lymphadenopathy. No enlarged mesenteric lymph nodes are identified. Bones/Soft Tissues: There is a decubitus ulcer noted inferiorly, measuring 3.6 x 1.3 cm. This appears to extend to the level of the coccyx, with a small locule of gas that appears to abut or come in contact with the inferior most portion of the coccyx. There are advanced degenerative changes involving the hips bilaterally. Moderate to severe degenerative changes involve the bilateral sacroiliac joints and lower lumbar spine. Vertebral bodies have a heterogeneous attenuation pattern but are predominantly hyperdense. Extensive ground-glass and consolidative opacities throughout both lungs, in a pattern highly suspicious for COVID pneumonia. Please note that the endotracheal tube terminates at the level of the ritesh. Recommend retracting the endotracheal tube by 3-4 cm. No acute inflammatory or obstructive process in the abdomen or pelvis. Cholelithiasis without evidence of acute cholecystitis. Diffusely heterogeneous and relatively hyperdense vertebral bodies, a nonspecific finding. Diffuse metastatic disease cannot be excluded. Recommend clinical correlation. Correlation could also be made with nuclear medicine bone scan, if clinically appropriate. Decubitus ulcer with at least 1 gas locule that may come into contact with the lower most portion of the coccyx. Osteomyelitis cannot be excluded at this level.      Xr Chest Portable    Result Date: 10/3/2020  EXAMINATION: ONE XRAY VIEW OF THE CHEST 10/3/2020 6:58 am COMPARISON: 10/2020 HISTORY: ORDERING SYSTEM PROVIDED HISTORY: resp failure TECHNOLOGIST PROVIDED HISTORY: Reason for exam:->resp failure FINDINGS: There is no interval change in the multifocal appear overall not significantly changed, given differences in technique. 4.   Nonspecific, diffuse heterogeneous increased density throughout all visualized osseous structures. When clinically possible, consider nuclear scintigraphy, as directed clinically. [place] Chest Portable    Result Date: 10/2/2020  EXAMINATION: ONE XRAY VIEW OF THE CHEST 10/2/2020 6:09 am COMPARISON: 09/24/2020 HISTORY: ORDERING SYSTEM PROVIDED HISTORY: post intubation TECHNOLOGIST PROVIDED HISTORY: Reason for exam:->post intubation What reading provider will be dictating this exam?->CRC FINDINGS: Endotracheal tube tip projects 12 mm superior to the ritesh. Nasogastric tube tip is in the upper abdomen. Cardiac silhouette is stable. There are new predominantly perihilar patchy airspace opacities. No large effusion or pneumothorax. No acute osseous abnormality. New patchy bilateral ground-glass airspace opacities Endotracheal tube tip projects 12 mm superior to the ritesh     Xr Abdomen For Ng/og/ne Tube Placement    Result Date: 10/2/2020  EXAMINATION: ONE SUPINE XRAY VIEW(S) OF THE ABDOMEN 10/2/2020 6:23 am COMPARISON: CT abdomen pelvis 08/25/2020 HISTORY: ORDERING SYSTEM PROVIDED HISTORY: Confirm G-tube placement TECHNOLOGIST PROVIDED HISTORY: Reason for exam:->Confirm G-tube placement What reading provider will be dictating this exam?->CRC FINDINGS: Nasogastric tube tip is in the proximal stomach. Bowel gas pattern is nonobstructive. No abnormal calcifications are visualized. Degenerative changes of the lumbar spine are noted. Nasogastric tube tip is in the proximal stomach     ASSESSMENT:  66yo female with altered mental status, metabolic encephalopathy, failed swallow study. PLAN:  Discussed PEG with son. POA by telephone.  He would like to give her a little more time to improve  Please call if/when PEG is desired    Electronically signed by Medhat Decker MD on 10/5/20 at 4:06 PM EDT  Electronically signed by Gilberto Glover MD on 10/6/2020 at 2:25 AM     I saw and examined the patient. I reviewed the above resident's note. I agree with the assessment and plan as outlined. Discussed PEG placement with son, Yancey Severs, who said he is agreeable but would like to discuss with his siblings prior to signing consent. Will put on for tomorrow.     Nadia Gonzales MD  General Surgery

## 2020-10-06 NOTE — PROGRESS NOTES
ID Progress Note                [address] [facility], [place], [address]            Phone [phone]     Fax [phone]      Chief complaint   Altered mental status  Subjective:  Out of ICU  Afebrile  On 40% FiO2  WBC count trending down  Chest x-ray also shows improvement  Extubated  Not following a lot of commands, discussed with the son at the bedside  Objective:    Vitals:    10/05/20 2115   BP: (!) 144/80   Pulse: 82   Resp: 16   Temp: 97.8 °F (36.6 °C)   SpO2: 99%   General appearance:  Extubated, NG tube in place but she is not oriented she is just awake  skin: Warm and dry  Eyes: Pink palpebral conjunctivae. PERRL  Ears: External ears normal.  Nose/Sinuses: Nares normal. Septum midline. Mucosa normal. No sinus tenderness. Oropharynx: Oropharynx clear with no exudates seen  Neck: Neck supple. No jugular venous distension, lymphadenopathy or bilateral crackles present at the lower lungs   l  Heart: S1 S2  Regular rate and rhythm. No rub, murmur or gallop  Abdomen: Abdomen soft, non-tender.  BS normal. No masses, organomegaly  Extremities: 1+ lower extremity edema,  peripheral pulses palpable  Musculoskeletal: No joint effusion, tenderness, swelling or warmth  Right neck central line in place      Labs:  Recent Labs     10/04/20  0520 10/05/20  0620 10/06/20  0530   WBC 15.9* 9.6 9.2   RBC 3.35* 3.09* 3.03*   HGB 8.0* 7.5* 7.4*   HCT 26.3* 24.2* 24.0*   MCV 78.5* 78.3* 79.2*   MCH 23.9* 24.3* 24.4*   MCHC 30.4* 31.0* 30.8*   RDW 19.7* 19.3* 19.6*    248 257   MPV 9.6 9.2 9.5     CMP:    Lab Results   Component Value Date     10/06/2020    K 3.5 10/06/2020    K 4.0 09/27/2020     10/06/2020    CO2 26 10/06/2020    BUN 17 10/06/2020    CREATININE 0.7 10/06/2020    GFRAA >60 10/06/2020    LABGLOM >60 10/06/2020    GLUCOSE 159 10/06/2020    GLUCOSE 147 03/07/2012    PROT 6.1 10/05/2020    LABALBU 2.2 10/05/2020    LABALBU 4.1 03/07/2012    CALCIUM 8.1 10/06/2020    BILITOT <0.2 10/05/2020    ALKPHOS 96 10/05/2020    AST 27 10/05/2020    ALT 9 10/05/2020          Microbiology :  No results for input(s): BC in the last 72 hours. No results for input(s): Marixa Uzma in the last 72 hours. No results for input(s): LABURIN in the last 72 hours. No results for input(s): CULTRESP in the last 72 hours. No results for input(s): WNDABS in the last 72 hours. Radiology :  XR CHEST PORTABLE   Preliminary Result   1. Patchy bilateral airspace disease which could represent pneumonia or   vascular congestion. 2. Status post removal of the endotracheal tube. XR CHEST PORTABLE   Final Result   Continued interval improvement aeration right upper lobe. XR CHEST PORTABLE   Final Result   Interval improvement in aeration right upper lobe. XR CHEST PORTABLE   Final Result   1. No interval change in extensive bilateral multifocal infiltrates more   prominent within the right lung. XR CHEST PORTABLE   Final Result   1. Slightly limited exam.      2.   New, right-internal-jugular-approach, single-lumen central venous   catheter terminating at the SVC/RA junction. Negative for gross   pneumothorax. An endotracheal tube has been slightly withdrawn, now   terminating approximately 3.8 cm above the ritesh. 3.   Bilateral multifocal consolidations and intermixed semi-solid patchy   opacities, as described, appear overall not significantly changed, given   differences in technique. 4.   Nonspecific, diffuse heterogeneous increased density throughout all   visualized osseous structures. When clinically possible, consider nuclear   scintigraphy, as directed clinically. .         CT Head WO Contrast   Final Result   No acute intracranial abnormality. CT CHEST WO CONTRAST   Final Result   Extensive ground-glass and consolidative opacities throughout both lungs, in   a pattern highly suspicious for COVID pneumonia.   Please note that the   endotracheal tube terminates at the level of the ritesh. Recommend   retracting the endotracheal tube by 3-4 cm. No acute inflammatory or obstructive process in the abdomen or pelvis. Cholelithiasis without evidence of acute cholecystitis. Diffusely heterogeneous and relatively hyperdense vertebral bodies, a   nonspecific finding. Diffuse metastatic disease cannot be excluded. Recommend clinical correlation. Correlation could also be made with nuclear   medicine bone scan, if clinically appropriate. Decubitus ulcer with at least 1 gas locule that may come into contact with   the lower most portion of the coccyx. Osteomyelitis cannot be excluded at   this level. CT ABDOMEN PELVIS WO CONTRAST Additional Contrast? None   Final Result   Extensive ground-glass and consolidative opacities throughout both lungs, in   a pattern highly suspicious for COVID pneumonia. Please note that the   endotracheal tube terminates at the level of the ritesh. Recommend   retracting the endotracheal tube by 3-4 cm. No acute inflammatory or obstructive process in the abdomen or pelvis. Cholelithiasis without evidence of acute cholecystitis. Diffusely heterogeneous and relatively hyperdense vertebral bodies, a   nonspecific finding. Diffuse metastatic disease cannot be excluded. Recommend clinical correlation. Correlation could also be made with nuclear   medicine bone scan, if clinically appropriate. Decubitus ulcer with at least 1 gas locule that may come into contact with   the lower most portion of the coccyx. Osteomyelitis cannot be excluded at   this level.          XR ABDOMEN FOR NG/OG/NE TUBE PLACEMENT   Final Result   Nasogastric tube tip is in the proximal stomach         XR CHEST PORTABLE   Final Result   New patchy bilateral ground-glass airspace opacities      Endotracheal tube tip projects 12 mm superior to the ritesh               URINARY CATHETER OUTPUT (Castano):  Urethral Catheter Non-latex 16 fr-Output (mL): 300 mL       Assessment and Plan:      · Septic shock-resolved  · Aspiration pneumonia  · Leukocytosis from sepsis-resolved  · Coccygeal ulcer not infected  · Coag neg staph abctremia- likley contaminatiom  · Delirium     PLAN  -Keep on meropenem , day #4  -Respiratory culture- ?   Not yet obtained, I ordered two times  - one set bld culture, no need for vancomycin  - with lack of resp cultures, I'll treat her with meropenem for another 10 days  - midline  - remove rt IJ      Electronically signed by Arturo Abdullahi MD on 10/6/2020 at 10:06 AM

## 2020-10-06 NOTE — CARE COORDINATION
COVID negative 10/2. Met w/ son Shelly Perales. Discharge planning discussed. Plan remains to return home on discharge w/ family. Has hospital bed , adriana lift. Aide is provided by [facility] 7 hours/day/7 days a week. Shelly Perales is requesting aide hours increased /day- VM left w/  @ [facility] Maddy Abdiel to call Shelly Hem [phone] regarding this request. Currently receiving tube feedings via NG- Shelly Diaz states plan is for his mother to have PEG inserted before discharging. Active w/ [facility]- will need NEW HHC order on discharge including tube feeding order. On room air- does not wear home O2. On iv abx. Will follow Russ Arias     Received call from [facility] [place] w/ [facility]- updated on pt status and discharge plan.  [facility] [place] is requesting to be notified of discharge date [address]

## 2020-10-06 NOTE — PROGRESS NOTES
Comprehensive Nutrition Assessment    Type and Reason for Visit:  Reassess    Nutrition Recommendations/Plan: Continue NPO. Recommend to Modify Current TF Orders (current TF/flush orders do not meet kcal/fluid needs at this time). TF Recommendations:  Diabetic 1.5 (Glucerna 1.5) @ 50 ml/hr (Goal) Continuous x 24 hrs/d to provide 1200 ml TV, 1800 kcals, 99 gm Pro, 911 ml water. 140 ml flush q 4 hrs= 1751 ml total water (TF+Flush). Nutrition Assessment:  Pt mildly improving from a nutritional stand-point AEB pt tolerating EN at goal x past ~1-2 days per EMR however remains at risk d/t continued NPO w/ TF via NGT 2/2 continued AMS and suspected Aspiration PNE on adm. Will provide updated TF rec's per noted pending/possible plan for EGD/PEG soon. Malnutrition Assessment:  Malnutrition Status:  Insufficient data    Context:  Acute Illness     Findings of the 6 clinical characteristics of malnutrition:  Energy Intake:  Mild decrease in energy intake (Comment)(x 4 days since adm)  Weight Loss:  Unable to assess(2/2 wt fluctuations per EMR wt hx pta)     Body Fat Loss:  Unable to assess     Muscle Mass Loss:  Unable to assess    Fluid Accumulation:  No significant fluid accumulation(edema noted however likely not r/t malnutrition at this time)     Strength:  Not Performed    Estimated Daily Nutrient Needs:  Energy (kcal):  8008-6765; Weight Used for Energy Requirements:  Admission     Protein (g):  (1.5-1.8 gm/kg per IBW); Weight Used for Protein Requirements:  Ideal        Fluid (ml/day):  1191-1271(1 ml/kcal); Weight Used for Fluid Requirements:  Admission      Nutrition Related Findings:   AMSx3, non-verbal, poor dentition, Abd/BS WDL, +NGT, +2/+3 BUE and +1 BLE edema, +I/O's(Intubated 10/2; extubated 10/4; pt refused BSE 2/2 AMS 10/5; possible/pend plan for EGD/PEG 10/7)      Wounds:  Pressure Injury, Stage IV, Multiple, Skin Tears       Current Nutrition Therapies:    Current Tube Feeding (TF) Orders:  · Feeding Route: Nasogastric  · Formula: Immune Enhancing  · Schedule: Continuous  · Additives/Modulars: (none)  · Water Flushes: 100 ml q 8 hrs= 300 ml total flush/d  · Current TF & Flush Orders Provides: Same as goal  · Goal TF & Flush Orders Provides: 1440 kcals, 90 gm pro, 1029 ml total water (TF+Flush)      Anthropometric Measures:  · Height: 5' 4\" (162.6 cm)  · Current Body Weight: 237 lb (107.5 kg)(actual 10/15)   · Admission Body Weight: 211 lb (95.7 kg)(10/2 bedscale)    · Usual Body Weight: 202 lb (91.6 kg)(noted possible wt loss per EMR wt of 262# bed 7/12/20 however unable to properly assess wt loss at this time 2/2 noted multiple wt fluctuations per this adm as well as previous admits)     · Ideal Body Weight: 120 lbs; % Ideal Body Weight 189.2 %   · BMI: 40.7  · Adjusted Body Weight:  ; No Adjustment   · Adjusted BMI:      · BMI Categories: Obese Class 2 (BMI 35.0 -39.9)(per ABW)       Nutrition Diagnosis:   · Inadequate oral intake related to cognitive or neurological impairment(likely 2/2 Septic Shock/PNE) as evidenced by NPO or clear liquid status due to medical condition, nutrition support - enteral nutrition, swallow study results    · Increased nutrient needs related to increase demand for energy/nutrients(2/2 wound healing) as evidenced by wounds      Nutrition Interventions:   Food and/or Nutrient Delivery:  Continue NPO, Modify Tube Feeding(Continue NPO. Recommend to Modify Current TF Orders. TF Rec:  Diabetic 1.5 (Glucerna 1.5) @ 50 ml/hr (Goal) Continuous x 24 hrs/d to provide 1200 ml TV, 1800 kcals, 99 gm Pro, 911 ml water. 140 ml flush q 4 hrs= 1751 ml total water (TF+Flush). )  Nutrition Education/Counseling:  No recommendation at this time   Coordination of Nutrition Care:  Continued Inpatient Monitoring    Goals:  Pt to tolerate EN at goal rate.        Nutrition Monitoring and Evaluation:   Behavioral-Environmental Outcomes:  (n/a)   Food/Nutrient Intake Outcomes:  Diet Advancement/Tolerance, Enteral Nutrition Intake/Tolerance  Physical Signs/Symptoms Outcomes:  Biochemical Data, Chewing or Swallowing, GI Status, Fluid Status or Edema, Nutrition Focused Physical Findings, Skin, Weight     Discharge Planning:     Too soon to determine     Electronically signed by Jayashree Eason RD, LD on 10/6/20 at 3:32 PM EDT    Contact: ext [phone]

## 2020-10-06 NOTE — PLAN OF CARE
Problem: Inadequate oral food/beverage intake (NI-2.1)  Goal: Food and/or Nutrient Delivery  Continue NPO. Recommend to Modify Current TF Orders. TF Rec:  Diabetic 1.5 (Glucerna 1.5) @ 50 ml/hr (Goal) Continuous x 24 hrs/d to provide 1200 ml TV, 1800 kcals, 99 gm Pro, 911 ml water. 140 ml flush q 4 hrs= 1751 ml total water (TF+Flush). Description: Individualized approach for food/nutrient provision.   Outcome: Met This Shift

## 2020-10-06 NOTE — PROGRESS NOTES
[facility]) Hospitalist Progress Note    Admission Date         10/2/2020  6:36 AM  Chief Complaint        Unresponsive  Admit Dx                    Respiratory failure ([facility] [address] [J96.90]     Subjective  History of Present Illness  10/2 Shanae Ahumada is a 58-year-old female with medical history pertinent for diabetes, hypertension, hyperlipidemia, and wheelchair-bound status who was found to be unresponsive by EMS and brought to the ER with hypoglycemia, hypoxia, coarse breath sounds. Patient was unable to maintain her airway and was endotracheally intubated in the emergency department. Work-up showed hypothermia with temperature 94.4, hypotension with blood pressure 71/42. Labs showed chronic anemia, abnormal UA with leukocyte esterase and rare bacteria only, chest x-ray with patchy bilateral groundglass opacities. CT of the head showed no acute pathology, follow-up CT of the chest demonstrated extensive groundglass and consolidative opacities bilaterally. Patient admitted to the ICU for further evaluation and treatment. 10/3 Patient seen at bedside in the ICU. She remains intubated and sedated. Breath sounds are clear, she has potentially trace lower extremity edema. Otherwise, exam largely unchanged compared to yesterday. It was noted the patient was recently admitted for altered mental status that was found to be due to urinary tract infection. Urine culture is showing gram-positive organisms, though less than 10,000 colony-forming units per milliliter; other cultures are currently pending. 10/4 Pt seen at bedside in ICU. Remains intubated / sedated. CXR today did show some improved aeration in the RUL. 1 bottle from blood cx positive for GPC in clusters; continues on Levaquin and meropenem per ID.      10/5 Pt seen at bedside in ICU. Was able to be extubated yesterday.   Was awake and appeared alert, she initially would grunt yes/no to questions, then eventually stopped responding even though eyes remained open. In no distress, satting well on 2LNC. Has been off pressors since yesterday morning. Potentially out of ICU today. No new issues identified. 10/6 Pt seen at bedside. More awake today, on room air. NG in place with TFs running. Still not talking, but humming a tune during my exam.  Occasionally tracks with eyes, still not following commands. Unsure of baseline but overall status is much improved over the past few days.   No family present during my exam.     Review of Systems - unable to obtain    Objective  Physical Exam  Vitals: BP (!) 144/80   Pulse 82   Temp 97.8 °F (36.6 °C) (Axillary)   Resp 16   Ht 5' 4\" (1.626 m)   Wt 237 lb 10.5 oz (107.8 kg)   SpO2 99%   BMI 40.79 kg/m²   General: obese, ill-appearing, no distress  Skin: warm, dry, intact, normal color without cyanosis  HEENT: normocephalic, atraumatic, mucous membranes dry; NCO2 in place Respiratory: mechanical / coarse breath sounds without respiratory distress  Cardiovascular: regular rate and rhythm without murmur / rub / gallop  Abdominal: soft, nontender, nondistended, normoactive bowel sounds  Extremities: no mottling, pulses intact, no edema  Neurologic: intubated / sedated  Psychiatric: unable to assess    Assessment / Plan  Unresponsive episode, questionable etiology, most likely metabolic              CT head showed no acute pathology              No hx CNS pathology              No arrhythmia noted              Troponin 0.03 in ED              See below     Acute hypoxic respiratory failure due to community acquired pneumonia              Diagnostics:    Testing  · 10/2 (rapid swab) negative  · 10/2 repeat negative  Patient currently afebrile, actually hypOthermic                                      CXR showing patchy b/l GGO                                      CT chest showing extensive b/l GGO                                      CBC shows mild lymphopenia                                      CMP shows no transaminitis                                      Lactic / procal unremarkable                                      Check CRP, d-dimer, ferritin      CRP 8.6      D-dimer 3113      Ferritin 283     Cultures      Blood 10/2 - GPC clusters in 1 bottle      Urine 10/2 - CoNS <10k CFU/mL, yeast      Sputum - ?no result      MRSA - negative                                      -------------------------------------------------------------------              Treatment:      ID following - meropenem x10 more days (Levaquin dc'd)                                       Decadron 6 mg IV daily now discontinued                                      Tylenol / ice packs for fever                                      Demerol if needed for shaking chills refractory to above                                      -------------------------------------------------------------------              Supportive tx:  Droplet Plus isolation                                      Avoid nebulized breathing tx, sub with MDI medications                                      Avoid IVF if possible; if necessary boluses > infusions                                      Avoid NSAIDs   Intubated on arrival (10/2), extubated 10/4     Decubitus ulcer              Wound vac noted on wound in ED and removed d/t soiling              Wound care consult              ID will be following as well, as above     Anemia, acute-on-chronic              Baseline hgb ~10-11 g/dL per EMR              Hgb 7.3 in ED, follow w labs              MCV wnl / MCHC low              Transfuse as needed to keep hgb > 7 g/dL              Hold chemical DVT prophylaxis for now              Check FOBT, LDH, iron profile, B12, folate        Iron / %sat / TIBC / transferrin / folate all low - would start repletion     IV Ferrlecit protocol ordered 10/3    Folate low - start supplement    B12 ok              Does not appear to be actively bleeding     Abnormal UA              UA 10/2 showed rare bacteria and trace LE              Likely does not represent UTI              Cx sent from ED - Gram-pos organism, <10k CFU/mL + yeast     IDDM              Hold any home oral antihyperglycemics for now              ISS and BG checks q6 for now              Hypoglycemic protocol              Check hemoglobin A1c to assess long-term glucose control - 5.3%              Monitor BG with goal 140-180     HTN              Currently hypOtensive              Hold home atenolol, lisinopril for now   Levo dc'd AM of 10/4     HPL - Continue rosuvastatin     Code status               Full  DVT prophylaxis       SCDs  Disposition                To be determined; from home    Electronically signed by Zak Vera DO on 10/6/2020 at 12:51 PM

## 2020-10-06 NOTE — ANESTHESIA PRE PROCEDURE
MD   ACCU-CHEK MULTICLIX LANCETS MISC Check BS Twice daily 11/6/19   Vandana Mello MD   acetaminophen (TYLENOL) 325 MG tablet Take 2 tablets by mouth every 4 hours as needed for Pain 11/6/19   Vandana Mello MD   Insulin Pen Needle (B-D UF III MINI PEN NEEDLES) 31G X 5 MM MISC use twice a day 11/6/19   Vandana Mello MD   Insulin Pen Needle (B-D ULTRAFINE III SHORT PEN) 31G X 8 MM MISC Inject 1 kit into the skin 2 times daily 11/6/19   Vandana Mello MD   vitamin D (ERGOCALCIFEROL) 1.25 MG (91314 UT) CAPS capsule Take 1 capsule by mouth once a week 11/6/19   Vandana Mello MD   Misc. Devices MISC Wheelchair cushion 6/5/17   Kam Hewitt MD   Mis. Devices (MATTRESS PAD) MISC 1 each by Does not apply route once for 1 dose 6/5/17 6/5/17  Kam Hewitt MD   Misc. Devices KIT 1 kit by Does not apply route daily as needed (leg edema) Compression stockings for bilateral leg swelling 6/5/17   Kam Hewitt MD   Mis. [address] Bed with gel overlay dsp # 1  Pt has Osteoarthritis and is Morbidly Obese 4/4/16   Brigitte Adhikari DO   Misc. Devices KIT 1 kit by Does not apply route daily as needed Foam mattress for bilateral knees osteoarthritis , degenerative back osteoarthitis 3/31/16   Denton Mendez MD   Misc. Devices [address] [facility] chair to assist with ADL with diagnosis of OA and full thickness knee meniscal tear 2/24/15   Casi Lara MD   Misc. Devices MISC Grab bar  diagnosis osteoarthritis and full thickness knee meniscal tear 2/24/15   Casi Lara MD   Misc. [address] bed 11/7/14   Joseph Shelton MD   Misc. Devices [address] [facility] wheelchair 11/7/14   Joseph Shelton MD   Misc. Devices [facility] Bedside commode. Dx osteoarthritis 715.90. Height 5'4.5\". Weight 245 lbs. 9/17/14   Denton Mendez MD       Current medications:    No current facility-administered medications for this visit. No current outpatient medications on file.      Facility-Administered Medications Ordered in Other Visits   Medication Dose Route Frequency Provider Last Rate Last Dose    sodium chloride flush 0.9 % injection 10 mL  10 mL Intravenous PRN Jana Sánchez MD        heparin flush 100 UNIT/ML injection 100 Units  1 mL Intravenous 2 times per day Jana Sánchez MD        heparin flush 100 UNIT/ML injection 100 Units  1 mL Intracatheter PRN Jana Sánchez MD        hydrocortisone sodium succinate PF (SOLU-CORTEF) injection 50 mg  50 mg Intravenous Q12H Cheyenne Mullins MD   50 mg at 10/06/20 1021    Followed by   Itz To ON 10/8/2020] hydrocortisone sodium succinate PF (SOLU-CORTEF) injection 25 mg  25 mg Intravenous Q12H Cheyenne Mullins MD        insulin glargine (LANTUS) injection vial 12 Units  12 Units Subcutaneous Nightly Maricopa Canard, DO   12 Units at 10/05/20 2145    enoxaparin (LOVENOX) injection 40 mg  40 mg Subcutaneous Daily Micheal Khan MD   40 mg at 41/55/06 1049    folic acid (FOLVITE) tablet 1 mg  1 mg Oral Daily Kishore R Lockso, DO   1 mg at 10/06/20 0900    allopurinol (ZYLOPRIM) tablet 100 mg  100 mg Oral Daily Kishore R Lockso, DO   100 mg at 10/06/20 1017    DULoxetine (CYMBALTA) extended release capsule 60 mg  60 mg Oral Daily Kishore R Lockso, DO   Stopped at 10/05/20 0820    rosuvastatin (CRESTOR) tablet 10 mg  10 mg Oral Nightly Kishore R Lockso, DO   10 mg at 10/05/20 2137    sodium chloride flush 0.9 % injection 10 mL  10 mL Intravenous 2 times per day Anahi Murdock, DO   10 mL at 10/06/20 1035    sodium chloride flush 0.9 % injection 10 mL  10 mL Intravenous PRN Ladoris Pica Lockso, DO   10 mL at 10/05/20 1800    acetaminophen (TYLENOL) tablet 650 mg  650 mg Oral Q6H PRN Ladoris Pica Lockso, DO        Or    acetaminophen (TYLENOL) suppository 650 mg  650 mg Rectal Q6H PRN Kishore R Lockso, DO        polyethylene glycol (GLYCOLAX) packet 17 g  17 g Oral Daily PRN Kishore R Lockso, DO        promethazine (PHENERGAN) tablet 12.5 mg  12.5 mg Oral Q6H PRN Anahi Murdock DO        Or   Michelet ondansetron (ZOFRAN) injection 4 mg  4 mg Intravenous Q6H PRN Kishore R Lockso, DO        [Held by provider] insulin lispro (HUMALOG) injection vial 8 Units  0.08 Units/kg Subcutaneous TID WC Kishore R Lockso, DO   Stopped at 10/03/20 0800    glucose (GLUTOSE) 40 % oral gel 15 g  15 g Oral PRN Kishore R Lockso, DO        dextrose 50 % IV solution  12.5 g Intravenous PRN Kazakh Berta Lockso, DO   12.5 g at 10/04/20 1611    glucagon (rDNA) injection 1 mg  1 mg Intramuscular PRN Kishore R Lockso, DO        dextrose 5 % solution  100 mL/hr Intravenous PRN Kishore R Lockso, DO        insulin lispro (HUMALOG) injection vial 0-12 Units  0-12 Units Subcutaneous Q4H Kishore R Lockso, DO   4 Units at 10/06/20 1702    0.9 % sodium chloride bolus  1,000 mL Intravenous Once Glorious Snellen, DO   Stopped at 10/02/20 1455    meropenem (MERREM) 1 g in sodium chloride 0.9 % 100 mL IVPB (mini-bag)  1 g Intravenous Alexandre Adams MD   Stopped at 10/06/20 1410    0.9 % sodium chloride infusion   Intravenous Alexandre Adams MD 33.3 mL/hr at 10/06/20 1239      pantoprazole (PROTONIX) injection 40 mg  40 mg Intravenous Daily Glorious Snellen, DO   40 mg at 10/06/20 1020    And    sodium chloride (PF) 0.9 % injection 10 mL  10 mL Intravenous Daily Glorious Snellen, DO   10 mL at 10/06/20 1036       Allergies:  No Known Allergies    Problem List:    Patient Active Problem List   Diagnosis Code    Essential hypertension I10    Type 2 diabetes mellitus with hyperglycemia, without long-term current use of insulin ([facility]) E11.65    Osteoarthritis M19.90    Hyperlipidemia E78.5    Chronic sinusitis J32.9    Eczema L30.9    Primary osteoarthritis involving multiple joints M89.49    Frequent falls R29.6    General weakness R53.1    Morbid obesity due to excess calories ([facility]) E66.01    Normochromic normocytic anemia D64.9    Wound of right buttock S31.819A    Decubitus ulcer of right buttock, stage 3 ([facility]) L89.313    Decubitus ulcer of sacral region L89.159    Hypokalemia E87.6    Leukocytosis D72.829    Cellulitis of sacral region L03.319    Hypomagnesemia E83.42    AMS (altered mental status) R41.82    UTI (urinary tract infection) N39.0    Severe protein-calorie malnutrition (HCC) E43    Respiratory failure (HCC) J96.90    Acute hypoxemic respiratory failure (HCC) J96.01    Community acquired pneumonia J18.9    Suspected COVID-19 virus infection Z20.828    Abnormal urinalysis R82.90       Past Medical History:        Diagnosis Date    Cataract, right     DM (diabetes mellitus), type 2 ([facility] [address] 11/3/2010    HTN (hypertension) 11/3/2010    Hyperlipidemia 11/3/2010    Obesity     Osteoarthritis 11/3/2010    Sinusitis chronic     seasonal    Wheelchair confinement     can pivot with assistance;  uses a lift chair       Past Surgical History:        Procedure Laterality Date    HYSTERECTOMY      ND XCAPSL CTRC RMVL INSJ IO LENS PROSTH W/O ECP Right 8/10/2018    RIGHT EYE CATARACT EXTRACTION WITH  IOL IMPLANT performed by Annette Giles MD at [facility] OR       Social History:    Social History     Tobacco Use    Smoking status: Never Smoker    Smokeless tobacco: Never Used   Substance Use Topics    Alcohol use: No     Alcohol/week: 0.0 standard drinks                                Counseling given: Not Answered      Vital Signs (Current): There were no vitals filed for this visit. BP Readings from Last 3 Encounters:   10/06/20 (!) 158/82   09/28/20 (!) 165/72   09/22/20 108/70       NPO Status:  Pt instructed to be NPO after midnight 7/14/20, states understanding. TUBE FEEDING STOPPED AT 0430.   CASE TO PROCEED AFTER 1230 TODAY                             BMI:   Wt Readings from Last 3 Encounters:   10/05/20 237 lb 10.5 oz (107.8 kg)   09/28/20 206 lb 14.4 oz (93.8 kg)   09/22/20 202 lb (91.6 kg)     There is no height or weight on file to calculate BMI.    CBC:   Lab Results   Component Value Date    WBC 9.2 10/06/2020    RBC 3.03 10/06/2020    HGB 7.4 10/06/2020    HCT 24.0 10/06/2020    MCV 79.2 10/06/2020    RDW 19.6 10/06/2020     10/06/2020       CMP:   Lab Results   Component Value Date     10/06/2020    K 3.5 10/06/2020    K 4.0 09/27/2020     10/06/2020    CO2 26 10/06/2020    BUN 17 10/06/2020    CREATININE 0.7 10/06/2020    GFRAA >60 10/06/2020    LABGLOM >60 10/06/2020    GLUCOSE 159 10/06/2020    GLUCOSE 147 03/07/2012    PROT 6.1 10/05/2020    CALCIUM 8.1 10/06/2020    BILITOT <0.2 10/05/2020    ALKPHOS 96 10/05/2020    AST 27 10/05/2020    ALT 9 10/05/2020       POC Tests: No results for input(s): POCGLU, POCNA, POCK, POCCL, POCBUN, POCHEMO, POCHCT in the last 72 hours. Coags: No results found for: PROTIME, INR, APTT    HCG (If Applicable): No results found for: PREGTESTUR, PREGSERUM, HCG, HCGQUANT     ABGs: No results found for: PHART, PO2ART, EYR6MOB, AKM7NHZ, BEART, L5WMCMPD     Type & Screen (If Applicable):  No results found for: LABABO, LABRH    Drug/Infectious Status (If Applicable):  No results found for: HIV, HEPCAB    COVID-19 Screening (If Applicable):   Lab Results   Component Value Date    COVID19 Not Detected 10/02/2020       EKG 3/9/17:  Normal sinus rhythm  Normal ECG  No previous ECGs available  HR 79bpm    Anesthesia Evaluation  Patient summary reviewed and Nursing notes reviewed no history of anesthetic complications:   Airway: Mallampati: III  TM distance: >3 FB   Neck ROM: full  Mouth opening: < 3 FB Dental:      Comment: Pt has very poor dentition, only has 3 teeth     Pulmonary:   (+) shortness of breath: chronic,  rhonchi,  rales,      (-) pneumonia                          ROS comment: Patchy bilateral airspace disease which could represent pneumonia or vascular congestion.    Recently extubated       Cardiovascular:  Exercise tolerance: poor (<4 METS),   (+) hypertension:, hyperlipidemia      ECG reviewed  Rhythm: regular  Rate: normal           Beta Blocker:  Dose within 24 Hrs         Neuro/Psych:   Negative Neuro/Psych ROS              GI/Hepatic/Renal:   (+) morbid obesity          Endo/Other:    (+) DiabetesType II DM, poorly controlled, , : arthritis: OA., . Pt had no PAT visit        ROS comment:  Wheelchair confinement  can pivot with assistance;  uses a lift chair    Decubitus ulcer of sacral region  Abdominal:           Vascular: negative vascular ROS. Anesthesia Plan      MAC     ASA 3       Induction: intravenous. Anesthetic plan and risks discussed with patient and child/children. Plan discussed with CRNA. Jose Ramirez MD   10/6/2020        Patient will need to be re-evaluated prior to surgery by DOS anesthesiologist.    Jose Ramirez MD           10/6/2020        6:20 PM     DOS STAFF ADDENDUM:    Patient seen and chart reviewed. No interval change in history or exam.   Anesthesia plan discussed, risk/benefits addressed. Patient's concerns and questions answered.      Marizol Miramontes DO  October 7, 2020  12:30 PM

## 2020-10-06 NOTE — CARE COORDINATION
Social Work / Discharge Planner: BOB did update Vernon Martínez from  [facility] on possible New Peg Tube at discharge . BOB also updated Charge RN for dietary consult needed if new peg tube. Await decisions and plan. SW to follow.  Electronically signed by CLEOPATRA Smiley on 10/6/20 at 10:49 AM EDT

## 2020-10-06 NOTE — PROGRESS NOTES
[address] PROGRESS NOTE    Patient: Yuly Hadley  MRN: [id number]  : 1950    Encounter Date: 10/6/2020  Encounter Time: 6:09 PM     Date of Admission: .10/2/2020  6:36 AM    Consulting Physician:  Primary Care Physician:     Demetrius Hayward MD     [phone]    Reason for Consultation: Dyspnea, Respiratory Failure     Problem List:  Patient Active Problem List   Diagnosis    Essential hypertension    Type 2 diabetes mellitus with hyperglycemia, without long-term current use of insulin ([address]    Osteoarthritis    Hyperlipidemia    Chronic sinusitis    Eczema    Primary osteoarthritis involving multiple joints    Frequent falls    General weakness    Morbid obesity due to excess calories ([address]    Normochromic normocytic anemia    Wound of right buttock    Decubitus ulcer of right buttock, stage 3 (HCC)    Decubitus ulcer of sacral region    Hypokalemia    Leukocytosis    Cellulitis of sacral region    Hypomagnesemia    AMS (altered mental status)    UTI (urinary tract infection)    Severe protein-calorie malnutrition (HCC)    Respiratory failure (HCC)    Acute hypoxemic respiratory failure ([address]    Community acquired pneumonia    Suspected COVID-19 virus infection    Abnormal urinalysis     SUBJECTIVE: Patient seen and examined. Overnight the patient had no shortness of breath, cough, increased work of breathing. HOME MEDICATIONS:  Prior to Admission medications    Medication Sig Start Date End Date Taking? Authorizing Provider   miconazole (MICOTIN) 2 % powder Apply topically 2 times daily.  20   Clem Malave MD   fluconazole (DIFLUCAN) 200 MG tablet Take 1 tablet by mouth daily for 20 days 9/29/20 10/19/20  Clem Malave MD   ibuprofen (ADVIL;MOTRIN) 800 MG tablet Take 1 tablet by mouth every 8 hours as needed for Pain 20   Anson Dunham DO   atenolol (TENORMIN) 50 MG tablet take 1 tablet by Compression stockings for bilateral leg swelling 6/5/17   Bob Mcgrath MD   [facility]. [address] Bed with gel overlay dsp # 1  Pt has Osteoarthritis and is Morbidly Obese 4/4/16   Gurinder Son, DO   [facility]. Devices KIT 1 kit by Does not apply route daily as needed Foam mattress for bilateral knees osteoarthritis , degenerative back osteoarthitis 3/31/16   Asiya Osuna MD   [facility]. Devices [address] [facility] chair to assist with ADL with diagnosis of OA and full thickness knee meniscal tear 2/24/15   Orlin Arredondo MD   Misc. Devices MISC Grab bar  diagnosis osteoarthritis and full thickness knee meniscal tear 2/24/15   Orlin Arredondo MD   [facility]. [address] bed 11/7/14   Yissel Gil MD   [facility]. Devices [address] [facility] wheelchair 11/7/14   Yissel Gil MD   [facility]. Devices [facility] Bedside commode. Dx osteoarthritis 715.90. Height 5'4.5\".  Weight 245 lbs. 9/17/14   Asiya Osuna MD       CURRENT MEDICATIONS:  Current Facility-Administered Medications: sodium chloride flush 0.9 % injection 10 mL, 10 mL, Intravenous, PRN  heparin flush 100 UNIT/ML injection 100 Units, 1 mL, Intravenous, 2 times per day  heparin flush 100 UNIT/ML injection 100 Units, 1 mL, Intracatheter, PRN  hydrocortisone sodium succinate PF (SOLU-CORTEF) injection 50 mg, 50 mg, Intravenous, Q12H **FOLLOWED BY** [START ON 10/8/2020] hydrocortisone sodium succinate PF (SOLU-CORTEF) injection 25 mg, 25 mg, Intravenous, Q12H  insulin glargine (LANTUS) injection vial 12 Units, 12 Units, Subcutaneous, Nightly  enoxaparin (LOVENOX) injection 40 mg, 40 mg, Subcutaneous, Daily  folic acid (FOLVITE) tablet 1 mg, 1 mg, Oral, Daily  allopurinol (ZYLOPRIM) tablet 100 mg, 100 mg, Oral, Daily  DULoxetine (CYMBALTA) extended release capsule 60 mg, 60 mg, Oral, Daily  rosuvastatin (CRESTOR) tablet 10 mg, 10 mg, Oral, Nightly  sodium chloride flush 0.9 % injection 10 mL, 10 mL, Intravenous, 2 times per day  sodium chloride flush 0.9 % injection 10 mL, 10 mL, Intravenous, PRN  acetaminophen (TYLENOL) tablet 650 mg, 650 mg, Oral, Q6H PRN **OR** acetaminophen (TYLENOL) suppository 650 mg, 650 mg, Rectal, Q6H PRN  polyethylene glycol (GLYCOLAX) packet 17 g, 17 g, Oral, Daily PRN  promethazine (PHENERGAN) tablet 12.5 mg, 12.5 mg, Oral, Q6H PRN **OR** ondansetron (ZOFRAN) injection 4 mg, 4 mg, Intravenous, Q6H PRN  [Held by provider] insulin lispro (HUMALOG) injection vial 8 Units, 0.08 Units/kg, Subcutaneous, TID WC  glucose (GLUTOSE) 40 % oral gel 15 g, 15 g, Oral, PRN  dextrose 50 % IV solution, 12.5 g, Intravenous, PRN  glucagon (rDNA) injection 1 mg, 1 mg, Intramuscular, PRN  dextrose 5 % solution, 100 mL/hr, Intravenous, PRN  insulin lispro (HUMALOG) injection vial 0-12 Units, 0-12 Units, Subcutaneous, Q4H  0.9 % sodium chloride bolus, 1,000 mL, Intravenous, Once  meropenem (MERREM) 1 g in sodium chloride 0.9 % 100 mL IVPB (mini-bag), 1 g, Intravenous, Q8H  0.9 % sodium chloride infusion, , Intravenous, Q8H  pantoprazole (PROTONIX) injection 40 mg, 40 mg, Intravenous, Daily **AND** sodium chloride (PF) 0.9 % injection 10 mL, 10 mL, Intravenous, Daily    ALLERGIES:  No Known Allergies    REVIEW OF SYSTEMS:  General ROS: Negative For: chills, fatigue, fever, malaise, night sweats or sleep disturbance   ENT ROS: Negative For: epistaxis, headaches, sinus pain, sneezing or sore throat   Respiratory ROS: Negative For: hemoptysis, pleuritic type chest pains  Cardiovascular ROS: Negative For: chest pain, dyspnea on exertion, irregular heartbeat, loss of consciousness, murmur, orthopnea or palpitations   Gastrointestinal ROS: Negative For: abdominal pain, appetite loss, blood in stools, change in bowel habits, change in stools, constipation, diarrhea, hematemesis, melena, nausea/vomiting or stool incontinence     OBJECTIVE:  PHYSICAL EXAMINATION:     VITAL SIGNS:  BP (!) 158/82   Pulse 79   Temp 98.4 °F (36.9 °C) (Oral)   Resp 16   Ht 5' 4\" (1.626 m)   Wt 237 lb 10.5 oz (107.8 kg) 428 [NG/GT:428]  Out: 300 [Urine:300]    Intake/Output Summary (Last 24 hours) at 10/6/2020 1809  Last data filed at 10/6/2020 0659  Gross per 24 hour   Intake 428 ml   Output 300 ml   Net 128 ml     General Appearance: well-developed and well-nourished, in no acute distress   Eyes: pupils equal, round, and reactive to light, extraocular eye movements intact, conjunctivae normal and sclera anicteric   Neck: neck supple and non tender without mass, no thyromegaly, no thyroid nodules and no cervical adenopathy   Pulmonary/Chest: decreased breath sounds at bases, no accessory muscles of inspiration noted  Cardiovascular: normal rate, regular rhythm, normal S1 and S2, no murmurs, rubs, clicks or gallops, distal pulses intact, no carotid bruits, no murmurs, no gallops, no carotid bruits and no JVD   Abdomen: soft, non-tender, non-distended, normal bowel sounds, no masses or organomegaly   Extremities: no cyanosis, no clubbing, no edema  Musculoskeletal: normal range of motion, no joint swelling, deformity or tenderness   Neurologic: reflexes normal and symmetric, no cranial nerve deficit noted    LABS/IMAGING:    CBC:  Lab Results   Component Value Date    WBC 9.2 10/06/2020    HGB 7.4 (L) 10/06/2020    HCT 24.0 (L) 10/06/2020    MCV 79.2 (L) 10/06/2020     10/06/2020    LYMPHOPCT 7.7 (L) 10/06/2020    RBC 3.03 (L) 10/06/2020    MCH 24.4 (L) 10/06/2020    MCHC 30.8 (L) 10/06/2020    RDW 19.6 (H) 10/06/2020    NEUTOPHILPCT 86.1 (H) 10/06/2020    MONOPCT 5.3 10/06/2020    BASOPCT 0.0 10/06/2020    NEUTROABS 7.92 (H) 10/06/2020    LYMPHSABS 0.71 (L) 10/06/2020    MONOSABS 0.49 10/06/2020    EOSABS 0.00 (L) 10/06/2020    BASOSABS 0.00 10/06/2020       Recent Labs     10/06/20  0530 10/05/20  0620 10/04/20  0520   WBC 9.2 9.6 15.9*   HGB 7.4* 7.5* 8.0*   HCT 24.0* 24.2* 26.3*   MCV 79.2* 78.3* 78.5*    248 284       BMP:   Recent Labs     10/04/20  0520 10/05/20  0620 10/06/20  0530    143 144   K 4.8 3.2* PORTABLE   Final Result   1. No interval change in extensive bilateral multifocal infiltrates more   prominent within the right lung. XR CHEST PORTABLE   Final Result   1. Slightly limited exam.      2.   New, right-internal-jugular-approach, single-lumen central venous   catheter terminating at the SVC/RA junction. Negative for gross   pneumothorax. An endotracheal tube has been slightly withdrawn, now   terminating approximately 3.8 cm above the ritesh. 3.   Bilateral multifocal consolidations and intermixed semi-solid patchy   opacities, as described, appear overall not significantly changed, given   differences in technique. 4.   Nonspecific, diffuse heterogeneous increased density throughout all   visualized osseous structures. When clinically possible, consider nuclear   scintigraphy, as directed clinically. .         CT Head WO Contrast   Final Result   No acute intracranial abnormality. CT CHEST WO CONTRAST   Final Result   Extensive ground-glass and consolidative opacities throughout both lungs, in   a pattern highly suspicious for COVID pneumonia. Please note that the   endotracheal tube terminates at the level of the ritesh. Recommend   retracting the endotracheal tube by 3-4 cm. No acute inflammatory or obstructive process in the abdomen or pelvis. Cholelithiasis without evidence of acute cholecystitis. Diffusely heterogeneous and relatively hyperdense vertebral bodies, a   nonspecific finding. Diffuse metastatic disease cannot be excluded. Recommend clinical correlation. Correlation could also be made with nuclear   medicine bone scan, if clinically appropriate. Decubitus ulcer with at least 1 gas locule that may come into contact with   the lower most portion of the coccyx. Osteomyelitis cannot be excluded at   this level.          CT ABDOMEN PELVIS WO CONTRAST Additional Contrast? None   Final Result   Extensive ground-glass and consolidative opacities throughout both lungs, in   a pattern highly suspicious for COVID pneumonia. Please note that the   endotracheal tube terminates at the level of the ritesh. Recommend   retracting the endotracheal tube by 3-4 cm. No acute inflammatory or obstructive process in the abdomen or pelvis. Cholelithiasis without evidence of acute cholecystitis. Diffusely heterogeneous and relatively hyperdense vertebral bodies, a   nonspecific finding. Diffuse metastatic disease cannot be excluded. Recommend clinical correlation. Correlation could also be made with nuclear   medicine bone scan, if clinically appropriate. Decubitus ulcer with at least 1 gas locule that may come into contact with   the lower most portion of the coccyx. Osteomyelitis cannot be excluded at   this level. XR ABDOMEN FOR NG/OG/NE TUBE PLACEMENT   Final Result   Nasogastric tube tip is in the proximal stomach         XR CHEST PORTABLE   Final Result   New patchy bilateral ground-glass airspace opacities      Endotracheal tube tip projects 12 mm superior to the ritesh           ASSESSMENT:  1.) Severe Sepsis with Septic Shock - aspiration vs HCAP    - shock resolved, off pressors and off mechanical ventilation   2.) Acute Respiratory Failure with Hypoxia  3.) Aspiration Pneumonia vs HCAP  4.) CONS Contamination     PLAN:  1.) s/p extubation 10/4/2020 after 3 days on mechanical ventilation   2.) meropenem Day 4 per ID   3.) O2, IS  4.) nasal cannula oxygen   5.) DVT Prophylaxis     - supportive care to continue   - COVID negative   - await D/C planning for patient   - OK to remove right IJ TLC  - PT/OT    Thank you Dioni Kelley, DO very much for allowing me to see this patient in consultation and follow up. Care reviewed with nursing staff, medical and surgical specialty care, primary care and the patient's family as available.  Restraints are ordered when the patient can do harm to him/herself by pulling out devices.     Iron Ferreira M.D.

## 2020-10-07 NOTE — PROGRESS NOTES
Notified TRU Salazar, we were not advised of a time of the procedure, therefore Ancef was not administered prior to the patient leaving the department.

## 2020-10-07 NOTE — PROGRESS NOTES
Spoke with the patient's son Carlos Gan re: consent for treatment. The patient's son is requesting her santos to be reinserted prior to discharge, states it was in prior to her admission. The patient's son says her visiting physician had ordered a santos for home to aid in wound healing and it was removed while she was here as a patient. Will relay info to Primary.

## 2020-10-07 NOTE — PROGRESS NOTES
Speech Language Pathology      NAME:  Radha Townsend  :  1950  DATE: 10/7/2020  ROOM:  ENDO POOL ROOM/NONE           Re-attempted to complete bedside swallow eval in PM. Pt off unit for procedure. SLP to re-attempt as able. Respiratory failure ([address] [J96.90]  Respiratory failure ([address] [J96.90]        Jana CALL CCC/SLP [id number]  Speech-Language Pathologist

## 2020-10-07 NOTE — PLAN OF CARE
Problem: Airway Clearance - Ineffective  Goal: Achieve or maintain patent airway  Outcome: Met This Shift     Problem: Gas Exchange - Impaired  Goal: Absence of hypoxia  Outcome: Met This Shift  Goal: Promote optimal lung function  Outcome: Met This Shift     Problem: Breathing Pattern - Ineffective  Goal: Ability to achieve and maintain a regular respiratory rate  Outcome: Met This Shift     Problem:  Body Temperature -  Risk of, Imbalanced  Goal: Ability to maintain a body temperature within defined limits  Outcome: Met This Shift  Goal: Will regain or maintain usual level of consciousness  Outcome: Met This Shift  Goal: Complications related to the disease process, condition or treatment will be avoided or minimized  Outcome: Met This Shift     Problem: Isolation Precautions - Risk of Spread of Infection  Goal: Prevent transmission of infection  Outcome: Met This Shift     Problem: Patient Education: Go to Patient Education Activity  Goal: Patient/Family Education  Outcome: Met This Shift     Problem: Skin Integrity:  Goal: Will show no infection signs and symptoms  Description: Will show no infection signs and symptoms  Outcome: Met This Shift  Goal: Absence of new skin breakdown  Description: Absence of new skin breakdown  Outcome: Met This Shift     Problem: Falls - Risk of:  Goal: Will remain free from falls  Description: Will remain free from falls  Outcome: Met This Shift  Goal: Absence of physical injury  Description: Absence of physical injury  Outcome: Met This Shift     Problem: Restraint Use - Nonviolent/Non-Self-Destructive Behavior:  Goal: Absence of restraint indications  Description: Absence of restraint indications  Outcome: Met This Shift  Goal: Absence of restraint-related injury  Description: Absence of restraint-related injury  Outcome: Met This Shift     Problem: Inadequate oral food/beverage intake (NI-2.1)  Goal: Food and/or Nutrient Delivery  Description: Individualized approach for food/nutrient provision.   10/6/2020 1532 by Alanis Mariano RD, LD  Outcome: Met This Shift     Problem: Pain:  Goal: Pain level will decrease  Description: Pain level will decrease  Outcome: Met This Shift  Goal: Control of acute pain  Description: Control of acute pain  Outcome: Met This Shift  Goal: Control of chronic pain  Description: Control of chronic pain  Outcome: Met This Shift

## 2020-10-07 NOTE — PROGRESS NOTES
P Quality Flow/Interdisciplinary Rounds Progress Note        Quality Flow Rounds held on October 7, 2020    Disciplines Attending:  Bedside Nurse,  and     [address] Cid was admitted on 10/2/2020  6:36 AM    Anticipated Discharge Date:  Expected Discharge Date: 10/09/20    Disposition:    Jayden Score:  Jayden Scale Score: 10    Readmission Risk              Risk of Unplanned Readmission:        27           Discussed patient goal for the day, patient clinical progression, and barriers to discharge. The following Goal(s) of the Day/Commitment(s) have been identified:  EGD & peg placement today.       Verena Christian  October 7, 2020

## 2020-10-07 NOTE — PROGRESS NOTES
[facility]) Hospitalist Progress Note    Admission Date         10/2/2020  6:36 AM  Chief Complaint        Unresponsive  Admit Dx                    Respiratory failure ([facility] [address] [J96.90]     Subjective  History of Present Illness  10/2 Naveen Montes is a 50-year-old female with medical history pertinent for diabetes, hypertension, hyperlipidemia, and wheelchair-bound status who was found to be unresponsive by EMS and brought to the ER with hypoglycemia, hypoxia, coarse breath sounds. Patient was unable to maintain her airway and was endotracheally intubated in the emergency department. Work-up showed hypothermia with temperature 94.4, hypotension with blood pressure 71/42. Labs showed chronic anemia, abnormal UA with leukocyte esterase and rare bacteria only, chest x-ray with patchy bilateral groundglass opacities. CT of the head showed no acute pathology, follow-up CT of the chest demonstrated extensive groundglass and consolidative opacities bilaterally. Patient admitted to the ICU for further evaluation and treatment. 10/3 Patient seen at bedside in the ICU. She remains intubated and sedated. Breath sounds are clear, she has potentially trace lower extremity edema. Otherwise, exam largely unchanged compared to yesterday. It was noted the patient was recently admitted for altered mental status that was found to be due to urinary tract infection. Urine culture is showing gram-positive organisms, though less than 10,000 colony-forming units per milliliter; other cultures are currently pending. 10/4 Pt seen at bedside in ICU. Remains intubated / sedated. CXR today did show some improved aeration in the RUL. 1 bottle from blood cx positive for GPC in clusters; continues on Levaquin and meropenem per ID.      10/5 Pt seen at bedside in ICU. Was able to be extubated yesterday.   Was awake and appeared alert, she initially would grunt yes/no to questions, then eventually stopped responding even though eyes remained open. In no distress, satting well on 2LNC. Has been off pressors since yesterday morning. Potentially out of ICU today. No new issues identified. 10/6 Pt seen at bedside. More awake today, on room air. NG in place with TFs running. Still not talking, but humming a tune during my exam.  Occasionally tracks with eyes, still not following commands. Unsure of baseline but overall status is much improved over the past few days. No family present during my exam.    10/7 Pt seen at bedside. For PEG today. Remains on room air. Still not really communicating or following commands, is humming songs.   Nursing staff reported she was saying \"yes\" (not necessarily in response to any question) but not on my exam.  No family present during my exam.       Review of Systems - unable to obtain    Objective  Physical Exam  Vitals: /68   Pulse 72   Temp 96.7 °F (35.9 °C) (Temporal)   Resp 18   Ht 5' 4\" (1.626 m)   Wt 237 lb 10.5 oz (107.8 kg)   SpO2 99%   BMI 40.79 kg/m²   General: obese, ill-appearing, no distress  Skin: warm, dry, intact, normal color without cyanosis  HEENT: normocephalic, atraumatic, mucous membranes dry; NCO2 in place Respiratory: mechanical / coarse breath sounds without respiratory distress  Cardiovascular: regular rate and rhythm without murmur / rub / gallop  Abdominal: soft, nontender, nondistended, normoactive bowel sounds  Extremities: no mottling, pulses intact, no edema  Neurologic: intubated / sedated  Psychiatric: unable to assess    Assessment / Plan  Unresponsive episode, questionable etiology, most likely metabolic              CT head showed no acute pathology              No hx CNS pathology              No arrhythmia noted              Troponin 0.03 in ED              See below     Acute hypoxic respiratory failure due to community acquired pneumonia              Diagnostics:    Testing  · 10/2 (rapid swab) negative  · 10/2 repeat negative  Patient currently afebrile, actually hypOthermic                                      CXR showing patchy b/l GGO                                      CT chest showing extensive b/l GGO                                      CBC shows mild lymphopenia                                      CMP shows no transaminitis                                      Lactic / procal unremarkable                                      Check CRP, d-dimer, ferritin      CRP 8.6      D-dimer 3113      Ferritin 283     Cultures      Blood 10/2 (see below)      Urine 10/2 - CoNS <10k CFU/mL, yeast      Sputum - ?no result      MRSA - negative                                      -------------------------------------------------------------------              Treatment:      ID following - meropenem x10 more days (Levaquin dc'd)                                       Decadron 6 mg IV daily now discontinued                                      Tylenol / ice packs for fever                                      Demerol if needed for shaking chills refractory to above                                      -------------------------------------------------------------------              Supportive tx:  Droplet Plus isolation                                      Avoid nebulized breathing tx, sub with MDI medications                                      Avoid IVF if possible; if necessary boluses > infusions                                      Avoid NSAIDs   Intubated on arrival (10/2), extubated 10/4     Decubitus ulcer              Wound vac noted on wound in ED and removed d/t soiling              Wound care consult              ID will be following as well, as above    Bacteremia   Blood cx 10/2 - CoNS   Blood cx 10/5 - GPC chains   Other cx NGTD   PCR: VRE     Anemia, acute-on-chronic              Baseline hgb ~10-11 g/dL per EMR              Hgb 7.3 in ED, follow w labs              MCV wnl / MCHC low              Transfuse as needed to keep hgb > 7 g/dL              Hold chemical DVT prophylaxis for now              Check FOBT, LDH, iron profile, B12, folate        Iron / %sat / TIBC / transferrin / folate all low - would start repletion     IV Ferrlecit protocol ordered 10/3    Folate low - start supplement    B12 ok              Does not appear to be actively bleeding     Abnormal UA              UA 10/2 showed rare bacteria and trace LE              Likely does not represent UTI              Cx sent from ED - Gram-pos organism, <10k CFU/mL + yeast     IDDM              Hold any home oral antihyperglycemics for now              ISS and BG checks q6 for now              Hypoglycemic protocol              Check hemoglobin A1c to assess long-term glucose control - 5.3%              Monitor BG with goal 140-180     HTN              Currently hypOtensive              Hold home atenolol, lisinopril for now   Levo dc'd AM of 10/4     HPL - Continue rosuvastatin     Code status               Full  DVT prophylaxis       SCDs  Disposition                To be determined; from home    Electronically signed by Amelie Fabian DO on 10/7/2020 at 2:49 PM

## 2020-10-07 NOTE — PROGRESS NOTES
Physician Progress Note      PATIENT:               Paulina Castillo  CSN #:                  [id number]  :                       1950  ADMIT DATE:       2020 6:07 PM  [address] DATE:        2020 7:45 PM  RESPONDING  PROVIDER #:        Isabelle Sher MD          QUERY TEXT:    Dear Attending,    Pt admitted with AMS and has encephalopathy documented. If possible, please   document in progress notes and discharge summary further specificity regarding   the type of encephalopathy:    The medical record reflects the following:  Risk Factors: UTI, sacral wound, advanced age  Clinical Indicators: per IM notes, \". .. AMS encephalopathy  possibly sec to UTI    ,? Sacral OM - noted in er , after IVF ,IV  ceftriax  much   better. Topher Carcamo Encephalopathy: pt presents from home with c/o altered mental status. Per EMS- family states that patient was unresponsive for 30 minutes. When EMS   shook her- she woke up. Noted that she was alert and oriented in ER. CT head   with no acute findings. Continue to treat UTI/ r/o OM of sacrum. ID following. Continue antibiotics. Patient remains very confused today. Monitor mentation   closely. .. Topher Carcamo \";  Treatment: Ct head; neuro assessments;  Options provided:  -- Metabolic encephalopathy  -- Septic encephalopathy  -- Other - I will add my own diagnosis  -- Disagree - Not applicable / Not valid  -- Disagree - Clinically unable to determine / Unknown  -- Refer to Clinical Documentation Reviewer    PROVIDER RESPONSE TEXT:    This patient has septic encephalopathy. Query created by: Gracy Hargrove on 2020 3:11 PM      QUERY TEXT:    Dear Attending,    Patient admitted with AMS and has sacral wound. Noted documentation of Stage   4 coccyx pressure injury, POA per wound care notes and \". Topher Carcamo Chronic sacral   ulcer stage 3. Topher Carcamo \", per IM notes. If possible, please document in progress   notes and discharge summary if you are evaluating and /or treating any of the   following:     The medical record reflects the following:  Risk Factors: immobility, advanced age  Clinical Indicators: per wound care consult, \". Rawleigh Billing Rawleigh Billing Wound 09/24/20 Coccyx Mid   wound appears to be approx size of a quarter round approx 3 cm deep and   appears to be tunneling, does have packing. Rawleigh Billing Rawleigh Billing Stage 4 coccyx pressure   injury. Rawleigh Billing Rawleigh Billing \"; per IM notes, \". Rawleigh Billing Rawleigh Billing Chronic sacral ulcer stage 3 -  concern of   OM. Rawleigh Billing Rawleigh Billing \";  Treatment: wound care consult;  Daily dressing changes to coccyx; Low air loss   bed;  Options provided:  -- Stage 4 coccyx pressure injury, POA  -- Stage 3 sacral ulcer, Please specify if POA. -- Other - I will add my own diagnosis  -- Disagree - Not applicable / Not valid  -- Disagree - Clinically unable to determine / Unknown  -- Refer to Clinical Documentation Reviewer    PROVIDER RESPONSE TEXT:    The patient has stage 4 pressure injury, POA.     Query created by: Marin Bueno on 9/28/2020 3:08 PM      Electronically signed by:  Grabiel Quinteros MD 10/7/2020 4:54 PM

## 2020-10-07 NOTE — ANESTHESIA POSTPROCEDURE EVALUATION
Department of Anesthesiology  Postprocedure Note    Patient: Aby Dick  MRN: [id number]  YOB: 1950  Date of evaluation: 10/7/2020  Time:  3:50 PM     Procedure Summary     Date:  10/07/20 Room / Location:  [facility] / [facility]    Anesthesia Start:  2076 Anesthesia Stop:  4761    Procedure:  EGD PEG TUBE PLACEMENT (N/A ) Diagnosis:  (/)    Surgeon:  Shahla Perez MD Responsible Provider:  Jericho French DO    Anesthesia Type:  MAC ASA Status:  3          Anesthesia Type: MAC    Braeden Phase I:      Braeden Phase II: Braeden Score: 10    Last vitals: Reviewed and per EMR flowsheets.        Anesthesia Post Evaluation    Patient location during evaluation: PACU  Patient participation: complete - patient participated  Level of consciousness: awake and alert  Airway patency: patent  Nausea & Vomiting: no nausea and no vomiting  Complications: no  Cardiovascular status: hemodynamically stable  Respiratory status: acceptable  Hydration status: euvolemic

## 2020-10-07 NOTE — PROGRESS NOTES
GENERAL SURGERY  DAILY PROGRESS NOTE  10/7/2020    CC: dysphagia     Subjective:  Patient significantly altered. Objective:  BP (!) 156/98   Pulse 68   Temp 96.6 °F (35.9 °C) (Axillary)   Resp 16   Ht 5' 4\" (1.626 m)   Wt 237 lb 10.5 oz (107.8 kg)   SpO2 99%   BMI 40.79 kg/m²     GENERAL: altered  HEAD: Normocephalic, atraumatic, NG in  EYES: No sclera icterus, pupils equal  LUNGS:  No increased work of breathing  CARDIOVASCULAR:  RR  ABDOMEN:  Soft, non-tender, non-distended, lower abdominal scar from VANDANA  EXTREMITIES: No edema or swelling  SKIN: Warm and dry    Assessment/Plan:  78 y.o. female with AMS and failed swallow study post extubation.  General surgery consulted for PEG tube placement     Plan for PEG 10/7    Electronically signed by Valente Hayden MD on 10/7/2020 at 7:07 AM    Tube feeds not stopped until 4:30am  Phosphorous repleted  Will reschedule PEG - timing to be determined    Electronically signed by Denis Escalante MD on 10/7/2020 at 8:23 AM

## 2020-10-07 NOTE — PROGRESS NOTES
Patient off floor for procedure at this time. I will see later.     Darryl Gilford  10/7/2020  2:10 PM

## 2020-10-07 NOTE — CARE COORDINATION
CM NOTE: Per QFR--- NPO for EGD with PEG placement today. Still has NGT in place this am. +midline for IV merrem q8hrs. Plan remains home with [facility] as pt was active with [facility] prior to admission. Will need new home care orders to include TF orders & IV merrem for another 10 days per ID.  Checking need for santos replacement at discharge to aid in wound healing per son's request.

## 2020-10-07 NOTE — OP NOTE
Operative Note    Delisa Montoya I [place]     DATE OF PROCEDURE: 10/7/2020  SURGEON: MARYLOU Pruett Roch:      PREOPERATIVE DIAGNOSES:  Dysphagia    POSTOPERATIVE DIAGNOSES: Same     OPERATION: Bgorzanv-[id number]-wjfrqduvfema with PEG tube placement    ANESTHESIA: LMAC           Complications: none    BLOOD LOSS: Minimal    OPERATIONS: The patient was placed on the table and sedated by anesthesia. Bite block was placed. A lubricated scope was easily passed into the upper esophagus which looked normal. The distal esophagus looked normal. The scope was passed into the stomach. The scope was passed down toward the pylorus. The antral mucosa all looked normal.  A spot was found on the anterior stomach where there was transillumination of the scope light through the abdominal wall and a finger could be seen pushing in. The skin was numbed with lidocaine, a 5 mm incision was made, the needle catheter was placed through the incision directly into the stomach without problems. The string was passed through the catheter into the stomach and was grasped with the snare and brought out through the mouth. The PEG tube was attached to the string and pulled down through the mouth and out through the abdominal wall with the inner mushroom against the gastric mucosa. The outer plastic bolster was attached to the tube with a dry tube gauze on the skin. An abdominal binder was placed to protect the tube from the patient touching it. The patient tolerated the procedure well. PLAN: The tube may be used for medications only for the first 24 hours. The tube may be used for tube feedings starting on POD #1. Physician Signature: Electronically signed by Dr. Paty Boyd M.D. FACS    Send copy of H&P to PCP, Vandana Mello MD and referring physician, No ref.  provider found

## 2020-10-07 NOTE — PROGRESS NOTES
ID Progress Note                [address] [facility] [address], [place], [address]            Phone [phone]     Fax [phone]      Chief complaint   Altered mental status  Subjective:  Out of ICU  Afebrile  On 40% FiO2  WBC count trending down  Chest x-ray also shows improvement  Extubated  Not following a lot of commands, discussed with the son at the bedside    Blood cultures from 10/5/2020 growing GPC in chains-growing as VRE reported today  Objective:    Vitals:    10/07/20 1559   BP: 100/64   Pulse: 65   Resp: 18   Temp: 97.7 °F (36.5 °C)   SpO2:    General appearance:  Extubated, NG tube in place but she is not oriented she is just awake  skin: Warm and dry  Eyes: Pink palpebral conjunctivae. PERRL  Ears: External ears normal.  Nose/Sinuses: Nares normal. Septum midline. Mucosa normal. No sinus tenderness. Oropharynx: Oropharynx clear with no exudates seen  Neck: Neck supple. No jugular venous distension, lymphadenopathy or bilateral crackles present at the lower lungs   l  Heart: S1 S2  Regular rate and rhythm. No rub, murmur or gallop  Abdomen: Abdomen soft, non-tender.  BS normal. No masses, organomegaly  Extremities: 1+ lower extremity edema,  peripheral pulses palpable  Musculoskeletal: No joint effusion, tenderness, swelling or warmth  Right neck central line in place      Labs:  Recent Labs     10/05/20  0620 10/06/20  0530 10/07/20  0400   WBC 9.6 9.2 9.6   RBC 3.09* 3.03* 3.19*   HGB 7.5* 7.4* 7.7*   HCT 24.2* 24.0* 25.0*   MCV 78.3* 79.2* 78.4*   MCH 24.3* 24.4* 24.1*   MCHC 31.0* 30.8* 30.8*   RDW 19.3* 19.6* 19.1*    257 288   MPV 9.2 9.5 9.9     CMP:    Lab Results   Component Value Date     10/07/2020    K 2.8 10/07/2020    K 4.0 09/27/2020     10/07/2020    CO2 30 10/07/2020    BUN 17 10/07/2020    CREATININE 0.5 10/07/2020    GFRAA >60 10/07/2020    LABGLOM >60 10/07/2020    GLUCOSE 201 10/07/2020    GLUCOSE 147 03/07/2012    PROT 6.1 10/05/2020    LABALBU 2.2 10/05/2020    LABALBU 4.1 03/07/2012    CALCIUM 8.1 10/07/2020    BILITOT <0.2 10/05/2020    ALKPHOS 96 10/05/2020    AST 27 10/05/2020    ALT 9 10/05/2020          Microbiology :  Recent Labs     10/05/20  1231   BC Gram stain performed from blood culture bottle media  Gram positive cocci in chains  *     No results for input(s): BLOODCULT2 in the last 72 hours. No results for input(s): LABURIN in the last 72 hours. No results for input(s): CULTRESP in the last 72 hours. No results for input(s): WNDABS in the last 72 hours. Radiology :  XR CHEST PORTABLE   Final Result   1. Patchy bilateral airspace disease which could represent pneumonia or   vascular congestion. 2. Status post removal of the endotracheal tube. XR CHEST PORTABLE   Final Result   Continued interval improvement aeration right upper lobe. XR CHEST PORTABLE   Final Result   Interval improvement in aeration right upper lobe. XR CHEST PORTABLE   Final Result   1. No interval change in extensive bilateral multifocal infiltrates more   prominent within the right lung. XR CHEST PORTABLE   Final Result   1. Slightly limited exam.      2.   New, right-internal-jugular-approach, single-lumen central venous   catheter terminating at the SVC/RA junction. Negative for gross   pneumothorax. An endotracheal tube has been slightly withdrawn, now   terminating approximately 3.8 cm above the ritesh. 3.   Bilateral multifocal consolidations and intermixed semi-solid patchy   opacities, as described, appear overall not significantly changed, given   differences in technique. 4.   Nonspecific, diffuse heterogeneous increased density throughout all   visualized osseous structures. When clinically possible, consider nuclear   scintigraphy, as directed clinically. .         CT Head WO Contrast   Final Result   No acute intracranial abnormality.          CT CHEST WO CONTRAST   Final Result   Extensive ground-glass and consolidative opacities throughout both lungs, in   a pattern highly suspicious for COVID pneumonia. Please note that the   endotracheal tube terminates at the level of the ritesh. Recommend   retracting the endotracheal tube by 3-4 cm. No acute inflammatory or obstructive process in the abdomen or pelvis. Cholelithiasis without evidence of acute cholecystitis. Diffusely heterogeneous and relatively hyperdense vertebral bodies, a   nonspecific finding. Diffuse metastatic disease cannot be excluded. Recommend clinical correlation. Correlation could also be made with nuclear   medicine bone scan, if clinically appropriate. Decubitus ulcer with at least 1 gas locule that may come into contact with   the lower most portion of the coccyx. Osteomyelitis cannot be excluded at   this level. CT ABDOMEN PELVIS WO CONTRAST Additional Contrast? None   Final Result   Extensive ground-glass and consolidative opacities throughout both lungs, in   a pattern highly suspicious for COVID pneumonia. Please note that the   endotracheal tube terminates at the level of the ritesh. Recommend   retracting the endotracheal tube by 3-4 cm. No acute inflammatory or obstructive process in the abdomen or pelvis. Cholelithiasis without evidence of acute cholecystitis. Diffusely heterogeneous and relatively hyperdense vertebral bodies, a   nonspecific finding. Diffuse metastatic disease cannot be excluded. Recommend clinical correlation. Correlation could also be made with nuclear   medicine bone scan, if clinically appropriate. Decubitus ulcer with at least 1 gas locule that may come into contact with   the lower most portion of the coccyx. Osteomyelitis cannot be excluded at   this level.          XR ABDOMEN FOR NG/OG/NE TUBE PLACEMENT   Final Result   Nasogastric tube tip is in the proximal stomach         XR CHEST PORTABLE   Final Result   New patchy bilateral ground-glass airspace opacities      Endotracheal tube tip projects 12 mm superior to the ritesh               URINARY CATHETER OUTPUT (Castano):  [REMOVED] Urethral Catheter Non-latex 16 fr-Output (mL): 300 mL       Assessment and Plan:      · Septic shock-resolved  · Aspiration pneumonia  · Leukocytosis from sepsis-resolved  · Coccygeal ulcer not infected  · Coag neg staph abctremia-  contaminatiom  · Delirium  · VRE bacteremia reported today-unclear source, UA from 10-20  looks not concerning for urine infection     PLAN  -Repeat 2 sets of blood culture for the VRE reported in blood today from 10/5/2020  -Start her on daptomycin pending final sensitivity  -Keep on meropenem , day #6 -Respiratory culture- ?   Not yet obtained, I ordered two times  - with lack of resp cultures, I'll treat her with meropenem for another 10 days  - midline  - remove rt IJ  -BRITTANY    Electronically signed by Rolando Castro MD on 10/7/2020 at 4:26 PM

## 2020-10-08 NOTE — PROGRESS NOTES
General Surgery  Attending Physician Progress Note    No chief complaint on file. CC: unable to obtain secondary to mental status    Subjective:  No overnight events. ROS: unable to obtain    I reviewed the patient's Past Medical History, Past Surgical History, Medications, and Allergies.     LABS:  CBC:   Lab Results   Component Value Date    WBC 11.3 10/08/2020    RBC 3.47 10/08/2020    HGB 8.4 10/08/2020    HCT 27.4 10/08/2020    MCV 79.0 10/08/2020    MCH 24.2 10/08/2020    MCHC 30.7 10/08/2020    RDW 19.4 10/08/2020     10/08/2020    MPV 9.6 10/08/2020     CMP:    Lab Results   Component Value Date     10/08/2020    K 2.5 10/08/2020    K 4.0 09/27/2020     10/08/2020    CO2 30 10/08/2020    BUN 11 10/08/2020    CREATININE 0.5 10/08/2020    GFRAA >60 10/08/2020    LABGLOM >60 10/08/2020    GLUCOSE 125 10/08/2020    GLUCOSE 147 03/07/2012    PROT 6.1 10/05/2020    LABALBU 2.2 10/05/2020    LABALBU 4.1 03/07/2012    CALCIUM 8.1 10/08/2020    BILITOT <0.2 10/05/2020    ALKPHOS 96 10/05/2020    AST 27 10/05/2020    ALT 9 10/05/2020     BMP:    Lab Results   Component Value Date     10/08/2020    K 2.5 10/08/2020    K 4.0 09/27/2020     10/08/2020    CO2 30 10/08/2020    BUN 11 10/08/2020    LABALBU 2.2 10/05/2020    LABALBU 4.1 03/07/2012    CREATININE 0.5 10/08/2020    CALCIUM 8.1 10/08/2020    GFRAA >60 10/08/2020    LABGLOM >60 10/08/2020     Hepatic Function Panel:    Lab Results   Component Value Date    ALKPHOS 96 10/05/2020    ALT 9 10/05/2020    AST 27 10/05/2020    PROT 6.1 10/05/2020    BILITOT <0.2 10/05/2020    BILIDIR <0.2 09/27/2020    IBILI see below 09/27/2020    LABALBU 2.2 10/05/2020    LABALBU 4.1 03/07/2012     PT/INR:  No results found for: PROTIME, INR  Warfarin PT/INR:  No components found for: PTPATWAR, PTINRWAR  ABG:  No results found for: VKM3VBE, BEART, S4QJXXOB, PHART, THGBART, LBN6OSN, PO2ART, CUF8ASG    Physical Exam:  Vitals:    10/08/20 1315   BP: 121/75   Pulse: 93   Resp: 18   Temp: 98.5 °F (36.9 °C)   SpO2: 99%       General appearance: opens eyes to voice  Lungs: Normal work of breathing  Heart: regular rate  Abdomen: soft, non-tender; non distended, no masses,  no organomegaly, PEG in place  Extremities: symmetrical, warm    Impression/Plan:      Assessment: Patricia Wills is an 78 y.o. female who presents with altered mental status, dysphagia, malnutrition s/p PEG    Plan: ok for tube feeds   Binder at all times    Electronically by Carey Hamilton MD, FACS, General Surgery  on 10/8/2020 at 3:26 PM

## 2020-10-08 NOTE — PROGRESS NOTES
Attempted to call ICU to obtain [facility]. They were unable to send anyone at this time.

## 2020-10-08 NOTE — PROGRESS NOTES
is recommended and requires a physician order. and Delayed initiation of the pharyngeal swallow noted    Due to delay, no further trials completed. The Speech Language Pathologist (SLP) completed education with the patient regarding results of evaluation. Explained that Speech Pathology intervention is warranted  at this time   Prognosis for improvements is fair     This plan will be re-evaluated and revised in 1 week  if warranted. Patient stated goals: Could not state,   Treatment goals discussed with Patient   The Patient did not demonstrate understanding of the diagnosis, prognosis and plan of care         INTERVENTION/EDUCATION    Pt/staff educated on above results and plan of care. Pt/staff trained on compensatory strategies for safe swallow if oral diet initiated with good outcome. Pt encouraged to engage in question/answer session. No questions asked. CPT code:  52153  bedside swallow eval, 27658 dysphagia therapy 15 mins      [x]The admitting diagnosis and active problem list, as listed below have been reviewed prior to initiation of this evaluation.      ADMITTING DIAGNOSIS: Respiratory failure ([facility]) [J96.90]  Respiratory failure ([facility] [address] [J96.90]     ACTIVE PROBLEM LIST:   Patient Active Problem List   Diagnosis    Essential hypertension    Type 2 diabetes mellitus with hyperglycemia, without long-term current use of insulin ([facility])    Osteoarthritis    Hyperlipidemia    Chronic sinusitis    Eczema    Primary osteoarthritis involving multiple joints    Frequent falls    General weakness    Morbid obesity due to excess calories ([facility])    Normochromic normocytic anemia    Wound of right buttock    Decubitus ulcer of right buttock, stage 3 ([facility])    Decubitus ulcer of sacral region    Hypokalemia    Leukocytosis    Cellulitis of sacral region    Hypomagnesemia    AMS (altered mental status)    UTI (urinary tract infection)    Severe protein-calorie malnutrition ([address]    Respiratory failure ([address]    Acute hypoxemic respiratory failure ([address]    Community acquired pneumonia    Suspected COVID-19 virus infection    Abnormal urinalysis    Pneumonia due to COVID-19 virus

## 2020-10-08 NOTE — PROGRESS NOTES
PEG Examined, In place, No drainage, No bleed. [facility] for tube feeds.  Surgery to sign off, call if any issues    Electronically signed by Marco Truong MD on 10/8/2020 at 5:37 AM

## 2020-10-08 NOTE — PROGRESS NOTES
P Quality Flow/Interdisciplinary Rounds Progress Note        Quality Flow Rounds held on October 8, 2020    Disciplines Attending:  Bedside Nurse,  and     [address] was admitted on 10/2/2020  6:36 AM    Anticipated Discharge Date:  Expected Discharge Date: 10/09/20    Disposition:    Jayden Score:  Jayden Scale Score: 14    Readmission Risk              Risk of Unplanned Readmission:        28           Discussed patient goal for the day, patient clinical progression, and barriers to discharge. The following Goal(s) of the Day/Commitment(s) have been identified:  check repeat blood cultures, monitor K level.       Parminder Lopez  October 8, 2020

## 2020-10-08 NOTE — PROGRESS NOTES
[facility] Progress Note    Admitting Date and Time: 10/2/2020  6:36 AM  Admit Dx: Respiratory failure ([facility] [address] [J96.90]  Respiratory failure ([facility] [address] [J96.90]    Subjective:  Patient is being followed for Respiratory failure ([facility] [address] [J96.90]  Respiratory failure ([facility] [address] [J96.90]     Patient awake,alert, resting in bed in no acute distress  Not answering questions or following commands  Patient humming   Son at bedside- reporting pt sometimes sings/ hums when she is stressed out  Per nursing when attempted mouth care she told nursing to \" stop\"  On RA        ROS: denies fever, chills, cp, sob, n/v, HA unless stated above.       magnesium sulfate  2 g Intravenous Once    sodium phosphate IVPB  10 mmol Intravenous Once    daptomycin (CUBICIN) IVPB  400 mg Intravenous Q24H    heparin flush  1 mL Intravenous 2 times per day    hydrocortisone sodium succinate PF  25 mg Intravenous Q12H    insulin glargine  12 Units Subcutaneous Nightly    enoxaparin  40 mg Subcutaneous Daily    folic acid  1 mg Oral Daily    allopurinol  100 mg Oral Daily    DULoxetine  60 mg Oral Daily    rosuvastatin  10 mg Oral Nightly    sodium chloride flush  10 mL Intravenous 2 times per day    [Held by provider] insulin lispro  0.08 Units/kg Subcutaneous TID WC    insulin lispro  0-12 Units Subcutaneous Q4H    sodium chloride  1,000 mL Intravenous Once    meropenem  1 g Intravenous Q8H    pantoprazole  40 mg Intravenous Daily    And    sodium chloride (PF)  10 mL Intravenous Daily     sodium chloride flush, 10 mL, PRN  heparin flush, 1 mL, PRN  sodium chloride flush, 10 mL, PRN  acetaminophen, 650 mg, Q6H PRN    Or  acetaminophen, 650 mg, Q6H PRN  polyethylene glycol, 17 g, Daily PRN  promethazine, 12.5 mg, Q6H PRN    Or  ondansetron, 4 mg, Q6H PRN  glucose, 15 g, PRN  dextrose, 12.5 g, PRN  glucagon (rDNA), 1 mg, PRN  dextrose, 100 mL/hr, PRN         Objective:    /75   Pulse 93   Temp 98.5 °F (36.9 °C) (Axillary)   Resp 18   Ht 5' 4\" (1.626 m)   Wt 237 lb 10.5 oz (107.8 kg)   SpO2 99%   BMI 40.79 kg/m²   General Appearance: awake and alert, not following commands or verbalizing, humming at times   Skin: warm and dry  Head: normocephalic and atraumatic  Neck: neck supple and non tender without mass   Pulmonary/Chest: diminished throughout to auscultation   Cardiovascular: normal rate, normal S1 and S2 and no carotid bruits  Abdomen: soft, non-tender, non-distended, normal bowel sounds- peg tube   Extremities: no cyanosis, no clubbing and no edema  Neurologic: TERESA        Recent Labs     10/06/20  0530 10/07/20  0400 10/08/20  0625    144 144   K 3.5 2.8* 2.5*   * 104 102   CO2 26 30* 30*   BUN 17 17 11   CREATININE 0.7 0.5 0.5   GLUCOSE 159* 201* 125*   CALCIUM 8.1* 8.1* 8.1*       Recent Labs     10/06/20  0530 10/07/20  0400 10/08/20  0625   WBC 9.2 9.6 11.3   RBC 3.03* 3.19* 3.47*   HGB 7.4* 7.7* 8.4*   HCT 24.0* 25.0* 27.4*   MCV 79.2* 78.4* 79.0*   MCH 24.4* 24.1* 24.2*   MCHC 30.8* 30.8* 30.7*   RDW 19.6* 19.1* 19.4*    288 297   MPV 9.5 9.9 9.6       Assessment:    Active Problems:    Essential hypertension    Type 2 diabetes mellitus with hyperglycemia, without long-term current use of insulin (H[facility])    Osteoarthritis    Hyperlipidemia    General weakness    Morbid obesity due to excess calories ([facility])    Wound of right buttock    Decubitus ulcer of sacral region    Respiratory failure ([facility])    Acute hypoxemic respiratory failure ([address]    Community acquired pneumonia    Suspected COVID-19 virus infection    Abnormal urinalysis  Resolved Problems:    * No resolved hospital problems. *      Plan:  1. Severe sepsis with septic shock: pt found unresponsive at home- EMS called. She was initially on mechanical ventilation/ pressors in ICU. Shock resolved. Thought to be related to aspiration vs HCAP. VRE bacteremia: ID following. On solucortef most likely can stop today.     2. Unresponsive episode: questionable etiology- likely metabolic. Patient reporting he checked her blood sugar when EMS called and was reported \" low. \"   on admission. No arrhythmia. CT head showed no acute pathology. No hx CNA pathology. 3. Encephalopathy: pt awake/ alert. Since extubation- minimal verbalization. Humming. Per son pt does this when stressed. ? If related to infection. Consult neurology. 4. Acute respiratory failure- likely related to pneumonia- aspiration. Pulmonology following. S/p extubation. On RA    5. VRE bacteremia: ID following. Repeat BC ordered, per nursing due to hard stick have not been able to send yet. On IV merrem/ IV daptomycin    6. Acute on chronic anemia: hg 8.f4 monitor    7. DM; hg a1c 5.3: hold oral meds. Per son pt hypoglycemic prior to arrival. On lantus    8. HTN; holding bp meds as bp was running low    9. Dysphagia: s/p peg tube: on TF    10. Electrolyte disturbances: supplement    11. Deconditioning: not ambulatory. In wheelchair at baseline    12. Chronic decubitus sacral ulcer stage III      NOTE: This report was transcribed using voice recognition software. Every effort was made to ensure accuracy; however, inadvertent computerized transcription errors may be present.   Electronically signed by DIXIE Mckeon on 10/8/2020 at 2:16 PM

## 2020-10-08 NOTE — PROGRESS NOTES
Nutrition Assessment     Type and Reason for Visit: Reassess, Consult(TF Rec's)    Nutrition Recommendations/Plan: Continue NPO. Start EN if ok'd by Surgery. TF Recommendations:  Diabetic 1.5 (Glucerna 1.5) @ 50 ml/hr (Goal) Continuous x 24 hrs/d to provide 1200 ml TV, 1800 kcals, 99 gm Pro, 911 ml water. 140 ml flush q 4 hrs= 1751 ml total water (TF+Flush). Recommend to start at low rate (~20 ml/hr) and increase slowly as tolerated to goal.     Nutrition Assessment:  Consult received for TF Recommendations. Chart reviewed. Pt initially assessed by this RD on 10/6. Noted EGD/PEG on 10/7. Will provide TF Recommendations below to appropriately meet current estimated needs. Will continue to monitor and follow-up as planned. Estimated Daily Nutrient Needs:  Energy (kcal): 1905-6627; Weight Used for Energy Requirements:  Admission     Protein (g): (1.5-1.8 gm/kg per IBW); Weight Used for Protein Requirements:  Ideal        Fluid (ml/day): 7373-9071(1 ml/kcal);  Weight Used for Fluid Requirements:  Admission      Electronically signed by Jayashree Eason RD, LD on 10/8/20 at 2:11 PM EDT    Contact: ext [phone]

## 2020-10-08 NOTE — PROGRESS NOTES
Several attempts made to collect sputum sample, but patient does not have a productive cough, nor does she follow commands to aid in spontaneous cough to obtain sample.

## 2020-10-08 NOTE — CONSULTS
NEUROLOGY CONSULT NOTE       Requesting Physician: Kelly Gonzalez*     Reason for Consult:  Evaluate for \" found unresponsive at home. ..encephalopathy\"    History of Present Illness:  Lissa Short is a 78 y.o. female admitted to [address] [place] [address] on 10/2/2020. She had been hospitalized a couple of weeks ago after being unresponsive for about 30 minutes. In the emergency department she was reportedly alert and oriented. Was felt that this episode was probably due to urinary tract infection. Her first blood glucose on that occasion was 114. On October 2 she was again found unresponsive. She reportedly had had a glucose in the 30s which was treated. On arrival in the emergency department her oxygen saturation was in the 70s. She was intubated. She became hypotensive. She had an axillary temperature of 90. She was subsequently cared for in the intensive care unit. It was felt she was septic from aspiration pneumonia. A PEG tube was placed yesterday. She has become alert since she left the intensive care unit but still is really not talking with people. She only occasionally says words. Her son had noted to others that she hums when she is stressed and she has been noted to be humming. She would not speak to me for more history.     Past Medical History:        Diagnosis Date    Cataract, right     DM (diabetes mellitus), type 2 ([address] 11/3/2010    HTN (hypertension) 11/3/2010    Hyperlipidemia 11/3/2010    Obesity     Osteoarthritis 11/3/2010    Sinusitis chronic     seasonal    Wheelchair confinement     can pivot with assistance;  uses a lift chair           Procedure Laterality Date    GASTROSTOMY TUBE PLACEMENT N/A 10/7/2020    EGD PEG TUBE PLAC[facility] IO LENS PROSTH W/O ECP Right 8/10/2018    RIGHT EYE CATARACT EXTRACTION WITH  IOL IMPLANT performed by Terrill Mcburney I Ashtyn Valencia MD at [address] [place] [address] History:  Social History     Tobacco Use   Smoking Status Never Smoker   Smokeless Tobacco Never Used     Social History     Substance and Sexual Activity   Alcohol Use No    Alcohol/week: 0.0 standard drinks     Social History     Substance and Sexual Activity   Drug Use No     Single    Family History:   No family history on file.     Review of Systems:  Not possible as the patient would not speak to me    Allergies:    No Known Allergies     Current Medications:   sodium phosphate 10 mmol in dextrose 5 % 250 mL IVPB, Once  dextrose 5 % and 0.9 % sodium chloride infusion, Continuous  DAPTOmycin (CUBICIN) 400 mg in sodium chloride 0.9 % 50 mL IVPB, Q24H  sodium chloride flush 0.9 % injection 10 mL, PRN  heparin flush 100 UNIT/ML injection 100 Units, 2 times per day  heparin flush 100 UNIT/ML injection 100 Units, PRN  insulin glargine (LANTUS) injection vial 12 Units, Nightly  enoxaparin (LOVENOX) injection 40 mg, Daily  folic acid (FOLVITE) tablet 1 mg, Daily  allopurinol (ZYLOPRIM) tablet 100 mg, Daily  DULoxetine (CYMBALTA) extended release capsule 60 mg, Daily  rosuvastatin (CRESTOR) tablet 10 mg, Nightly  sodium chloride flush 0.9 % injection 10 mL, 2 times per day  sodium chloride flush 0.9 % injection 10 mL, PRN  acetaminophen (TYLENOL) tablet 650 mg, Q6H PRN    Or  acetaminophen (TYLENOL) suppository 650 mg, Q6H PRN  polyethylene glycol (GLYCOLAX) packet 17 g, Daily PRN  promethazine (PHENERGAN) tablet 12.5 mg, Q6H PRN    Or  ondansetron (ZOFRAN) injection 4 mg, Q6H PRN  [Held by provider] insulin lispro (HUMALOG) injection vial 8 Units, TID WC  glucose (GLUTOSE) 40 % oral gel 15 g, PRN  dextrose 50 % IV solution, PRN  glucagon (rDNA) injection 1 mg, PRN  dextrose 5 % solution, PRN  insulin lispro (HUMALOG) injection vial 0-12 Units, Q4H  0.9 % sodium chloride bolus, Once  meropenem (MERREM) 1 g in sodium chloride 0.9 % 100 mL IVPB (mini-bag), Q8H  0.9 % sodium chloride infusion, Q8H  pantoprazole (PROTONIX) injection 40 mg, Daily    And  sodium chloride (PF) 0.9 % injection 10 mL, Daily       Medications Prior to Admission: miconazole (MICOTIN) 2 % powder, Apply topically 2 times daily. fluconazole (DIFLUCAN) 200 MG tablet, Take 1 tablet by mouth daily for 20 days  [] Magnesium Oxide 400 MG CAPS, Take 400 mg by mouth daily for 7 days  ibuprofen (ADVIL;MOTRIN) 800 MG tablet, Take 1 tablet by mouth every 8 hours as needed for Pain  atenolol (TENORMIN) 50 MG tablet, take 1 tablet by mouth twice a day  lisinopril (PRINIVIL;ZESTRIL) 40 MG tablet, Take 1 tablet by mouth daily  metFORMIN (GLUCOPHAGE) 1000 MG tablet, take 1 tablet by mouth twice a day WITH MEALS  glipiZIDE (GLUCOTROL XL) 5 MG extended release tablet, take 1 tablet by mouth once daily  DULoxetine (CYMBALTA) 60 MG extended release capsule, Take 1 capsule by mouth daily  rosuvastatin (CRESTOR) 10 MG tablet, take 1 tablet by mouth once daily  exenatide (BYETTA 10 MCG PEN) 10 MCG/0.04ML injection, inject . 04 milliliters subcutaneously twice a day with food  allopurinol (ZYLOPRIM) 100 MG tablet, Take 1 tablet by mouth daily  RA Alcohol Swabs 70 % PADS, USE WITH INJECTIONS TWICE A DAY AND CHECKING BLOOD SUGAR TWICE A DAY  blood glucose test strips (ACCU-CHEK RUI PLUS) strip, TEST twice a day FASTING BEFORE BREAKFAST AND SUPPER  ACCU-CHEK MULTICLIX LANCETS MISC, Check BS Twice daily  acetaminophen (TYLENOL) 325 MG tablet, Take 2 tablets by mouth every 4 hours as needed for Pain  Insulin Pen Needle (B-D UF III MINI PEN NEEDLES) 31G X 5 MM MISC, use twice a day  Insulin Pen Needle (B-D ULTRAFINE III SHORT PEN) 31G X 8 MM MISC, Inject 1 kit into the skin 2 times daily  vitamin D (ERGOCALCIFEROL) 1.25 MG (23222 UT) CAPS capsule, Take 1 capsule by mouth once a week  Misc. Devices MISC, Wheelchair cushion  Misc. Devices (MATTRESS PAD) MISC, 1 each by Does not apply route once for 1 dose  Misc.  Devices KIT, 1 kit by Does not apply route daily as needed (leg edema) Compression stockings for bilateral leg swelling  Misc. Devices [address] Bed with gel overlay dsp # 1 Pt has Osteoarthritis and is Morbidly Obese  Misc. Devices KIT, 1 kit by Does not apply route daily as needed Foam mattress for bilateral knees osteoarthritis , degenerative back osteoarthitis  Misc. Devices MISC, Wheel chair to assist with ADL with diagnosis of OA and full thickness knee meniscal tear  Misc. Devices MISC, Grab bar  diagnosis osteoarthritis and full thickness knee meniscal tear  Misc. Devices [place] 4 bed  Misc. Devices MISC, wheelchair  Misc. Devices MISC, Bedside commode. Dx osteoarthritis 715.90. Height 5'4.5\". Weight 245 lbs. Physical Exam:  /75   Pulse 93   Temp 98.5 °F (36.9 °C) (Axillary)   Resp 18   Ht 5' 4\" (1.626 m)   Wt 237 lb 10.5 oz (107.8 kg)   SpO2 99%   BMI 40.79 kg/m²  I Body mass index is 40.79 kg/m². I   Wt Readings from Last 1 Encounters:   10/05/20 237 lb 10.5 oz (107.8 kg)        GENERAL: Awake but resting with her eyes closed in bed and seems to be ignoring me. EYE:  Fundi not examined due to the Covid-19 outbreak  CARDIOVASCULAR: In contact isolation and I did not use my stethoscope  NEUROLOGIC:  Level of Alertness: Hard to assess but certainly awake  MSE: When I enter the room, I said hi to the patient and she gave me a cheerful hi back. I then introduced myself and she started humming. Throughout the visit she said no other words other than to once say ow. She follows no commands. She kept her eyes closed throughout the exam.  Cranial Nerves: pupils are equal.  Eyes look conjugate. She would not move them for me. Her face looks symmetric. I chose not to gag her so the lower cranial nerves not tested and sensory not testable. Motor Exam: Tone mildly increased bilaterally. Some edema of the upper extremities.   She did not cooperate for any strength testing but I felt no asymmetry just passively moving her upper extremities. Lower extremities were in some type of PRAFO device  Sensory and cerebellar not testable due to lack of cooperation  Deep Tendon Reflexes: Reflex hammer not brought into contact isolation room  Plantar Responses:  Downgoing right and no response left  Abnormal movements: none  Station and gait: Unsafe to try to get her up    Diagnostics:  CBC:   Lab Results   Component Value Date    WBC 11.3 10/08/2020    RBC 3.47 10/08/2020    HGB 8.4 10/08/2020    HCT 27.4 10/08/2020    MCV 79.0 10/08/2020    MCH 24.2 10/08/2020    MCHC 30.7 10/08/2020    RDW 19.4 10/08/2020     10/08/2020    MPV 9.6 10/08/2020     CMP:    Lab Results   Component Value Date     10/08/2020    K 2.5 10/08/2020    K 4.0 09/27/2020     10/08/2020    CO2 30 10/08/2020    BUN 11 10/08/2020    CREATININE 0.5 10/08/2020    GFRAA >60 10/08/2020    LABGLOM >60 10/08/2020    GLUCOSE 125 10/08/2020    GLUCOSE 147 03/07/2012    PROT 6.1 10/05/2020    LABALBU 2.2 10/05/2020    LABALBU 4.1 03/07/2012    CALCIUM 8.1 10/08/2020    BILITOT <0.2 10/05/2020    ALKPHOS 96 10/05/2020    AST 27 10/05/2020    ALT 9 10/05/2020       CT brain of October 2, images reviewed: Atrophy, small vessel disease, and probable old lacunar infarcts      Impression:  Hard to know how much of this is an acute encephalopathy and how much might be psychogenic. I certainly had a sense that she was tuning me out. I was interested in her admission from a couple weeks ago when she was unresponsive at home. No mention at that time of hypoglycemia though that may have been the reason. Still, this brings up the possibility that she has had seizures. I think more likely at this point would be some residual cognitive effects from the sepsis with some additional volitional decreased responsiveness. I would like to see repeat imaging to rule out stroke causing aphasia. Recommendations :  I ordered an EEG and an MRI.   If she can't get the MRI

## 2020-10-08 NOTE — PROGRESS NOTES
[address] PROGRESS NOTE    Patient: Jimmy Grissom  MRN: [id number]  : 1950    Encounter Date: 10/8/2020  Encounter Time: 9:28 AM     Date of Admission: .10/2/2020  6:36 AM    Consulting Physician:  Primary Care Physician:     Tawnya Moncada MD     [phone]    Reason for Consultation: Dyspnea, Respiratory Failure     Problem List:  Patient Active Problem List   Diagnosis    Essential hypertension    Type 2 diabetes mellitus with hyperglycemia, without long-term current use of insulin ([address]    Osteoarthritis    Hyperlipidemia    Chronic sinusitis    Eczema    Primary osteoarthritis involving multiple joints    Frequent falls    General weakness    Morbid obesity due to excess calories ([address]    Normochromic normocytic anemia    Wound of right buttock    Decubitus ulcer of right buttock, stage 3 (HCC)    Decubitus ulcer of sacral region    Hypokalemia    Leukocytosis    Cellulitis of sacral region    Hypomagnesemia    AMS (altered mental status)    UTI (urinary tract infection)    Severe protein-calorie malnutrition (HCC)    Respiratory failure (HCC)    Acute hypoxemic respiratory failure ([address]    Community acquired pneumonia    Suspected COVID-19 virus infection    Abnormal urinalysis     SUBJECTIVE: Patient seen and examined. Overnight the patient had no shortness of breath, cough, increased work of breathing.  - s/p PEG tube placement 10/7/2020    HOME MEDICATIONS:  Prior to Admission medications    Medication Sig Start Date End Date Taking? Authorizing Provider   miconazole (MICOTIN) 2 % powder Apply topically 2 times daily.  20   Radha Salgado MD   fluconazole (DIFLUCAN) 200 MG tablet Take 1 tablet by mouth daily for 20 days 9/29/20 10/19/20  Radha Salgado MD   ibuprofen (ADVIL;MOTRIN) 800 MG tablet Take 1 tablet by mouth every 8 hours as needed for Pain 20   Darshana Avila DO   atenolol (TENORMIN) 50 MG tablet take 1 tablet by mouth twice a day 7/23/20   Zoë Gaston MD   lisinopril (PRINIVIL;ZESTRIL) 40 MG tablet Take 1 tablet by mouth daily 7/23/20   Zoë Gaston MD   metFORMIN (GLUCOPHAGE) 1000 MG tablet take 1 tablet by mouth twice a day WITH MEALS 7/23/20   Zoë Gaston MD   glipiZIDE (GLUCOTROL XL) 5 MG extended release tablet take 1 tablet by mouth once daily 7/23/20   Zoë Gaston MD   DULoxetine (CYMBALTA) 60 MG extended release capsule Take 1 capsule by mouth daily 7/23/20   Zoë Gaston MD   rosuvastatin (CRESTOR) 10 MG tablet take 1 tablet by mouth once daily 7/23/20   Zoë Gaston MD   exenatide (BYETTA 10 MCG PEN) 10 MCG/0.04ML injection inject . 04 milliliters subcutaneously twice a day with food 7/23/20   Zoë Gaston MD   allopurinol (ZYLOPRIM) 100 MG tablet Take 1 tablet by mouth daily 7/23/20 11/20/20  Zoë Gaston MD   RA Alcohol Swabs 70 % PADS USE WITH INJECTIONS TWICE A DAY AND CHECKING BLOOD SUGAR TWICE A DAY 2/17/20   Adri Arteaga MD   blood glucose test strips (ACCU-CHEK RUI PLUS) strip TEST twice a day FASTING BEFORE BREAKFAST AND SUPPER 11/6/19   Adri Arteaga MD   ACCU-CHEK MULTICLIX LANCETS MISC Check BS Twice daily 11/6/19   Adri Arteaga MD   acetaminophen (TYLENOL) 325 MG tablet Take 2 tablets by mouth every 4 hours as needed for Pain 11/6/19   Adri Arteaga MD   Insulin Pen Needle (B-D UF III MINI PEN NEEDLES) 31G X 5 MM MISC use twice a day 11/6/19   Adri Arteaga MD   Insulin Pen Needle (B-D ULTRAFINE III SHORT PEN) 31G X 8 MM MISC Inject 1 kit into the skin 2 times daily 11/6/19   Adri Arteaga MD   vitamin D (ERGOCALCIFEROL) 1.25 MG (09418 UT) CAPS capsule Take 1 capsule by mouth once a week 11/6/19   Adri Arteaga MD   Misc. Devices MISC Wheelchair cushion 6/5/17   Mira Pearce MD   [facility]. Devices (MATTRESS PAD) MISC 1 each by Does not apply route once for 1 dose 6/5/17 6/5/17  MD Zoe Monetc.  Devices KIT 1 kit by Does not apply route daily as needed (leg edema) Compression stockings for bilateral leg swelling 6/5/17   Svetlana Cyr MD   [facility] [address] Bed with gel overlay dsp # 1  Pt has Osteoarthritis and is Morbidly Obese 4/4/16   Denise Kathleen DO   [facility] [place] KIT 1 kit by Does not apply route daily as needed Foam mattress for bilateral knees osteoarthritis , degenerative back osteoarthitis 3/31/16   Yanick Glass MD   [facility] [place] Devices [facility] chair to assist with ADL with diagnosis of OA and full thickness knee meniscal tear 2/24/15   Julio César Archer MD   Misc. Devices MISC Grab bar  diagnosis osteoarthritis and full thickness knee meniscal tear 2/24/15   Julio César Archer MD   Mis. [address] bed 11/7/14   Bridget Almaraz MD   [facility] [place] [id number] [facility] wheelchair 11/7/14   Bridget Almaraz MD   [facility] [place] Devices [facility] Bedside commode. Dx osteoarthritis 715.90. Height 5'4.5\".  Weight 245 lbs. 9/17/14   Yanick Glass MD       CURRENT MEDICATIONS:  Current Facility-Administered Medications: potassium chloride 10 mEq/100 mL IVPB (Peripheral Line), 10 mEq, Intravenous, Q1H  dextrose 5 % and 0.9 % sodium chloride infusion, , Intravenous, Continuous  DAPTOmycin (CUBICIN) 400 mg in sodium chloride 0.9 % 50 mL IVPB, 400 mg, Intravenous, Q24H  sodium chloride flush 0.9 % injection 10 mL, 10 mL, Intravenous, PRN  heparin flush 100 UNIT/ML injection 100 Units, 1 mL, Intravenous, 2 times per day  heparin flush 100 UNIT/ML injection 100 Units, 1 mL, Intracatheter, PRN  [COMPLETED] hydrocortisone sodium succinate PF (SOLU-CORTEF) injection 50 mg, 50 mg, Intravenous, Q12H **FOLLOWED BY** hydrocortisone sodium succinate PF (SOLU-CORTEF) injection 25 mg, 25 mg, Intravenous, Q12H  insulin glargine (LANTUS) injection vial 12 Units, 12 Units, Subcutaneous, Nightly  enoxaparin (LOVENOX) injection 40 mg, 40 mg, Subcutaneous, Daily  folic acid (FOLVITE) tablet 1 mg, 1 mg, Oral, Daily  allopurinol (ZYLOPRIM) tablet 100 mg, 100 mg, Oral, Daily  DULoxetine (CYMBALTA) extended release capsule 60 mg, 60 mg, Oral, Daily  rosuvastatin (CRESTOR) tablet 10 mg, 10 mg, Oral, Nightly  sodium chloride flush 0.9 % injection 10 mL, 10 mL, Intravenous, 2 times per day  sodium chloride flush 0.9 % injection 10 mL, 10 mL, Intravenous, PRN  acetaminophen (TYLENOL) tablet 650 mg, 650 mg, Oral, Q6H PRN **OR** acetaminophen (TYLENOL) suppository 650 mg, 650 mg, Rectal, Q6H PRN  polyethylene glycol (GLYCOLAX) packet 17 g, 17 g, Oral, Daily PRN  promethazine (PHENERGAN) tablet 12.5 mg, 12.5 mg, Oral, Q6H PRN **OR** ondansetron (ZOFRAN) injection 4 mg, 4 mg, Intravenous, Q6H PRN  [Held by provider] insulin lispro (HUMALOG) injection vial 8 Units, 0.08 Units/kg, Subcutaneous, TID WC  glucose (GLUTOSE) 40 % oral gel 15 g, 15 g, Oral, PRN  dextrose 50 % IV solution, 12.5 g, Intravenous, PRN  glucagon (rDNA) injection 1 mg, 1 mg, Intramuscular, PRN  dextrose 5 % solution, 100 mL/hr, Intravenous, PRN  insulin lispro (HUMALOG) injection vial 0-12 Units, 0-12 Units, Subcutaneous, Q4H  0.9 % sodium chloride bolus, 1,000 mL, Intravenous, Once  meropenem (MERREM) 1 g in sodium chloride 0.9 % 100 mL IVPB (mini-bag), 1 g, Intravenous, Q8H  0.9 % sodium chloride infusion, , Intravenous, Q8H  pantoprazole (PROTONIX) injection 40 mg, 40 mg, Intravenous, Daily **AND** sodium chloride (PF) 0.9 % injection 10 mL, 10 mL, Intravenous, Daily    ALLERGIES:  No Known Allergies    REVIEW OF SYSTEMS:  General ROS: Negative For: chills, fatigue, fever, malaise, night sweats or sleep disturbance   ENT ROS: Negative For: epistaxis, headaches, sinus pain, sneezing or sore throat   Respiratory ROS: Negative For: hemoptysis, pleuritic type chest pains  Cardiovascular ROS: Negative For: chest pain, dyspnea on exertion, irregular heartbeat, loss of consciousness, murmur, orthopnea or palpitations   Gastrointestinal ROS: Negative For: abdominal pain, appetite loss, blood in stools, change in bowel habits, change in stools, constipation, diarrhea, hematemesis, melena, nausea/vomiting or stool incontinence     OBJECTIVE:  PHYSICAL EXAMINATION:     VITAL SIGNS:  /62   Pulse 68   Temp 98.3 °F (36.8 °C) (Axillary)   Resp 18   Ht 5' 4\" (1.626 m)   Wt 237 lb 10.5 oz (107.8 kg)   SpO2 97%   BMI 40.79 kg/m²   Wt Readings from Last 3 Encounters:   10/05/20 237 lb 10.5 oz (107.8 kg)   20 206 lb 14.4 oz (93.8 kg)   20 202 lb (91.6 kg)     Temp Readings from Last 3 Encounters:   10/08/20 98.3 °F (36.8 °C) (Axillary)   20 97.3 °F (36.3 °C) (Oral)   20 97.7 °F (36.5 °C) (Infrared)     TMAX:  BP Readings from Last 3 Encounters:   10/07/20 128/62   10/07/20 (!) 144/108   20 (!) 165/72     Pulse Readings from Last 3 Encounters:   10/07/20 68   20 79   20 64       CURRENT PULSE OXIMETRY: SpO2: 97 %  24HR PULSE OXIMETRY RANGE: SpO2  Av.5 %  Min: 93 %  Max: 100 %  CVP:      ________________________________________________________________________    VENTILATOR SETTINGS (if applicable):         Vent Information  $Ventilation: Off Vent  Skin Assessment: Clean, dry, & intact  Equipment ID: [id number]  Vent Type: [id number]  Vent Mode: PS  Vt Ordered: 400 mL  Rate Set: 0 bmp  Peak Flow: 44 L/min  Pressure Support: 5 cmH20  FiO2 : 40 %  SpO2: 97 %  SpO2/FiO2 ratio: 250  Sensitivity: 3  PEEP/CPAP: 5  I Time/ I Time %: 0 s  Humidification Source: HME  Additional Respiratory  Assessments  Pulse: 68  Resp: 18  SpO2: 97 %  Position: Semi-[facility]  Humidification Source: HME  Oral Care: Mouth swabbed, Mouth moisturizer, Mouth suctioned, Suction toothette  Subglottic Suction Done?: Yes  Airway Type: ET  Airway Size: 7.5  Cuff Pressure (cm H2O): 29 cm H2O  ETCO2:  Peak Inspiratory Pressure:  End-Inspiratory Plateau Pressure:    ABG:    No results for input(s): PH, PO2, PCO2, HCO3, BE, O2SAT, METHB, O2HB, COHB, O2CON, HHB, THB in the last 72 hours.   FiO2 : 40 %  I:E Ratio: 1:3.00  ________________________________________________________________________    IV ACCESS:    NUTRITION: DIET TUBE FEED CONTINUOUS/CYCLIC NPO; STANDARD WITH FIBER; Gastrostomy; Continuous; 25; Exceptions are: Sips with Meds    INTAKE/OUTPUTS:  I/O last 3 completed shifts:   In: 950 [I.V.:150; NG/GT:800]  Out: -     Intake/Output Summary (Last 24 hours) at 10/8/2020 0928  Last data filed at 10/8/2020 0357  Gross per 24 hour   Intake 950 ml   Output --   Net 950 ml     General Appearance: well-developed and well-nourished, in no acute distress   Eyes: pupils equal, round, and reactive to light, extraocular eye movements intact, conjunctivae normal and sclera anicteric   Neck: neck supple and non tender without mass, no thyromegaly, no thyroid nodules and no cervical adenopathy   Pulmonary/Chest: decreased breath sounds at bases, no accessory muscles of inspiration noted  Cardiovascular: normal rate, regular rhythm, normal S1 and S2, no murmurs, rubs, clicks or gallops, distal pulses intact, no carotid bruits, no murmurs, no gallops, no carotid bruits and no JVD   Abdomen: soft, non-tender, non-distended, normal bowel sounds, no masses or organomegaly   Extremities: no cyanosis, no clubbing, no edema  Musculoskeletal: normal range of motion, no joint swelling, deformity or tenderness   Neurologic: reflexes normal and symmetric, no cranial nerve deficit noted    LABS/IMAGING:    CBC:  Lab Results   Component Value Date    WBC 11.3 10/08/2020    HGB 8.4 (L) 10/08/2020    HCT 27.4 (L) 10/08/2020    MCV 79.0 (L) 10/08/2020     10/08/2020    LYMPHOPCT 17.8 (L) 10/08/2020    RBC 3.47 (L) 10/08/2020    MCH 24.2 (L) 10/08/2020    MCHC 30.7 (L) 10/08/2020    RDW 19.4 (H) 10/08/2020    NEUTOPHILPCT 73.3 10/08/2020    MONOPCT 6.9 10/08/2020    BASOPCT 0.1 10/08/2020    NEUTROABS 8.32 (H) 10/08/2020    LYMPHSABS 2.02 10/08/2020    MONOSABS 0.78 10/08/2020    EOSABS 0.03 (L) 10/08/2020    BASOSABS 0.01 10/08/2020 Recent Labs     10/08/20  0625 10/07/20  0400 10/06/20  0530   WBC 11.3 9.6 9.2   HGB 8.4* 7.7* 7.4*   HCT 27.4* 25.0* 24.0*   MCV 79.0* 78.4* 79.2*    288 257       BMP:   Recent Labs     10/06/20  0530 10/07/20  0400 10/08/20  0625    144 144   K 3.5 2.8* 2.5*   * 104 102   CO2 26 30* 30*   PHOS 1.5* 1.1* 1.4*   BUN 17 17 11   CREATININE 0.7 0.5 0.5       MG:   Lab Results   Component Value Date    MG 1.5 10/08/2020     Ca/Phos:   Lab Results   Component Value Date    CALCIUM 8.1 (L) 10/08/2020    PHOS 1.4 (L) 10/08/2020     Amylase: No results found for: AMYLASE  Lipase:   Lab Results   Component Value Date    LIPASE 28 09/24/2020     LIVER PROFILE:   No results for input(s): AST, ALT, LIPASE, BILIDIR, BILITOT, ALKPHOS in the last 72 hours. Invalid input(s): AMYLASE,  ALB    PT/INR: No results for input(s): PROTIME, INR in the last 72 hours. APTT: No results for input(s): APTT in the last 72 hours. Cardiac Enzymes:  Lab Results   Component Value Date    CKTOTAL 58 10/02/2020    CKMB 3.4 10/02/2020    TROPONINI 0.02 10/02/2020       Hgb A1C:   Lab Results   Component Value Date    LABA1C 5.3 10/02/2020     No results found for: EAG  NASIM:   Lab Results   Component Value Date    NASIM NEGATIVE 09/19/2017     ESR:   Lab Results   Component Value Date    SEDRATE 80 (H) 09/26/2020     CRP:   Lab Results   Component Value Date    CRP 8.6 (H) 10/02/2020     D Dimer:   Lab Results   Component Value Date    DDIMER 3113 10/02/2020     Folate and B12:   Lab Results   Component Value Date    JPBPHGEF54 944 10/02/2020   ,   Lab Results   Component Value Date    FOLATE 2.3 (L) 10/02/2020       Lactic Acid:   Lab Results   Component Value Date    LACTA 1.3 09/26/2020     Ammonia:   Cortisol:  Thyroid Studies:  Lab Results   Component Value Date    TSH 5.130 (H) 03/09/2017    [id number] 98.91 03/10/2017     XR CHEST PORTABLE   Final Result   1.  Patchy bilateral airspace disease which could represent pneumonia or   vascular congestion. 2. Status post removal of the endotracheal tube. XR CHEST PORTABLE   Final Result   Continued interval improvement aeration right upper lobe. XR CHEST PORTABLE   Final Result   Interval improvement in aeration right upper lobe. XR CHEST PORTABLE   Final Result   1. No interval change in extensive bilateral multifocal infiltrates more   prominent within the right lung. XR CHEST PORTABLE   Final Result   1. Slightly limited exam.      2.   New, right-internal-jugular-approach, single-lumen central venous   catheter terminating at the SVC/RA junction. Negative for gross   pneumothorax. An endotracheal tube has been slightly withdrawn, now   terminating approximately 3.8 cm above the ritesh. 3.   Bilateral multifocal consolidations and intermixed semi-solid patchy   opacities, as described, appear overall not significantly changed, given   differences in technique. 4.   Nonspecific, diffuse heterogeneous increased density throughout all   visualized osseous structures. When clinically possible, consider nuclear   scintigraphy, as directed clinically. .         CT Head WO Contrast   Final Result   No acute intracranial abnormality. CT CHEST WO CONTRAST   Final Result   Extensive ground-glass and consolidative opacities throughout both lungs, in   a pattern highly suspicious for COVID pneumonia. Please note that the   endotracheal tube terminates at the level of the ritesh. Recommend   retracting the endotracheal tube by 3-4 cm. No acute inflammatory or obstructive process in the abdomen or pelvis. Cholelithiasis without evidence of acute cholecystitis. Diffusely heterogeneous and relatively hyperdense vertebral bodies, a   nonspecific finding. Diffuse metastatic disease cannot be excluded. Recommend clinical correlation.   Correlation could also be made with nuclear   medicine bone scan, if clinically appropriate. Decubitus ulcer with at least 1 gas locule that may come into contact with   the lower most portion of the coccyx. Osteomyelitis cannot be excluded at   this level. CT ABDOMEN PELVIS WO CONTRAST Additional Contrast? None   Final Result   Extensive ground-glass and consolidative opacities throughout both lungs, in   a pattern highly suspicious for COVID pneumonia. Please note that the   endotracheal tube terminates at the level of the ritesh. Recommend   retracting the endotracheal tube by 3-4 cm. No acute inflammatory or obstructive process in the abdomen or pelvis. Cholelithiasis without evidence of acute cholecystitis. Diffusely heterogeneous and relatively hyperdense vertebral bodies, a   nonspecific finding. Diffuse metastatic disease cannot be excluded. Recommend clinical correlation. Correlation could also be made with nuclear   medicine bone scan, if clinically appropriate. Decubitus ulcer with at least 1 gas locule that may come into contact with   the lower most portion of the coccyx. Osteomyelitis cannot be excluded at   this level.          XR ABDOMEN FOR NG/OG/NE TUBE PLACEMENT   Final Result   Nasogastric tube tip is in the proximal stomach         XR CHEST PORTABLE   Final Result   New patchy bilateral ground-glass airspace opacities      Endotracheal tube tip projects 12 mm superior to the ritesh           ASSESSMENT:  1.) Severe Sepsis with Septic Shock - aspiration vs HCAP    - shock resolved, off pressors and off mechanical ventilation   2.) Acute Respiratory Failure with Hypoxia  3.) Aspiration Pneumonia vs HCAP  4.) CONS Contamination   5.) Hypokalemia   6.) Hypomagnesemia  7.) Hypophosphatemia     PLAN:  1.) s/p extubation 10/4/2020 after 3 days on mechanical ventilation   2.) meropenem Day 4 per ID   3.) O2, IS  4.) nasal cannula oxygen   5.) DVT Prophylaxis     - supportive care to continue   - await D/C planning for patient   - central line removed  - PT/OT  - replace K  - solu-cortef taper in progress 25 mg IV BID x 4 does then stop     Thank you Dion Elaine* very much for allowing me to see this patient in consultation and follow up. Care reviewed with nursing staff, medical and surgical specialty care, primary care and the patient's family as available. Restraints are ordered when the patient can do harm to him/herself by pulling out devices.     Castro Farris M.D.

## 2020-10-08 NOTE — PROGRESS NOTES
ID Progress Note                [address] [facility] [address], [place], [address] [facility]            Phone [phone]     Fax [phone]      Chief complaint   Altered mental status  Subjective:  Status post PEG tube placement on 10/7/2020  Overnight no acute events  Objective:    Vitals:    10/08/20 1315   BP: 121/75   Pulse: 93   Resp: 18   Temp: 98.5 °F (36.9 °C)   SpO2: 99%   General appearance:  Awake, in no acute respiratory distress   skin: Warm and dry  Eyes: Pink palpebral conjunctivae. PERRL  Ears: External ears normal.  Nose/Sinuses: Nares normal. Septum midline. Mucosa normal. No sinus tenderness. Oropharynx: Oropharynx clear with no exudates seen  Neck: Neck supple. No jugular venous distension, lymphadenopathy or bilateral crackles present at the lower lungs   l  Heart: S1 S2  Regular rate and rhythm. No rub, murmur or gallop  Abdomen: Abdomen soft, non-tender.   PEG tube in place  Extremities: 1+ lower extremity edema,  peripheral pulses palpable  Musculoskeletal: No joint effusion, tenderness, swelling or warmth  Right neck central line in place      Labs:  Recent Labs     10/06/20  0530 10/07/20  0400 10/08/20  0625   WBC 9.2 9.6 11.3   RBC 3.03* 3.19* 3.47*   HGB 7.4* 7.7* 8.4*   HCT 24.0* 25.0* 27.4*   MCV 79.2* 78.4* 79.0*   MCH 24.4* 24.1* 24.2*   MCHC 30.8* 30.8* 30.7*   RDW 19.6* 19.1* 19.4*    288 297   MPV 9.5 9.9 9.6     CMP:    Lab Results   Component Value Date     10/08/2020    K 2.5 10/08/2020    K 4.0 09/27/2020     10/08/2020    CO2 30 10/08/2020    BUN 11 10/08/2020    CREATININE 0.5 10/08/2020    GFRAA >60 10/08/2020    LABGLOM >60 10/08/2020    GLUCOSE 125 10/08/2020    GLUCOSE 147 03/07/2012    PROT 6.1 10/05/2020    LABALBU 2.2 10/05/2020    LABALBU 4.1 03/07/2012    CALCIUM 8.1 10/08/2020    BILITOT <0.2 10/05/2020    ALKPHOS 96 10/05/2020    AST 27 10/05/2020    ALT 9 10/05/2020          Microbiology :  No results for input(s): BC in the last 72 hours. No results for input(s): Aundrsamina Loatt in the last 72 hours. No results for input(s): LABURIN in the last 72 hours. No results for input(s): CULTRESP in the last 72 hours. No results for input(s): WNDABS in the last 72 hours. Radiology :  XR CHEST PORTABLE   Final Result   1. Patchy bilateral airspace disease which could represent pneumonia or   vascular congestion. 2. Status post removal of the endotracheal tube. XR CHEST PORTABLE   Final Result   Continued interval improvement aeration right upper lobe. XR CHEST PORTABLE   Final Result   Interval improvement in aeration right upper lobe. XR CHEST PORTABLE   Final Result   1. No interval change in extensive bilateral multifocal infiltrates more   prominent within the right lung. XR CHEST PORTABLE   Final Result   1. Slightly limited exam.      2.   New, right-internal-jugular-approach, single-lumen central venous   catheter terminating at the SVC/RA junction. Negative for gross   pneumothorax. An endotracheal tube has been slightly withdrawn, now   terminating approximately 3.8 cm above the ritesh. 3.   Bilateral multifocal consolidations and intermixed semi-solid patchy   opacities, as described, appear overall not significantly changed, given   differences in technique. 4.   Nonspecific, diffuse heterogeneous increased density throughout all   visualized osseous structures. When clinically possible, consider nuclear   scintigraphy, as directed clinically. .         CT Head WO Contrast   Final Result   No acute intracranial abnormality. CT CHEST WO CONTRAST   Final Result   Extensive ground-glass and consolidative opacities throughout both lungs, in   a pattern highly suspicious for COVID pneumonia. Please note that the   endotracheal tube terminates at the level of the ritesh. Recommend   retracting the endotracheal tube by 3-4 cm.       No acute inflammatory or obstructive process in the abdomen or pelvis. Cholelithiasis without evidence of acute cholecystitis. Diffusely heterogeneous and relatively hyperdense vertebral bodies, a   nonspecific finding. Diffuse metastatic disease cannot be excluded. Recommend clinical correlation. Correlation could also be made with nuclear   medicine bone scan, if clinically appropriate. Decubitus ulcer with at least 1 gas locule that may come into contact with   the lower most portion of the coccyx. Osteomyelitis cannot be excluded at   this level. CT ABDOMEN PELVIS WO CONTRAST Additional Contrast? None   Final Result   Extensive ground-glass and consolidative opacities throughout both lungs, in   a pattern highly suspicious for COVID pneumonia. Please note that the   endotracheal tube terminates at the level of the ritesh. Recommend   retracting the endotracheal tube by 3-4 cm. No acute inflammatory or obstructive process in the abdomen or pelvis. Cholelithiasis without evidence of acute cholecystitis. Diffusely heterogeneous and relatively hyperdense vertebral bodies, a   nonspecific finding. Diffuse metastatic disease cannot be excluded. Recommend clinical correlation. Correlation could also be made with nuclear   medicine bone scan, if clinically appropriate. Decubitus ulcer with at least 1 gas locule that may come into contact with   the lower most portion of the coccyx. Osteomyelitis cannot be excluded at   this level.          XR ABDOMEN FOR NG/OG/NE TUBE PLACEMENT   Final Result   Nasogastric tube tip is in the proximal stomach         XR CHEST PORTABLE   Final Result   New patchy bilateral ground-glass airspace opacities      Endotracheal tube tip projects 12 mm superior to the ritesh         MRI BRAIN WO CONTRAST    (Results Pending)         URINARY CATHETER OUTPUT (Castano):  [REMOVED] Urethral Catheter Non-latex 16 fr-Output (mL): 300 mL       Assessment and Plan:      · Septic shock-resolved  · Aspiration pneumonia  · Leukocytosis from sepsis-resolved  · Coccygeal ulcer not infected  · Coag neg staph abctremia-  contaminatiom  · Delirium  · VRE bacteremia reported today-unclear source, UA from 10-20  looks not concerning for urine infection     PLAN  -Repeat 2 sets of blood culture for the VRE reported in blood today from 10/5/2020 currently in process  -Started her on daptomycin on 10/7/2020 pending final sensitivity  -Keep on meropenem , day #7 -Respiratory culture- ?   Not yet obtained, I ordered two times  - with lack of resp cultures, I'll treat her with meropenem for another 7 days  Right brachial has a midline  -BRITTANY tomorrow    Electronically signed by Kofi Tavera MD on 10/8/2020 at 4:42 PM

## 2020-10-09 NOTE — CARE COORDINATION
Social work / Discharge Planning:       Discharge plan is home with [facility] and will need new orders. Patient has a new tube feeding. Social work noted that [address] for BRITTANY today. Social work will follow.   Electronically signed by CLEOPATRA Meléndez on 10/9/2020 at 8:54 AM

## 2020-10-09 NOTE — PROGRESS NOTES
input(s): BC in the last 72 hours. No results for input(s): Oral Konig in the last 72 hours. No results for input(s): LABURIN in the last 72 hours. No results for input(s): CULTRESP in the last 72 hours. No results for input(s): WNDABS in the last 72 hours. Radiology :  MRI BRAIN WO CONTRAST   Final Result   Limited exam.      Acute infarct within the right thalamus and probable acute infarcts within   the ipsilateral hippocampus and occipital lobe. The findings were sent to the Radiology Results [address] at 9:13   pm on 10/8/2020to be communicated to a licensed caregiver. XR CHEST PORTABLE   Final Result   1. Patchy bilateral airspace disease which could represent pneumonia or   vascular congestion. 2. Status post removal of the endotracheal tube. XR CHEST PORTABLE   Final Result   Continued interval improvement aeration right upper lobe. XR CHEST PORTABLE   Final Result   Interval improvement in aeration right upper lobe. XR CHEST PORTABLE   Final Result   1. No interval change in extensive bilateral multifocal infiltrates more   prominent within the right lung. XR CHEST PORTABLE   Final Result   1. Slightly limited exam.      2.   New, right-internal-jugular-approach, single-lumen central venous   catheter terminating at the SVC/RA junction. Negative for gross   pneumothorax. An endotracheal tube has been slightly withdrawn, now   terminating approximately 3.8 cm above the ritesh. 3.   Bilateral multifocal consolidations and intermixed semi-solid patchy   opacities, as described, appear overall not significantly changed, given   differences in technique. 4.   Nonspecific, diffuse heterogeneous increased density throughout all   visualized osseous structures. When clinically possible, consider nuclear   scintigraphy, as directed clinically. .         CT Head WO Contrast   Final Result   No acute intracranial abnormality. CT CHEST WO CONTRAST   Final Result   Extensive ground-glass and consolidative opacities throughout both lungs, in   a pattern highly suspicious for COVID pneumonia. Please note that the   endotracheal tube terminates at the level of the ritesh. Recommend   retracting the endotracheal tube by 3-4 cm. No acute inflammatory or obstructive process in the abdomen or pelvis. Cholelithiasis without evidence of acute cholecystitis. Diffusely heterogeneous and relatively hyperdense vertebral bodies, a   nonspecific finding. Diffuse metastatic disease cannot be excluded. Recommend clinical correlation. Correlation could also be made with nuclear   medicine bone scan, if clinically appropriate. Decubitus ulcer with at least 1 gas locule that may come into contact with   the lower most portion of the coccyx. Osteomyelitis cannot be excluded at   this level. CT ABDOMEN PELVIS WO CONTRAST Additional Contrast? None   Final Result   Extensive ground-glass and consolidative opacities throughout both lungs, in   a pattern highly suspicious for COVID pneumonia. Please note that the   endotracheal tube terminates at the level of the ritesh. Recommend   retracting the endotracheal tube by 3-4 cm. No acute inflammatory or obstructive process in the abdomen or pelvis. Cholelithiasis without evidence of acute cholecystitis. Diffusely heterogeneous and relatively hyperdense vertebral bodies, a   nonspecific finding. Diffuse metastatic disease cannot be excluded. Recommend clinical correlation. Correlation could also be made with nuclear   medicine bone scan, if clinically appropriate. Decubitus ulcer with at least 1 gas locule that may come into contact with   the lower most portion of the coccyx. Osteomyelitis cannot be excluded at   this level.          XR ABDOMEN FOR NG/OG/NE TUBE PLACEMENT   Final Result   Nasogastric tube tip is in the proximal stomach         XR CHEST PORTABLE   Final Result   New patchy bilateral ground-glass airspace opacities      Endotracheal tube tip projects 12 mm superior to the ritesh               URINARY CATHETER OUTPUT (Castano):  [REMOVED] Urethral Catheter Non-latex 16 fr-Output (mL): 300 mL       Assessment and Plan:      · Septic shock-resolved  · Aspiration pneumonia  · Leukocytosis from sepsis-resolved  · Coccygeal ulcer not infected  · Coag neg staph abctremia-  contaminatiom  · Delirium-patient remains encephalopathic  · VRE bacteremia reported today-unclear source, UA from 10/2/2020  looks not concerning for urine infection     PLAN  -Repeat 2 sets of blood culture for the VRE reported in blood today from 10/5/2020 currently in process-negative so far  -Started her on daptomycin on 10/7/2020 pending final sensitivity  -Keep on meropenem , day #8   - with lack of resp cultures, I'll treat her with meropenem for another 6 days  Right brachial has a midline  -BRITTANY will be done on Monday  -Neurology following as well recommended EEG and MRI    Electronically signed by Ailyn Barragan MD on 10/9/2020 at 2:22 PM

## 2020-10-09 NOTE — PROGRESS NOTES
Spoke to [facility] in PACU. Notified of K supplements ordered for patient. Secure text message sent to Dr. Raven Barron on call. Notified of  K=2.4 this morning. Patient received 40 meq effer-K through PEG tube @610. 10meq IV kCL currently infusion with 3 additional bags remaining. Transport up to collect patient before secure message read. Chelsy from PACU notified message not yet read. Okay to still send patient down to PACU.

## 2020-10-09 NOTE — PROGRESS NOTES
[address] PROGRESS NOTE    Patient: Chai Hernandez  MRN: [id number]  : 1950    Encounter Date: 10/9/2020  Encounter Time: 12:01 PM     Date of Admission: .10/2/2020  6:36 AM    Consulting Physician:  Primary Care Physician:     Jaja Parnell MD     [phone]    Reason for Consultation: Dyspnea, Respiratory Failure     Problem List:  Patient Active Problem List   Diagnosis    Essential hypertension    Type 2 diabetes mellitus with hyperglycemia, without long-term current use of insulin ([address]    Osteoarthritis    Hyperlipidemia    Chronic sinusitis    Eczema    Primary osteoarthritis involving multiple joints    Frequent falls    General weakness    Morbid obesity due to excess calories ([address]    Normochromic normocytic anemia    Wound of right buttock    Decubitus ulcer of right buttock, stage 3 (HCC)    Decubitus ulcer of sacral region    Hypokalemia    Leukocytosis    Cellulitis of sacral region    Hypomagnesemia    AMS (altered mental status)    UTI (urinary tract infection)    Severe protein-calorie malnutrition (HCC)    Respiratory failure (HCC)    Acute hypoxemic respiratory failure ([address]    Community acquired pneumonia    Suspected COVID-19 virus infection    Abnormal urinalysis    Pneumonia due to COVID-19 virus     SUBJECTIVE: Patient seen and examined. Overnight the patient had no shortness of breath, cough, increased work of breathing.  - s/p PEG tube placement 10/7/2020  - BRITTANY canceled today due to K 2.4    HOME MEDICATIONS:  Prior to Admission medications    Medication Sig Start Date End Date Taking? Authorizing Provider   miconazole (MICOTIN) 2 % powder Apply topically 2 times daily.  20   Marisela Chang MD   fluconazole (DIFLUCAN) 200 MG tablet Take 1 tablet by mouth daily for 20 days 9/29/20 10/19/20  Marisela Chang MD   ibuprofen (ADVIL;MOTRIN) 800 MG tablet Take 1 tablet by mouth every 8 6/5/17  Curry Cano MD   [facility]. Devices KIT 1 kit by Does not apply route daily as needed (leg edema) Compression stockings for bilateral leg swelling 6/5/17   Curry Cano MD   M[address]. [address] Bed with gel overlay dsp # 1  Pt has Osteoarthritis and is Morbidly Obese 4/4/16   Katina Madden DO   Misc. Devices KIT 1 kit by Does not apply route daily as needed Foam mattress for bilateral knees osteoarthritis , degenerative back osteoarthitis 3/31/16   Inessa Mota MD   [facility]. Devices [address] [facility] chair to assist with ADL with diagnosis of OA and full thickness knee meniscal tear 2/24/15   Maude Collazo MD   Misc. Devices MISC Grab bar  diagnosis osteoarthritis and full thickness knee meniscal tear 2/24/15   Maude Collazo MD   [facility] [place]. [address] bed 11/7/14   Chandni Burnett MD   [facility] [place] [facility]. Devices [address] wheelchair 11/7/14   Chandni Burnett MD   [facility] [place] [facility]. Devices [facility] Bedside commode. Dx osteoarthritis 715.90. Height 5'4.5\".  Weight 245 lbs. 9/17/14   Inessa Mota MD       CURRENT MEDICATIONS:  Current Facility-Administered Medications: potassium bicarb-citric acid (EFFER-K) effervescent tablet 40 mEq, 40 mEq, Per G Tube, Once  potassium chloride 10 mEq/100 mL IVPB (Peripheral Line), 10 mEq, Intravenous, Q1H  aspirin chewable tablet 81 mg, 81 mg, PEG Tube, Daily  dextrose 5 % and 0.9 % sodium chloride infusion, , Intravenous, Continuous  DAPTOmycin (CUBICIN) 400 mg in sodium chloride 0.9 % 50 mL IVPB, 400 mg, Intravenous, Q24H  sodium chloride flush 0.9 % injection 10 mL, 10 mL, Intravenous, PRN  heparin flush 100 UNIT/ML injection 100 Units, 1 mL, Intravenous, 2 times per day  heparin flush 100 UNIT/ML injection 100 Units, 1 mL, Intracatheter, PRN  insulin glargine (LANTUS) injection vial 12 Units, 12 Units, Subcutaneous, Nightly  enoxaparin (LOVENOX) injection 40 mg, 40 mg, Subcutaneous, Daily  folic acid (FOLVITE) tablet 1 mg, 1 mg, Oral, Daily  allopurinol (ZYLOPRIM) tablet 100 mg, 100 mg, Oral, Daily  DULoxetine (CYMBALTA) extended release capsule 60 mg, 60 mg, Oral, Daily  rosuvastatin (CRESTOR) tablet 10 mg, 10 mg, Oral, Nightly  sodium chloride flush 0.9 % injection 10 mL, 10 mL, Intravenous, 2 times per day  sodium chloride flush 0.9 % injection 10 mL, 10 mL, Intravenous, PRN  acetaminophen (TYLENOL) tablet 650 mg, 650 mg, Oral, Q6H PRN **OR** acetaminophen (TYLENOL) suppository 650 mg, 650 mg, Rectal, Q6H PRN  polyethylene glycol (GLYCOLAX) packet 17 g, 17 g, Oral, Daily PRN  promethazine (PHENERGAN) tablet 12.5 mg, 12.5 mg, Oral, Q6H PRN **OR** ondansetron (ZOFRAN) injection 4 mg, 4 mg, Intravenous, Q6H PRN  [Held by provider] insulin lispro (HUMALOG) injection vial 8 Units, 0.08 Units/kg, Subcutaneous, TID WC  glucose (GLUTOSE) 40 % oral gel 15 g, 15 g, Oral, PRN  dextrose 50 % IV solution, 12.5 g, Intravenous, PRN  glucagon (rDNA) injection 1 mg, 1 mg, Intramuscular, PRN  dextrose 5 % solution, 100 mL/hr, Intravenous, PRN  insulin lispro (HUMALOG) injection vial 0-12 Units, 0-12 Units, Subcutaneous, Q4H  0.9 % sodium chloride bolus, 1,000 mL, Intravenous, Once  meropenem (MERREM) 1 g in sodium chloride 0.9 % 100 mL IVPB (mini-bag), 1 g, Intravenous, Q8H  0.9 % sodium chloride infusion, , Intravenous, Q8H  pantoprazole (PROTONIX) injection 40 mg, 40 mg, Intravenous, Daily **AND** sodium chloride (PF) 0.9 % injection 10 mL, 10 mL, Intravenous, Daily    ALLERGIES:  No Known Allergies    REVIEW OF SYSTEMS: Unable to Attain given patient status and condition     OBJECTIVE:  PHYSICAL EXAMINATION:     VITAL SIGNS:  /75   Pulse 86   Temp 98.6 °F (37 °C) (Axillary)   Resp 18   Ht 5' 4\" (1.626 m)   Wt 237 lb 10.5 oz (107.8 kg)   SpO2 97%   BMI 40.79 kg/m²   Wt Readings from Last 3 Encounters:   10/05/20 237 lb 10.5 oz (107.8 kg)   09/28/20 206 lb 14.4 oz (93.8 kg)   09/22/20 202 lb (91.6 kg)     Temp Readings from Last 3 Encounters:   10/09/20 98.6 °F (37 °C) (Axillary)   09/28/20 97.3 °F (36.3 °C) (Oral)   20 97.7 °F (36.5 °C) (Infrared)     TMAX:  BP Readings from Last 3 Encounters:   10/09/20 120/75   10/07/20 (!) 144/108   20 (!) 165/72     Pulse Readings from Last 3 Encounters:   10/09/20 86   20 79   20 64       CURRENT PULSE OXIMETRY: SpO2: 97 %  24HR PULSE OXIMETRY RANGE: SpO2  Av.3 %  Min: 96 %  Max: 99 %  CVP:      ________________________________________________________________________    VENTILATOR SETTINGS (if applicable):         Vent Information  $Ventilation: Off Vent  Skin Assessment: Clean, dry, & intact  Equipment ID: [id number]  Vent Type: [id number]  Vent Mode: PS  Vt Ordered: 400 mL  Rate Set: 0 bmp  Peak Flow: 44 L/min  Pressure Support: 5 cmH20  FiO2 : 40 %  SpO2: 97 %  SpO2/FiO2 ratio: 250  Sensitivity: 3  PEEP/CPAP: 5  I Time/ I Time %: 0 s  Humidification Source: HME  Additional Respiratory  Assessments  Pulse: 86  Resp: 18  SpO2: 97 %  Position: Semi-[facility]  Humidification Source: HME  Oral Care: Mouth swabbed, Mouth moisturizer, Mouth suctioned, Suction toothette  Subglottic Suction Done?: Yes  Airway Type: ET  Airway Size: 7.5  Cuff Pressure (cm H2O): 29 cm H2O  ETCO2:  Peak Inspiratory Pressure:  End-Inspiratory Plateau Pressure:    ABG:    No results for input(s): PH, PO2, PCO2, HCO3, BE, O2SAT, METHB, O2HB, COHB, O2CON, HHB, THB in the last 72 hours. FiO2 : 40 %  I:E Ratio: 1:3.00  ________________________________________________________________________    IV ACCESS:    NUTRITION: Diet NPO, After Midnight Exceptions are: Sips with Meds    INTAKE/OUTPUTS:  No intake/output data recorded.     Intake/Output Summary (Last 24 hours) at 10/9/2020 1201  Last data filed at 10/9/2020 1100  Gross per 24 hour   Intake 10 ml   Output 1000 ml   Net -990 ml     General Appearance: well-developed and well-nourished, in no acute distress   Eyes: pupils equal, round, and reactive to light, extraocular eye movements intact, conjunctivae normal and sclera anicteric   Neck: neck supple and non tender without mass, no thyromegaly, no thyroid nodules and no cervical adenopathy   Pulmonary/Chest: decreased breath sounds at bases, no accessory muscles of inspiration noted  Cardiovascular: normal rate, regular rhythm, normal S1 and S2, no murmurs, rubs, clicks or gallops, distal pulses intact, no carotid bruits, no murmurs, no gallops, no carotid bruits and no JVD   Abdomen: soft, non-tender, non-distended, normal bowel sounds, no masses or organomegaly   Extremities: no cyanosis, no clubbing, no edema    LABS/IMAGING:    CBC:  Lab Results   Component Value Date    WBC 10.2 10/09/2020    HGB 8.3 (L) 10/09/2020    HCT 26.8 (L) 10/09/2020    MCV 78.8 (L) 10/09/2020     10/09/2020    LYMPHOPCT 23.4 10/09/2020    RBC 3.40 (L) 10/09/2020    MCH 24.4 (L) 10/09/2020    MCHC 31.0 (L) 10/09/2020    RDW 19.8 (H) 10/09/2020    NEUTOPHILPCT 64.3 10/09/2020    MONOPCT 7.3 10/09/2020    BASOPCT 0.1 10/09/2020    NEUTROABS 6.57 10/09/2020    LYMPHSABS 2.39 10/09/2020    MONOSABS 0.75 10/09/2020    EOSABS 0.28 10/09/2020    BASOSABS 0.01 10/09/2020       Recent Labs     10/09/20  0435 10/08/20  0625 10/07/20  0400   WBC 10.2 11.3 9.6   HGB 8.3* 8.4* 7.7*   HCT 26.8* 27.4* 25.0*   MCV 78.8* 79.0* 78.4*    297 288       BMP:   Recent Labs     10/07/20  0400 10/08/20  0625 10/09/20  0435    144 142   K 2.8* 2.5* 2.4*    102 104   CO2 30* 30* 31*   PHOS 1.1* 1.4* 2.2*   BUN 17 11 12   CREATININE 0.5 0.5 0.6       MG:   Lab Results   Component Value Date    MG 1.8 10/09/2020     Ca/Phos:   Lab Results   Component Value Date    CALCIUM 8.1 (L) 10/09/2020    PHOS 2.2 (L) 10/09/2020     Amylase: No results found for: AMYLASE  Lipase:   Lab Results   Component Value Date    LIPASE 28 09/24/2020     LIVER PROFILE:   No results for input(s): AST, ALT, LIPASE, BILIDIR, BILITOT, ALKPHOS in the last 72 hours. Invalid input(s):   AMYLASE,  ALB    PT/INR: No results for input(s): PROTIME, INR in the last 72 hours. APTT: No results for input(s): APTT in the last 72 hours. Cardiac Enzymes:  Lab Results   Component Value Date    CKTOTAL 58 10/02/2020    CKMB 3.4 10/02/2020    TROPONINI 0.02 10/02/2020       Hgb A1C:   Lab Results   Component Value Date    LABA1C 5.3 10/02/2020     No results found for: EAG  NASIM:   Lab Results   Component Value Date    NASIM NEGATIVE 09/19/2017     ESR:   Lab Results   Component Value Date    SEDRATE 80 (H) 09/26/2020     CRP:   Lab Results   Component Value Date    CRP 8.6 (H) 10/02/2020     D Dimer:   Lab Results   Component Value Date    DDIMER 3113 10/02/2020     Folate and B12:   Lab Results   Component Value Date    ABFAOKAG91 771 10/02/2020   ,   Lab Results   Component Value Date    FOLATE 2.3 (L) 10/02/2020       Lactic Acid:   Lab Results   Component Value Date    LACTA 1.3 09/26/2020     Ammonia:   Cortisol:  Thyroid Studies:  Lab Results   Component Value Date    TSH 5.130 (H) 03/09/2017    H2JAQPM 98.91 03/10/2017     MRI BRAIN WO CONTRAST   Final Result   Limited exam.      Acute infarct within the right thalamus and probable acute infarcts within   the ipsilateral hippocampus and occipital lobe. The findings were sent to the Radiology Results [address] at 9:13   pm on 10/8/2020to be communicated to a licensed caregiver. XR CHEST PORTABLE   Final Result   1. Patchy bilateral airspace disease which could represent pneumonia or   vascular congestion. 2. Status post removal of the endotracheal tube. XR CHEST PORTABLE   Final Result   Continued interval improvement aeration right upper lobe. XR CHEST PORTABLE   Final Result   Interval improvement in aeration right upper lobe. XR CHEST PORTABLE   Final Result   1. No interval change in extensive bilateral multifocal infiltrates more   prominent within the right lung. XR CHEST PORTABLE   Final Result   1.   Slightly limited exam.      2.   New,

## 2020-10-09 NOTE — PROGRESS NOTES
Occupational Therapy  Date:10/9/2020  Patient Name: Amber Stewart  Room: 5882/4896-H     Occupational Therapy (OT) order received and patient's medical record reviewed. Patient appears to be at her baseline level of functioning, however physician's order indicated request for completion of OT evaluation secondary to acute stroke. OT evaluation to be attempted/completed on 10/10/2020, as appropriate/able. Sue Moreira, OTR/L  License Number: [id number]

## 2020-10-09 NOTE — PROGRESS NOTES
[facility] Progress Note    Admitting Date and Time: 10/2/2020  6:36 AM  Admit Dx: Respiratory failure ([facility] [address] [J96.90]  Respiratory failure ([facility] [address] [J96.90]    Subjective:  Patient is being followed for Respiratory failure ([facility] [address] [J96.90]  Respiratory failure ([facility] [address] [J96.90]     Patient awake, alert, resting and sitting up in bed in no acute distress  Per nursing BRITTANY canceled today due to hypokalemia  No family at bedside  Patient talking more today         ROS: denies fever, chills, cp, sob, n/v, HA unless stated above.       potassium bicarb-citric acid  40 mEq Per G Tube Once    potassium chloride  10 mEq Intravenous Q1H    aspirin  81 mg PEG Tube Daily    daptomycin (CUBICIN) IVPB  400 mg Intravenous Q24H    heparin flush  1 mL Intravenous 2 times per day    insulin glargine  12 Units Subcutaneous Nightly    enoxaparin  40 mg Subcutaneous Daily    folic acid  1 mg Oral Daily    allopurinol  100 mg Oral Daily    DULoxetine  60 mg Oral Daily    rosuvastatin  10 mg Oral Nightly    sodium chloride flush  10 mL Intravenous 2 times per day    [Held by provider] insulin lispro  0.08 Units/kg Subcutaneous TID WC    insulin lispro  0-12 Units Subcutaneous Q4H    sodium chloride  1,000 mL Intravenous Once    meropenem  1 g Intravenous Q8H    pantoprazole  40 mg Intravenous Daily    And    sodium chloride (PF)  10 mL Intravenous Daily     sodium chloride flush, 10 mL, PRN  heparin flush, 1 mL, PRN  sodium chloride flush, 10 mL, PRN  acetaminophen, 650 mg, Q6H PRN    Or  acetaminophen, 650 mg, Q6H PRN  polyethylene glycol, 17 g, Daily PRN  promethazine, 12.5 mg, Q6H PRN    Or  ondansetron, 4 mg, Q6H PRN  glucose, 15 g, PRN  dextrose, 12.5 g, PRN  glucagon (rDNA), 1 mg, PRN  dextrose, 100 mL/hr, PRN         Objective:    /75   Pulse 86   Temp 98.6 °F (37 °C) (Axillary)   Resp 18   Ht 5' 4\" (1.626 m)   Wt 237 lb 10.5 oz (107.8 kg)   SpO2 97%   BMI 40.79 kg/m²      General Appearance: appears more awake and alert today. Stated \" Hi- how are you\" not verbalizing at times. ? Aphasia. Making eye contact. Not following commands. Skin: warm and dry  Head: normocephalic and atraumatic  Neck: neck supple and non tender without mass   Pulmonary/Chest: diminished throughout to auscultation   Cardiovascular: normal rate, normal S1 and S2 and no carotid bruits  Abdomen: soft, non-tender, non-distended, normal bowel sounds- peg tube   Extremities: no cyanosis, no clubbing and no edema  Neurologic: TERESA        Recent Labs     10/07/20  0400 10/08/20  0625 10/09/20  0435    144 142   K 2.8* 2.5* 2.4*    102 104   CO2 30* 30* 31*   BUN 17 11 12   CREATININE 0.5 0.5 0.6   GLUCOSE 201* 125* 104*   CALCIUM 8.1* 8.1* 8.1*       Recent Labs     10/07/20  0400 10/08/20  0625 10/09/20  0435   WBC 9.6 11.3 10.2   RBC 3.19* 3.47* 3.40*   HGB 7.7* 8.4* 8.3*   HCT 25.0* 27.4* 26.8*   MCV 78.4* 79.0* 78.8*   MCH 24.1* 24.2* 24.4*   MCHC 30.8* 30.7* 31.0*   RDW 19.1* 19.4* 19.8*    297 268   MPV 9.9 9.6 9.1     Assessment:    Active Problems:    Essential hypertension    Type 2 diabetes mellitus with hyperglycemia, without long-term current use of insulin (HCC)    Osteoarthritis    Hyperlipidemia    General weakness    Morbid obesity due to excess calories (HCC)    Wound of right buttock    Decubitus ulcer of sacral region    Respiratory failure (HCC)    Acute hypoxemic respiratory failure (HCC)    Community acquired pneumonia    Suspected COVID-19 virus infection    Abnormal urinalysis    Pneumonia due to COVID-19 virus    Recurrent episodes of unresponsiveness    Acute encephalopathy  Resolved Problems:    * No resolved hospital problems. *      Plan:  1. Severe sepsis with septic shock: pt found unresponsive at home- EMS called. She was initially on mechanical ventilation/ pressors in ICU. Shock resolved. Thought to be related to aspiration vs HCAP. VRE bacteremia: ID following. solucortef weaned off.      2. Unresponsive episode: questionable etiology- likely metabolic. Patient reporting he checked her blood sugar when EMS called and was reported \" low. \"   on admission. No arrhythmia. CT head showed no acute pathology. No hx CNS pathology. MRI 10/8 showing acute CVA.     3. Encephalopathy: pt awake/ alert. Since extubation- minimal verbalization. Humming. Per son pt does this when stressed. ? If related to infection. Neurology consulted- appreciate input. EEG reviewed. MRI reviewed showing acute CVA.     4. Acute respiratory failure- likely related to pneumonia- aspiration. Pulmonology following. S/p extubation. On RA     5. VRE bacteremia: ID following. Repeat BC sent. Unable to do BRITTANY today due to hypokalemia- now planning on Monday. On IV merrem/ IV daptomycin     6. Acute CVA; MRI 10/8 - showing acute infarct within the right thalamus and probable infarcts within the ipsilateral hippocampus and occiptal love. Neurology stared on ASA. Lipid panel reviewed.      7. DM; hg a1c 5.3: hold oral meds. Per son pt hypoglycemic prior to arrival. On lantus. Stop lantus for now- nursing has been holding. And monitor BS     8. HTN; holding bp meds as bp was running low     9. Dysphagia: s/p peg tube: on TF     10. Electrolyte disturbances: supplement     11. Deconditioning: not ambulatory. In wheelchair at baseline     12. Chronic decubitus sacral ulcer stage III     13. Acute on chronic anemia: hg 8.f4 monitor        Dispo: updated son MRI results. In regards to discharge planning- family is still not interested in rehab at this time. Plan is home with [facility]    NOTE: This report was transcribed using voice recognition software. Every effort was made to ensure accuracy; however, inadvertent computerized transcription errors may be present.   Electronically signed by DIXIE Velazco on 10/9/2020 at 1:34 PM

## 2020-10-09 NOTE — PROGRESS NOTES
Updated Dr. Sulema Sainz on K level, stated he wont do procedure unless K is 3.0. Dr Manpreet Conway updated- stated will cancel procedure and reschedule for Monday. 6S updated.

## 2020-10-09 NOTE — PROCEDURES
ELECTROENCEPHALOGRAPHY REPORT     PATIENT NAME: Brayan Matthews   DATE:10/9/2020    MEDICAL RECORD NO: [id number]   ROOM NO: 7696/7649-G   ACCOUNT NO: [de-identified]   REFERRING DOCTOR: [unfilled]      CLINICAL HISTORY:  This is a 78 y.o. female who has had two unresponsive episodes. The possibility of seizures is being considered. PMH:   has a past medical history of Cataract, right, DM (diabetes mellitus), type 2 ([address] HTN (hypertension), Hyperlipidemia, Obesity, Osteoarthritis, Sinusitis chronic, and Wheelchair confinement.       MEDICATIONS: potassium bicarb-citric acid (EFFER-K) effervescent tablet 40 mEq, Once  potassium chloride 10 mEq/100 mL IVPB (Peripheral Line), Q1H  aspirin chewable tablet 81 mg, Daily  dextrose 5 % and 0.9 % sodium chloride infusion, Continuous  DAPTOmycin (CUBICIN) 400 mg in sodium chloride 0.9 % 50 mL IVPB, Q24H  sodium chloride flush 0.9 % injection 10 mL, PRN  heparin flush 100 UNIT/ML injection 100 Units, 2 times per day  heparin flush 100 UNIT/ML injection 100 Units, PRN  insulin glargine (LANTUS) injection vial 12 Units, Nightly  enoxaparin (LOVENOX) injection 40 mg, Daily  folic acid (FOLVITE) tablet 1 mg, Daily  allopurinol (ZYLOPRIM) tablet 100 mg, Daily  DULoxetine (CYMBALTA) extended release capsule 60 mg, Daily  rosuvastatin (CRESTOR) tablet 10 mg, Nightly  sodium chloride flush 0.9 % injection 10 mL, 2 times per day  sodium chloride flush 0.9 % injection 10 mL, PRN  acetaminophen (TYLENOL) tablet 650 mg, Q6H PRN    Or  acetaminophen (TYLENOL) suppository 650 mg, Q6H PRN  polyethylene glycol (GLYCOLAX) packet 17 g, Daily PRN  promethazine (PHENERGAN) tablet 12.5 mg, Q6H PRN    Or  ondansetron (ZOFRAN) injection 4 mg, Q6H PRN  [Held by provider] insulin lispro (HUMALOG) injection vial 8 Units, TID WC  glucose (GLUTOSE) 40 % oral gel 15 g, PRN  dextrose 50 % IV solution, PRN  glucagon (rDNA) injection 1 mg, PRN  dextrose 5 % solution, PRN  insulin lispro (HUMALOG) injection vial 0-12 Units, Q4H  0.9 % sodium chloride bolus, Once  meropenem (MERREM) 1 g in sodium chloride 0.9 % 100 mL IVPB (mini-bag), Q8H  0.9 % sodium chloride infusion, Q8H  pantoprazole (PROTONIX) injection 40 mg, Daily    And  sodium chloride (PF) 0.9 % injection 10 mL, Daily         DESCRIPTION OF RECORD:    Waking background consisted of 6-7 Hertz theta activity, which was bilaterally symmetrical and more centrally predominant. A moderate amount of beta activity was admixed. Myogenic artifact was often superimposed on the tracing. Sleep was not achieved during the recording. There were no areas of focal asymmetry. No epileptiform activity was seen. Rhythm on the EKG lead appeared to be irregular. IMPRESSION:  This is an abnormal EEG. A mild degree of diffuse slowing of the background activity was seen, consistent with an encephalopathic process of a generalized nature. Possible causes include metabolic encephalopathy, infection/post-infection, and dementia, among other things. No evidence of seizure activity was seen, but this does not completely rule out the possibility of seizures.         98 Franchesca Choudhury DO  10/9/2020   12:04 PM

## 2020-10-09 NOTE — PROGRESS NOTES
Dr. Yasmeen Bae updated on patient's critical MRI results. Dr. Jennifer Ha with Nephrology also updated on critical MRI results. Awaiting response/new orders.

## 2020-10-09 NOTE — PROGRESS NOTES
Physician Progress Note      Vida Montemayor  CSN #:                  [id number]  :                       1950  ADMIT DATE:       10/2/2020 6:36 AM  DISCH DATE:  RESPONDING  PROVIDER #:        Sheree Bowens MD          QUERY TEXT:    Dear Attending Physician,    Patient admitted with Acute Resp Failure gurgling, rales were heard in   bilateral lung fields, patient was satting in the 76s on room air. Patient   was placed on nonrebreather . Noted initial testing negative for COVID-19 on   10/2 x2 . Per H/P,\". .Acute hypoxic respiratory failure due to community acquired   pneumonia from suspected COVID-19 infection. ? \"  If possible, please   document in the progress notes and discharge summary if COVID-19 was: The medical record reflects the following:  Risk Factors: obese, w/c bound ,HTN,DM  Clinical Indicators: Per H/P,\". .. CT of the chest demonstrated extensive   groundglass and consolidative opacities bilaterally. .Acute hypoxic respiratory   failure due to community acquired pneumonia from suspected COVID-19   infection. .  ID for possible remdesivir. .Decadron 6 mg IV daily  ? \"  Per ID   10/3,\". ..looks more concerning for an aspiration pneumonia. ..? Septic shock   Aspiration pneumonia Leukocytosis from sepsis? \"  Per PCP 10/4,\". .Acute hypoxic   respiratory failure due to community acquired pneumonia   ? \"  Per PCP   10/8,\". Lucie Money Lucie Money Impression:Active Problems: Acute hypoxemic respira  Treatment: IV ATB    Thank you,  Alona Kate RN [facility]  Clinical Documentation Improvement Specialist  [phone]  Options provided:  -- COVID-19 ruled out after study  -- COVID-19 confirmed after study with initial testing suspected to be false   negative  -- Other - I will add my own diagnosis  -- Disagree - Not applicable / Not valid  -- Disagree - Clinically unable to determine / Unknown  -- Refer to Clinical Documentation Reviewer    PROVIDER RESPONSE TEXT:    COVID-19 was ruled out after study.    Query created by: Nanette Diaz on 10/9/2020 11:17 AM      Electronically signed by:  John Horn MD 10/9/2020 2:57 PM

## 2020-10-09 NOTE — PROGRESS NOTES
SPEECH/LANGUAGE PATHOLOGY  CLINICAL RE-ASSESSMENT OF SWALLOWING FUNCTION    PATIENT NAME:  Enrrique Ramos      :  1950      TODAY'S DATE:  10/9/2020  ROOM:  0013/7223-X    SUMMARY OF EVALUATION    RN cleared Pt for participation in assessment?: yes     DYSPHAGIA DIAGNOSIS:  Clinical indicators consistent with suspected pharyngeal dysphagia      DIET RECOMMENDATIONS:  NPO (nothing by mouth including oral meds)        FEEDING RECOMMENDATIONS:     Assistance level:  Not applicable      Compensatory strategies recommended: Not applicable    THERAPY RECOMMENDATIONS:      Dysphagia therapy is recommended 3-5 times per week to be attempted for LOS or when goals are met. Pt will complete oral motor strength/ coordination exercises to improve bolus prep/ control and mastication with  maximal verbal prompts . Pt will complete BOTR strength/ ROM exercises to reduce pharyngeal residuals and improve epiglottic inversion with  maximal verbal prompts.    Pt will complete laryngeal strength/ ROM therapeutic exercises to improve airway protection for the least restrictive PO diet with  maximal verbal prompts   Pt will complete PO trials of upgraded diet textures with SLP only to determine the least restrictive PO diet to maintain adequate nutrition/hydration with no more than 1 overt s/s of pen/asp    A Video Swallow Study (MBSS) is recommended and requires a physician order   A Speech, Language and Cognitive Evaluation is recommended and requires a physician order    PAST HISTORY OF DYSPHAGIA?: unknown  Diet PRIOR to hospital admission:    unknown  COGNITION: Alert & Oriented x 2, Follows 1 - step directions appropriate for this assessment, Confusion noted and Language impaired                PROCEDURE     Oral Peripheral Examination   Generalized oral weakness      Parameters of Speech Production  Respiration:  Adequate for speech production  Quality:   Within functional limits  Intensity: Within functional limits    Volitional Swallow: Present  Volitional Cough:    Present    Consistencies Administered During the Evaluation   Liquids: thin liquid   Solids:  pureed foods      Method of Intake:   spoon  Fed by clinician      Position:   Seated, upright                  RESULTS     Oral Stage:         Delayed A-P transit due to: reduced lingual strength  and cognitive function       Pharyngeal Stage:      Immediate wet cough was noted after presentation of thin liquid, Latent wet cough was noted after presentation of pureed foods, Multiple swallows were noted after presentation of thin liquid and Delayed initiation of the pharyngeal swallow noted                  The Speech Language Pathologist (SLP) completed education with the patient regarding results of evaluation. Explained that Speech Pathology intervention is warranted  at this time   Prognosis for improvements is fair     This plan will be re-evaluated and revised in 1 week  if warranted. Patient stated goals: Did not state,   Treatment goals discussed with Patient   The Patient did not demonstrate understanding of the diagnosis, prognosis and plan of care       CPT code:  83349  bedside swallow eval      [x]The admitting diagnosis and active problem list, as listed below have been reviewed prior to initiation of this evaluation.      ADMITTING DIAGNOSIS: Respiratory failure ([facility]) [J96.90]  Respiratory failure ([facility] [address] [J96.90]     ACTIVE PROBLEM LIST:   Patient Active Problem List   Diagnosis    Essential hypertension    Type 2 diabetes mellitus with hyperglycemia, without long-term current use of insulin ([facility])    Osteoarthritis    Hyperlipidemia    Chronic sinusitis    Eczema    Primary osteoarthritis involving multiple joints    Frequent falls    General weakness    Morbid obesity due to excess calories ([facility])    Normochromic normocytic anemia    Wound of right buttock    Decubitus ulcer of right buttock, stage 3 ([facility])    Decubitus ulcer of sacral region    Hypokalemia    Leukocytosis    Cellulitis of sacral region    Hypomagnesemia    AMS (altered mental status)    UTI (urinary tract infection)    Severe protein-calorie malnutrition (HCC)    Respiratory failure (HCC)    Acute hypoxemic respiratory failure ([address]    Community acquired pneumonia    Suspected COVID-19 virus infection    Abnormal urinalysis    Pneumonia due to COVID-19 virus    Recurrent episodes of unresponsiveness    Acute encephalopathy

## 2020-10-09 NOTE — PROGRESS NOTES
[facility] Quality Flow/Interdisciplinary Rounds Progress Note        Quality Flow Rounds held on October 9, 2020    Disciplines Attending:  Bedside Nurse,  and     [address] Cid was admitted on 10/2/2020  6:36 AM    Anticipated Discharge Date:  Expected Discharge Date: 10/09/20    Disposition:    Jayden Score:  Jayden Scale Score: 13    Readmission Risk              Risk of Unplanned Readmission:        26           Discussed patient goal for the day, patient clinical progression, and barriers to discharge. The following Goal(s) of the Day/Commitment(s) have been identified:  BRITTANY and EEG today.       Vikram Daniels  October 9, 2020

## 2020-10-09 NOTE — PROGRESS NOTES
Exam:  Blood pressures have mostly been in the 991N systolic. She looks quite drowsy. She follows some simple commands. She also repeated what I said when I gave commands more insistently. She moves her eyes to either side past midline to command. Eyes move conjugately. Pupils equal.  Upper extremities are edematous, a little worse on the left than the right. She may have slightly greater weakness on the left than the right but I was not sure about that. She smiled a little for me and her facial movements look symmetric. Unable to cooperate for sensory exam.    24 hour intake/output:    Intake/Output Summary (Last 24 hours) at 10/9/2020 1516  Last data filed at 10/9/2020 1100  Gross per 24 hour   Intake 10 ml   Output 1000 ml   Net -990 ml     Last 3 weights: Wt Readings from Last 3 Encounters:   10/05/20 237 lb 10.5 oz (107.8 kg)   09/28/20 206 lb 14.4 oz (93.8 kg)   09/22/20 202 lb (91.6 kg)         CBC:   Recent Labs     10/07/20  0400 10/08/20  0625 10/09/20  0435   WBC 9.6 11.3 10.2   HGB 7.7* 8.4* 8.3*    297 268     BMP:    Recent Labs     10/07/20  0400 10/08/20  0625 10/09/20  0435    144 142   K 2.8* 2.5* 2.4*    102 104   CO2 30* 30* 31*   BUN 17 11 12   CREATININE 0.5 0.5 0.6   GLUCOSE 201* 125* 104*     Calcium:  Recent Labs     10/09/20  0435   CALCIUM 8.1*     Magnesium:  Recent Labs     10/09/20  0435   MG 1.8     Glucose:No results for input(s): POCGLU in the last 72 hours. HgbA1C: No results for input(s): LABA1C in the last 72 hours. Lipids:   Recent Labs     10/09/20  0435   CHOL 124   TRIG 95   HDL 47   LDLCALC 58       MRI brain images reviewed: There is an acute infarct in the right thalamus, relatively large for an infarct in this location. I agree with the radiologist that there are couple of other questionable areas that might be acute strokes but the study was limited by motion artifact and was not completed.                  Assessment:    Active Problems: Essential hypertension    Type 2 diabetes mellitus with hyperglycemia, without long-term current use of insulin (HCC)    Osteoarthritis    Hyperlipidemia    General weakness    Morbid obesity due to excess calories (HCC)    Wound of right buttock    Decubitus ulcer of sacral region    Respiratory failure (HCC)    Acute hypoxemic respiratory failure ([address]    Community acquired pneumonia    Suspected COVID-19 virus infection    Abnormal urinalysis    Pneumonia due to COVID-19 virus    Recurrent episodes of unresponsiveness    Acute encephalopathy  Resolved Problems:    * No resolved hospital problems. *    Acute stroke in the right thalamus, probably due to small vessel disease, though contribution from a hypercoagulable state related to sepsis would be a possibility. This is one of the few locations where a stroke can cause an encephalopathy. Plan:  A BRITTANY is planned, and though the thalamic stroke is likely to be due to small vessel disease, there is a suggestion of a couple of other lesions, and she has positive blood cultures, so this is certainly indicated. I do not think at this point, a week after presentation, we would be likely to get much benefit from adding Plavix. I did put her on baby aspirin last night after the MRI was done. She is already on a statin and her LDL is only 58. She needs PT and OT. Right now she is not a good candidate for acute rehabilitation due to her lack of interaction, but subacute rehabilitation should be considered. There will be no neurology here in [facility] for the next week, and if there are any urgent questions or concerns of a neurological nature, the neurologist at [facility] should be contacted. Feel free to try calling me if you'd like to discuss her case.     Electronically signed by Mayuri Vaz DO on 10/9/2020 at 3:16 PM    Neurology

## 2020-10-09 NOTE — HOME CARE
[facility] following for DC plans. Benefits checked for enteral formula and IV ATB infusion. Kirk Shah EFF DATE 11/23/2015  PAYS 100%/ NO DED/ NO OOP  AUTH IS REQUIRED    Spoke with daughter Josie Rose and reviewed the above. She stated patient lives with her brother Maximus Shoemaker and he would be learning. She was going to also contact him and review. Will need final orders and IV ATB script if applicable.   Uziel Blancas, DEBIN [id number]

## 2020-10-10 NOTE — CONSULTS
Inpatient Cardiology Consultation      Reason for Consult: Tachycardia    Consulting Physician: Dr. Mckinney Spine    Requesting Physician: DIXIE York     Date of Consultation: 10/10/2020    HISTORY OF PRESENT ILLNESS:   Patient is a 8year-old -American female not previously known to [facility]. She is being seen in initial consultation by Dr. Faye Morgan for evaluation and recommendations regarding tachycardia. Of note, patient is a poor historian due to altered mental status. She is alert and oriented to self and year, but cannot tell me that she is in the hospital or why she is in the hospital at this time. As a result, most of history obtained from chart review and medical staff. From chart review, patient has a past medical history of morbid obesity, chronic decubitus sacral ulcer, hypertension, diabetes mellitus type II, gout, hyperlipidemia, chronic anemia. Patient is wheelchair-bound at home. Of note, patient was admitted to [facility] on 10/2/2020 after being found unresponsive by EMS. Supposedly she was hypoglycemic with glucose levels in the 30s. On admission, she was noted to have severe septic shock requiring pressor support, acute hypoxic respiratory failure requiring intubation, aspiration pneumonia vs. HCAP (COVID-19 negative x 1), and VRE bacteremia. She has since been extubated and is no longer requiring pressor support at this time. She is scheduled for BRITTANY Monday for further evaluation of bacteremia and to rule out endocarditis. Additionally, brain MRI on 10/8 revealed new acute CVA of unclear etiology. Patient was also recently admitted last month and discharged on 9/28/2020 with complaints of altered mental status and was found to have UTI. AMS improved with treatment and she was discharged home in stable condition. On interview/exam today, I cannot obtain a detailed review of systems due to patient's altered mental status.   She did deny any current chest pain or shortness of breath to me however. I have also unable to obtain a detailed family or social history at this time due to patient's altered mental status. She is currently being followed by ID service for antibiotic therapy. She is also being followed by neurology service due to acute right thalamic CVA and encephalopathy on admission. Pulmonology is on the case due to acute hypoxic respiratory failure and pneumonia. Patient is also on tube feeds at this time due to dysphasia and status post PEG tube placement. Please note: past medical records were reviewed per electronic medical record (EMR) - see detailed reports under Past Medical/ Surgical History. PAST MEDICAL HISTORY:    1. Wheelchair bound. 2. Morbid obesity. 3. Chronic decubitis sacral ulcer. 4. Hypertension. 5. Diabetes mellitus type II. 6. Gout. 7. Hyperlipidemia, on statin therapy. 8. Chronic anemia. PAST SURGICAL HISTORY:    Past Surgical History:   Procedure Laterality Date    GASTROSTOMY TUBE PLACEMENT N/A 10/7/2020    EGD PEG TUBE PLACEMENT performed by Eileen Mccracken MD at [facility] IO LENS PROSTH W/O ECP Right 8/10/2018    RIGHT EYE CATARACT EXTRACTION WITH  IOL IMPLANT performed by Jason Hoover MD at [address] [place] [address] Way:  Prior to Admission medications    Medication Sig Start Date End Date Taking? Authorizing Provider   miconazole (MICOTIN) 2 % powder Apply topically 2 times daily.  9/28/20   Noy López MD   fluconazole (DIFLUCAN) 200 MG tablet Take 1 tablet by mouth daily for 20 days 9/29/20 10/19/20  Noy López MD   ibuprofen (ADVIL;MOTRIN) 800 MG tablet Take 1 tablet by mouth every 8 hours as needed for Pain 8/18/20   Latasha Ayala DO   atenolol (TENORMIN) 50 MG tablet take 1 tablet by mouth twice a day 7/23/20   Елена Lama MD   lisinopril (PRINIVIL;ZESTRIL) 40 MG tablet Take 1 tablet by mouth daily 7/23/20   Елена Lama MD is Morbidly Obese 4/4/16   Luiz Paige DO   Misc. Devices KIT 1 kit by Does not apply route daily as needed Foam mattress for bilateral knees osteoarthritis , degenerative back osteoarthitis 3/31/16   Soy Cummins MD   [facility]. Devices [address] [facility] chair to assist with ADL with diagnosis of OA and full thickness knee meniscal tear 2/24/15   Scottie Colindres MD   Misc. Devices MISC Grab bar  diagnosis osteoarthritis and full thickness knee meniscal tear 2/24/15   Scottie Colindres MD   [facility]. [address] bed 11/7/14   Marisa Hess MD   [facility]. Devices [address] [facility] wheelchair 11/7/14   Marisa Hess MD   [facility]. Devices [facility] Bedside commode. Dx osteoarthritis 715.90. Height 5'4.5\".  Weight 245 lbs. 9/17/14   Soy Cummins MD       CURRENT MEDICATIONS:      Current Facility-Administered Medications:     potassium & sodium phosphates (PHOS-NAK) 280-160-250 MG packet 250 mg, 1 packet, Per G Tube, BID, Bertrum Imam, APN, 250 mg at 10/10/20 1105    senna (SENOKOT) 8.8 MG/5ML syrup 8.8 mg, 5 mL, Per G Tube, Nightly, Bertrum Imam, APN    potassium bicarb-citric acid (EFFER-K) effervescent tablet 40 mEq, 40 mEq, Per G Tube, Once, Ashley Shaikh MD, Stopped at 10/09/20 1043    potassium chloride (KLOR-CON M) extended release tablet 40 mEq, 40 mEq, Oral, PRN **OR** potassium bicarb-citric acid (EFFER-K) effervescent tablet 40 mEq, 40 mEq, Oral, PRN, 40 mEq at 10/09/20 1839 **OR** potassium chloride 10 mEq/100 mL IVPB (Peripheral Line), 10 mEq, Intravenous, PRN, Bertrum Imam, APN    aspirin chewable tablet 81 mg, 81 mg, PEG Tube, Daily, Pretty Grady, , 81 mg at 10/10/20 0851    DAPTOmycin (CUBICIN) 400 mg in sodium chloride 0.9 % 50 mL IVPB, 400 mg, Intravenous, Q24H, Remington Guevara MD, Stopped at 10/09/20 1833    sodium chloride flush 0.9 % injection 10 mL, 10 mL, Intravenous, PRN, Jace Delgadillo MD    heparin flush 100 UNIT/ML injection 100 Units, 1 mL, Intravenous, 2 times per day, Jeff Beth Units at 10/10/20 0854    0.9 % sodium chloride bolus, 1,000 mL, Intravenous, Once, Eileen Mccracken MD, Stopped at 10/02/20 1455    meropenem (MERREM) 1 g in sodium chloride 0.9 % 100 mL IVPB (mini-bag), 1 g, Intravenous, Q8H, Eileen Mccracken MD, Last Rate: 0 mL/hr at 10/10/20 0701, 1 g at 10/10/20 1209    0.9 % sodium chloride infusion, , Intravenous, Q8H, Eileen Mccracken MD, Last Rate: 33.3 mL/hr at 10/10/20 0852    pantoprazole (PROTONIX) injection 40 mg, 40 mg, Intravenous, Daily, 40 mg at 10/10/20 0851 **AND** sodium chloride (PF) 0.9 % injection 10 mL, 10 mL, Intravenous, Daily, Eileen Mccracken MD, 10 mL at 10/10/20 8051      ALLERGIES:  Patient has no known allergies. SOCIAL HISTORY:    Unable to obtain at this time due to patient's altered mental status. FAMILY HISTORY:   Unable to obtain at this time due to patient's altered mental status. REVIEW OF SYSTEMS:     Unable to obtain detailed review of systems at this time due to patient's altered mental status, however she did deny any chest pain or shortness of breath to me today during interview/exam.      PHYSICAL EXAM:   /73   Pulse 88   Temp 98.2 °F (36.8 °C) (Oral)   Resp 18   Ht 5' 4\" (1.626 m)   Wt 237 lb 10.5 oz (107.8 kg)   SpO2 95%   BMI 40.79 kg/m²   CONST:  Well developed, morbidly obese AAF who appears stated age. Awake, alert, cooperative, no apparent distress. HEENT:   Head- Normocephalic, atraumatic. Neck-  No stridor, trachea midline, no apparent jugular venous distention. CHEST: Chest symmetrical and non-tender to palpation. No accessory muscle use or intercostal retractions. RESPIRATORY: Lung sounds - clear throughout fields. No wheezing, rales or rhonchi. CARDIOVASCULAR:     Heart Inspection- shows no noted pulsations. Heart Palpation- no heaves or thrills.   Heart Ausculation- Regular rate and rhythm, no apparent murmur (however patient was not cooperative during cardiac exam and was talking throughout). No s3, s4 or rub. PV: Bilateral upper extremity edema. Pedal pulses palpable, no clubbing or cyanosis. ABDOMEN: Soft, non-tender to light palpation. Bowel sounds present. SKIN: Warm and dry. NEURO / PSYCH: Oriented to person, time. Pleasantly confused. RUE and RLE weakness. Patient was able to wiggle her toes on the left and push down with her foot, hand. She was also able to squeeze my hand with her left hand. When asked to do the same commands on the right, she was unable to at this time. DATA:    Telemetry: NSR with HR in the 80s. Episodes of ST with rare PACs/PVCs versus SVT versus atrial tachycardia with HR ranging from the 120s-140s. Diagnostic:  All diagnostic testing and lab work thus far this admission reviewed in detail. CXR 10/2/2020: Impression:  New patchy bilateral ground-glass airspace opacities   Endotracheal tube tip projects 12 mm superior to the ritesh     CT Head 10/2/2020: Impression:  No acute intracranial abnormality. CT Chest/Abdomen 10/2/2020: Impression:  Extensive ground-glass and consolidative opacities throughout both lungs, in   a pattern highly suspicious for COVID pneumonia.  Please note that the   endotracheal tube terminates at the level of the ritesh.  Recommend   retracting the endotracheal tube by 3-4 cm. No acute inflammatory or obstructive process in the abdomen or pelvis. Cholelithiasis without evidence of acute cholecystitis. Diffusely heterogeneous and relatively hyperdense vertebral bodies, a   nonspecific finding.  Diffuse metastatic disease cannot be excluded. Recommend clinical correlation.  Correlation could also be made with nuclear   medicine bone scan, if clinically appropriate. Decubitus ulcer with at least 1 gas locule that may come into contact with   the lower most portion of the coccyx.  Osteomyelitis cannot be excluded at   this level. CXR 10/6/2020: Impression:  1.  Patchy bilateral airspace disease which could represent pneumonia or   vascular congestion. 2. Status post removal of the endotracheal tube. Brain MRI 10/8/2020: Impression:  Limited exam.   Acute infarct within the right thalamus and probable acute infarcts within   the ipsilateral hippocampus and occipital lobe. Intake/Output Summary (Last 24 hours) at 10/10/2020 1300  Last data filed at 10/10/2020 0701  Gross per 24 hour   Intake 801 ml   Output --   Net 801 ml       Labs:   CBC:   Recent Labs     10/09/20  0435 10/10/20  0905   WBC 10.2 7.4   HGB 8.3* 7.5*   HCT 26.8* 25.2*    289     BMP:   Recent Labs     10/09/20  0435 10/09/20  1642 10/10/20  0905     --  140   K 2.4* 3.1* 3.4*   CO2 31*  --  31*   BUN 12  --  15   CREATININE 0.6  --  0.6   LABGLOM >60  --  >60   CALCIUM 8.1*  --  8.4*     Mag:   Recent Labs     10/09/20  0435 10/10/20  0905   MG 1.8 1.8     Phos:   Recent Labs     10/09/20  0435 10/10/20  0905   PHOS 2.2* 2.2*       HgA1c:   Lab Results   Component Value Date    LABA1C 5.3 10/02/2020     FASTING LIPID PANEL:  Lab Results   Component Value Date    CHOL 124 10/09/2020    HDL 47 10/09/2020    LDLCALC 58 10/09/2020    TRIG 95 10/09/2020       ASSESSMENT:  1. Atrial tachycardia versus SVT versus atrial flutter: Ectopy is also noted on telemetry review, so atrial fibrillation is not entirely excluded at this time, particularly in setting of acute CVA. Currently in normal sinus rhythm with rare PACs and heart rate in the 80s during my interview/exam today. 2.  Severe sepsis with septic shock on admission, improved. In setting of aspiration pneumonia/HCAP and VRE bacteremia. 3.  Acute hypoxic respiratory failure, improved. Patient is now extubated. 4.  Altered mental status. In setting of sepsis/bacteremia/hypoglycemia and now with acute CVA. 5.  VRE bacteremia. For BRITTANY Monday for further evaluation. 6.  Acute right Melamix CVA. 7.  Diabetes mellitus type II.   8. Hypertension, well controlled. 9.  Dysphagia status post PEG tube placement. 10.  Hypokalemia, continue to supplement. 11.  Morbid obesity. 12.  Acute on chronic anemia with questionable etiology. 13.  Hyperlipidemia, on statin therapy. PLAN:  1. We will evaluate acute on chronic anemia with fecal occult blood test.  2.  Recommend continued supplementation of hypokalemia in setting of possible cardiac arrhythmia. Recommend keeping potassium level greater than or equal to 4.0 and magnesium level greater than equal to 2.0.  3.  Patient is scheduled for BRITTANY Monday for further evaluation to rule out endocarditis in setting of bacteremia. No need for transthoracic echo as per Dr. Drake Hart. 4.  Will start on Lopressor 25 mg twice daily with parameters. 5.  Further recommendations pending results of above, and as per Dr. Drake Hart. The above assessment and plan has been discussed with Dr. Drake Hart. Cristina Hunter, [address] [place] [address], [place] [id number] Cardiology    Electronically signed by Esthela Bello PA-C on 10/10/2020 at 1:00 PM   ______________________________________________________________________  Cardiology attending attestation:  I have independently interviewed and examined the patient. I personally reviewed pertinent laboratory values and diagnostic testing (including, if applicable, chest xray, electrocardiogram, telemetry, echocardiogram, stress testing, and coronary angiography). I have reviewed the above documentation completed by Cristina Hunter PA-C including past medical, social, and family history and agree with the findings, assessment and plan except where noted. Plan formulated under my direct supervision. I participated in all aspects of the medical decision making.   Please see my additional contributions to the HPI, physical exam, and assessment / medical decision making:  _______________________________________________________________________    Physical Exam:  /78   Pulse 107   Temp 98 °F (36.7 °C) (Oral)   Resp 16   Ht 5' 4\" (1.626 m)   Wt 237 lb 10.5 oz (107.8 kg)   SpO2 94%   BMI 40.79 kg/m²   General appearance: Awake, able to answer yes or no, obese  Skin: Intact, no rash  ENMT: Moist mucous membranes  Neck: Supple, no carotid bruits. Normal jugular venous pressure  Lungs: Clear to auscultation bilaterally. No wheezes, rales, or rhonchi  Cardiac: Regular rhythm with a normal rate, normal S1&S2, no murmurs apparent  Abdomen: Soft, positive bowel sounds, nontender. PEG tube  Extremities: Moves all extremities x 4, no lower extremity edema  Neurologic: No focal motor deficits apparent    Telemetry: Sinus rhythm/sinus tachycardia with episodes of likely PAT    Impression:   1. Long RP tachycardia likely paroxysmal atrial tachycardia  2. VRE bacteremia  3. Hypokalemia/hypophosphatemia  4. Anemia  5. Remainder as above    Recommendations:    · Aggressive electrolyte replacement  · Start beta-blocker  · Please rule out GI bleed prior to BRITTANY, or BRITTANY will not be performed  · Needs at least a negative FOBT  · Will see as needed please call with questions  · BRITTANY Monday if GI bleed excluded    Thank you for the consultation. Please do not hesitate to call with questions.     Carlo Lenz MD, [address] [place] [address] Cardiology

## 2020-10-10 NOTE — PROGRESS NOTES
faecium  10/2 blood cx 1  Bottle CN staph   MRSA screen neg  10/2 urine cx <10k GPOs, yeast  Urine legionella neg      Radiology :       Assessment and Plan:      · Septic shock-resolved  · Aspiration pneumonia  · Leukocytosis from sepsis-resolved  · Coccygeal ulcer not infected  · Coag neg staph abctremia-  contaminatiom  · Delirium-patient remains encephalopathic  · VRE bacteremia -unclear source, UA from 10/2/2020  looks not concerning for urine infection     PLAN  -f/u repeat blood cx  -Started her on daptomycin on 10/7/2020   -Keep on meropenem , day #9 x5 more days  Right brachial has a midline  -BRITTANY will be done on Monday  -Neurology following as well recommended EEG and MRI    Electronically signed by IVON Locke - CNP on 10/10/2020 at 10:34 AM      Patient seen and examined. I had a face to face encounter with the patient. Agree with exam.  Assessment and plan as outlined above and directed by me. Addition and corrections were done as deemed appropriate. My exam and plan include: The patient is in no distress. She has minimally verbal but answers a couple of questions. Continue Daptomycin and Meropenem.     Roberto Blake  10/10/2020  1:22 PM

## 2020-10-10 NOTE — PROGRESS NOTES
When the patient was turn to her left side to addressed the coccyx wound dressing, a large hard stool matter was noted coming out the  patient's rectum while the remaining stool in th rectum was forcing the anal splinter to remain open. The patient complained of tenderness around anus. STEVAN Mcdowell was notified and orders were received to disimpact the patient manually.

## 2020-10-10 NOTE — PROGRESS NOTES
Andrew was notified of 1600 H&H result. Orders were received to recheck lab in the AM, and next time patient has a BM to check for OB at the beside.

## 2020-10-10 NOTE — PROGRESS NOTES
Occupational Therapy  OCCUPATIONAL THERAPY INITIAL EVALUATION      Date:10/10/2020  Patient Name: Feliz Mora  MRN: [id number]  : 1950  Room: [address] [place] [address]    Referring Provider: Lennox Georgia, DO   Evaluating OT: Leonardo Singh. Deepak Smithsherrierolly - [id number]    AM-PAC Daily Activity Raw Score:       Recommended Adaptive Equipment: Continue to assess. Diagnosis: Respiratory failure ([address] [J96.90]    Per physician note from 10/9/2020, patient found to have \"acute stroke in the right thalamus\". Patient intubated 10/2/2020; patient extubated on 10/4/2020. Surgery: Patient underwent PEG tube placement on 10/7/2020. Pertinent Medical History: obesity, DM, HTN, OA      Precautions: falls, PEG tube, contact isolation, skin integrity - coccyx wound    Home Living: Patient reported that she lives with her mother. Per review of patient's medical record, patient lives with family in a one-floor setup; no family/caregiver present to verify information gathered. Equipment Owned: hospital bed, adriana lift, wheelchair    Prior Level of Function (PLOF): Per review of patient's medical record, patient needed assistance with all ADLs; a adriana lift was used for functional transfers and patient needed assistance with wheelchair propulsion. Patient has consistent assistance from aides and family, per review of patient's medical record. Pain Level: Patient denied experiencing pain. Cognition: Patient alert and oriented to person and grossly to time (month/year only). Confusion demonstrated; short phrases stated repeatedly occasionally throughout this session - e.g. \"you have to learn Jerman\" and \"you know, it's \". Inconsistent command follow demonstrated; patient able to follow some simple, one-step commands without verbal/visual cues. Slowed processing noted.     Memory: Impaired   Sequencing: Impaired   Problem Solving: Impaired   Judgement/Safety: Impaired    Functional Assessment:   Initial Eval Status  Date: 10/10/2020 Treatment Status  Date:  Short Term Goals   Feeding N/A  NPO - PEG tube     Grooming Max A  Mod A   UB Dressing Max A  Mod A   LB Dressing Dependent  Mod A - with use of AE, as needed/appropriate   Bathing Dependent  Mod A - with use of AE/DME, as needed/appropriate   Toileting Dependent  Mod A   Bed Mobility  Dependent  Mod A in order to maximize patient's independence/participation with ADLs. Functional Transfers Not assessed. Renaldo Bogdanrier lift used at home for functional transfers, per review of patient's medical record. N/A   Functional Mobility Not assessed. N/A   Balance Not assessed. Activity Tolerance Limited     Visual/  Perceptual Patient denied experiencing blurred/double vision. Patient does not wear glasses. N/A   B UE Strength R Shoulder: 2-/5  Distal R UE: 3/5 to 3+/5    L Shoulder: 3-/5  Distal L UE: 3/5 to 3+/5   Patient will demonstrate 4-/5 distal B UE strength in order to maximize independence with ADLs and functional transfers. Long-Term Goal: Patient will increase functional independence to PLOF in order to allow patient to live in least restrictive environment. ROM: Additional Information:    R UE  0 - 60 degrees of active-assisted shoulder flexion; distal AROM grossly Inconsistent command follow noted. L UE 0 - 90 degrees of active-assisted shoulder flexion; distal AROM grossly Inconsistent command follow noted. Hand Dominance: Right (per patient report)    [facility]  Sensation: No complaints of numbness/tingling in B UEs. Tone: WFL  Edema: Yes - bilateral hands    Comments: RN approved patient's participation in OT session. Upon arrival, patient supine in bed. At end of session, patient supine in bed with call light and phone within reach, bed alarm activated, and all lines and tubes intact.  Patient would benefit from continued skilled OT to increase safety and independence with completion of ADL/IADL tasks for functional independence and quality of life. Patient education provided regardin) orientation, 2) techniques to maximize independence/participation with bed mobility. Patient indicated limited understanding. Eval Complexity: Low    Assessment of Current Deficits:   Functional Mobility [x]  ADLs [x] Strength [x]  Cognition []  Functional Transfers  [x] IADLs [x] Safety Awareness [x]  Endurance [x]  Fine Motor Coordination [] Balance [x] Vision/Perception [] Sensation []   Gross Motor Coordination [] ROM [] Delirium []                  Motor Control []    Plan of Care:   OT treatment to be provided 1-3x/week for 5-7 days PRN to address the following:  [x] ADL Re-Training/AE Recommendations  [x] Energy Conservation Techniques/Strategies      [x] Neuromuscular Re-Education     [x] Functional Transfer Training         [x] Functional Mobility Training          [] Cognitive Re-Training         [] Splinting/Positioning Needs           [x] Therapeutic Activity   [x] Therapeutic Exercise  [] Visual/Perceptual   [] Delirium Prevention/Treatment   [x] Positioning to Improve Functional [place], Safety, and Skin Integrity   [x] Patient and/or Family Education to Increase Safety and Functional [place]   [] Other:     Rehab Potential: Fair for established goals. Patient / Family Goal: No goal stated. Patient and/or family were instructed on functional diagnosis, prognosis/goals, and OT plan of care. Demonstrated limited understanding. Low complexity evaluation + 0 timed treatment minutes  Time In: 1457  Time Out: 1515  Total Treatment Time: 0 minutes    Evaluation time includes thorough review of current medical information, gathering information on past medical history/social history and prior level of function, completion of standardized testing/informal observation of tasks, assessment of data, and development of POC/Goals. AGNIESZKA Bueno/JOSUÉ  License Number: [id number]

## 2020-10-10 NOTE — PROGRESS NOTES
Spoke with Dr. Jose Henry who is covering for Dr. Racquel Rice regarding CMR calling about the patient having three different SVT occurrences with the patient asymptomatic. Patient is resting comfortably at this time. No new orders noted at this time from Dr. Jose Henry.

## 2020-10-11 NOTE — PROGRESS NOTES
Luz Elena called and made aware of low air loss bed not properly inflating. Will receive a call from tech to troubleshoot/fix problem. [phone]- tech returned call will send someone to see bed.

## 2020-10-11 NOTE — PROGRESS NOTES
[facility] Progress Note    Admitting Date and Time: 10/2/2020  6:36 AM  Admit Dx: Respiratory failure ([facility] [address] [facility] [J96.90]  Respiratory failure ([facility] [address] [J96.90]    Subjective:  Patient is being followed for Respiratory failure ([facility] [address] [J96.90]  Respiratory failure ([facility] [address] [J96.90]       Patient awake, alert, resting in bed in no acute distress  Talkative  Per nursing multiple Bms overnight  Son at bedside  Per nursing- pt difficult stick-awaiting on IV team to draw labs  Occult stool negative this am per nursing         ROS: denies fever, chills, cp, sob, n/v, HA unless stated above.      senna  5 mL Per G Tube Nightly    metoprolol tartrate  25 mg Oral BID    potassium bicarb-citric acid  40 mEq Per G Tube Once    aspirin  81 mg PEG Tube Daily    daptomycin (CUBICIN) IVPB  400 mg Intravenous Q24H    heparin flush  1 mL Intravenous 2 times per day    enoxaparin  40 mg Subcutaneous Daily    folic acid  1 mg Oral Daily    allopurinol  100 mg Oral Daily    DULoxetine  60 mg Oral Daily    rosuvastatin  10 mg Oral Nightly    sodium chloride flush  10 mL Intravenous 2 times per day    insulin lispro  0-12 Units Subcutaneous Q4H    sodium chloride  1,000 mL Intravenous Once    meropenem  1 g Intravenous Q8H    pantoprazole  40 mg Intravenous Daily    And    sodium chloride (PF)  10 mL Intravenous Daily     potassium chloride, 40 mEq, PRN    Or  potassium alternative oral replacement, 40 mEq, PRN    Or  potassium chloride, 10 mEq, PRN  sodium chloride flush, 10 mL, PRN  heparin flush, 1 mL, PRN  sodium chloride flush, 10 mL, PRN  acetaminophen, 650 mg, Q6H PRN    Or  acetaminophen, 650 mg, Q6H PRN  polyethylene glycol, 17 g, Daily PRN  promethazine, 12.5 mg, Q6H PRN    Or  ondansetron, 4 mg, Q6H PRN  glucose, 15 g, PRN  dextrose, 12.5 g, PRN  glucagon (rDNA), 1 mg, PRN  dextrose, 100 mL/hr, PRN         Objective:    /75   Pulse 81   Temp 97.2 °F (36.2 °C) (Oral)   Resp 16   Ht 5' 4\" (1.626 m)   Wt 189 lb 4.8 oz (85.9 kg)   SpO2 96%   BMI 32.49 kg/m²   General Appearance: appears more awake and alert today. Able to state name/ hand grasps to command. able to state year/ and that she is in the hospital   Skin: warm and dry  Head: normocephalic and atraumatic  Neck: neck supple and non tender without mass   Pulmonary/Chest: diminished throughout to auscultation   Cardiovascular: normal rate, normal S1 and S2 and no carotid bruits  Abdomen: soft, non-tender, non-distended, normal bowel sounds- peg tube   Extremities: no cyanosis, no clubbing and no edema  Neurologic: TERESA            Recent Labs     10/09/20  0435 10/09/20  1642 10/10/20  0905     --  140   K 2.4* 3.1* 3.4*     --  104   CO2 31*  --  31*   BUN 12  --  15   CREATININE 0.6  --  0.6   GLUCOSE 104*  --  189*   CALCIUM 8.1*  --  8.4*       Recent Labs     10/09/20  0435 10/10/20  0905 10/10/20  1700   WBC 10.2 7.4  --    RBC 3.40* 3.10*  --    HGB 8.3* 7.5* 7.0*   HCT 26.8* 25.2* 22.8*   MCV 78.8* 81.3  --    MCH 24.4* 24.2*  --    MCHC 31.0* 29.8*  --    RDW 19.8* 20.6*  --     289  --    MPV 9.1 9.6  --          Assessment:    Active Problems:    Essential hypertension    Type 2 diabetes mellitus with hyperglycemia, without long-term current use of insulin (HCC)    Osteoarthritis    Hyperlipidemia    General weakness    Morbid obesity due to excess calories (HCC)    Wound of right buttock    Decubitus ulcer of sacral region    Respiratory failure (HCC)    Acute hypoxemic respiratory failure (HCC)    Community acquired pneumonia    Suspected COVID-19 virus infection    Abnormal urinalysis    Pneumonia due to COVID-19 virus    Recurrent episodes of unresponsiveness    Acute encephalopathy    Acute ischemic VBA thalamic stroke, right (HCC)  Resolved Problems:    * No resolved hospital problems. *      Plan:  1.  Severe sepsis with septic shock: pt found unresponsive at home- EMS called.  She was initially on mechanical ventilation/ pressors in ICU. Shock resolved. Thought to be related to aspiration vs HCAP/ VRE bacteremia/ acute CVA: ID following. solucortef weaned off.      2. Unresponsive episode: questionable etiology- likely metabolic/ + CVA. Patient's son reporting he checked her blood sugar when EMS called and was reported \" low. \"   on admission. No arrhythmia. CT head showed no acute pathology. No hx CNS pathology. MRI 10/8 showing acute CVA.     3. Encephalopathy: pt awake/ alert. Appears much more awake/ alert. ? Aphasia at times. Per son pt does this when stressed. ? If related to infection. Neurology consulted- appreciate input. EEG reviewed. MRI reviewed showing acute CVA.      4. Acute respiratory failure- likely related to pneumonia- aspiration. Pulmonology following. S/p extubation. On RA     5. VRE bacteremia: ID following. Repeat BC sent- prelim negative. Unable to do BRITTANY 10/9 due to hypokalemia- now planning on Monday. On IV merrem/ IV daptomycin     6. Acute CVA right thalamus; MRI 10/8 - showing acute infarct within the right thalamus and probable infarcts within the ipsilateral hippocampus and occiptal love. Neurology stared on ASA. Lipid panel reviewed. On statin. Not a good candidate for acute rehab.      7. DM; hg a1c 5.3: hold oral meds. Per son pt hypoglycemic prior to arrival. On lantus. Stop lantus for now- nursing has been holding. And monitor BS     8. HTN; holding bp meds as bp was running low     9. Dysphagia: s/p peg tube: on TF     10. Electrolyte disturbances: supplement     11. Deconditioning: not ambulatory. In wheelchair at baseline     12. Chronic decubitus sacral ulcer stage III     13.  Acute on chronic anemia: hg slight decrease. No prior h/o GI bleed. CBC pending. Occult stool neg. Anemia panel reviewed.      14. ? SVT/ tachycardia: consult cardiology- appreciate input. Started on BB     15. Constipation; bowel regimen        Dispo:  Son reporting plan is home with [facility] discussed case 10/9/20- family is not interested in [address]. They feel that they have the support to take care of her at home. Discussed concerns with new acute CVA- son stating they still feel comfortable providing care at home. NOTE: This report was transcribed using voice recognition software. Every effort was made to ensure accuracy; however, inadvertent computerized transcription errors may be present.   Electronically signed by DIXIE Keating on 10/11/2020 at 12:05 PM

## 2020-10-12 NOTE — PROGRESS NOTES
Physician Progress Note      Penny Urrutia  CSN #:                  [id number]  :                       1950  ADMIT DATE:       10/2/2020 6:36 AM  DISCH DATE:  RESPONDING  PROVIDER #:        Talon Joiner MD          QUERY TEXT:    Dear Attending Physician,    Patient admitted with Acute Resp Failure gurgling, rales were heard in   bilateral lung fields, patient was satting in the 76s on room air. Patient   was placed on nonrebreather . Noted initial testing negative for COVID-19 on   10/2 x2 . Per H/P,\". .Acute hypoxic respiratory failure due to community acquired   pneumonia from suspected COVID-19 infection. ? \"  If possible, please   document in the progress notes and discharge summary if COVID-19 was: The medical record reflects the following:  Risk Factors: obese, w/c bound ,HTN,DM  Clinical Indicators: Per H/P,\". .. CT of the chest demonstrated extensive   groundglass and consolidative opacities bilaterally. .Acute hypoxic respiratory   failure due to community acquired pneumonia from suspected COVID-19   infection. .  ID for possible remdesivir. .Decadron 6 mg IV daily  ? \"  Per ID   10/3,\". ..looks more concerning for an aspiration pneumonia. ..? Septic shock   Aspiration pneumonia Leukocytosis from sepsis? \"  Per PCP 10/4,\". .Acute hypoxic   respiratory failure due to community acquired pneumonia   ? \"  Per PCP   10/8,\". [place] Impression:Active Problems: Acute hypoxemic respira  Treatment: IV ATB    Thank you,  Marina Aguilar RN [facility]  Clinical Documentation Improvement Specialist  [phone]  Options provided:  -- COVID-19 ruled out after study  -- COVID-19 confirmed after study with initial testing suspected to be false   negative  -- Other - I will add my own diagnosis  -- Disagree - Not applicable / Not valid  -- Disagree - Clinically unable to determine / Unknown  -- Refer to Clinical Documentation Reviewer    PROVIDER RESPONSE TEXT:    COVID-19 was ruled out after study.     Query created by: Orlin Rangel on 10/12/2020 12:59 PM      Electronically signed by:  Alejandro Ness MD 10/12/2020 2:08 PM

## 2020-10-12 NOTE — PROGRESS NOTES
ID Progress Note                [address] [facility] [address], [place], [address]            Phone [phone]     Fax [phone]      Chief complaint   Altered mental status  Subjective:  Remains confused, very slow to respond, discussed with the daughter at the bedside  Objective:    Vitals:    10/12/20 0830   BP: 122/72   Pulse: 80   Resp: 16   Temp: 98 °F (36.7 °C)   SpO2: 97%   General appearance:  much more alert and verbally rsponsive  skin: Warm and dry  Eyes: Pink palpebral conjunctivae. PERRL  Ears: External ears normal.  Nose/Sinuses: Nares normal. Septum midline. Mucosa normal.   Oropharynx: Oropharynx clear with no exudates seen  Neck: Neck supple. Lungs: CTA. Respirations even and unlabored on RA  Heart: S1 S2  Regular rate and rhythm. No rub, murmur or gallop  Abdomen: Abdomen soft, distension resolved. Nontender. +BS.  Peg no d/c or erythema  Extremities: no peripheral edema         Labs:  Recent Labs     10/10/20  0905 10/10/20  1700 10/11/20  1158 10/12/20  1016   WBC 7.4  --  9.6 8.2   RBC 3.10*  --  3.02* 3.02*   HGB 7.5* 7.0* 7.4* 7.5*   HCT 25.2* 22.8* 24.8* 24.5*   MCV 81.3  --  82.1 81.1   MCH 24.2*  --  24.5* 24.8*   MCHC 29.8*  --  29.8* 30.6*   RDW 20.6*  --  20.4* 20.6*     --  271 312   MPV 9.6  --  9.6 9.6     CMP:    Lab Results   Component Value Date     10/12/2020    K 3.4 10/12/2020    K 4.0 09/27/2020     10/12/2020    CO2 28 10/12/2020    BUN 11 10/12/2020    CREATININE 0.4 10/12/2020    GFRAA >60 10/12/2020    LABGLOM >60 10/12/2020    GLUCOSE 161 10/12/2020    GLUCOSE 147 03/07/2012    PROT 6.1 10/05/2020    LABALBU 2.2 10/05/2020    LABALBU 4.1 03/07/2012    CALCIUM 8.3 10/12/2020    BILITOT <0.2 10/05/2020    ALKPHOS 96 10/05/2020    AST 27 10/05/2020    ALT 9 10/05/2020          Microbiology :  10/8 blood cx no growth  10/5 blood cx E faecium  10/2 blood cx 1  Bottle CN staph   MRSA screen neg  10/2 urine cx <10k GPOs, yeast  Urine legionella neg      Radiology :       Assessment and Plan:      · Septic shock-resolved  · Aspiration pneumonia  · Leukocytosis from sepsis-resolved  · Coccygeal ulcer not infected  · Coag neg staph abctremia-  contaminatiom  · Delirium-patient remains encephalopathic  · VRE bacteremia -unclear source, UA from 10/2/2020  looks not concerning for urine infection  · Rt thalamus acute infarct     PLAN  -f/u repeat blood cx  - daptomycin started 10/7/2020   -Keep on meropenem , day #11 x3 more days  Right brachial has a midline  -BRITTANY today  -Neurology following - acute rt thalamus infarct  -Expect IV antibiotics at discharge    Electronically signed by Megan Ballesteros MD on 10/12/2020 at 11:19 AM        Megan Ballesteros  10/12/2020  11:19 AM

## 2020-10-12 NOTE — PROGRESS NOTES
Nursing Transfer Note    Data:  Summary of patients progress: Successful recovery from BRITTANY. Reason for transfer: Phase II care complete. Action:  Explained reason for transfer to patient. Report given to: Hermilo Howard, using RN Handoff Navigator. Mode of transportation: Cart    Response:  RN Recommendations: Follow post operative orders.

## 2020-10-12 NOTE — PROGRESS NOTES
Call placed to Dr. Neymar De Leon re: tube feed resume/recommenditations. Per Dr. Neymar De Leon okay to order new dietitian recommendations.

## 2020-10-12 NOTE — CARE COORDINATION
Social work / Discharge planning:        Discharge plan continues to be home with [facility]. Family refuses [facility]. Patient will need new home care orders. Social work will continue to follow.   Electronically signed by CLEOPATRA Monsalve on 10/12/2020 at 11:05 AM

## 2020-10-12 NOTE — PROCEDURES
PRELIMINARY TRANSESOPHAGEAL ECHOCARDIOGRAPHY REPORT    Date of Procedure: 10/12/2020    Indication:  Bacteremia    Sedation: Propofol    Complications: None    Preliminary findings:  Normal LV function   Significant LVH  Normal RV function   No hemodynamically significant valve disease   No evidence of vegetations     No echocardiographic evidence of endocarditis    Full report to follow    Deandra Rosado MD, [facility]

## 2020-10-12 NOTE — PROGRESS NOTES
Physical Therapy       PT consult received, Per chart review Pt is long standing paraplegic.  Pt has home aides daily and they use adriana lift for transfers and pt is dependent for WC mobility.  Pt has no skilled PT needs at this time as she is at her functional baseline.   Will discharge pt from our service at this time as SW reports pt  Is returning home.

## 2020-10-12 NOTE — PLAN OF CARE
Problem: Inadequate oral food/beverage intake (NI-2.1)  Goal: Food and/or Nutrient Delivery  Continue NPO per SLP Rec's. Recommend to Modify TF Order to Bolus Feeds in prep for home TF use on d/c.  TF Rec:  Diabetic 1.5 - 240 ml Bolus - 5 Times/daily. Description: Individualized approach for food/nutrient provision.   Outcome: Met This Shift

## 2020-10-12 NOTE — PROGRESS NOTES
Notified AD Julian that there are two scripts for IV ATB for discharge present in light weight chart. 2112

## 2020-10-12 NOTE — ANESTHESIA POSTPROCEDURE EVALUATION
Department of Anesthesiology  Postprocedure Note    Patient: Halle Anthony  MRN: [id number]  YOB: 1950  Date of evaluation: 10/12/2020  Time:  5:59 PM     Procedure Summary     Date:  10/12/20 Room / Location:  [facility]    Anesthesia Start:  1351 Anesthesia Stop:  1422    Procedure:  TRANSESOPHAGEAL ECHO Diagnosis:      Scheduled Providers:   Responsible Provider:  Benjamin Ballard MD    Anesthesia Type:  MAC ASA Status:  4          Anesthesia Type: MAC    Braeden Phase I:      Braeden Phase II: Braeden Score: 10    Last vitals: Reviewed and per EMR flowsheets.        Anesthesia Post Evaluation    Patient location during evaluation: PACU  Patient participation: complete - patient cannot participate (baseline has some aphasia (same as preop))  Level of consciousness: awake  Airway patency: patent  Nausea & Vomiting: no vomiting and no nausea  Complications: no  Cardiovascular status: hemodynamically stable  Respiratory status: acceptable  Hydration status: stable

## 2020-10-12 NOTE — PROGRESS NOTES
[facility] Progress Note    Admitting Date and Time: 10/2/2020  6:36 AM  Admit Dx: Respiratory failure ([facility] [address] [J96.90]  Respiratory failure ([facility] [address] [J96.90]    Subjective:  Patient is being followed for Respiratory failure ([facility] [address] [J96.90]  Respiratory failure ([facility] [address] [J96.90]     Patient resting and sleeping in bed in no acute distress  Awakens easily  Reporting she feels \" good\"  No complaints  Denies sob  On RA  I was called by nursing yesterday evening pt was having several loose stools. Not sure if this is ongoing. Could not find nurse this am.            ROS: denies fever, chills, cp, sob, n/v, HA unless stated above.     metoprolol tartrate  25 mg Oral BID    potassium bicarb-citric acid  40 mEq Per G Tube Once    aspirin  81 mg PEG Tube Daily    daptomycin (CUBICIN) IVPB  400 mg Intravenous Q24H    heparin flush  1 mL Intravenous 2 times per day    enoxaparin  40 mg Subcutaneous Daily    folic acid  1 mg Oral Daily    allopurinol  100 mg Oral Daily    DULoxetine  60 mg Oral Daily    rosuvastatin  10 mg Oral Nightly    sodium chloride flush  10 mL Intravenous 2 times per day    insulin lispro  0-12 Units Subcutaneous Q4H    sodium chloride  1,000 mL Intravenous Once    meropenem  1 g Intravenous Q8H    pantoprazole  40 mg Intravenous Daily    And    sodium chloride (PF)  10 mL Intravenous Daily     potassium chloride, 40 mEq, PRN    Or  potassium alternative oral replacement, 40 mEq, PRN    Or  potassium chloride, 10 mEq, PRN  sodium chloride flush, 10 mL, PRN  heparin flush, 1 mL, PRN  sodium chloride flush, 10 mL, PRN  acetaminophen, 650 mg, Q6H PRN    Or  acetaminophen, 650 mg, Q6H PRN  polyethylene glycol, 17 g, Daily PRN  promethazine, 12.5 mg, Q6H PRN    Or  ondansetron, 4 mg, Q6H PRN  glucose, 15 g, PRN  dextrose, 12.5 g, PRN  glucagon (rDNA), 1 mg, PRN  dextrose, 100 mL/hr, PRN         Objective:    /72   Pulse 80   Temp 98 °F (36.7 °C) (Oral)   Resp 16   Ht 5' 4\" (1.626 m)   Wt 241 lb 9.6 oz (109.6 kg)   SpO2 97%   BMI 41.47 kg/m²   General Appearance: appears more awake and alert today. Able to state name/ hand grasps to command. able to state year/ and that she is in the hospital   Skin: warm and dry  Head: normocephalic and atraumatic  Neck: neck supple and non tender without mass   Pulmonary/Chest: diminished throughout to auscultation   Cardiovascular: normal rate, normal S1 and S2 and no carotid bruits  Abdomen: soft, non-tender, non-distended, normal bowel sounds- peg tube   Extremities: no cyanosis, no clubbing and no edema  Neurologic: TERESA         Recent Labs     10/10/20  0905 10/11/20  1158 10/12/20  1016    140 140   K 3.4* 3.5 3.4*    103 104   CO2 31* 30* 28   BUN 15 14 11   CREATININE 0.6 0.5 0.4*   GLUCOSE 189* 173* 161*   CALCIUM 8.4* 8.4* 8.3*       Recent Labs     10/10/20  0905 10/10/20  1700 10/11/20  1158 10/12/20  1016   WBC 7.4  --  9.6 8.2   RBC 3.10*  --  3.02* 3.02*   HGB 7.5* 7.0* 7.4* 7.5*   HCT 25.2* 22.8* 24.8* 24.5*   MCV 81.3  --  82.1 81.1   MCH 24.2*  --  24.5* 24.8*   MCHC 29.8*  --  29.8* 30.6*   RDW 20.6*  --  20.4* 20.6*     --  271 312   MPV 9.6  --  9.6 9.6           Assessment:    Active Problems:    Essential hypertension    Type 2 diabetes mellitus with hyperglycemia, without long-term current use of insulin (HCC)    Osteoarthritis    Hyperlipidemia    General weakness    Morbid obesity due to excess calories (HCC)    Wound of right buttock    Decubitus ulcer of sacral region    Respiratory failure (HCC)    Acute hypoxemic respiratory failure (HCC)    Community acquired pneumonia    Suspected COVID-19 virus infection    Abnormal urinalysis    Pneumonia due to COVID-19 virus    Recurrent episodes of unresponsiveness    Acute encephalopathy    Acute ischemic VBA thalamic stroke, right (HCC)  Resolved Problems:    * No resolved hospital problems.  *      Plan:  1.  Severe sepsis with septic shock: pt found unresponsive at home- EMS called. She was initially on mechanical ventilation/ pressors in ICU. Shock resolved. Thought to be related to aspiration vs HCAP/ VRE bacteremia/ acute CVA: ID following. solucortef weaned off.      2. Unresponsive episode: questionable etiology- likely metabolic/ + CVA. Patient's son reporting he checked her blood sugar when EMS called and was reported \" low. \"   on admission. No arrhythmia. CT head showed no acute pathology. No hx CNS pathology. MRI 10/8 showing acute CVA.     3. Encephalopathy: pt awake/ alert. Appears much more awake/ alert. ? Aphasia at times. Per son pt does this when stressed. ? If related to infection. Neurology consulted- appreciate input. EEG reviewed. MRI reviewed showing acute CVA.      4. Acute respiratory failure- likely related to pneumonia- aspiration. Pulmonology following. S/p extubation. On RA     5. VRE bacteremia: ID following. Repeat BC sent- prelim negative. Unable to do BRITTANY 10/9 due to hypokalemia- now planning today. On IV merrem/ IV daptomycin     6. Acute CVA right thalamus; MRI 10/8 - showing acute infarct within the right thalamus and probable infarcts within the ipsilateral hippocampus and occiptal love. Neurology stared on ASA. Lipid panel reviewed. On statin. Not a good candidate for acute rehab.       7. DM; hg a1c 5.3: hold oral meds. Per son pt hypoglycemic prior to arrival. On lantus. Stop lantus for now- nursing has been holding. And monitor BS     8. HTN; holding bp meds as bp was running low     9. Dysphagia: s/p peg tube: on TF     10. Electrolyte disturbances: supplement     11. Deconditioning: not ambulatory. In wheelchair at baseline     12. Chronic decubitus sacral ulcer stage III     13.  Acute on chronic anemia: hg slight decrease. No prior h/o GI bleed. CBC pending. Occult stool neg. Anemia panel reviewed.      14. ? SVT/ tachycardia: consult cardiology- appreciate input. Started on BB     15.  Constipation; bowel regimen- now with diarrhea. On TF monitor        Dispo: Son reporting plan is home with [facility] discussed case. Family is not interested in [address]. They feel that they have the support to take care of her at home. Discussed concerns with new acute CVA- son stating they still feel comfortable providing care at home.          NOTE: This report was transcribed using voice recognition software. Every effort was made to ensure accuracy; however, inadvertent computerized transcription errors may be present.   Electronically signed by DIXIE Epps on 10/12/2020 at 11:55 AM

## 2020-10-12 NOTE — PROGRESS NOTES
P Quality Flow/Interdisciplinary Rounds Progress Note        Quality Flow Rounds held on October 12, 2020    Disciplines Attending:  Bedside Nurse, ,  and Nursing Unit [address] was admitted on 10/2/2020  6:36 AM    Anticipated Discharge Date:  Expected Discharge Date: 10/09/20    Disposition:    Jayden Score:  Jayden Scale Score: 10    Readmission Risk              Risk of Unplanned Readmission:        28           Discussed patient goal for the day, patient clinical progression, and barriers to discharge. The following Goal(s) of the Day/Commitment(s) have been identified:  BRITTANY today.        Carlene Villalta  October 12, 2020

## 2020-10-12 NOTE — PROGRESS NOTES
Pulmonary Progress Note    Admit Date: 10/2/2020                            PCP: Feroz Schmidt MD  Active Problems:    Essential hypertension    Type 2 diabetes mellitus with hyperglycemia, without long-term current use of insulin (HCC)    Osteoarthritis    Hyperlipidemia    General weakness    Morbid obesity due to excess calories (HCC)    Wound of right buttock    Decubitus ulcer of sacral region    Respiratory failure (HCC)    Acute hypoxemic respiratory failure ([address]    Community acquired pneumonia    Suspected COVID-19 virus infection    Abnormal urinalysis    Pneumonia due to COVID-19 virus    Recurrent episodes of unresponsiveness    Acute encephalopathy    Acute ischemic VBA thalamic stroke, right ([address]  Resolved Problems:    * No resolved hospital problems. *      Subjective:  Resting in bed on room air, getting washed up. Denies dyspnea or cough.     Medications:   dextrose      sodium chloride 33.3 mL/hr at 10/12/20 0054        metoprolol tartrate  25 mg Oral BID    potassium bicarb-citric acid  40 mEq Per G Tube Once    aspirin  81 mg PEG Tube Daily    daptomycin (CUBICIN) IVPB  400 mg Intravenous Q24H    heparin flush  1 mL Intravenous 2 times per day    enoxaparin  40 mg Subcutaneous Daily    folic acid  1 mg Oral Daily    allopurinol  100 mg Oral Daily    DULoxetine  60 mg Oral Daily    rosuvastatin  10 mg Oral Nightly    sodium chloride flush  10 mL Intravenous 2 times per day    insulin lispro  0-12 Units Subcutaneous Q4H    sodium chloride  1,000 mL Intravenous Once    meropenem  1 g Intravenous Q8H    pantoprazole  40 mg Intravenous Daily    And    sodium chloride (PF)  10 mL Intravenous Daily       Vitals:  VITALS:  BP (!) 144/68   Pulse 86   Temp 98.9 °F (37.2 °C) (Oral)   Resp 18   Ht 5' 4\" (1.626 m)   Wt 241 lb 9.6 oz (109.6 kg)   SpO2 98%   BMI 41.47 kg/m²   24HR INTAKE/OUTPUT:      Intake/Output Summary (Last 24 hours) at 10/12/2020 0629  Last data filed at 10/12/2020 0644  Gross per 24 hour   Intake 419 ml   Output 800 ml   Net -381 ml     CURRENT PULSE OXIMETRY:  SpO2: 98 %  24HR PULSE OXIMETRY RANGE:  SpO2  Av %  Min: 96 %  Max: 98 %      EXAM:  General: Alert, in NAD  ENT: No discharge. Pharynx clear. membranes moist   Neck: Supple, Trachea midline. Resp: No accessory muscle use. Non-labored. Lungs clear diminished   CV: Regular rate. Regular rhythm. No murmur . No edema. ABD: Non-tender. Non-distended. Soft, round . Normal bowel sounds. + diarrhea   Skin: Warm and dry. DSD right neck   M/S: No cyanosis. No joint deformity. No clubbing. Neuro: Awake. Follows few commands. O x 1 DUNBAR, does not communicate much answers simple questions  Psych:  calm     I/O: I/O last 3 completed shifts: In: 419 [NG/GT:419]  Out: 800 [Urine:800]  No intake/output data recorded. Results:  CBC:   Recent Labs     10/10/20  0905 10/10/20  1700 10/11/20  1158   WBC 7.4  --  9.6   HGB 7.5* 7.0* 7.4*   HCT 25.2* 22.8* 24.8*   MCV 81.3  --  82.1     --  271     BMP:   Recent Labs     10/09/20  1642 10/10/20  0905 10/11/20  1158   NA  --  140 140   K 3.1* 3.4* 3.5   CL  --  104 103   CO2  --  31* 30*   PHOS  --  2.2* 2.2*   BUN  --  15 14   CREATININE  --  0.6 0.5     LFT: No results for input(s): ALKPHOS, ALT, AST, PROT, BILITOT, BILIDIR, LABALBU in the last 72 hours. PT/INR: No results for input(s): PROTIME, INR in the last 72 hours. Procalcitonin: Results for Zita Rainey (MRN [id number]) as of 10/12/2020 09:29   Ref. Range 10/2/2020 07:05   Procalcitonin Latest Ref Range: 0.00 - 0.08 ng/mL 0.09 (H)     Cultures:    Results for Zita Rainey (MRN [id number]) as of 10/12/2020 09:29   Ref. Range 10/2/2020 07:14   COVID-19 Unknown Rpt   SARS-CoV-2, NAAT Latest Ref Range: Not Detected  Not Detected           Results for Zita Rainey (MRN [id number]) as of 10/12/2020 09:29   Ref.  Range 10/5/2020 12:31   Bottle Type Unknown Aerobic   Source of Blood Culture Unknown No site given   Haemophilus Influenzae by PCR Latest Ref Range: Not Detected  Not Detected   Listeria monocytogenes by PCR Latest Ref Range: Not Detected  Not Detected   Neisseria meningitidis by PCR Latest Ref Range: Not Detected  Not Detected   Vancomycin Resistant by PCR Latest Ref Range: Not Detected  DETECTED (AA)   Enterococcus by PCR Latest Ref Range: Not Detected  DETECTED (AA)   Staphylococcus by PCR Latest Ref Range: Not Detected  Not Detected   Staphylococcus aureus by PCR Latest Ref Range: Not Detected  Not Detected   Streptococcus by PCR Latest Ref Range: Not Detected  Not Detected   Streptococcus agalactiae by PCR Latest Ref Range: Not Detected  Not Detected   Streptococcus pneumoniae by PCR Latest Ref Range: Not Detected  Not Detected   Streptococcus pyogenes  by PCR Latest Ref Range: Not Detected  Not Detected   Acinetobacter baumannii by PCR Latest Ref Range: Not Detected  Not Detected   Enterobacteriaceae by PCR Latest Ref Range: Not Detected  Not Detected   Enterobacter cloacae complex by PCR Latest Ref Range: Not Detected  Not Detected   Escherichia coli by PCR Latest Ref Range: Not Detected  Not Detected   Klebsiella oxytoca by PCR Latest Ref Range: Not Detected  Not Detected   Klebsiella pneumoniae by PCR Latest Ref Range: Not Detected  Not Detected   Proteus by PCR Latest Ref Range: Not Detected  Not Detected   Serratia marcescens by PCR Latest Ref Range: Not Detected  Not Detected   Pseudomonas aeruginosa by PCR Latest Ref Range: Not Detected  Not Detected   Candida albicans by PCR Latest Ref Range: Not Detected  Not Detected   Candida glabrata by PCR Latest Ref Range: Not Detected  Not Detected   Candida krusei by PCR Latest Ref Range: Not Detected  Not Detected   Candida parapsilosis by PCR Latest Ref Range: Not Detected  Not Detected   Candida tropicalis by PCR Latest Ref Range: Not Detected  Not Detected     Component  Collected  Lab    Organism Abnormal    10/05/2020 12:31 PM  [facility]    Enterococcus faecium    Testing Performed By     Lab - Abbreviation  Name  Director  Address  Valid Date Range    [facility] [facility] [address] [facility] [address] [facility]  Toney Harry MD  [address] [place] [address]. Mirella Quintana [id number]  12/03/19 1502-Present    Narrative   Performed by: [address] [place] [address] Lab   Source: BLOOD       Site:           Microbiology results called to and read back by Koki Steiner, 10/07/2020   08:38, by [facility]    Susceptibility     Enterococcus faecium (1)     Antibiotic  Interpretation  MADALYN  Status     ampicillin  Resistant  >=^32  mcg/mL      doxycycline  Sensitive  <=^0.5  mcg/mL      gentamicin-syn  Sensitive  SYN-S  mcg/mL      linezolid  Sensitive  ^2  mcg/mL      vancomycin  Resistant  >=^32  mcg/mL      DAPTOmycin  Sensitive  <=^4  mcg/mL      Lab and Collection         ABG:   Results for Dara John (MRN [id number]) as of 10/12/2020 09:29   Ref. Range 10/5/2020 06:56   Source: Unknown Blood Arterial   pH, Blood Gas Latest Ref Range: 7.350 - 7.450  7.546 (H)   PCO2 Latest Ref Range: 35.0 - 45.0 mmHg 29.3 (L)   pO2 Latest Ref Range: 75.0 - 100.0 mmHg 109.0 (H)   HCO3 Latest Ref Range: 22.0 - 26.0 mmol/L 24.8   Base Excess Latest Ref Range: -3.0 - 3.0 mmol/L 2.5   O2 Sat Latest Ref Range: 92.0 - 98.5 % 98.5   tHb (est) Latest Ref Range: 11.5 - 16.5 g/dL 8.2 (L)   O2Hb Latest Ref Range: 94.0 - 97.0 % 97.6 (H)   COHb Latest Ref Range: 0.0 - 1.5 % 0.6   MetHb Latest Ref Range: 0.0 - 1.5 % 0.3   HHb Latest Ref Range: 0.0 - 5.0 % 1.5   O2 Content Latest Units: mL/dL 11.5   Pt Temp Latest Units: C 37.0   Critical(s) Notified Unknown .  No Critical Values   Time Analyzed Unknown 0656   Mode Unknown NC- 2 L       Films:  Ct Abdomen Pelvis Wo Contrast Additional Contrast? None    Result Date: 10/2/2020  EXAMINATION: CT OF THE CHEST WITHOUT CONTRAST; CT OF THE ABDOMEN AND PELVIS WITHOUT CONTRAST 10/2/2020 8:55 am TECHNIQUE: CT of the chest was performed without the administration of intravenous contrast. Multiplanar reformatted images are provided for review. Dose modulation, iterative reconstruction, and/or weight based adjustment of the mA/kV was utilized to reduce the radiation dose to as low as reasonably achievable.; CT of the abdomen and pelvis was performed without the administration of intravenous contrast. Multiplanar reformatted images are provided for review. Dose modulation, iterative reconstruction, and/or weight based adjustment of the mA/kV was utilized to reduce the radiation dose to as low as reasonably achievable. COMPARISON: None. Correlated with chest radiograph from the same day. HISTORY: ORDERING SYSTEM PROVIDED HISTORY: recent suspected aspiration, new ground glass opacities on CXR. TECHNOLOGIST PROVIDED HISTORY: Reason for exam:->recent suspected aspiration, new ground glass opacities on CXR. What reading provider will be dictating this exam?->CRC; ORDERING SYSTEM PROVIDED HISTORY: hemoglobin 7.2 TECHNOLOGIST PROVIDED HISTORY: Reason for exam:->hemoglobin 7.2 Additional Contrast?->None What reading provider will be dictating this exam?->CRC FINDINGS: Chest: Mediastinum: An enteric tube terminates in the body of the stomach. The endotracheal tube terminates immediately cranial to the ritesh. Recommend retraction of 3-4 mm. Heart size is within normal limits. Coronary artery calcifications are present, potentially a marker for coronary artery disease. Trace pericardial fluid is present. The thoracic esophagus is decompressed, limiting assessment. No obvious esophageal abnormality. Scattered visible but nonenlarged mediastinal lymph nodes are present. Lungs/pleura: The central airways are patent. There is no evidence of a pleural effusion. There are extensive ground-glass and consolidative opacities throughout both lungs. No evidence of a pneumothorax.  Soft Tissues/Bones: No acute osseous abnormality. Bones are diffusely osteopenic and there is moderate osteophyte formation and intervertebral disc space narrowing throughout the thoracic spine. Moderate degenerative changes involve the shoulders bilaterally, right greater than left. Abdomen/Pelvis: Organs: The gallbladder contains a single large peripherally calcified stone. No gallbladder dilation or obvious wall thickening. The unenhanced liver is normal in appearance. The spleen and adrenal glands are normal in size. The pancreas demonstrates diffuse fatty atrophy but is otherwise unremarkable. The kidneys have a lobulated surface contour bilaterally. No hydronephrosis or radiopaque calculus. GI/Bowel: The stomach and duodenal sweep are within normal limits. No evidence of a bowel obstruction, free air or pneumatosis. Portions the colon are decompressed, limiting assessment for mucosal based abnormalities. The appendix is normal. Pelvis: The urinary bladder is decompressed around a Castano catheter. The uterus and ovaries are not well visualized and are either surgically absent or severely atrophic. No free fluid is identified in the pelvis and there is no pelvic lymphadenopathy. Scattered visible but nonenlarged pelvic lymph nodes are present. Peritoneum/Retroperitoneum: The abdominal aorta is normal in caliber with scattered atherosclerotic plaques. No retroperitoneal lymphadenopathy. No enlarged mesenteric lymph nodes are identified. Bones/Soft Tissues: There is a decubitus ulcer noted inferiorly, measuring 3.6 x 1.3 cm. This appears to extend to the level of the coccyx, with a small locule of gas that appears to abut or come in contact with the inferior most portion of the coccyx. There are advanced degenerative changes involving the hips bilaterally. Moderate to severe degenerative changes involve the bilateral sacroiliac joints and lower lumbar spine.   Vertebral bodies have a heterogeneous attenuation pattern but are predominantly hyperdense. Extensive ground-glass and consolidative opacities throughout both lungs, in a pattern highly suspicious for COVID pneumonia. Please note that the endotracheal tube terminates at the level of the ritesh. Recommend retracting the endotracheal tube by 3-4 cm. No acute inflammatory or obstructive process in the abdomen or pelvis. Cholelithiasis without evidence of acute cholecystitis. Diffusely heterogeneous and relatively hyperdense vertebral bodies, a nonspecific finding. Diffuse metastatic disease cannot be excluded. Recommend clinical correlation. Correlation could also be made with nuclear medicine bone scan, if clinically appropriate. Decubitus ulcer with at least 1 gas locule that may come into contact with the lower most portion of the coccyx. Osteomyelitis cannot be excluded at this level. Ct Head Wo Contrast    Result Date: 10/2/2020  EXAMINATION: CT OF THE HEAD WITHOUT CONTRAST  10/2/2020 7:55 am TECHNIQUE: CT of the head was performed without the administration of intravenous contrast. Dose modulation, iterative reconstruction, and/or weight based adjustment of the mA/kV was utilized to reduce the radiation dose to as low as reasonably achievable. COMPARISON: None. 09/24/2020 HISTORY: ORDERING SYSTEM PROVIDED HISTORY: AMS TECHNOLOGIST PROVIDED HISTORY: Reason for exam:->AMS Has a \"code stroke\" or \"stroke alert\" been called? ->No FINDINGS: BRAIN/VENTRICLES: There is no acute intracranial hemorrhage, mass effect or midline shift. No abnormal extra-axial fluid collection. The gray-white differentiation is maintained without evidence of an acute infarct. Ventricles and sulci are within normal limits in size. There are nonspecific areas of hypoattenuation within the periventricular and subcortical white matter, which likely represent chronic microvascular ischemic change. ORBITS: The visualized portion of the orbits demonstrate no acute abnormality.  SINUSES: The visualized paranasal sinuses and mastoid air cells demonstrate no acute abnormality. SOFT TISSUES/SKULL: No acute abnormality of the visualized skull or soft tissues. No acute intracranial abnormality. Ct Chest Wo Contrast    Result Date: 10/2/2020  EXAMINATION: CT OF THE CHEST WITHOUT CONTRAST; CT OF THE ABDOMEN AND PELVIS WITHOUT CONTRAST 10/2/2020 8:55 am TECHNIQUE: CT of the chest was performed without the administration of intravenous contrast. Multiplanar reformatted images are provided for review. Dose modulation, iterative reconstruction, and/or weight based adjustment of the mA/kV was utilized to reduce the radiation dose to as low as reasonably achievable.; CT of the abdomen and pelvis was performed without the administration of intravenous contrast. Multiplanar reformatted images are provided for review. Dose modulation, iterative reconstruction, and/or weight based adjustment of the mA/kV was utilized to reduce the radiation dose to as low as reasonably achievable. COMPARISON: None. Correlated with chest radiograph from the same day. HISTORY: ORDERING SYSTEM PROVIDED HISTORY: recent suspected aspiration, new ground glass opacities on CXR. TECHNOLOGIST PROVIDED HISTORY: Reason for exam:->recent suspected aspiration, new ground glass opacities on CXR. What reading provider will be dictating this exam?->CRC; ORDERING SYSTEM PROVIDED HISTORY: hemoglobin 7.2 TECHNOLOGIST PROVIDED HISTORY: Reason for exam:->hemoglobin 7.2 Additional Contrast?->None What reading provider will be dictating this exam?->CRC FINDINGS: Chest: Mediastinum: An enteric tube terminates in the body of the stomach. The endotracheal tube terminates immediately cranial to the ritesh. Recommend retraction of 3-4 mm. Heart size is within normal limits. Coronary artery calcifications are present, potentially a marker for coronary artery disease. Trace pericardial fluid is present. The thoracic esophagus is decompressed, limiting assessment. locule of gas that appears to abut or come in contact with the inferior most portion of the coccyx. There are advanced degenerative changes involving the hips bilaterally. Moderate to severe degenerative changes involve the bilateral sacroiliac joints and lower lumbar spine. Vertebral bodies have a heterogeneous attenuation pattern but are predominantly hyperdense. Extensive ground-glass and consolidative opacities throughout both lungs, in a pattern highly suspicious for COVID pneumonia. Please note that the endotracheal tube terminates at the level of the ritesh. Recommend retracting the endotracheal tube by 3-4 cm. No acute inflammatory or obstructive process in the abdomen or pelvis. Cholelithiasis without evidence of acute cholecystitis. Diffusely heterogeneous and relatively hyperdense vertebral bodies, a nonspecific finding. Diffuse metastatic disease cannot be excluded. Recommend clinical correlation. Correlation could also be made with nuclear medicine bone scan, if clinically appropriate. Decubitus ulcer with at least 1 gas locule that may come into contact with the lower most portion of the coccyx. Osteomyelitis cannot be excluded at this level. Xr Chest Portable  10/6:  Patchy bilateral airspace disease which could represent pneumonia or    vascular congestion. 2. Status post removal of the endotracheal tube. Result Date: 10/3/2020  EXAMINATION: ONE XRAY VIEW OF THE CHEST 10/3/2020 6:58 am COMPARISON: 10/2020 HISTORY: ORDERING SYSTEM PROVIDED HISTORY: resp failure TECHNOLOGIST PROVIDED HISTORY: Reason for exam:->resp failure FINDINGS: There is no interval change in the multifocal bilateral pulmonary infiltrates more prominent on the right. There is stable position of the support lines and tubes. There is no pleural effusion. The heart is not enlarged. 1. No interval change in extensive bilateral multifocal infiltrates more prominent within the right lung.    10/6 10/4                               10/2      Assessment:  1.) Severe Sepsis with Septic Shock - aspiration vs HCAP    - shock resolved, off pressors and off mechanical ventilation   2.) Acute Respiratory Failure with Hypoxia  3.) Aspiration Pneumonia vs HCAP  4.) VRE Bacteremia/Septicemia  5.) Hypokalemia   6.) Hypomagnesemia  7.) Hypophosphatemia          Plan:  · Extubated 10/4, intubated x 3 days on pressors, now off x8 days  · Keep O2  90-95%, o2 as needed on RA without issues  · ON Merrem day #11  and Dapto day #6  per ID. · For BRITTANY today   · Neuro following for confusion/encephalopathy Right thalamic infarct?   · Cardiology following for SVT  · IS for pulmonary hygiene       Electronically signed by IVON Reeder on 10/12/2020 at 9:27 AM

## 2020-10-12 NOTE — ANESTHESIA PRE PROCEDURE
daily 7/23/20 11/20/20  Kush Anaya MD   RA Alcohol Swabs 70 % PADS USE WITH INJECTIONS TWICE A DAY AND CHECKING BLOOD SUGAR TWICE A DAY 2/17/20   Leslie Coyle MD   blood glucose test strips (ACCU-CHEK RUI PLUS) strip TEST twice a day FASTING BEFORE BREAKFAST AND SUPPER 11/6/19   Leslie Coyle MD   ACCU-CHEK MULTICLIX LANCETS MISC Check BS Twice daily 11/6/19   Leslie Coyle MD   acetaminophen (TYLENOL) 325 MG tablet Take 2 tablets by mouth every 4 hours as needed for Pain 11/6/19   Leslie Coyle MD   Insulin Pen Needle (B-D UF III MINI PEN NEEDLES) 31G X 5 MM MISC use twice a day 11/6/19   Leslie Coyle MD   Insulin Pen Needle (B-D ULTRAFINE III SHORT PEN) 31G X 8 MM MISC Inject 1 kit into the skin 2 times daily 11/6/19   Leslie Coyle MD   vitamin D (ERGOCALCIFEROL) 1.25 MG (90257 UT) CAPS capsule Take 1 capsule by mouth once a week 11/6/19   Leslie Coyle MD   Misc. Devices MISC Wheelchair cushion 6/5/17   Gardenia Swift MD   Mis. Devices (MATTRESS PAD) MISC 1 each by Does not apply route once for 1 dose 6/5/17 6/5/17  Gardenia Swift MD   Misc. Devices KIT 1 kit by Does not apply route daily as needed (leg edema) Compression stockings for bilateral leg swelling 6/5/17   Gardenia Swift MD   Mis. [address] Bed with gel overlay dsp # 1  Pt has Osteoarthritis and is Morbidly Obese 4/4/16   Warren Kwong DO   Misc. Devices KIT 1 kit by Does not apply route daily as needed Foam mattress for bilateral knees osteoarthritis , degenerative back osteoarthitis 3/31/16   Stephania Roy MD   Misc. Devices [address] [facility] chair to assist with ADL with diagnosis of OA and full thickness knee meniscal tear 2/24/15   Franchesca Hathaway MD   Misc. Devices MISC Grab bar  diagnosis osteoarthritis and full thickness knee meniscal tear 2/24/15   Franchesca Hathaway MD   Misc. [address] bed 11/7/14   Octavio Demarco MD   Misc. Devices [address] [facility] wheelchair 11/7/14   Octavio Demarco MD   Misc. Devices MISC Bedside commode.  Dx osteoarthritis release capsule 60 mg  60 mg Oral Daily Erik Kelly MD   60 mg at 10/11/20 6339    rosuvastatin (CRESTOR) tablet 10 mg  10 mg Oral Nightly Moon Bernstein MD   10 mg at 10/11/20 2155    sodium chloride flush 0.9 % injection 10 mL  10 mL Intravenous 2 times per day Moon Bernstein MD   10 mL at 10/11/20 0910    sodium chloride flush 0.9 % injection 10 mL  10 mL Intravenous PRN Moon Bernstein MD   10 mL at 10/10/20 0913    acetaminophen (TYLENOL) tablet 650 mg  650 mg Oral Q6H PRN Moon Bernstein MD        Or   [place] [facility] acetaminophen (TYLENOL) suppository 650 mg  650 mg Rectal Q6H PRN Moon Bernstein MD        polyethylene glycol (GLYCOLAX) packet 17 g  17 g Oral Daily PRN Moon Bernstein MD        promethazine (PHENERGAN) tablet 12.5 mg  12.5 mg Oral Q6H PRN Moon Bernstein MD        Or    ondansetron [facility]) injection 4 mg  4 mg Intravenous Q6H PRN Moon Bernstein MD        glucose (GLUTOSE) 40 % oral gel 15 g  15 g Oral PRN Moon Bernstein MD        dextrose 50 % IV solution  12.5 g Intravenous PRN Moon Bernstein MD   12.5 g at 10/04/20 1611    glucagon (rDNA) injection 1 mg  1 mg Intramuscular PRN Moon Bernstein MD        dextrose 5 % solution  100 mL/hr Intravenous PRN Moon Bernstein MD        insulin lispro (HUMALOG) injection vial 0-12 Units  0-12 Units Subcutaneous Q4H Moon Bernstein MD   Stopped at 10/12/20 1133    0.9 % sodium chloride bolus  1,000 mL Intravenous Once Moon Bernstein MD   Stopped at 10/02/20 1455    meropenem (MERREM) 1 g in sodium chloride 0.9 % 100 mL IVPB (mini-bag)  1 g Intravenous Karthik Hernadez MD   Stopped at 10/12/20 1000    0.9 % sodium chloride infusion   Intravenous Q8H Moon Bernstein MD 33.3 mL/hr at 10/12/20 0957      pantoprazole (PROTONIX) injection 40 mg  40 mg Intravenous Daily Moon Bernstein MD   40 mg at 10/11/20 0910    And    sodium chloride (PF) 0.9 % injection 10 mL  10 mL Intravenous Daily Michael Judge MD   10 mL at 10/11/20 0911       Allergies:  No Known Allergies    Problem List:    Patient Active Problem List   Diagnosis Code    Essential hypertension I10    Type 2 diabetes mellitus with hyperglycemia, without long-term current use of insulin ([facility]) E11.65    Osteoarthritis M19.90    Hyperlipidemia E78.5    Chronic sinusitis J32.9    Eczema L30.9    Primary osteoarthritis involving multiple joints M89.49    Frequent falls R29.6    General weakness R53.1    Morbid obesity due to excess calories ([facility]) E66.01    Normochromic normocytic anemia D64.9    Wound of right buttock S31.819A    Decubitus ulcer of right buttock, stage 3 ([facility]) L89.313    Decubitus ulcer of sacral region L89.159    Hypokalemia E87.6    Leukocytosis D72.829    Cellulitis of sacral region L03.319    Hypomagnesemia E83.42    AMS (altered mental status) R41.82    UTI (urinary tract infection) N39.0    Severe protein-calorie malnutrition (HCC) E43    Respiratory failure ([facility]) J96.90    Acute hypoxemic respiratory failure ([facility]) J96.01    Community acquired pneumonia J18.9    Suspected COVID-19 virus infection Z20.828    Abnormal urinalysis R82.90    Pneumonia due to COVID-19 virus U07.1, J12.89    Recurrent episodes of unresponsiveness R41.89    Acute encephalopathy G93.40    Acute ischemic VBA thalamic stroke, right ([facility]) X37.461, L12.33       Past Medical History:        Diagnosis Date    Cataract, right     DM (diabetes mellitus), type 2 ([facility] [address] 11/3/2010    HTN (hypertension) 11/3/2010    Hyperlipidemia 11/3/2010    Obesity     Osteoarthritis 11/3/2010    Sinusitis chronic     seasonal    Wheelchair confinement     can pivot with assistance;  uses a lift chair       Past Surgical History:        Procedure Laterality Date    GASTROSTOMY TUBE PLACEMENT N/A 10/7/2020    EGD PEG TUBE PLACEMENT performed by Prateek Boswell MD at [facility] INSJ IO LENS PROSTH W/O ECP Right 8/10/2018    RIGHT EYE CATARACT EXTRACTION WITH  IOL IMPLANT performed by Natalio English MD at [address] [place] [address] History:    Social History     Tobacco Use    Smoking status: Never Smoker    Smokeless tobacco: Never Used   Substance Use Topics    Alcohol use: No     Alcohol/week: 0.0 standard drinks                                Counseling given: Not Answered      Vital Signs (Current):   Vitals:    10/11/20 1543 10/11/20 2145 10/12/20 0156 10/12/20 0830   BP: 112/75 (!) 144/68  122/72   Pulse: 88 86  80   Resp: 18 18  16   Temp: 97.5 °F (36.4 °C) 98.9 °F (37.2 °C)  98 °F (36.7 °C)   TempSrc: Axillary Oral  Oral   SpO2: 96% 98%  97%   Weight:   241 lb 9.6 oz (109.6 kg)    Height:                                                  BP Readings from Last 3 Encounters:   10/12/20 122/72   10/07/20 (!) 144/108   09/28/20 (!) 165/72       NPO Status: Time of last liquid consumption: 0445                        Date of last solid food consumption: 10/08/20(TF stopped at 2350)    BMI:   Wt Readings from Last 3 Encounters:   10/12/20 241 lb 9.6 oz (109.6 kg)   09/28/20 206 lb 14.4 oz (93.8 kg)   09/22/20 202 lb (91.6 kg)     Body mass index is 41.47 kg/m².     CBC:   Lab Results   Component Value Date    WBC 8.2 10/12/2020    RBC 3.02 10/12/2020    HGB 7.5 10/12/2020    HCT 24.5 10/12/2020    MCV 81.1 10/12/2020    RDW 20.6 10/12/2020     10/12/2020       CMP:   Lab Results   Component Value Date     10/12/2020    K 3.4 10/12/2020    K 4.0 09/27/2020     10/12/2020    CO2 28 10/12/2020    BUN 11 10/12/2020    CREATININE 0.4 10/12/2020    GFRAA >60 10/12/2020    LABGLOM >60 10/12/2020    GLUCOSE 161 10/12/2020    GLUCOSE 147 03/07/2012    PROT 6.1 10/05/2020    CALCIUM 8.3 10/12/2020    BILITOT <0.2 10/05/2020    ALKPHOS 96 10/05/2020    AST 27 10/05/2020    ALT 9 10/05/2020       POC Tests: No results for input(s): POCGLU, POCNA, POCK, POCCL, POCBUN, POCHEMO, POCHCT in the last 72 hours. Coags: No results found for: PROTIME, INR, APTT    HCG (If Applicable): No results found for: PREGTESTUR, PREGSERUM, HCG, HCGQUANT     ABGs: No results found for: PHART, PO2ART, AZA0VAT, EOE2ZSN, BEART, K9GUNDBA     Type & Screen (If Applicable):  No results found for: LABABO, LABRH    Drug/Infectious Status (If Applicable):  No results found for: HIV, HEPCAB    COVID-19 Screening (If Applicable):   Lab Results   Component Value Date    COVID19 Not Detected 10/02/2020       EKG 3/9/17:  Normal sinus rhythm  Normal ECG  No previous ECGs available  HR 79bpm    Anesthesia Evaluation  Patient summary reviewed no history of anesthetic complications:   Airway: Mallampati: III  TM distance: >3 FB   Neck ROM: full  Mouth opening: < 3 FB Dental:      Comment: Pt has very poor dentition, only has 3 teeth     Pulmonary:   (+) pneumonia:  shortness of breath: chronic,  decreased breath sounds,                            ROS comment: Per notes -->  \" Severe sepsis with septic shock: pt found unresponsive at home- EMS called. She was initially on mechanical ventilation/ pressors in ICU. Shock resolved. Thought to be related to aspiration vs HCAP/ VRE bacteremia/ acute CVA: ID following. solucortef weaned off. \"     Cardiovascular:  Exercise tolerance: poor (<4 METS),   (+) hypertension:, dysrhythmias: SVT, hyperlipidemia        Rhythm: regular  Rate: normal                 ROS comment: Confined to wheelchair     Neuro/Psych:   (+) CVA:, depression/anxiety              ROS comment: Per notes --> \"Acute CVA right thalamus; MRI 10/8 - showing acute infarct within the right thalamus and probable infarcts within the ipsilateral hippocampus and occiptal lobe. Aphasia \" GI/Hepatic/Renal:   (+) morbid obesity         ROS comment: S/p PEG on 10/7/20.    Endo/Other:    (+) DiabetesType II DM, poorly controlled,

## 2020-10-12 NOTE — PROGRESS NOTES
edema, +I/O's; EGD/PEG 10/7      Wounds:  Pressure Injury, Stage IV, Multiple, Skin Tears       Current Nutrition Therapies:    Current Tube Feeding (TF) Orders:  · Feeding Route: PEG  · Formula: 1.5 Diabetic  · Schedule: Continuous  · Additives/Modulars: (none)  · Water Flushes: Prior to NPO; 140 ml flush q 4 hrs= 840 ml total flush/d  · Current TF & Flush Orders Provides: NPO for BRITTANY  · Goal TF & Flush Orders Provides: 1800 kcals, 99 gm Pro, 1751 ml total water (TF+Flush)      Anthropometric Measures:  · Height: 5' 4\" (162.6 cm)  · Current Body Weight: 241 lb (109.3 kg)(bed 10/12)   · Admission Body Weight: 211 lb (95.7 kg)(10/2 bedscale)    · Usual Body Weight: 202 lb (91.6 kg)(noted possible wt loss per EMR wt of 262# bed 7/12/20 however unable to properly assess wt loss at this time 2/2 noted multiple wt fluctuations per this adm as well as previous admits)     · Ideal Body Weight: 120 lbs; % Ideal Body Weight 189.2 %   · BMI: 41.3  · Adjusted Body Weight:  ; No Adjustment   · Adjusted BMI:      · BMI Categories: Obese Class 2 (BMI 35.0 -39.9)(per ABW)       Nutrition Diagnosis:   · Inadequate oral intake related to cognitive or neurological impairment(2/2 CVA) as evidenced by NPO or clear liquid status due to medical condition, nutrition support - enteral nutrition, swallow study results    · Increased nutrient needs related to increase demand for energy/nutrients(2/2 Wound Healing) as evidenced by wounds      Nutrition Interventions:   Food and/or Nutrient Delivery:  Continue NPO, Modify Tube Feeding(Continue NPO per SLP Rec's. Recommend to Modify TF Order to Bolus Feeds in prep for home TF use on d/c.  TF Rec:  Diabetic 1.5 - 240 ml Bolus - 5 Times/daily.)  Nutrition Education/Counseling:  No recommendation at this time   Coordination of Nutrition Care:  Continued Inpatient Monitoring    Goals:  Nutrition Progression.        Nutrition Monitoring and Evaluation:   Behavioral-Environmental Outcomes:  (n/a)

## 2020-10-12 NOTE — PROGRESS NOTES
Call placed to Dr. Lauren Vazquez re: santos insertion. Okay for santos to be inserted at this time.

## 2020-10-12 NOTE — PROGRESS NOTES
Speech Language Pathology      NAME:  Enrrique Ramos  :  1950  DATE: 10/12/2020  ROOM:  [id number]         Attempted dysphagia therapy in PM. Pt off unit for testing/procedure. SLP to re attempt as able. Respiratory failure ([address] [J96.90]  Respiratory failure ([address] [J96.90]        Karlie CALL CCC/SLP [phone]  Speech-Language Pathologist

## 2020-10-13 NOTE — CARE COORDINATION
Social work / Discharge Planning:        [facility] cannot start services until tomorrow. RN updated on need to hold discharge. Signed scripts faxed to [facility] with plans to start services tomorrow.   .Electronically signed by CLEOPATRA Cunha on 10/13/2020 at 1:24 PM

## 2020-10-13 NOTE — PROGRESS NOTES
[place] NP notified via telephone [facility] cannot see patient until tomorrow.

## 2020-10-13 NOTE — PROGRESS NOTES
P Quality Flow/Interdisciplinary Rounds Progress Note        Quality Flow Rounds held on October 13, 2020    Disciplines Attending:  Bedside Nurse,  and     [address] Cid was admitted on 10/2/2020  6:36 AM    Anticipated Discharge Date:  Expected Discharge Date: 10/09/20    Disposition:    Jayden Score:  Jayden Scale Score: 10    Readmission Risk              Risk of Unplanned Readmission:        26           Discussed patient goal for the day, patient clinical progression, and barriers to discharge. The following Goal(s) of the Day/Commitment(s) have been identified:  discharge later today.       Michelle Randhawa  October 13, 2020

## 2020-10-13 NOTE — PROGRESS NOTES
Reviewed peg tube feeding teaching with son. He verbalized understanding. Stated his wife is a LPN and is familiar as well.

## 2020-10-13 NOTE — PROGRESS NOTES
Pulmonary Progress Note    Admit Date: 10/2/2020                            PCP: Jackie Joseph MD  Active Problems:    Essential hypertension    Type 2 diabetes mellitus with hyperglycemia, without long-term current use of insulin (HCC)    Osteoarthritis    Hyperlipidemia    General weakness    Morbid obesity due to excess calories (HCC)    Wound of right buttock    Decubitus ulcer of sacral region    Respiratory failure (HCC)    Acute hypoxemic respiratory failure ([address]    Community acquired pneumonia    Suspected COVID-19 virus infection    Abnormal urinalysis    Pneumonia due to COVID-19 virus    Recurrent episodes of unresponsiveness    Acute encephalopathy    Acute ischemic VBA thalamic stroke, right ([address]  Resolved Problems:    * No resolved hospital problems. *      Subjective:  Resting in bed on room air, slightly more responsive today.  Denies dyspnea     Medications:   dextrose      sodium chloride 33.3 mL/hr at 10/12/20 0957        potassium & sodium phosphates  1 packet Per G Tube 4x Daily    metoprolol tartrate  25 mg Oral BID    potassium bicarb-citric acid  40 mEq Per G Tube Once    aspirin  81 mg PEG Tube Daily    daptomycin (CUBICIN) IVPB  400 mg Intravenous Q24H    heparin flush  1 mL Intravenous 2 times per day    enoxaparin  40 mg Subcutaneous Daily    folic acid  1 mg Oral Daily    allopurinol  100 mg Oral Daily    DULoxetine  60 mg Oral Daily    rosuvastatin  10 mg Oral Nightly    sodium chloride flush  10 mL Intravenous 2 times per day    insulin lispro  0-12 Units Subcutaneous Q4H    sodium chloride  1,000 mL Intravenous Once    meropenem  1 g Intravenous Q8H    pantoprazole  40 mg Intravenous Daily    And    sodium chloride (PF)  10 mL Intravenous Daily       Vitals:  VITALS:  BP (!) 186/93   Pulse 82   Temp 98.3 °F (36.8 °C) (Axillary)   Resp 16   Ht 5' 4\" (1.626 m)   Wt 241 lb 9.6 oz (109.6 kg)   SpO2 99%   BMI 41.47 kg/m²   24HR INTAKE/OUTPUT:      Intake/Output Summary (Last 24 hours) at 10/13/2020 1027  Last data filed at 10/13/2020 1006  Gross per 24 hour   Intake 800 ml   Output 3500 ml   Net -2700 ml     CURRENT PULSE OXIMETRY:  SpO2: 99 %  24HR PULSE OXIMETRY RANGE:  SpO2  Av.5 %  Min: 24 %  Max: 100 %      EXAM:  General: Alert, in NAD  ENT: No discharge. Pharynx clear. membranes moist   Neck: Supple, Trachea midline. Resp: No accessory muscle use. Non-labored. Lungs clear diminished   CV: Regular rate. Regular rhythm. No murmur . No edema. ABD: Non-tender. Non-distended. Soft, round . Normal bowel sounds. + diarrhea   Skin: Warm and dry. DSD right neck   M/S: No cyanosis. No joint deformity. No clubbing. Neuro: Awake. Follows few commands. O x 1 DUNBAR, does not communicate much answers simple questions  Psych:  calm     I/O: I/O last 3 completed shifts: In: 700 [I.V.:700]  Out: 2750 [Urine:2750]  I/O this shift:  In: 100 [I.V.:100]  Out: 750 [Urine:750]     Results:  CBC:   Recent Labs     10/11/20  1158 10/12/20  1016 10/13/20  0921   WBC 9.6 8.2 8.1   HGB 7.4* 7.5* 7.6*   HCT 24.8* 24.5* 25.3*   MCV 82.1 81.1 81.1    312 355     BMP:   Recent Labs     10/11/20  1158 10/12/20  1016    140   K 3.5 3.4*    104   CO2 30* 28   PHOS 2.2* 2.0*   BUN 14 11   CREATININE 0.5 0.4*     LFT: No results for input(s): ALKPHOS, ALT, AST, PROT, BILITOT, BILIDIR, LABALBU in the last 72 hours. PT/INR: No results for input(s): PROTIME, INR in the last 72 hours. Procalcitonin: Results for Mike Felix (MRN [id number]) as of 10/12/2020 09:29   Ref. Range 10/2/2020 07:05   Procalcitonin Latest Ref Range: 0.00 - 0.08 ng/mL 0.09 (H)     Cultures:    Results for Mike Felix (MRN [id number]) as of 10/12/2020 09:29   Ref. Range 10/2/2020 07:14   COVID-19 Unknown Rpt   SARS-CoV-2, NAAT Latest Ref Range: Not Detected  Not Detected           Results for Mike Felix (MRN [id number]) as of 10/12/2020 09:29   Ref.  Range 10/5/2020 12:31 Bottle Type Unknown Aerobic   Source of Blood Culture Unknown No site given   Haemophilus Influenzae by PCR Latest Ref Range: Not Detected  Not Detected   Listeria monocytogenes by PCR Latest Ref Range: Not Detected  Not Detected   Neisseria meningitidis by PCR Latest Ref Range: Not Detected  Not Detected   Vancomycin Resistant by PCR Latest Ref Range: Not Detected  DETECTED (AA)   Enterococcus by PCR Latest Ref Range: Not Detected  DETECTED (AA)   Staphylococcus by PCR Latest Ref Range: Not Detected  Not Detected   Staphylococcus aureus by PCR Latest Ref Range: Not Detected  Not Detected   Streptococcus by PCR Latest Ref Range: Not Detected  Not Detected   Streptococcus agalactiae by PCR Latest Ref Range: Not Detected  Not Detected   Streptococcus pneumoniae by PCR Latest Ref Range: Not Detected  Not Detected   Streptococcus pyogenes  by PCR Latest Ref Range: Not Detected  Not Detected   Acinetobacter baumannii by PCR Latest Ref Range: Not Detected  Not Detected   Enterobacteriaceae by PCR Latest Ref Range: Not Detected  Not Detected   Enterobacter cloacae complex by PCR Latest Ref Range: Not Detected  Not Detected   Escherichia coli by PCR Latest Ref Range: Not Detected  Not Detected   Klebsiella oxytoca by PCR Latest Ref Range: Not Detected  Not Detected   Klebsiella pneumoniae by PCR Latest Ref Range: Not Detected  Not Detected   Proteus by PCR Latest Ref Range: Not Detected  Not Detected   Serratia marcescens by PCR Latest Ref Range: Not Detected  Not Detected   Pseudomonas aeruginosa by PCR Latest Ref Range: Not Detected  Not Detected   Candida albicans by PCR Latest Ref Range: Not Detected  Not Detected   Candida glabrata by PCR Latest Ref Range: Not Detected  Not Detected   Candida krusei by PCR Latest Ref Range: Not Detected  Not Detected   Candida parapsilosis by PCR Latest Ref Range: Not Detected  Not Detected   Candida tropicalis by PCR Latest Ref Range: Not Detected  Not Detected     Component Collected  Lab    Organism Abnormal    10/05/2020 12:31 PM  [address] [facility]    Enterococcus faecium    Testing Performed By     Lab - Abbreviation  Name  Director  Address  Valid Date Range    21-FJ - [address] [facility]. [address] [facility]  Tresa Patel MD  [address] [place] [address]. [address] [place], [place] [id number]  12/03/19 1502-Present    Narrative   Performed by: [address] [place] [address] Lab   Source: BLOOD       Site:           Microbiology results called to and read back by Koki Steiner, 10/07/2020   08:38, by [facility]    Susceptibility     Enterococcus faecium (1)     Antibiotic  Interpretation  MADALYN  Status     ampicillin  Resistant  >=^32  mcg/mL      doxycycline  Sensitive  <=^0.5  mcg/mL      gentamicin-syn  Sensitive  SYN-S  mcg/mL      linezolid  Sensitive  ^2  mcg/mL      vancomycin  Resistant  >=^32  mcg/mL      DAPTOmycin  Sensitive  <=^4  mcg/mL      Lab and Collection         ABG:   Results for Florida Humphries (MRN [id number]) as of 10/12/2020 09:29   Ref. Range 10/5/2020 06:56   Source: Unknown Blood Arterial   pH, Blood Gas Latest Ref Range: 7.350 - 7.450  7.546 (H)   PCO2 Latest Ref Range: 35.0 - 45.0 mmHg 29.3 (L)   pO2 Latest Ref Range: 75.0 - 100.0 mmHg 109.0 (H)   HCO3 Latest Ref Range: 22.0 - 26.0 mmol/L 24.8   Base Excess Latest Ref Range: -3.0 - 3.0 mmol/L 2.5   O2 Sat Latest Ref Range: 92.0 - 98.5 % 98.5   tHb (est) Latest Ref Range: 11.5 - 16.5 g/dL 8.2 (L)   O2Hb Latest Ref Range: 94.0 - 97.0 % 97.6 (H)   COHb Latest Ref Range: 0.0 - 1.5 % 0.6   MetHb Latest Ref Range: 0.0 - 1.5 % 0.3   HHb Latest Ref Range: 0.0 - 5.0 % 1.5   O2 Content Latest Units: mL/dL 11.5   Pt Temp Latest Units: C 37.0   Critical(s) Notified Unknown .  No Critical Values   Time Analyzed Unknown 0656   Mode Unknown NC- 2 L       Films:  Ct Abdomen Pelvis Wo Contrast Additional Contrast? None    Result Date: 10/2/2020  EXAMINATION: CT OF THE CHEST WITHOUT CONTRAST; CT OF THE ABDOMEN AND PELVIS WITHOUT CONTRAST 10/2/2020 8:55 am TECHNIQUE: CT of the chest was performed without the administration of intravenous contrast. Multiplanar reformatted images are provided for review. Dose modulation, iterative reconstruction, and/or weight based adjustment of the mA/kV was utilized to reduce the radiation dose to as low as reasonably achievable.; CT of the abdomen and pelvis was performed without the administration of intravenous contrast. Multiplanar reformatted images are provided for review. Dose modulation, iterative reconstruction, and/or weight based adjustment of the mA/kV was utilized to reduce the radiation dose to as low as reasonably achievable. COMPARISON: None. Correlated with chest radiograph from the same day. HISTORY: ORDERING SYSTEM PROVIDED HISTORY: recent suspected aspiration, new ground glass opacities on CXR. TECHNOLOGIST PROVIDED HISTORY: Reason for exam:->recent suspected aspiration, new ground glass opacities on CXR. What reading provider will be dictating this exam?->CRC; ORDERING SYSTEM PROVIDED HISTORY: hemoglobin 7.2 TECHNOLOGIST PROVIDED HISTORY: Reason for exam:->hemoglobin 7.2 Additional Contrast?->None What reading provider will be dictating this exam?->CRC FINDINGS: Chest: Mediastinum: An enteric tube terminates in the body of the stomach. The endotracheal tube terminates immediately cranial to the ritesh. Recommend retraction of 3-4 mm. Heart size is within normal limits. Coronary artery calcifications are present, potentially a marker for coronary artery disease. Trace pericardial fluid is present. The thoracic esophagus is decompressed, limiting assessment. No obvious esophageal abnormality. Scattered visible but nonenlarged mediastinal lymph nodes are present. Lungs/pleura: The central airways are patent. There is no evidence of a pleural effusion. There are extensive ground-glass and consolidative opacities throughout both lungs.   No evidence of a pneumothorax. Soft Tissues/Bones: No acute osseous abnormality. Bones are diffusely osteopenic and there is moderate osteophyte formation and intervertebral disc space narrowing throughout the thoracic spine. Moderate degenerative changes involve the shoulders bilaterally, right greater than left. Abdomen/Pelvis: Organs: The gallbladder contains a single large peripherally calcified stone. No gallbladder dilation or obvious wall thickening. The unenhanced liver is normal in appearance. The spleen and adrenal glands are normal in size. The pancreas demonstrates diffuse fatty atrophy but is otherwise unremarkable. The kidneys have a lobulated surface contour bilaterally. No hydronephrosis or radiopaque calculus. GI/Bowel: The stomach and duodenal sweep are within normal limits. No evidence of a bowel obstruction, free air or pneumatosis. Portions the colon are decompressed, limiting assessment for mucosal based abnormalities. The appendix is normal. Pelvis: The urinary bladder is decompressed around a Castano catheter. The uterus and ovaries are not well visualized and are either surgically absent or severely atrophic. No free fluid is identified in the pelvis and there is no pelvic lymphadenopathy. Scattered visible but nonenlarged pelvic lymph nodes are present. Peritoneum/Retroperitoneum: The abdominal aorta is normal in caliber with scattered atherosclerotic plaques. No retroperitoneal lymphadenopathy. No enlarged mesenteric lymph nodes are identified. Bones/Soft Tissues: There is a decubitus ulcer noted inferiorly, measuring 3.6 x 1.3 cm. This appears to extend to the level of the coccyx, with a small locule of gas that appears to abut or come in contact with the inferior most portion of the coccyx. There are advanced degenerative changes involving the hips bilaterally. Moderate to severe degenerative changes involve the bilateral sacroiliac joints and lower lumbar spine.   Vertebral bodies have a heterogeneous attenuation pattern but are predominantly hyperdense. Extensive ground-glass and consolidative opacities throughout both lungs, in a pattern highly suspicious for COVID pneumonia. Please note that the endotracheal tube terminates at the level of the ritesh. Recommend retracting the endotracheal tube by 3-4 cm. No acute inflammatory or obstructive process in the abdomen or pelvis. Cholelithiasis without evidence of acute cholecystitis. Diffusely heterogeneous and relatively hyperdense vertebral bodies, a nonspecific finding. Diffuse metastatic disease cannot be excluded. Recommend clinical correlation. Correlation could also be made with nuclear medicine bone scan, if clinically appropriate. Decubitus ulcer with at least 1 gas locule that may come into contact with the lower most portion of the coccyx. Osteomyelitis cannot be excluded at this level. Ct Head Wo Contrast    Result Date: 10/2/2020  EXAMINATION: CT OF THE HEAD WITHOUT CONTRAST  10/2/2020 7:55 am TECHNIQUE: CT of the head was performed without the administration of intravenous contrast. Dose modulation, iterative reconstruction, and/or weight based adjustment of the mA/kV was utilized to reduce the radiation dose to as low as reasonably achievable. COMPARISON: None. 09/24/2020 HISTORY: ORDERING SYSTEM PROVIDED HISTORY: AMS TECHNOLOGIST PROVIDED HISTORY: Reason for exam:->AMS Has a \"code stroke\" or \"stroke alert\" been called? ->No FINDINGS: BRAIN/VENTRICLES: There is no acute intracranial hemorrhage, mass effect or midline shift. No abnormal extra-axial fluid collection. The gray-white differentiation is maintained without evidence of an acute infarct. Ventricles and sulci are within normal limits in size. There are nonspecific areas of hypoattenuation within the periventricular and subcortical white matter, which likely represent chronic microvascular ischemic change.  ORBITS: The visualized portion of the orbits demonstrate no esophagus is decompressed, limiting assessment. No obvious esophageal abnormality. Scattered visible but nonenlarged mediastinal lymph nodes are present. Lungs/pleura: The central airways are patent. There is no evidence of a pleural effusion. There are extensive ground-glass and consolidative opacities throughout both lungs. No evidence of a pneumothorax. Soft Tissues/Bones: No acute osseous abnormality. Bones are diffusely osteopenic and there is moderate osteophyte formation and intervertebral disc space narrowing throughout the thoracic spine. Moderate degenerative changes involve the shoulders bilaterally, right greater than left. Abdomen/Pelvis: Organs: The gallbladder contains a single large peripherally calcified stone. No gallbladder dilation or obvious wall thickening. The unenhanced liver is normal in appearance. The spleen and adrenal glands are normal in size. The pancreas demonstrates diffuse fatty atrophy but is otherwise unremarkable. The kidneys have a lobulated surface contour bilaterally. No hydronephrosis or radiopaque calculus. GI/Bowel: The stomach and duodenal sweep are within normal limits. No evidence of a bowel obstruction, free air or pneumatosis. Portions the colon are decompressed, limiting assessment for mucosal based abnormalities. The appendix is normal. Pelvis: The urinary bladder is decompressed around a Castano catheter. The uterus and ovaries are not well visualized and are either surgically absent or severely atrophic. No free fluid is identified in the pelvis and there is no pelvic lymphadenopathy. Scattered visible but nonenlarged pelvic lymph nodes are present. Peritoneum/Retroperitoneum: The abdominal aorta is normal in caliber with scattered atherosclerotic plaques. No retroperitoneal lymphadenopathy. No enlarged mesenteric lymph nodes are identified. Bones/Soft Tissues: There is a decubitus ulcer noted inferiorly, measuring 3.6 x 1.3 cm.   This appears to

## 2020-10-13 NOTE — PROGRESS NOTES
ID Progress Note                [address], [place], [address]            Phone [phone]     Fax [phone]      Chief complaint   Altered mental status  Subjective:  Confusion on and off, this will be less likely her new baseline    Objective:    Vitals:    10/13/20 0850   BP: (!) 186/93   Pulse: 82   Resp:    Temp:    SpO2:    General appearance:  much more alert and verbally rsponsive  skin: Warm and dry  Eyes: Pink palpebral conjunctivae. PERRL  Ears: External ears normal.  Nose/Sinuses: Nares normal. Septum midline. Mucosa normal.   Oropharynx: Oropharynx clear with no exudates seen  Neck: Neck supple. Lungs: CTA. Respirations even and unlabored on RA  Heart: S1 S2  Regular rate and rhythm. No rub, murmur or gallop  Abdomen: Abdomen soft, distension resolved. Nontender. +BS.  Peg no d/c or erythema  Extremities: no peripheral edema         Labs:  Recent Labs     10/11/20  1158 10/12/20  1016 10/13/20  0921   WBC 9.6 8.2 8.1   RBC 3.02* 3.02* 3.12*   HGB 7.4* 7.5* 7.6*   HCT 24.8* 24.5* 25.3*   MCV 82.1 81.1 81.1   MCH 24.5* 24.8* 24.4*   MCHC 29.8* 30.6* 30.0*   RDW 20.4* 20.6* 20.5*    312 355   MPV 9.6 9.6 9.6     CMP:    Lab Results   Component Value Date     10/13/2020    K 3.0 10/13/2020    K 4.0 09/27/2020     10/13/2020    CO2 24 10/13/2020    BUN 8 10/13/2020    CREATININE 0.5 10/13/2020    GFRAA >60 10/13/2020    LABGLOM >60 10/13/2020    GLUCOSE 138 10/13/2020    GLUCOSE 147 03/07/2012    PROT 6.1 10/05/2020    LABALBU 2.2 10/05/2020    LABALBU 4.1 03/07/2012    CALCIUM 8.6 10/13/2020    BILITOT <0.2 10/05/2020    ALKPHOS 96 10/05/2020    AST 27 10/05/2020    ALT 9 10/05/2020          Microbiology :  10/8 blood cx no growth  10/5 blood cx E faecium  10/2 blood cx 1  Bottle CN staph   MRSA screen neg  10/2 urine cx <10k GPOs, yeast  Urine legionella neg      Radiology :       Assessment and Plan:      · Septic shock-resolved  · Aspiration pneumonia  · Leukocytosis from sepsis-resolved  · Coccygeal ulcer not infected  · Coag neg staph abctremia-  contaminatiom  · Delirium-patient remains encephalopathic  · VRE bacteremia -unclear source, UA from 10/2/2020  looks not concerning for urine infection  · Rt thalamus acute infarct     PLAN  -f/u repeat blood cx  - daptomycin started 10/7/2020 -for total 2 weeks from start date  -Keep on meropenem , day #12 x 2 more days  Right brachial has a midline  -BRITTANY -no endocarditis  -Neurology following - acute rt thalamus infarct    Electronically signed by Rolando Castro MD on 10/13/2020 at 12:51 PM    Prescription signed and okay for discharge from [address]  10/13/2020  12:51 PM

## 2020-10-13 NOTE — CARE COORDINATION
CM received call from [facility]. Will need discharge instructions faxed to her tomorrow ---[phone]. If pt is not discharging 10/14/20, Yuliet Ybarra will need updated.

## 2020-10-13 NOTE — DISCHARGE SUMMARY
[facility]ician Discharge Summary       [facility] [place] [place]  [place] [address], [address] [place], [place] [id number]  [phone]    In 1 week  Make an appointment in 1 week to go over hospitalization, medications, and follow up. Yudelka Hannon MD  [address] [facility] 80  [address]    Schedule an appointment as soon as possible for a visit in 2 weeks  Infectious Disease     Anne-Marie Goldman MD  [address]  [phone]      As needed- Cardiology    Daniella Clinton,   [facility]  [address]     In 3 weeks  Neurology    Denise Desai MD  [facility] [phone]      As needed- Pulmonology      Activity level: As tolerated     Dispo:home with [facility]     Condition on discharge: Stable     Patient ID:  Amber Nail  [id number]  78 y.o.  1950    Admit date: 10/2/2020    Discharge date and time:  10/13/2020  1:15 PM    Admission Diagnoses: Active Problems:    Essential hypertension    Type 2 diabetes mellitus with hyperglycemia, without long-term current use of insulin (HCC)    Osteoarthritis    Hyperlipidemia    General weakness    Morbid obesity due to excess calories (HCC)    Wound of right buttock    Decubitus ulcer of sacral region    Respiratory failure (HCC)    Acute hypoxemic respiratory failure ([address]    Community acquired pneumonia    Suspected COVID-19 virus infection    Abnormal urinalysis    Pneumonia due to COVID-19 virus    Recurrent episodes of unresponsiveness    Acute encephalopathy    Acute ischemic VBA thalamic stroke, right ([address]  Resolved Problems:    * No resolved hospital problems. *      Discharge Diagnoses:    Active Problems:    Essential hypertension    Type 2 diabetes mellitus with hyperglycemia, without long-term current use of insulin ([address] alert. ? Aphasia at times. Per son pt does this when stressed. ? If related to infection. Neurology consulted- appreciate input. EEG reviewed. MRI reviewed showing acute CVA. Patient much more awake, alert on day of discharge. More talkative.      4. Acute respiratory failure- likely related to pneumonia- aspiration. Pulmonology following. S/p extubation. On RA     5. VRE bacteremia: ID following. Repeat BC sent- prelim negative. Unable to do BRITTANY 10/9 due to hypokalemia- now planning today. On IV merrem/ IV daptomycin. To complete IV antibiotics outpt.     6. Acute CVA right thalamus; MRI 10/8 - showing acute infarct within the right thalamus and probable infarcts within the ipsilateral hippocampus and occiptal love. Neurology stared on ASA. Lipid panel reviewed. On statin. Not a good candidate for acute rehab.       7. DM; hg a1c 5.3: hold oral meds. Per son pt hypoglycemic prior to arrival. On lantus. Stop lantus for now- nursing has been holding. And monitor BS. To go home on insulin ss     8. HTN; holding bp meds as bp was running low     9. Dysphagia: s/p peg tube: on TF. Dietary consulted . Will go home on TF boluses     10. Electrolyte disturbances: supplement     11. Deconditioning: not ambulatory. In wheelchair at baseline     12. Chronic decubitus sacral ulcer stage III: :wound care      13.  Acute on chronic anemia: hg slight decrease. No prior h/o GI bleed. CBC pending. Occult stool neg. Anemia panel reviewed.      14. ? SVT/ tachycardia: consult cardiology- appreciate input. Started on BB     15. Constipation; bowel regimen-         Dispo: Son reporting plan is home with [facility] discussed case. Family is not interested in [address]. They feel that they have the support to take care of her at home. Discussed concerns with new acute CVA- son stating they still feel comfortable providing care at home.     Patient improved.  She was then discharged in stable condition with the following medications, instructions, and follow up.        Discharge Exam:  General Appearance: appears more awake and alert today. Able to state name/ hand grasps to command. able to state year/ and that she is in the hospital   Skin: warm and dry  Head: normocephalic and atraumatic  Neck: neck supple and non tender without mass   Pulmonary/Chest: diminished throughout to auscultation   Cardiovascular: normal rate, normal S1 and S2 and no carotid bruits  Abdomen: soft, non-tender, non-distended, normal bowel sounds- peg tube   Extremities: no cyanosis, no clubbing and no edema  Neurologic: TERESA       I/O last 3 completed shifts: In: 700 [I.V.:700]  Out: 2750 [Urine:2750]  I/O this shift:  In: 100 [I.V.:100]  Out: 750 [Urine:750]      LABS:  Recent Labs     10/11/20  1158 10/12/20  1016 10/13/20  0921    140 142   K 3.5 3.4* 3.0*    104 106   CO2 30* 28 24   BUN 14 11 8   CREATININE 0.5 0.4* 0.5   GLUCOSE 173* 161* 138*   CALCIUM 8.4* 8.3* 8.6       Recent Labs     10/11/20  1158 10/12/20  1016 10/13/20  0921   WBC 9.6 8.2 8.1   RBC 3.02* 3.02* 3.12*   HGB 7.4* 7.5* 7.6*   HCT 24.8* 24.5* 25.3*   MCV 82.1 81.1 81.1   MCH 24.5* 24.8* 24.4*   MCHC 29.8* 30.6* 30.0*   RDW 20.4* 20.6* 20.5*    312 355   MPV 9.6 9.6 9.6       No results for input(s): POCGLU in the last 72 hours. Imaging:  Ct Abdomen Pelvis Wo Contrast Additional Contrast? None    Result Date: 10/2/2020  EXAMINATION: CT OF THE CHEST WITHOUT CONTRAST; CT OF THE ABDOMEN AND PELVIS WITHOUT CONTRAST 10/2/2020 8:55 am TECHNIQUE: CT of the chest was performed without the administration of intravenous contrast. Multiplanar reformatted images are provided for review. Dose modulation, iterative reconstruction, and/or weight based adjustment of the mA/kV was utilized to reduce the radiation dose to as low as reasonably achievable.; CT of the abdomen and pelvis was performed without the administration of intravenous contrast. Multiplanar reformatted images are provided for review.  Dose modulation, iterative reconstruction, and/or weight based adjustment of the mA/kV was utilized to reduce the radiation dose to as low as reasonably achievable. COMPARISON: None. Correlated with chest radiograph from the same day. HISTORY: ORDERING SYSTEM PROVIDED HISTORY: recent suspected aspiration, new ground glass opacities on CXR. TECHNOLOGIST PROVIDED HISTORY: Reason for exam:->recent suspected aspiration, new ground glass opacities on CXR. What reading provider will be dictating this exam?->CRC; ORDERING SYSTEM PROVIDED HISTORY: hemoglobin 7.2 TECHNOLOGIST PROVIDED HISTORY: Reason for exam:->hemoglobin 7.2 Additional Contrast?->None What reading provider will be dictating this exam?->CRC FINDINGS: Chest: Mediastinum: An enteric tube terminates in the body of the stomach. The endotracheal tube terminates immediately cranial to the ritesh. Recommend retraction of 3-4 mm. Heart size is within normal limits. Coronary artery calcifications are present, potentially a marker for coronary artery disease. Trace pericardial fluid is present. The thoracic esophagus is decompressed, limiting assessment. No obvious esophageal abnormality. Scattered visible but nonenlarged mediastinal lymph nodes are present. Lungs/pleura: The central airways are patent. There is no evidence of a pleural effusion. There are extensive ground-glass and consolidative opacities throughout both lungs. No evidence of a pneumothorax. Soft Tissues/Bones: No acute osseous abnormality. Bones are diffusely osteopenic and there is moderate osteophyte formation and intervertebral disc space narrowing throughout the thoracic spine. Moderate degenerative changes involve the shoulders bilaterally, right greater than left. Abdomen/Pelvis: Organs: The gallbladder contains a single large peripherally calcified stone. No gallbladder dilation or obvious wall thickening. The unenhanced liver is normal in appearance.   The spleen and adrenal glands are normal in size. The pancreas demonstrates diffuse fatty atrophy but is otherwise unremarkable. The kidneys have a lobulated surface contour bilaterally. No hydronephrosis or radiopaque calculus. GI/Bowel: The stomach and duodenal sweep are within normal limits. No evidence of a bowel obstruction, free air or pneumatosis. Portions the colon are decompressed, limiting assessment for mucosal based abnormalities. The appendix is normal. Pelvis: The urinary bladder is decompressed around a Castano catheter. The uterus and ovaries are not well visualized and are either surgically absent or severely atrophic. No free fluid is identified in the pelvis and there is no pelvic lymphadenopathy. Scattered visible but nonenlarged pelvic lymph nodes are present. Peritoneum/Retroperitoneum: The abdominal aorta is normal in caliber with scattered atherosclerotic plaques. No retroperitoneal lymphadenopathy. No enlarged mesenteric lymph nodes are identified. Bones/Soft Tissues: There is a decubitus ulcer noted inferiorly, measuring 3.6 x 1.3 cm. This appears to extend to the level of the coccyx, with a small locule of gas that appears to abut or come in contact with the inferior most portion of the coccyx. There are advanced degenerative changes involving the hips bilaterally. Moderate to severe degenerative changes involve the bilateral sacroiliac joints and lower lumbar spine. Vertebral bodies have a heterogeneous attenuation pattern but are predominantly hyperdense. Extensive ground-glass and consolidative opacities throughout both lungs, in a pattern highly suspicious for COVID pneumonia. Please note that the endotracheal tube terminates at the level of the ritesh. Recommend retracting the endotracheal tube by 3-4 cm. No acute inflammatory or obstructive process in the abdomen or pelvis. Cholelithiasis without evidence of acute cholecystitis.  Diffusely heterogeneous and relatively hyperdense vertebral bodies, a nonspecific finding. Diffuse metastatic disease cannot be excluded. Recommend clinical correlation. Correlation could also be made with nuclear medicine bone scan, if clinically appropriate. Decubitus ulcer with at least 1 gas locule that may come into contact with the lower most portion of the coccyx. Osteomyelitis cannot be excluded at this level. Ct Head Wo Contrast    Result Date: 10/2/2020  EXAMINATION: CT OF THE HEAD WITHOUT CONTRAST  10/2/2020 7:55 am TECHNIQUE: CT of the head was performed without the administration of intravenous contrast. Dose modulation, iterative reconstruction, and/or weight based adjustment of the mA/kV was utilized to reduce the radiation dose to as low as reasonably achievable. COMPARISON: None. 09/24/2020 HISTORY: ORDERING SYSTEM PROVIDED HISTORY: AMS TECHNOLOGIST PROVIDED HISTORY: Reason for exam:->AMS Has a \"code stroke\" or \"stroke alert\" been called? ->No FINDINGS: BRAIN/VENTRICLES: There is no acute intracranial hemorrhage, mass effect or midline shift. No abnormal extra-axial fluid collection. The gray-white differentiation is maintained without evidence of an acute infarct. Ventricles and sulci are within normal limits in size. There are nonspecific areas of hypoattenuation within the periventricular and subcortical white matter, which likely represent chronic microvascular ischemic change. ORBITS: The visualized portion of the orbits demonstrate no acute abnormality. SINUSES: The visualized paranasal sinuses and mastoid air cells demonstrate no acute abnormality. SOFT TISSUES/SKULL: No acute abnormality of the visualized skull or soft tissues. No acute intracranial abnormality.      Ct Chest Wo Contrast    Result Date: 10/2/2020  EXAMINATION: CT OF THE CHEST WITHOUT CONTRAST; CT OF THE ABDOMEN AND PELVIS WITHOUT CONTRAST 10/2/2020 8:55 am TECHNIQUE: CT of the chest was performed without the administration of intravenous contrast. Multiplanar reformatted images are provided for review. Dose modulation, iterative reconstruction, and/or weight based adjustment of the mA/kV was utilized to reduce the radiation dose to as low as reasonably achievable.; CT of the abdomen and pelvis was performed without the administration of intravenous contrast. Multiplanar reformatted images are provided for review. Dose modulation, iterative reconstruction, and/or weight based adjustment of the mA/kV was utilized to reduce the radiation dose to as low as reasonably achievable. COMPARISON: None. Correlated with chest radiograph from the same day. HISTORY: ORDERING SYSTEM PROVIDED HISTORY: recent suspected aspiration, new ground glass opacities on CXR. TECHNOLOGIST PROVIDED HISTORY: Reason for exam:->recent suspected aspiration, new ground glass opacities on CXR. What reading provider will be dictating this exam?->CRC; ORDERING SYSTEM PROVIDED HISTORY: hemoglobin 7.2 TECHNOLOGIST PROVIDED HISTORY: Reason for exam:->hemoglobin 7.2 Additional Contrast?->None What reading provider will be dictating this exam?->CRC FINDINGS: Chest: Mediastinum: An enteric tube terminates in the body of the stomach. The endotracheal tube terminates immediately cranial to the ritesh. Recommend retraction of 3-4 mm. Heart size is within normal limits. Coronary artery calcifications are present, potentially a marker for coronary artery disease. Trace pericardial fluid is present. The thoracic esophagus is decompressed, limiting assessment. No obvious esophageal abnormality. Scattered visible but nonenlarged mediastinal lymph nodes are present. Lungs/pleura: The central airways are patent. There is no evidence of a pleural effusion. There are extensive ground-glass and consolidative opacities throughout both lungs. No evidence of a pneumothorax. Soft Tissues/Bones: No acute osseous abnormality.   Bones are diffusely osteopenic and there is moderate osteophyte formation and intervertebral disc space narrowing COVID pneumonia. Please note that the endotracheal tube terminates at the level of the ritesh. Recommend retracting the endotracheal tube by 3-4 cm. No acute inflammatory or obstructive process in the abdomen or pelvis. Cholelithiasis without evidence of acute cholecystitis. Diffusely heterogeneous and relatively hyperdense vertebral bodies, a nonspecific finding. Diffuse metastatic disease cannot be excluded. Recommend clinical correlation. Correlation could also be made with nuclear medicine bone scan, if clinically appropriate. Decubitus ulcer with at least 1 gas locule that may come into contact with the lower most portion of the coccyx. Osteomyelitis cannot be excluded at this level. Xr Chest Portable    Result Date: 10/3/2020  EXAMINATION: ONE XRAY VIEW OF THE CHEST 10/3/2020 6:58 am COMPARISON: 10/2020 HISTORY: ORDERING SYSTEM PROVIDED HISTORY: resp failure TECHNOLOGIST PROVIDED HISTORY: Reason for exam:->resp failure FINDINGS: There is no interval change in the multifocal bilateral pulmonary infiltrates more prominent on the right. There is stable position of the support lines and tubes. There is no pleural effusion. The heart is not enlarged. 1. No interval change in extensive bilateral multifocal infiltrates more prominent within the right lung. Xr Chest Portable    Result Date: 10/2/2020  EXAMINATION: ONE XRAY VIEW OF THE CHEST 10/2/2020 3:55 pm COMPARISON: Noncontrast chest CT 10/02/2020 HISTORY: ORDERING SYSTEM PROVIDED HISTORY: line placement TECHNOLOGIST PROVIDED HISTORY: Reason for exam:->line placement FINDINGS: This exam is slightly limited by AP portable, semi-upright technique and multiple overlying support devices. There is a new, right-internal-jugular-approach, single-lumen central venous catheter terminating at the SVC/RA junction. Negative for gross pneumothorax. An endotracheal tube has been slightly withdrawn, now terminating approximately 3.8 cm above the ritesh.   An enteric tube courses below the left hemidiaphragm, off the inferior edge of the image. Given differences in technique, previously-existing, bibasilar and bilateral perihilar multifocal consolidations and intermixed semi-solid patchy opacities, appear overall not significantly changed, given differences in technique. On this exam this is most conspicuous within the right suprahilar region. Nonspecific, diffuse heterogeneous increased density throughout all visualized osseous structures and scattered osseous degenerative disease have not significantly changed. No other clinically-significant changes are noted. .     1. Slightly limited exam. 2.   New, right-internal-jugular-approach, single-lumen central venous catheter terminating at the SVC/RA junction. Negative for gross pneumothorax. An endotracheal tube has been slightly withdrawn, now terminating approximately 3.8 cm above the ritesh. 3.   Bilateral multifocal consolidations and intermixed semi-solid patchy opacities, as described, appear overall not significantly changed, given differences in technique. 4.   Nonspecific, diffuse heterogeneous increased density throughout all visualized osseous structures. When clinically possible, consider nuclear scintigraphy, as directed clinically. [facility] Chest Portable    Result Date: 10/2/2020  EXAMINATION: ONE XRAY VIEW OF THE CHEST 10/2/2020 6:09 am COMPARISON: 09/24/2020 HISTORY: ORDERING SYSTEM PROVIDED HISTORY: post intubation TECHNOLOGIST PROVIDED HISTORY: Reason for exam:->post intubation What reading provider will be dictating this exam?->CRC FINDINGS: Endotracheal tube tip projects 12 mm superior to the ritesh. Nasogastric tube tip is in the upper abdomen. Cardiac silhouette is stable. There are new predominantly perihilar patchy airspace opacities. No large effusion or pneumothorax. No acute osseous abnormality.      New patchy bilateral ground-glass airspace opacities Endotracheal tube tip projects 12 mm superior to the ritesh     Xr Abdomen For Ng/og/ne Tube Placement    Result Date: 10/2/2020  EXAMINATION: ONE SUPINE XRAY VIEW(S) OF THE ABDOMEN 10/2/2020 6:23 am COMPARISON: CT abdomen pelvis 08/25/2020 HISTORY: ORDERING SYSTEM PROVIDED HISTORY: Confirm G-tube placement TECHNOLOGIST PROVIDED HISTORY: Reason for exam:->Confirm G-tube placement What reading provider will be dictating this exam?->CRC FINDINGS: Nasogastric tube tip is in the proximal stomach. Bowel gas pattern is nonobstructive. No abnormal calcifications are visualized. Degenerative changes of the lumbar spine are noted. Nasogastric tube tip is in the proximal stomach       Patient Instructions:      Medication List      START taking these medications    aspirin 81 MG chewable tablet  1 tablet by PEG Tube route daily  Start taking on:  October 14, 2020     daptomycin  infusion  Commonly known as:  CUBICIN  Infuse 400 mg intravenously every 24 hours for 10 days Compound per protocol. folic acid 1 MG tablet  Commonly known as:  FOLVITE  1 tablet by PEG Tube route daily  Start taking on:  October 14, 2020     insulin lispro (1 Unit Dial) 100 UNIT/ML Sopn  Commonly known as:  HumaLOG KwikPen  Check blood sugar TID and dose insulin 0-12 units according to medium dose sliding scale. meropenem  infusion  Commonly known as:  MERREM  Infuse 1,000 mg intravenously every 8 hours for 3 days Compound per protocol. metoprolol tartrate 25 MG tablet  Commonly known as:  LOPRESSOR  1 tablet by PEG Tube route 2 times daily        CHANGE how you take these medications    allopurinol 100 MG tablet  Commonly known as:  Zyloprim  1 tablet by PEG Tube route daily  What changed:  how to take this     * Insulin Pen Needle 31G X 5 MM Misc  Commonly known as:  B-D UF III MINI PEN NEEDLES  use twice a day  What changed:  Another medication with the same name was added. Make sure you understand how and when to take each.      * Insulin Pen Needle 31G X 8 MM [address] [facility] with gel overlay dsp # 1  Pt has Osteoarthritis and is Morbidly Obese     * Misc. Devices Misc  Wheelchair cushion     * Mattress Pad Misc  1 each by Does not apply route once for 1 dose     * Misc. Devices Kit  1 kit by Does not apply route daily as needed (leg edema) Compression stockings for bilateral leg swelling     RA Alcohol Swabs 70 % Pads  USE WITH INJECTIONS TWICE A DAY AND CHECKING BLOOD SUGAR TWICE A DAY         * This list has 10 medication(s) that are the same as other medications prescribed for you. Read the directions carefully, and ask your doctor or other care provider to review them with you.             STOP taking these medications    acetaminophen 325 MG tablet  Commonly known as:  TYLENOL     atenolol 50 MG tablet  Commonly known as:  TENORMIN     Byetta 10 MCG Pen 10 MCG/0.04ML injection  Generic drug:  exenatide     fluconazole 200 MG tablet  Commonly known as:  DIFLUCAN     glipiZIDE 5 MG extended release tablet  Commonly known as:  GLUCOTROL XL     ibuprofen 800 MG tablet  Commonly known as:  ADVIL;MOTRIN     lisinopril 40 MG tablet  Commonly known as:  PRINIVIL;ZESTRIL     Magnesium Oxide 400 MG Caps     metFORMIN 1000 MG tablet  Commonly known as:  GLUCOPHAGE     vitamin D 1.25 MG (23371 UT) Caps capsule  Commonly known as:  ERGOCALCIFEROL           Where to Get Your Medications      These medications were sent to [address] [place] [address] [phone] - f [phone]  [address] [place] [address]    Phone:  [phone]   · allopurinol 100 MG tablet  · aspirin 81 MG chewable tablet  · DULoxetine 60 MG extended release capsule  · folic acid 1 MG tablet  · insulin lispro (1 Unit Dial) 100 UNIT/ML Sopn  · metoprolol tartrate 25 MG tablet  · Pen Needles 31G X 6 MM Misc  · rosuvastatin 10 MG tablet     You can get these medications from any pharmacy    Bring a paper prescription for each of these medications  · daptomycin infusion  · meropenem  infusion           Note that more than 30 minutes was spent in preparing discharge papers, discussing discharge with patient, medication review, etc.    Signed:  Electronically signed by DIXIE Nowak on 10/13/2020 at 1:15 PM

## 2020-10-14 NOTE — PROGRESS NOTES
CLINICAL PHARMACY NOTE: MEDS TO [address] Select Patient?: No  Total # of Prescriptions Filled: 6   The following medications were delivered to the patient:  · Allopurinol 699  · Folic acid 1  · Metoprolol 25  · Leader pen needle  · Aspirin 81 mg  · humalog kwikpen  Total # of Interventions Completed: 6  Time Spent (min): 45    Additional Documentation:

## 2020-10-14 NOTE — PROGRESS NOTES
[facility] Quality Flow/Interdisciplinary Rounds Progress Note        Quality Flow Rounds held on October 14, 2020    Disciplines Attending:  Bedside Nurse, ,  and Nursing Unit [address] was admitted on 10/2/2020  6:36 AM    Anticipated Discharge Date:  Expected Discharge Date: 10/09/20    Disposition:    Jayden Score:  Jayden Scale Score: 11    Readmission Risk              Risk of Unplanned Readmission:        32           Discussed patient goal for the day, patient clinical progression, and barriers to discharge.   The following Goal(s) of the Day/Commitment(s) have been identified:  Discharge today    Courtney Mancera  October 14, 2020

## 2020-10-14 NOTE — CARE COORDINATION
Social work / Discharge Planning:        RN updated social work that patient is on room air. [facility] will transport patient to [address] [facility].   [facility] at 2pm.    Social work updated patient's son  John Paul Prince. He will be at the residence when patient arrives. [facility]. Justin) updated on discharge time. RN updated on discharge time.   Electronically signed by CLEOPATRA Gold on 10/14/2020 at 10:05 AM

## 2020-10-15 NOTE — CARE COORDINATION
COVID-19 Monitoring Follow-up Note    First attempt to reach the patient for COVID Monitoring (negative) Care Transition call post hospital discharge, phone rang busy.

## 2020-10-21 NOTE — TELEPHONE ENCOUNTER
Spoke with patient son Danni Huff [phone], who states that patient is being followed now by [facility] [[id number]] Severiano Kasper. . Son states mother has appointment with wound care on 10/27/20. Spoke with son regarding messages noted in chart that [facility] office trying to contact them and son given their phone number [phone].VEYBDD he will call their office in am.  Son stated that he appreciated all of our assistance, but mother unable to come in for office visits and wanted to cancel appointment on 11/4/20 with  also.

## 2020-10-21 NOTE — TELEPHONE ENCOUNTER
Unable to reach patient by phone. Message left on cell phone, home phone and daughter Lavinia Su phone stating that we would like to see her this week [opening Friday10/23 at 9am] or next week. Asked patient or family member to call tomorrow at [phone] to schedule HFU visit. Will also let Tory Sim know to try to contact again tomorrow.

## 2020-10-21 NOTE — TELEPHONE ENCOUNTER
Message left for patient and daughter to please call to schedule appointment, Roena Ormond requested that patient come in for HFU

## 2020-10-21 NOTE — TELEPHONE ENCOUNTER
Received labs from 10/20 on patient that has not been established at the clinic. Patient has appt with Dr. Presley Cuellar on 11/4- discharged by hospitalist group last week with Adair Damon and daptomycin infusion. Weekly labs are likely result from follow-up with ID and Gen Surgery- Please contact ID immediately for lab results. Also, please contact Adair Damon immediately to discuss labs and ensure follow-up of labs as this patient is not established at this time with our clinic. Given 11/4 appt- patient may need appt changed immediately for evaluation in the clinic.

## 2020-10-21 NOTE — TELEPHONE ENCOUNTER
Attempted to call [facility] independently. Listed as ordering provider, but review of notes- there are no documented encounters. Likely home care orders are relevant to ID following patient. However, given significant change in labs with review- assessment needed. Patient may need immediate assessment including possible ED assessment for worsening hypernatremia. ID clearly listed on lab order from 5 days prior. Need confirmation of patient care needs including C needs if patient is following in clinic with [facility] upon DC from [facility].

## 2020-10-25 PROBLEM — N39.0 UTI (URINARY TRACT INFECTION): Status: RESOLVED | Noted: 2020-01-01 | Resolved: 2020-01-01

## 2020-10-27 NOTE — PROGRESS NOTES
Wound Healing Center /Hyperbarics   History and Physical/Consultation  Vascular    Referring Physician : Amanda Beckett MD  [facility]  MEDICAL RECORD NUMBER:  [id number]  AGE: 78 y.o. GENDER: female  : 1950  EPISODE DATE:  10/27/2020  Subjective:     Chief Complaint   Patient presents with    Wound Check     coccyx         HISTORY of PRESENT ILLNESS HPI     Hernando Lundy is a 78 y.o. female who presents today for wound/ulcer evaluation. History of Wound Context:  The patient has a stage 3 sacral decubitus of the buttock which was debrided in the hospital in 2020.       20 CT showed osteomyelitis of the sacrum.  Per her son, she has a lot of drainage.     Wound/Ulcer Pain Timing/Severity: intermittent  Quality of pain: dull  Severity:  2 / 10   Modifying Factors: Pain is relieved/improved with rest  Associated Signs/Symptoms: pain     Ulcer Identification:  Ulcer Type: pressure  Contributing Factors: chronic pressure and decreased mobility     Diabetic/Pressure/Non Pressure Ulcers only:  Ulcer: Pressure ulcer, Stage 3           10/27/2020    Wound: Sacral wound noted, with significant undermining, 4 cm, towards the 12 o'clock position, recently diagnosed of osteomyelitis of the sacrum      PAST MEDICAL HISTORY      Diagnosis Date    Cataract, right     Cerebral artery occlusion with cerebral infarction ([address]     DM (diabetes mellitus), type 2 ([address] 11/3/2010    HTN (hypertension) 11/3/2010    Hyperlipidemia 11/3/2010    Obesity     Osteoarthritis 11/3/2010    Sinusitis chronic     seasonal    Wheelchair confinement     can pivot with assistance;  uses a lift chair     Past Surgical History:   Procedure Laterality Date    GASTROSTOMY TUBE PLACEMENT N/A 10/7/2020    EGD PEG TUBE PLACEMENT performed by Janusz Silverio MD at [facility] IO LENS PROSTH W/O ECP Right 8/10/2018    RIGHT EYE CATARACT EXTRACTION WITH IOL IMPLANT performed by Toby Garza MD at [address]     History reviewed. No pertinent family history. Social History     Tobacco Use    Smoking status: Never Smoker    Smokeless tobacco: Never Used   Substance Use Topics    Alcohol use: No     Alcohol/week: 0.0 standard drinks    Drug use: No     No Known Allergies  Current Outpatient Medications on File Prior to Encounter   Medication Sig Dispense Refill    folic acid (FOLVITE) 1 MG tablet 1 tablet by PEG Tube route daily 30 tablet 0    allopurinol (ZYLOPRIM) 100 MG tablet 1 tablet by PEG Tube route daily 30 tablet 3    metoprolol tartrate (LOPRESSOR) 25 MG tablet 1 tablet by PEG Tube route 2 times daily 60 tablet 0    rosuvastatin (CRESTOR) 10 MG tablet 1 tablet by PEG Tube route nightly 30 tablet 3    DULoxetine (CYMBALTA) 60 MG extended release capsule Take 1 capsule by mouth daily 30 capsule 0    aspirin 81 MG chewable tablet 1 tablet by PEG Tube route daily 30 tablet 0    insulin lispro, 1 Unit Dial, (HUMALOG KWIKPEN) 100 UNIT/ML SOPN Check blood sugar TID and dose insulin 0-12 units according to medium dose sliding scale. 3 pen 0    Insulin Pen Needle (PEN NEEDLES) 31G X 6 MM MISC 1 each by Does not apply route daily Check blood sugar TID and dose according to medium dose sliding scale. 100 each 1    RA Alcohol Swabs 70 % PADS USE WITH INJECTIONS TWICE A DAY AND CHECKING BLOOD SUGAR TWICE A  each 1    blood glucose test strips (ACCU-CHEK RUI PLUS) strip TEST twice a day FASTING BEFORE BREAKFAST AND SUPPER 100 strip 2    ACCU-CHEK MULTICLIX LANCETS MISC Check BS Twice daily 200 each 1    Insulin Pen Needle (B-D UF III MINI PEN NEEDLES) 31G X 5 MM MISC use twice a day 100 each 5    Insulin Pen Needle (B-D ULTRAFINE III SHORT PEN) 31G X 8 MM MISC Inject 1 kit into the skin 2 times daily 100 each 5    miconazole (MICOTIN) 2 % powder Apply topically 2 times daily. 45 g 3    Misc. Devices MISC Wheelchair cushion 1 Device 0    Misc. Devices (MATTRESS PAD) MISC 1 each by Does not apply route once for 1 dose 1 each 0    M[address]. [address] Bed with gel overlay dsp # 1  Pt has Osteoarthritis and is Morbidly Obese 1 Device 0    Misc. Devices KIT 1 kit by Does not apply route daily as needed Foam mattress for bilateral knees osteoarthritis , degenerative back osteoarthitis 1 kit 0    Misc. Devices MISC Wheel chair to assist with ADL with diagnosis of OA and full thickness knee meniscal tear 1 Device 0    Misc. Devices MISC Grab bar  diagnosis osteoarthritis and full thickness knee meniscal tear 1 Device 0    Misc. [address] bed 1 Device 0    Misc. Devices MISC wheelchair 1 Device 0    Misc. Devices MISC Bedside commode. Dx osteoarthritis 715.90. Height 5'4.5\". Weight 245 lbs. 1 Device 0     No current facility-administered medications on file prior to encounter.         REVIEW OF SYSTEMS   ROS : All others Negative if blank [], Positive if [x]  General Urinary   [] Fevers [] Hematuria   [] Chills [] Dysuria   [] Weight Loss Vascular   Skin [] Claudication   [x] Tissue Loss [] Rest Pain   Eyes Neurologic   [] Wears Glasses/Contacts [] Stroke/TIA   [] Vision Changes [] Focal weakness   Respiratory [] Slurred Speech    [] Shortness of breath ENT   Cardiovascular [] Difficulty swallowing   [] Chest Pain Gastrointestinal   [] Shortness of breath with exertion [] Abdominal Pain    [] Melena      Recent diagnosis of osteomyelitis of the sacrum [] Hematochezia               Objective:    /68   Pulse 76   Temp 96 °F (35.6 °C) (Temporal)   Resp 18   Ht 5' 4\" (1.626 m)   Wt 225 lb (102.1 kg)   BMI 38.62 kg/m²   Wt Readings from Last 3 Encounters:   10/27/20 225 lb (102.1 kg)   10/12/20 241 lb 9.6 oz (109.6 kg)   09/28/20 206 lb 14.4 oz (93.8 kg)       PHYSICAL EXAM  CONSTITUTIONAL:   Awake, alert, cooperative  PSYCHIATRIC :  Oriented to time, place and person      normal insight to disease process  ENT:  External ears and Wound 08/04/20 Buttocks Right #1  (Active)   Wound Image   10/27/20 1423   Wound Etiology Pressure Stage  3 08/04/20 1518   Dressing Status New dressing applied 10/27/20 1517   Wound Cleansed Cleansed with saline 10/27/20 1517   Dressing/Treatment Moist to moist;Dry dressing 10/27/20 1517   Wound Length (cm) 3.1 cm 10/27/20 1423   Wound Width (cm) 1.6 cm 10/27/20 1423   Wound Depth (cm) 3.8 cm 10/27/20 1423   Wound Surface Area (cm^2) 4.96 cm^2 10/27/20 1423   Change in Wound Size % (l*w) 68.08 10/27/20 1423   Wound Volume (cm^3) 18.85 cm^3 10/27/20 1423   Wound Healing % 60 10/27/20 1423   Post-Procedure Length (cm) 3.3 cm 10/27/20 1510   Post-Procedure Width (cm) 1.8 cm 10/27/20 1510   Post-Procedure Depth (cm) 3.9 cm 10/27/20 1510   Post-Procedure Surface Area (cm^2) 5.94 cm^2 10/27/20 1510   Post-Procedure Volume (cm^3) 23.17 cm^3 10/27/20 1510   Undermining Starts ___ O'Clock 5 10/27/20 1423   Undermining Ends___ O'Clock 12 10/27/20 1423   Undermining Maxium Distance (cm) 4.5 @10 10/27/20 1423   Number of days: 84       Percent of Wound/Ulcer Debrided: 100%    Total Surface Area Debrided:  5.9 sq cm     Estimated Blood Loss:  Minimal    Hemostasis Achieved:  by pressure    Procedural Pain:  2  / 10     Post Procedural Pain:  3 / 10     Response to treatment:  Well tolerated by patient. A culture was not done. Plan:     Pt is not a smoker     In my professional opinion and based off the information that is available at this time this patient has appropriate indication for HBO Therapy: No    Treatment Note please see attached Discharge Instructions    Written patient dismissal instructions given to patient and signed by patient or POA.          Discharge Instructions         Discharge Instructions        Visit Discharge/Physician Orders     Discharge condition: Stable     Assessment of pain at discharge:     Anesthetic used: 4% lidocaine solutioin     Discharge to: Home     Left via:Private automobile     Accompanied by: accompanied by SON     ECF/HHA: Harpreet LION 25 to hold vac x 1 more week      Dressing Orders:RT BUTTOCK: CLEANSE  WOUND WITH NORMAL STERILE SALINE, LIGHTLY PACK  MOIST TO MOIST SALINE MARY LOU INTO WOUND, COVER WITH DRY GAUZE DRESSING  AND SECURE WITH TAPE. CHANGE DAILY.     Turn every 2 hours      LOW AIR LOSS MATTRESS TO BED. TURN AND REPOSITION EVERY 1-2 HOURS. KEEP PRESSURE OFF WOUND. USE PILLOWS FOR POSITIONING. MONITOR INFLATION OF MATTRESS- IF MATTRESS DEFLATES OR SAGS IMMEDIATELY NOTIFY MANUFACTURE AND REMOVE PATIENT FROM THE BED. LIMIT TIME IN CHAIR obtain high back chair  Pt to come by cart for medical necessity for all appointments        Treatment Orders: REFERRAL TO INFECTIOUS DISEASE 11/11/2020     [facility] followup visit __________1 WEEK ___________________  (Please note your next appointment above and if you are unable to keep, kindly give a 24 hour notice.  Thank you.)     Physician signature:__________________________        If you experience any of the following, please call the [email or web address] during business hours:     * Increase in Pain  * Temperature over 101  * Increase in drainage from your wound  * Drainage with a foul odor  * Bleeding  * Increase in swelling  * Need for compression bandage changes due to slippage, breakthrough drainage.     If you need medical attention outside of the business hours of the [facility] please contact your PCP or go to the nearest emergency room.     Contact your doctor if you have a temperature above 100.4 with any of the following:               Persistent cough             Shortness of breath            Chills            Fatigue            Chest pain or pressure            Difficulty breathing            Decreased consciousness of confusion             Headache     Recommend:                Wash hands often and for 20 seconds or more             Avoid touching eyes, nose, mouth and face             Social distance ( 6 feet)             Stay at home as much as possible             Call health care provider with any concerns          Electronically signed by Nancy Bearden MD on 10/27/2020 at 3:28 PM

## 2020-11-16 NOTE — PROGRESS NOTES
SPEECH/LANGUAGE PATHOLOGY  VIDEOFLUOROSCOPIC STUDY OF SWALLOWING (MBS)      PATIENT NAME:  Sven Lezama      :  1950      TODAY'S DATE:  2020   ROOM:  Room/bed info not found    SUMMARY OF EVALUATION     DYSPHAGIA DIAGNOSIS:  Moderate oral, mild pharyngeal dysphagia-no penetration or aspiration observed during this study      DIET RECOMMENDATIONS:  Dysphagia 1, Pureed solids with small sips thin liquids     FEEDING RECOMMENDATIONS:       Assistance level:  Stand by assistance is needed during all oral intake      Compensatory strategies recommended: Small bites/sips and Alternate solids and liquids, upright for all intake    THERAPY RECOMMENDATIONS:      Therapy at the discretion of facility/treating Speech Pathologist, recommend trials of upgraded diet texture with treating SLP. Ok to upgrade as tolerated. PROCEDURE     Consistencies Administered During the Evaluation   Liquids: thin liquid and nectar thick liquid   Solids:  pureed foods and solid foods      Method of Intake:   cup, straw, spoon  Self fed, Fed by clinician      Position:   Seated, upright, Lateral plane    Current Respiratory Status   room air                RESULTS     ORAL STAGE       Decreased mastication due to:  poor/missing dentition and cognitive function, Delayed A-P transit due to: cognitive function , Oral residuals were noted :  throughout the oral cavity and Decreased bolus formation resulting in premature pharyngeal spillage.         PHARYNGEAL STAGE           ONSET TIME     Delayed initiation of the pharyngeal swallow was noted with swallow reflex triggered at the level of the pyriform sinus for thin inconsistently       PHARYNGEAL RESIDUALS          Vallecula/Pharyngeal Wall           No significant residuals were noted in the vallecula      Pyriform Sinuses      No significant residuals were noted in the pyriform sinuses    LARYNGEAL PENETRATION     Laryngeal penetration was not present during this evaluation                 ASPIRATION    Aspiration was not present during this evaluation         COMPENSATORY STRATEGIES       Compensatory strategies that were beneficial included Small bites/sips      STRUCTURAL/FUNCTIONAL ANOMALIES       No structural/functional anomalies were noted      CERVICAL ESOPHAGEAL STAGE :        The cervical esophagus appeared adequate                            The Speech Language Pathologist (SLP) completed education with the patient regarding results of evaluation. INTERVENTION/EDUCATION    Pt/son educated on above results and plan of care with visuals. Pt/son trained on compensatory strategies for safe swallow with good outcome. Handout provided. Pt encouraged to engage in question/answer session. Son encouraged to contact physician for approval prior to initiating oral diet. All questions answered and son verbalized understanding of above. CPT code:  73364  dysphagia study, 28832 dysphagia therapy 15 mins        [x]The admitting diagnosis and active problem list, as listed below have been reviewed prior to initiation of this evaluation.      ADMITTING DIAGNOSIS: Other dysphagia [R13.19]     ACTIVE PROBLEM LIST:   Patient Active Problem List   Diagnosis    Essential hypertension    Type 2 diabetes mellitus with hyperglycemia, without long-term current use of insulin (HCC)    Osteoarthritis    Hyperlipidemia    Chronic sinusitis    Eczema    Primary osteoarthritis involving multiple joints    Frequent falls    General weakness    Morbid obesity due to excess calories (HCC)    Normochromic normocytic anemia    Wound of right buttock    Decubitus ulcer of right buttock, stage 3 (HCC)    Decubitus ulcer of sacral region    AMS (altered mental status)    Severe protein-calorie malnutrition (HCC)    Respiratory failure (HCC)    Acute hypoxemic respiratory failure ([address]    Community acquired pneumonia    Suspected COVID-19 virus infection  Abnormal urinalysis    Pneumonia due to COVID-19 virus    Recurrent episodes of unresponsiveness    Acute ischemic VBA thalamic stroke, right ([facility]       Carola PENA. CCC/SLP [id number]  Speech-Language Pathologist

## 2020-11-17 NOTE — PROGRESS NOTES
[facility] Followup Visit Note    Referring Physician : MD Carloz Fierro [facility]  MEDICAL RECORD NUMBER:  [id number]  AGE: 78 y.o. GENDER: female  : 1950  EPISODE DATE:  2020    Subjective:     Chief Complaint   Patient presents with    Wound Check     right buttock ulcers      HISTORY of PRESENT ILLNESS HPI   Jimmy Grissom is a 78 y.o. female who presents today in regards to follow up evaluation and treatment of wound/ulcer. That patient's past medical, family and social hx were reviewed and changes were made if present. History of Wound Context:  The patient has a stage 3 sacral decubitus of the buttock which was debrided in the hospital in 2020.      20 CT showed osteomyelitis of the sacrum. Per her son, she has a lot of drainage.     Wound/Ulcer Pain Timing/Severity: intermittent  Quality of pain: dull  Severity:  2 / 10   Modifying Factors: Pain is relieved/improved with rest  Associated Signs/Symptoms: pain    Ulcer Identification:  Ulcer Type: pressure  Contributing Factors: chronic pressure and decreased mobility    Diabetic/Pressure/Non Pressure Ulcers only:  Ulcer: Pressure ulcer, Stage 3    Wound: N/A        PAST MEDICAL HISTORY      Diagnosis Date    Cataract, right     Cerebral artery occlusion with cerebral infarction ([address]     DM (diabetes mellitus), type 2 ([address] 11/3/2010    HTN (hypertension) 11/3/2010    Hyperlipidemia 11/3/2010    Obesity     Osteoarthritis 11/3/2010    Sinusitis chronic     seasonal    Wheelchair confinement     can pivot with assistance;  uses a lift chair     Past Surgical History:   Procedure Laterality Date    GASTROSTOMY TUBE PLACEMENT N/A 10/7/2020    EGD PEG TUBE PLACEMENT performed by Doug Hoffman MD at [facility] IO LENS PROSTH W/O ECP Right 8/10/2018    RIGHT EYE CATARACT EXTRACTION WITH  IOL IMPLANT performed by Dustin Santa MD at [facility]     History reviewed. No pertinent family history. Social History     Tobacco Use    Smoking status: Never Smoker    Smokeless tobacco: Never Used   Substance Use Topics    Alcohol use: No     Alcohol/week: 0.0 standard drinks    Drug use: No     No Known Allergies  Current Outpatient Medications on File Prior to Encounter   Medication Sig Dispense Refill    aspirin 81 MG EC tablet Take 81 mg by mouth daily      DULoxetine (CYMBALTA) 60 MG extended release capsule Take 60 mg by mouth daily      metoprolol tartrate (LOPRESSOR) 25 MG tablet 25 mg by PEG Tube route 2 times daily      folic acid (FOLVITE) 1 MG tablet 1 mg by PEG Tube route daily      allopurinol (ZYLOPRIM) 100 MG tablet 1 tablet by PEG Tube route daily 30 tablet 3    rosuvastatin (CRESTOR) 10 MG tablet 1 tablet by PEG Tube route nightly 30 tablet 3    insulin lispro, 1 Unit Dial, (HUMALOG KWIKPEN) 100 UNIT/ML SOPN Check blood sugar TID and dose insulin 0-12 units according to medium dose sliding scale. 3 pen 0    Insulin Pen Needle (PEN NEEDLES) 31G X 6 MM MISC 1 each by Does not apply route daily Check blood sugar TID and dose according to medium dose sliding scale. 100 each 1    miconazole (MICOTIN) 2 % powder Apply topically 2 times daily. 45 g 3    RA Alcohol Swabs 70 % PADS USE WITH INJECTIONS TWICE A DAY AND CHECKING BLOOD SUGAR TWICE A  each 1    blood glucose test strips (ACCU-CHEK RUI PLUS) strip TEST twice a day FASTING BEFORE BREAKFAST AND SUPPER 100 strip 2    ACCU-CHEK MULTICLIX LANCETS MISC Check BS Twice daily 200 each 1    Insulin Pen Needle (B-D UF III MINI PEN NEEDLES) 31G X 5 MM MISC use twice a day 100 each 5    Insulin Pen Needle (B-D ULTRAFINE III SHORT PEN) 31G X 8 MM MISC Inject 1 kit into the skin 2 times daily 100 each 5    Misc. Devices MISC Wheelchair cushion 1 Device 0    Misc.  [address] Bed with gel overlay dsp # 1  Pt has Osteoarthritis and is Morbidly Obese 1 Device 0    Misc. Devices KIT 1 kit by Does not apply route daily as needed Foam mattress for bilateral knees osteoarthritis , degenerative back osteoarthitis 1 kit 0    Misc. Devices MISC Wheel chair to assist with ADL with diagnosis of OA and full thickness knee meniscal tear 1 Device 0    Misc. Devices MISC Grab bar  diagnosis osteoarthritis and full thickness knee meniscal tear 1 Device 0    Misc. [address] bed 1 Device 0    Misc. Devices MISC wheelchair 1 Device 0    Misc. Devices MISC Bedside commode. Dx osteoarthritis 715.90. Height 5'4.5\". Weight 245 lbs. 1 Device 0    Misc. Devices (MATTRESS PAD) MISC 1 each by Does not apply route once for 1 dose 1 each 0     No current facility-administered medications on file prior to encounter.         REVIEW OF SYSTEMS See HPI    Objective:    /70   Pulse 84   Temp 96.8 °F (36 °C) (Temporal)   Resp 16   Ht 5' 4\" (1.626 m)   Wt 225 lb (102.1 kg)   BMI 38.62 kg/m²   Wt Readings from Last 3 Encounters:   11/17/20 225 lb (102.1 kg)   10/27/20 225 lb (102.1 kg)   10/12/20 241 lb 9.6 oz (109.6 kg)     PHYSICAL EXAM  CONSTITUTIONAL:   Awake, alert, cooperative   EYES:  lids and lashes normal   ENT: external ears and nose without lesions   NECK:  supple, symmetrical, trachea midline   SKIN:  Open wound Present    Assessment:     Problem List Items Addressed This Visit     * (Principal) Wound of right buttock - Primary          Pre Debridement Measurements:  Are located in the [place]  Documentation Flow Sheet  Post Debridement Measurements:  Wound/Ulcer Descriptions are Pre Debridement except measurements:     Wound 07/14/20 Abdomen Lateral;Lower (Active)   Number of days: 126       Wound 08/04/20 Buttocks Right #1  (Active)   Wound Image   10/27/20 1423   Wound Etiology Pressure Stage  3 08/04/20 1518   Dressing Status New dressing applied 10/27/20 1517   Wound Cleansed Cleansed with saline 10/27/20 1517   Dressing/Treatment Moist to moist;Dry dressing 10/27/20 1517   Wound Length (cm) 2.4 cm 11/17/20 1609   Wound Width (cm) 2 cm 11/17/20 1609   Wound Depth (cm) 3.6 cm 11/17/20 1609   Wound Surface Area (cm^2) 4.8 cm^2 11/17/20 1609   Change in Wound Size % (l*w) 69.11 11/17/20 1609   Wound Volume (cm^3) 17.28 cm^3 11/17/20 1609   Wound Healing % 63 11/17/20 1609   Post-Procedure Length (cm) 3.3 cm 10/27/20 1510   Post-Procedure Width (cm) 1.8 cm 10/27/20 1510   Post-Procedure Depth (cm) 3.9 cm 10/27/20 1510   Post-Procedure Surface Area (cm^2) 5.94 cm^2 10/27/20 1510   Post-Procedure Volume (cm^3) 23.17 cm^3 10/27/20 1510   Undermining Starts ___ O'Clock 5 11/17/20 1609   Undermining Ends___ O'Clock 10 11/17/20 1609   Undermining Maxium Distance (cm) 2.9 at 10 0'clock 11/17/20 1609   Wound Assessment Granulation tissue;Pale granulation tissue;Fibrinous 11/17/20 1609   Drainage Amount Moderate 11/17/20 1609   Drainage Description Serosanguinous; Serous; Thin;Green;Yellow 11/17/20 1609   Odor None 11/17/20 1609   Svetlana-wound Assessment Maceration; Other (Comment) 11/17/20 1609   Number of days: 105          Procedure Note  Indications:  Based on my examination of this patient's wound(s)/ulcer(s) today, debridement is required to promote healing and evaluate the wound base. Performed by: Savana Colon MD    Consent obtained:  Yes    Time out taken:  Yes    Pain Control: Anesthetic  Anesthetic: 4% Lidocaine Liquid Topical     Debridement:Excisional Debridement    Using curette the wound(s)/ulcer(s) was/were sharply debrided down through and including the removal of subcutaneous tissue.     Devitalized Tissue Debrided:  fibrin, biofilm, slough, exudate and callus to stimulate bleeding to promote healing, post debridement good bleeding base and wound edges noted    Wound/Ulcer #: 1    Percent of Wound/Ulcer Debrided: 100%    Total Surface Area Debrided: 5.94 sq cm     Estimated Blood Loss:  Minimal  Hemostasis Achieved:  by pressure    Procedural Pain:  7  / 10 Post Procedural Pain:  2 / 10     Response to treatment:  Well tolerated by patient. CT abd pelvis:     Impression    Decubitus ulcer at the level the sacrum suspicious for osteomyelitis    Limited evaluation of the left side of the abdomen, portion of the    left-sided abdomen is not included on the examination                  Consult ID - appointment next week  Wound vac - continue    Plan:   Treatment Note please see attached Discharge Instructions    Written patient dismissal instructions given to patient and signed by patient or POA. Discharge Instructions       Discharge Instructions      Visit Discharge/Physician Orders     Discharge condition: Stable     Assessment of pain at discharge:     Anesthetic used: 4% lidocaine solutioin     Discharge to: Home     Left via:Private automobile     Accompanied by: accompanied by SON     ECF/HHA: [facility] to hold vac x 1 more week       Dressing Orders:RT BUTTOCK: CLEANSE  WOUND WITH NORMAL STERILE SALINE, LIGHTLY PACK  MOIST TO MOIST SALINE MARY LOU INTO WOUND, COVER WITH DRY GAUZE DRESSING  AND SECURE WITH TAPE. CHANGE DAILY.       Turn every 2 hours      LOW AIR LOSS MATTRESS TO BED. TURN AND REPOSITION EVERY 1-2 HOURS. KEEP PRESSURE OFF WOUND. USE PILLOWS FOR POSITIONING. MONITOR INFLATION OF MATTRESS- IF MATTRESS DEFLATES OR SAGS IMMEDIATELY NOTIFY MANUFACTURE AND REMOVE PATIENT FROM THE BED. LIMIT TIME IN CHAIR obtain high back chair  Pt to come by cart for medical necessity for all appointments         Treatment Orders: REFERRAL TO INFECTIOUS DISEASE 11/11/2020     [address] [place] [address],[address] followup visit __________1 WEEK ___________________  (Please note your next appointment above and if you are unable to keep, kindly give a 24 hour notice.  Thank you.)     Physician signature:__________________________        If you experience any of the following, please call the [address] [facility] [address] during business hours:     * Increase in Pain  * Temperature over 101  * Increase in drainage from your wound  * Drainage with a foul odor  * Bleeding  * Increase in swelling  * Need for compression bandage changes due to slippage, breakthrough drainage.     If you need medical attention outside of the business hours of the [address] [place] [address] Road please contact your PCP or go to the nearest emergency room.     Contact your doctor if you have a temperature above 100.4 with any of the following:               Persistent cough             Shortness of breath            Chills            Fatigue            Chest pain or pressure            Difficulty breathing            Decreased consciousness of confusion             Headache     Recommend:                Wash hands often and for 20 seconds or more             Avoid touching eyes, nose, mouth and face             Social distance ( 6 feet)             Stay at home as much as possible             Call health care provider with any concerns                      Electronically signed by Latrell Guajardo MD on 11/17/2020 at 4:24 PM

## 2020-12-22 NOTE — TELEPHONE ENCOUNTER
Vicki Scott for refill and patient needs office visit, last office visit July 2020.     Electronically signed by Antonia Lemus DO on 12/22/2020 at 2:57 PM

## 2020-12-22 NOTE — TELEPHONE ENCOUNTER
Spoke with son Shelly Perales who states Angie Winter is now being seen by [facility]. Explained any further refills will need to be done by [facility].  Son verbalized understanding

## 2021-01-01 ENCOUNTER — HOSPITAL ENCOUNTER (OUTPATIENT)
Dept: WOUND CARE | Age: 71
Discharge: HOME | End: 2021-02-09
Payer: COMMERCIAL

## 2021-01-01 ENCOUNTER — TELEPHONE (OUTPATIENT)
Dept: INTERNAL MEDICINE | Age: 71
End: 2021-01-01

## 2021-01-01 ENCOUNTER — HOSPITAL ENCOUNTER (OUTPATIENT)
Dept: WOUND CARE | Age: 71
Discharge: HOME | End: 2021-06-01
Payer: COMMERCIAL

## 2021-01-01 ENCOUNTER — HOSPITAL ENCOUNTER (EMERGENCY)
Age: 71
End: 2021-08-06
Attending: EMERGENCY MEDICINE
Payer: COMMERCIAL

## 2021-01-01 ENCOUNTER — APPOINTMENT (OUTPATIENT)
Dept: GENERAL RADIOLOGY | Age: 71
End: 2021-01-01
Payer: COMMERCIAL

## 2021-01-01 ENCOUNTER — HOSPITAL ENCOUNTER (EMERGENCY)
Age: 71
Discharge: NURSING, REHAB OR LONG-TERM CARE | End: 2021-06-24
Attending: EMERGENCY MEDICINE
Payer: COMMERCIAL

## 2021-01-01 ENCOUNTER — HOSPITAL ENCOUNTER (OUTPATIENT)
Dept: WOUND CARE | Age: 71
Discharge: HOME | End: 2021-01-05
Payer: COMMERCIAL

## 2021-01-01 ENCOUNTER — HOSPITAL ENCOUNTER (OUTPATIENT)
Dept: WOUND CARE | Age: 71
Discharge: HOME | End: 2021-06-15
Payer: COMMERCIAL

## 2021-01-01 ENCOUNTER — HOSPITAL ENCOUNTER (OUTPATIENT)
Dept: WOUND CARE | Age: 71
Discharge: HOME | End: 2021-04-20
Payer: COMMERCIAL

## 2021-01-01 ENCOUNTER — HOSPITAL ENCOUNTER (OUTPATIENT)
Dept: WOUND CARE | Age: 71
Discharge: HOME | End: 2021-02-23
Payer: COMMERCIAL

## 2021-01-01 ENCOUNTER — HOSPITAL ENCOUNTER (OUTPATIENT)
Dept: WOUND CARE | Age: 71
Discharge: HOME | End: 2021-01-26
Payer: COMMERCIAL

## 2021-01-01 ENCOUNTER — HOSPITAL ENCOUNTER (OUTPATIENT)
Dept: WOUND CARE | Age: 71
Discharge: HOME | End: 2021-04-06
Payer: COMMERCIAL

## 2021-01-01 ENCOUNTER — HOSPITAL ENCOUNTER (OUTPATIENT)
Dept: WOUND CARE | Age: 71
Discharge: HOME | End: 2021-05-11
Payer: COMMERCIAL

## 2021-01-01 ENCOUNTER — HOSPITAL ENCOUNTER (OUTPATIENT)
Dept: WOUND CARE | Age: 71
Discharge: HOME | End: 2021-08-03
Payer: COMMERCIAL

## 2021-01-01 ENCOUNTER — HOSPITAL ENCOUNTER (OUTPATIENT)
Dept: WOUND CARE | Age: 71
Discharge: HOME | End: 2021-03-09
Payer: COMMERCIAL

## 2021-01-01 ENCOUNTER — HOSPITAL ENCOUNTER (OUTPATIENT)
Dept: WOUND CARE | Age: 71
Discharge: HOME | End: 2021-04-27
Payer: COMMERCIAL

## 2021-01-01 ENCOUNTER — HOSPITAL ENCOUNTER (OUTPATIENT)
Dept: WOUND CARE | Age: 71
Discharge: HOME | End: 2021-01-12
Payer: COMMERCIAL

## 2021-01-01 ENCOUNTER — HOSPITAL ENCOUNTER (OUTPATIENT)
Dept: WOUND CARE | Age: 71
Discharge: HOME | End: 2021-05-25
Payer: COMMERCIAL

## 2021-01-01 ENCOUNTER — HOSPITAL ENCOUNTER (OUTPATIENT)
Dept: WOUND CARE | Age: 71
Discharge: HOME | End: 2021-03-23
Payer: COMMERCIAL

## 2021-01-01 VITALS
BODY MASS INDEX: 30.56 KG/M2 | HEART RATE: 76 BPM | WEIGHT: 179 LBS | TEMPERATURE: 95.7 F | HEIGHT: 64 IN | SYSTOLIC BLOOD PRESSURE: 160 MMHG | RESPIRATION RATE: 20 BRPM | DIASTOLIC BLOOD PRESSURE: 80 MMHG

## 2021-01-01 VITALS
TEMPERATURE: 96.5 F | HEIGHT: 64 IN | RESPIRATION RATE: 16 BRPM | BODY MASS INDEX: 30.56 KG/M2 | HEART RATE: 84 BPM | WEIGHT: 179 LBS | DIASTOLIC BLOOD PRESSURE: 72 MMHG | SYSTOLIC BLOOD PRESSURE: 132 MMHG

## 2021-01-01 VITALS
SYSTOLIC BLOOD PRESSURE: 116 MMHG | DIASTOLIC BLOOD PRESSURE: 80 MMHG | RESPIRATION RATE: 20 BRPM | HEART RATE: 88 BPM | TEMPERATURE: 95.6 F

## 2021-01-01 VITALS
BODY MASS INDEX: 38.41 KG/M2 | WEIGHT: 225 LBS | RESPIRATION RATE: 18 BRPM | HEIGHT: 64 IN | TEMPERATURE: 97.8 F | HEART RATE: 80 BPM | DIASTOLIC BLOOD PRESSURE: 68 MMHG | SYSTOLIC BLOOD PRESSURE: 126 MMHG

## 2021-01-01 VITALS
WEIGHT: 179 LBS | TEMPERATURE: 96.3 F | DIASTOLIC BLOOD PRESSURE: 68 MMHG | RESPIRATION RATE: 16 BRPM | SYSTOLIC BLOOD PRESSURE: 124 MMHG | BODY MASS INDEX: 30.56 KG/M2 | HEIGHT: 64 IN | HEART RATE: 80 BPM

## 2021-01-01 VITALS
RESPIRATION RATE: 16 BRPM | SYSTOLIC BLOOD PRESSURE: 128 MMHG | HEART RATE: 88 BPM | DIASTOLIC BLOOD PRESSURE: 70 MMHG | TEMPERATURE: 96.5 F

## 2021-01-01 VITALS
HEART RATE: 80 BPM | SYSTOLIC BLOOD PRESSURE: 108 MMHG | TEMPERATURE: 97 F | DIASTOLIC BLOOD PRESSURE: 58 MMHG | RESPIRATION RATE: 16 BRPM

## 2021-01-01 VITALS
HEIGHT: 64 IN | BODY MASS INDEX: 30.56 KG/M2 | HEART RATE: 94 BPM | TEMPERATURE: 96.4 F | WEIGHT: 179 LBS | RESPIRATION RATE: 18 BRPM | HEIGHT: 64 IN | DIASTOLIC BLOOD PRESSURE: 76 MMHG | TEMPERATURE: 97.3 F | SYSTOLIC BLOOD PRESSURE: 132 MMHG | RESPIRATION RATE: 16 BRPM | HEART RATE: 84 BPM | WEIGHT: 179 LBS | BODY MASS INDEX: 30.56 KG/M2

## 2021-01-01 VITALS
OXYGEN SATURATION: 99 % | RESPIRATION RATE: 14 BRPM | DIASTOLIC BLOOD PRESSURE: 63 MMHG | WEIGHT: 189 LBS | SYSTOLIC BLOOD PRESSURE: 146 MMHG | TEMPERATURE: 97 F | HEART RATE: 86 BPM | BODY MASS INDEX: 32.27 KG/M2 | HEIGHT: 64 IN

## 2021-01-01 VITALS
WEIGHT: 179 LBS | SYSTOLIC BLOOD PRESSURE: 122 MMHG | HEIGHT: 64 IN | RESPIRATION RATE: 16 BRPM | TEMPERATURE: 96.9 F | BODY MASS INDEX: 30.56 KG/M2 | HEART RATE: 73 BPM | DIASTOLIC BLOOD PRESSURE: 58 MMHG

## 2021-01-01 VITALS
RESPIRATION RATE: 18 BRPM | SYSTOLIC BLOOD PRESSURE: 112 MMHG | HEIGHT: 64 IN | TEMPERATURE: 97 F | DIASTOLIC BLOOD PRESSURE: 68 MMHG | BODY MASS INDEX: 30.56 KG/M2 | WEIGHT: 179 LBS | HEART RATE: 104 BPM

## 2021-01-01 DIAGNOSIS — T17.308A CHOKING, INITIAL ENCOUNTER: ICD-10-CM

## 2021-01-01 DIAGNOSIS — L89.154 PRESSURE INJURY OF SACRAL REGION, STAGE 4 (HCC): Primary | ICD-10-CM

## 2021-01-01 DIAGNOSIS — Z86.73 H/O: CVA (CEREBROVASCULAR ACCIDENT): ICD-10-CM

## 2021-01-01 DIAGNOSIS — I46.9 CARDIAC ARREST (HCC): Primary | ICD-10-CM

## 2021-01-01 DIAGNOSIS — K94.20 PROBLEM WITH GASTROSTOMY TUBE (HCC): ICD-10-CM

## 2021-01-01 DIAGNOSIS — S31.819D WOUND OF RIGHT BUTTOCK, SUBSEQUENT ENCOUNTER: Primary | ICD-10-CM

## 2021-01-01 DIAGNOSIS — R53.1 GENERALIZED WEAKNESS: Primary | ICD-10-CM

## 2021-01-01 LAB
ALBUMIN SERPL-MCNC: 3 G/DL (ref 3.5–5.2)
ALBUMIN SERPL-MCNC: 3.2 G/DL (ref 3.5–5.2)
ALP BLD-CCNC: 113 U/L (ref 35–104)
ALP BLD-CCNC: 115 U/L (ref 35–104)
ALP BLD-CCNC: 132 U/L (ref 35–104)
ALP BLD-CCNC: 83 U/L (ref 35–104)
ALP BLD-CCNC: 96 U/L (ref 35–104)
ALP BLD-CCNC: 97 U/L (ref 35–104)
ALT SERPL-CCNC: 10 U/L (ref 0–32)
ALT SERPL-CCNC: 10 U/L (ref 0–32)
ALT SERPL-CCNC: 13 U/L (ref 0–32)
ALT SERPL-CCNC: 20 U/L (ref 0–32)
ALT SERPL-CCNC: 22 U/L (ref 0–32)
ALT SERPL-CCNC: 23 U/L (ref 0–32)
AMORPHOUS: PRESENT
ANION GAP SERPL CALCULATED.3IONS-SCNC: 10 MMOL/L (ref 7–16)
ANION GAP SERPL CALCULATED.3IONS-SCNC: 12 MMOL/L (ref 7–16)
ANION GAP SERPL CALCULATED.3IONS-SCNC: 13 MMOL/L (ref 7–16)
ANION GAP SERPL CALCULATED.3IONS-SCNC: 13 MMOL/L (ref 7–16)
ANION GAP SERPL CALCULATED.3IONS-SCNC: 15 MMOL/L (ref 7–16)
ANION GAP SERPL CALCULATED.3IONS-SCNC: 9 MMOL/L (ref 7–16)
ANISOCYTOSIS: ABNORMAL
AST SERPL-CCNC: 10 U/L (ref 0–31)
AST SERPL-CCNC: 12 U/L (ref 0–31)
AST SERPL-CCNC: 12 U/L (ref 0–31)
AST SERPL-CCNC: 15 U/L (ref 0–31)
BACTERIA: ABNORMAL /HPF
BASOPHILS ABSOLUTE: 0 E9/L (ref 0–0.2)
BASOPHILS ABSOLUTE: 0.03 E9/L (ref 0–0.2)
BASOPHILS ABSOLUTE: 0.04 E9/L (ref 0–0.2)
BASOPHILS RELATIVE PERCENT: 0.3 % (ref 0–2)
BASOPHILS RELATIVE PERCENT: 0.3 % (ref 0–2)
BASOPHILS RELATIVE PERCENT: 0.4 % (ref 0–2)
BILIRUB SERPL-MCNC: 0.2 MG/DL (ref 0–1.2)
BILIRUB SERPL-MCNC: <0.2 MG/DL (ref 0–1.2)
BILIRUB SERPL-MCNC: <0.2 MG/DL (ref 0–1.2)
BILIRUBIN URINE: NEGATIVE
BLOOD, URINE: ABNORMAL
BLOOD, URINE: ABNORMAL
BLOOD, URINE: NEGATIVE
BLOOD, URINE: NEGATIVE
BUN BLDV-MCNC: 18 MG/DL (ref 6–23)
BUN BLDV-MCNC: 23 MG/DL (ref 8–23)
BUN BLDV-MCNC: 24 MG/DL (ref 8–23)
BUN BLDV-MCNC: 30 MG/DL (ref 6–23)
BUN BLDV-MCNC: 34 MG/DL (ref 8–23)
BUN BLDV-MCNC: 35 MG/DL (ref 8–23)
CALCIUM SERPL-MCNC: 9.3 MG/DL (ref 8.6–10.2)
CALCIUM SERPL-MCNC: 9.6 MG/DL (ref 8.6–10.2)
CALCIUM SERPL-MCNC: 9.7 MG/DL (ref 8.6–10.2)
CALCIUM SERPL-MCNC: 9.9 MG/DL (ref 8.6–10.2)
CHLORIDE BLD-SCNC: 101 MMOL/L (ref 98–107)
CHLORIDE BLD-SCNC: 102 MMOL/L (ref 98–107)
CHLORIDE BLD-SCNC: 104 MMOL/L (ref 98–107)
CHLORIDE BLD-SCNC: 92 MMOL/L (ref 98–107)
CHLORIDE BLD-SCNC: 97 MMOL/L (ref 98–107)
CHLORIDE BLD-SCNC: 98 MMOL/L (ref 98–107)
CHOLESTEROL, TOTAL: 102 MG/DL (ref 0–199)
CHP ED QC CHECK: YES
CLARITY: ABNORMAL
CLARITY: CLEAR
CO2: 19 MMOL/L (ref 22–29)
CO2: 22 MMOL/L (ref 22–29)
CO2: 23 MMOL/L (ref 22–29)
CO2: 24 MMOL/L (ref 22–29)
CO2: 25 MMOL/L (ref 22–29)
CO2: 26 MMOL/L (ref 22–29)
COLOR: ABNORMAL
COLOR: YELLOW
CREAT SERPL-MCNC: 0.8 MG/DL (ref 0.5–1)
CREAT SERPL-MCNC: 1 MG/DL (ref 0.5–1)
CREAT SERPL-MCNC: 1.1 MG/DL (ref 0.5–1)
CREAT SERPL-MCNC: 1.3 MG/DL (ref 0.5–1)
CREAT SERPL-MCNC: 1.4 MG/DL (ref 0.5–1)
CREAT SERPL-MCNC: 1.5 MG/DL (ref 0.5–1)
EKG ATRIAL RATE: 75 BPM
EKG P AXIS: 40 DEGREES
EKG P-R INTERVAL: 136 MS
EKG Q-T INTERVAL: 384 MS
EKG QRS DURATION: 64 MS
EKG QTC CALCULATION (BAZETT): 428 MS
EKG R AXIS: 21 DEGREES
EKG T AXIS: 67 DEGREES
EKG VENTRICULAR RATE: 75 BPM
EOSINOPHILS ABSOLUTE: 0.11 E9/L (ref 0.05–0.5)
EOSINOPHILS ABSOLUTE: 0.11 E9/L (ref 0.05–0.5)
EOSINOPHILS ABSOLUTE: 0.31 E9/L (ref 0.05–0.5)
EOSINOPHILS RELATIVE PERCENT: 1 % (ref 0–6)
EOSINOPHILS RELATIVE PERCENT: 1 % (ref 0–6)
EOSINOPHILS RELATIVE PERCENT: 2.6 % (ref 0–6)
FOLATE: 12 NG/ML (ref 4.8–24.2)
GFR AFRICAN AMERICAN: 41
GFR AFRICAN AMERICAN: 45
GFR AFRICAN AMERICAN: 49
GFR AFRICAN AMERICAN: 59
GFR AFRICAN AMERICAN: >60
GFR AFRICAN AMERICAN: >60
GFR NON-AFRICAN AMERICAN: 41 ML/MIN/1.73
GFR NON-AFRICAN AMERICAN: 45 ML/MIN/1.73
GFR NON-AFRICAN AMERICAN: 49 ML/MIN/1.73
GFR NON-AFRICAN AMERICAN: 49 ML/MIN/1.73
GFR NON-AFRICAN AMERICAN: 55 ML/MIN/1.73
GFR NON-AFRICAN AMERICAN: >60 ML/MIN/1.73
GLUCOSE BLD-MCNC: 169 MG/DL (ref 74–99)
GLUCOSE BLD-MCNC: 260 MG/DL (ref 74–99)
GLUCOSE BLD-MCNC: 291 MG/DL
GLUCOSE BLD-MCNC: 307 MG/DL (ref 74–99)
GLUCOSE BLD-MCNC: 331 MG/DL (ref 74–99)
GLUCOSE BLD-MCNC: 344 MG/DL (ref 74–99)
GLUCOSE BLD-MCNC: 365 MG/DL (ref 74–99)
GLUCOSE URINE: 100 MG/DL
GLUCOSE URINE: >=1000 MG/DL
GLUCOSE URINE: >=1000 MG/DL
GLUCOSE URINE: NEGATIVE MG/DL
HBA1C MFR BLD: 10.4 % (ref 4–5.6)
HBA1C MFR BLD: 10.5 % (ref 4–5.6)
HBA1C MFR BLD: 10.6 % (ref 4–5.6)
HCT VFR BLD CALC: 26.9 % (ref 34–48)
HCT VFR BLD CALC: 28.1 % (ref 34–48)
HCT VFR BLD CALC: 29.2 % (ref 34–48)
HCT VFR BLD CALC: 29.8 % (ref 34–48)
HDLC SERPL-MCNC: 40 MG/DL
HEMOGLOBIN: 7.7 G/DL (ref 11.5–15.5)
HEMOGLOBIN: 8 G/DL (ref 11.5–15.5)
HEMOGLOBIN: 8.4 G/DL (ref 11.5–15.5)
HEMOGLOBIN: 8.7 G/DL (ref 11.5–15.5)
HYPOCHROMIA: ABNORMAL
IMMATURE GRANULOCYTES #: 0.09 E9/L
IMMATURE GRANULOCYTES #: 0.13 E9/L
IMMATURE GRANULOCYTES %: 0.8 % (ref 0–5)
IMMATURE GRANULOCYTES %: 1.1 % (ref 0–5)
KETONES, URINE: NEGATIVE MG/DL
LEUKOCYTE ESTERASE, URINE: ABNORMAL
LYMPHOCYTES ABSOLUTE: 1.18 E9/L (ref 1.5–4)
LYMPHOCYTES ABSOLUTE: 2.19 E9/L (ref 1.5–4)
LYMPHOCYTES ABSOLUTE: 2.43 E9/L (ref 1.5–4)
LYMPHOCYTES RELATIVE PERCENT: 19.3 % (ref 20–42)
LYMPHOCYTES RELATIVE PERCENT: 22.5 % (ref 20–42)
LYMPHOCYTES RELATIVE PERCENT: 9.6 % (ref 20–42)
MCH RBC QN AUTO: 22.3 PG (ref 26–35)
MCH RBC QN AUTO: 22.3 PG (ref 26–35)
MCH RBC QN AUTO: 22.4 PG (ref 26–35)
MCH RBC QN AUTO: 23 PG (ref 26–35)
MCHC RBC AUTO-ENTMCNC: 28.5 % (ref 32–34.5)
MCHC RBC AUTO-ENTMCNC: 28.6 % (ref 32–34.5)
MCHC RBC AUTO-ENTMCNC: 28.8 % (ref 32–34.5)
MCHC RBC AUTO-ENTMCNC: 29.2 % (ref 32–34.5)
MCV RBC AUTO: 76.8 FL (ref 80–99.9)
MCV RBC AUTO: 77.5 FL (ref 80–99.9)
MCV RBC AUTO: 78.3 FL (ref 80–99.9)
MCV RBC AUTO: 80.3 FL (ref 80–99.9)
METER GLUCOSE: 291 MG/DL (ref 74–99)
MONOCYTES ABSOLUTE: 0.12 E9/L (ref 0.1–0.95)
MONOCYTES ABSOLUTE: 0.65 E9/L (ref 0.1–0.95)
MONOCYTES ABSOLUTE: 0.83 E9/L (ref 0.1–0.95)
MONOCYTES RELATIVE PERCENT: 0.9 % (ref 2–12)
MONOCYTES RELATIVE PERCENT: 5.7 % (ref 2–12)
MONOCYTES RELATIVE PERCENT: 7.7 % (ref 2–12)
MYELOCYTE PERCENT: 0.9 % (ref 0–0)
NEUTROPHILS ABSOLUTE: 10.27 E9/L (ref 1.8–7.3)
NEUTROPHILS ABSOLUTE: 7.33 E9/L (ref 1.8–7.3)
NEUTROPHILS ABSOLUTE: 8.23 E9/L (ref 1.8–7.3)
NEUTROPHILS RELATIVE PERCENT: 67.7 % (ref 43–80)
NEUTROPHILS RELATIVE PERCENT: 72.5 % (ref 43–80)
NEUTROPHILS RELATIVE PERCENT: 86.1 % (ref 43–80)
NITRITE, URINE: NEGATIVE
ORGANISM: ABNORMAL
ORGANISM: ABNORMAL
PDW BLD-RTO: 16 FL (ref 11.5–15)
PDW BLD-RTO: 18.5 FL (ref 11.5–15)
PDW BLD-RTO: 18.6 FL (ref 11.5–15)
PDW BLD-RTO: 18.6 FL (ref 11.5–15)
PH UA: 6.5 (ref 5–9)
PH UA: 7.5 (ref 5–9)
PH UA: 7.5 (ref 5–9)
PH UA: 8 (ref 5–9)
PLATELET # BLD: 328 E9/L (ref 130–450)
PLATELET # BLD: 339 E9/L (ref 130–450)
PLATELET # BLD: 431 E9/L (ref 130–450)
PLATELET # BLD: 502 E9/L (ref 130–450)
PMV BLD AUTO: 10.2 FL (ref 7–12)
PMV BLD AUTO: 9.2 FL (ref 7–12)
PMV BLD AUTO: 9.7 FL (ref 7–12)
PMV BLD AUTO: 9.9 FL (ref 7–12)
POLYCHROMASIA: ABNORMAL
POLYCHROMASIA: ABNORMAL
POTASSIUM REFLEX MAGNESIUM: 5.2 MMOL/L (ref 3.5–5)
POTASSIUM SERPL-SCNC: 4.1 MMOL/L (ref 3.5–5)
POTASSIUM SERPL-SCNC: 4.4 MMOL/L (ref 3.5–5)
POTASSIUM SERPL-SCNC: 4.5 MMOL/L (ref 3.5–5)
POTASSIUM SERPL-SCNC: 4.9 MMOL/L (ref 3.5–5)
POTASSIUM SERPL-SCNC: 4.9 MMOL/L (ref 3.5–5)
PREALBUMIN: 14 MG/DL (ref 20–40)
PREALBUMIN: 17 MG/DL (ref 20–40)
PREALBUMIN: 18 MG/DL (ref 20–40)
PRO-BNP: 616 PG/ML (ref 0–125)
PROTEIN UA: ABNORMAL MG/DL
PROTEIN UA: NEGATIVE MG/DL
RBC # BLD: 3.35 E12/L (ref 3.5–5.5)
RBC # BLD: 3.59 E12/L (ref 3.5–5.5)
RBC # BLD: 3.77 E12/L (ref 3.5–5.5)
RBC # BLD: 3.88 E12/L (ref 3.5–5.5)
RBC UA: ABNORMAL /HPF (ref 0–2)
SODIUM BLD-SCNC: 126 MMOL/L (ref 132–146)
SODIUM BLD-SCNC: 132 MMOL/L (ref 132–146)
SODIUM BLD-SCNC: 134 MMOL/L (ref 132–146)
SODIUM BLD-SCNC: 135 MMOL/L (ref 132–146)
SODIUM BLD-SCNC: 139 MMOL/L (ref 132–146)
SODIUM BLD-SCNC: 139 MMOL/L (ref 132–146)
SPECIFIC GRAVITY UA: 1.01 (ref 1–1.03)
TOTAL PROTEIN: 8.3 G/DL (ref 6.4–8.3)
TOTAL PROTEIN: 8.4 G/DL (ref 6.4–8.3)
TOTAL PROTEIN: 8.5 G/DL (ref 6.4–8.3)
TOTAL PROTEIN: 8.5 G/DL (ref 6.4–8.3)
TOTAL PROTEIN: 8.6 G/DL (ref 6.4–8.3)
TOTAL PROTEIN: 8.8 G/DL (ref 6.4–8.3)
TRIGL SERPL-MCNC: 76 MG/DL (ref 0–149)
TSH SERPL DL<=0.05 MIU/L-ACNC: 3.44 UIU/ML (ref 0.27–4.2)
URIC ACID, SERUM: 6.4 MG/DL (ref 2.4–5.7)
URINE CULTURE, ROUTINE: ABNORMAL
URINE CULTURE, ROUTINE: NORMAL
UROBILINOGEN, URINE: 0.2 E.U./DL
VITAMIN B-12: 627 PG/ML (ref 211–946)
VITAMIN D 25-HYDROXY: 29 NG/ML (ref 30–100)
WBC # BLD: 10.8 E9/L (ref 4.5–11.5)
WBC # BLD: 11.4 E9/L (ref 4.5–11.5)
WBC # BLD: 11.8 E9/L (ref 4.5–11.5)
WBC # BLD: 7.8 E9/L (ref 4.5–11.5)
WBC UA: ABNORMAL /HPF (ref 0–5)
YEAST: PRESENT /HPF

## 2021-01-01 PROCEDURE — 11042 DBRDMT SUBQ TIS 1ST 20SQCM/<: CPT | Performed by: SURGERY

## 2021-01-01 PROCEDURE — 93010 ELECTROCARDIOGRAM REPORT: CPT | Performed by: INTERNAL MEDICINE

## 2021-01-01 PROCEDURE — 99283 EMERGENCY DEPT VISIT LOW MDM: CPT

## 2021-01-01 PROCEDURE — 11042 DBRDMT SUBQ TIS 1ST 20SQCM/<: CPT

## 2021-01-01 PROCEDURE — 92950 HEART/LUNG RESUSCITATION CPR: CPT

## 2021-01-01 PROCEDURE — 2500000003 HC RX 250 WO HCPCS

## 2021-01-01 PROCEDURE — 82962 GLUCOSE BLOOD TEST: CPT

## 2021-01-01 PROCEDURE — 96372 THER/PROPH/DIAG INJ SC/IM: CPT

## 2021-01-01 PROCEDURE — 74018 RADEX ABDOMEN 1 VIEW: CPT

## 2021-01-01 PROCEDURE — 6370000000 HC RX 637 (ALT 250 FOR IP): Performed by: SURGERY

## 2021-01-01 PROCEDURE — 99284 EMERGENCY DEPT VISIT MOD MDM: CPT

## 2021-01-01 PROCEDURE — 6370000000 HC RX 637 (ALT 250 FOR IP): Performed by: EMERGENCY MEDICINE

## 2021-01-01 PROCEDURE — 2580000003 HC RX 258: Performed by: EMERGENCY MEDICINE

## 2021-01-01 PROCEDURE — 2580000003 HC RX 258

## 2021-01-01 PROCEDURE — 6360000002 HC RX W HCPCS

## 2021-01-01 PROCEDURE — 93005 ELECTROCARDIOGRAM TRACING: CPT | Performed by: EMERGENCY MEDICINE

## 2021-01-01 PROCEDURE — 80053 COMPREHEN METABOLIC PANEL: CPT

## 2021-01-01 PROCEDURE — 2500000003 HC RX 250 WO HCPCS: Performed by: EMERGENCY MEDICINE

## 2021-01-01 PROCEDURE — 6360000002 HC RX W HCPCS: Performed by: EMERGENCY MEDICINE

## 2021-01-01 PROCEDURE — 81001 URINALYSIS AUTO W/SCOPE: CPT

## 2021-01-01 PROCEDURE — 97165 OT EVAL LOW COMPLEX 30 MIN: CPT

## 2021-01-01 PROCEDURE — 85025 COMPLETE CBC W/AUTO DIFF WBC: CPT

## 2021-01-01 PROCEDURE — 97161 PT EVAL LOW COMPLEX 20 MIN: CPT

## 2021-01-01 RX ORDER — LIDOCAINE 50 MG/G
OINTMENT TOPICAL ONCE
Status: CANCELLED | OUTPATIENT
Start: 2021-01-01 | End: 2021-01-01

## 2021-01-01 RX ORDER — BACITRACIN, NEOMYCIN, POLYMYXIN B 400; 3.5; 5 [USP'U]/G; MG/G; [USP'U]/G
OINTMENT TOPICAL ONCE
Status: CANCELLED | OUTPATIENT
Start: 2021-01-01 | End: 2021-01-01

## 2021-01-01 RX ORDER — LIDOCAINE HYDROCHLORIDE 20 MG/ML
JELLY TOPICAL ONCE
Status: CANCELLED | OUTPATIENT
Start: 2021-01-01 | End: 2021-01-01

## 2021-01-01 RX ORDER — PREDNISONE 50 MG/1
50 TABLET ORAL 2 TIMES DAILY
COMMUNITY
End: 2021-01-01

## 2021-01-01 RX ORDER — LIDOCAINE HYDROCHLORIDE 40 MG/ML
SOLUTION TOPICAL ONCE
Status: CANCELLED | OUTPATIENT
Start: 2021-01-01 | End: 2021-01-01

## 2021-01-01 RX ORDER — QUETIAPINE FUMARATE 25 MG/1
25 TABLET, FILM COATED ORAL NIGHTLY
COMMUNITY
Start: 2021-01-01

## 2021-01-01 RX ORDER — CALCIUM CHLORIDE 100 MG/ML
INJECTION INTRAVENOUS; INTRAVENTRICULAR DAILY PRN
Status: COMPLETED | OUTPATIENT
Start: 2021-01-01 | End: 2021-01-01

## 2021-01-01 RX ORDER — LIDOCAINE 40 MG/G
CREAM TOPICAL ONCE
Status: CANCELLED | OUTPATIENT
Start: 2021-01-01 | End: 2021-01-01

## 2021-01-01 RX ORDER — BACITRACIN ZINC AND POLYMYXIN B SULFATE 500; 1000 [USP'U]/G; [USP'U]/G
OINTMENT TOPICAL ONCE
Status: CANCELLED | OUTPATIENT
Start: 2021-01-01 | End: 2021-01-01

## 2021-01-01 RX ORDER — GINSENG 100 MG
CAPSULE ORAL ONCE
Status: CANCELLED | OUTPATIENT
Start: 2021-01-01 | End: 2021-01-01

## 2021-01-01 RX ORDER — LIDOCAINE HYDROCHLORIDE 40 MG/ML
SOLUTION TOPICAL ONCE
Status: DISCONTINUED | OUTPATIENT
Start: 2021-01-01 | End: 2021-01-01 | Stop reason: HOSPADM

## 2021-01-01 RX ORDER — GENTAMICIN SULFATE 1 MG/G
OINTMENT TOPICAL ONCE
Status: CANCELLED | OUTPATIENT
Start: 2021-01-01 | End: 2021-01-01

## 2021-01-01 RX ORDER — EPINEPHRINE 1 MG/ML
INJECTION, SOLUTION, CONCENTRATE INTRAVENOUS DAILY PRN
Status: COMPLETED | OUTPATIENT
Start: 2021-01-01 | End: 2021-01-01

## 2021-01-01 RX ORDER — BETAMETHASONE DIPROPIONATE 0.05 %
OINTMENT (GRAM) TOPICAL ONCE
Status: CANCELLED | OUTPATIENT
Start: 2021-01-01 | End: 2021-01-01

## 2021-01-01 RX ORDER — CLOBETASOL PROPIONATE 0.5 MG/G
OINTMENT TOPICAL ONCE
Status: CANCELLED | OUTPATIENT
Start: 2021-01-01 | End: 2021-01-01

## 2021-01-01 RX ORDER — LIDOCAINE HYDROCHLORIDE 40 MG/ML
SOLUTION TOPICAL ONCE
Status: COMPLETED | OUTPATIENT
Start: 2021-01-01 | End: 2021-01-01

## 2021-01-01 RX ORDER — 0.9 % SODIUM CHLORIDE 0.9 %
1000 INTRAVENOUS SOLUTION INTRAVENOUS ONCE
Status: COMPLETED | OUTPATIENT
Start: 2021-01-01 | End: 2021-01-01

## 2021-01-01 RX ORDER — DOCUSATE SODIUM AND SENNOSIDES 8.6; 5 MG/1; MG/1
1 TABLET, COATED ORAL 2 TIMES DAILY
COMMUNITY
Start: 2021-01-01

## 2021-01-01 RX ORDER — LEVOFLOXACIN 750 MG/1
750 TABLET ORAL DAILY
COMMUNITY
Start: 2021-01-01 | End: 2021-01-01

## 2021-01-01 RX ORDER — HYDROXYZINE PAMOATE 25 MG/1
25 CAPSULE ORAL 3 TIMES DAILY PRN
COMMUNITY

## 2021-01-01 RX ADMIN — EPINEPHRINE 1 MG: 1 INJECTION, SOLUTION, CONCENTRATE INTRAVENOUS at 11:40

## 2021-01-01 RX ADMIN — SODIUM BICARBONATE 50 MEQ: 84 INJECTION, SOLUTION INTRAVENOUS at 11:36

## 2021-01-01 RX ADMIN — EPINEPHRINE 1 MG: 1 INJECTION, SOLUTION, CONCENTRATE INTRAVENOUS at 11:55

## 2021-01-01 RX ADMIN — LIDOCAINE HYDROCHLORIDE 5 ML: 40 SOLUTION TOPICAL at 15:23

## 2021-01-01 RX ADMIN — CALCIUM CHLORIDE 1000 MG: 100 INJECTION, SOLUTION INTRAVENOUS; INTRAVENTRICULAR at 11:46

## 2021-01-01 RX ADMIN — LIDOCAINE HYDROCHLORIDE: 40 SOLUTION TOPICAL at 15:23

## 2021-01-01 RX ADMIN — LIDOCAINE HYDROCHLORIDE: 40 SOLUTION TOPICAL at 15:56

## 2021-01-01 RX ADMIN — EPINEPHRINE 1 MG: 1 INJECTION, SOLUTION, CONCENTRATE INTRAVENOUS at 11:37

## 2021-01-01 RX ADMIN — EPINEPHRINE 1 MG: 1 INJECTION, SOLUTION, CONCENTRATE INTRAVENOUS at 11:45

## 2021-01-01 RX ADMIN — CALCIUM CHLORIDE 1000 MG: 100 INJECTION, SOLUTION INTRAVENOUS; INTRAVENTRICULAR at 11:40

## 2021-01-01 RX ADMIN — SODIUM BICARBONATE 50 MEQ: 84 INJECTION, SOLUTION INTRAVENOUS at 11:46

## 2021-01-01 RX ADMIN — EPINEPHRINE 1 MG: 1 INJECTION, SOLUTION, CONCENTRATE INTRAVENOUS at 11:43

## 2021-01-01 RX ADMIN — SODIUM CHLORIDE 1000 ML: 9 INJECTION, SOLUTION INTRAVENOUS at 13:11

## 2021-01-01 RX ADMIN — INSULIN LISPRO 4 UNITS: 100 INJECTION, SOLUTION INTRAVENOUS; SUBCUTANEOUS at 13:57

## 2021-01-01 RX ADMIN — SODIUM BICARBONATE 50 MEQ: 84 INJECTION, SOLUTION INTRAVENOUS at 11:40

## 2021-01-01 RX ADMIN — EPINEPHRINE 1 MG: 1 INJECTION, SOLUTION, CONCENTRATE INTRAVENOUS at 11:34

## 2021-01-01 RX ADMIN — EPINEPHRINE 1 MG: 1 INJECTION, SOLUTION, CONCENTRATE INTRAVENOUS at 11:52

## 2021-01-01 RX ADMIN — EPINEPHRINE 1 MG: 1 INJECTION, SOLUTION, CONCENTRATE INTRAVENOUS at 11:49

## 2021-01-01 ASSESSMENT — PAIN SCALES - GENERAL
PAINLEVEL_OUTOF10: 0
PAINLEVEL_OUTOF10: 0
PAINLEVEL_OUTOF10: 4

## 2021-01-01 ASSESSMENT — ENCOUNTER SYMPTOMS
DIARRHEA: 0
COUGH: 0
VOMITING: 0
SORE THROAT: 0
SHORTNESS OF BREATH: 0
NAUSEA: 0
BACK PAIN: 0
ABDOMINAL PAIN: 0
EYE PAIN: 0

## 2021-01-01 ASSESSMENT — PAIN DESCRIPTION - LOCATION: LOCATION: GENERALIZED

## 2021-01-01 ASSESSMENT — PAIN - FUNCTIONAL ASSESSMENT: PAIN_FUNCTIONAL_ASSESSMENT: ACTIVITIES ARE NOT PREVENTED

## 2021-01-01 ASSESSMENT — PAIN DESCRIPTION - PAIN TYPE: TYPE: ACUTE PAIN

## 2021-01-01 ASSESSMENT — PAIN DESCRIPTION - PROGRESSION: CLINICAL_PROGRESSION: NOT CHANGED

## 2021-01-01 ASSESSMENT — PAIN DESCRIPTION - FREQUENCY: FREQUENCY: CONTINUOUS

## 2021-01-01 ASSESSMENT — PAIN DESCRIPTION - ONSET: ONSET: GRADUAL

## 2021-01-01 ASSESSMENT — PAIN DESCRIPTION - DESCRIPTORS: DESCRIPTORS: ACHING

## 2021-01-05 NOTE — PROGRESS NOTES
OR     History reviewed. No pertinent family history. Social History     Tobacco Use    Smoking status: Never Smoker    Smokeless tobacco: Never Used   Substance Use Topics    Alcohol use: No     Alcohol/week: 0.0 standard drinks    Drug use: No     No Known Allergies  Current Outpatient Medications on File Prior to Encounter   Medication Sig Dispense Refill    BYETTA 10 MCG PEN 10 MCG/0.04ML injection Inject 0.04 mLs into the skin 2 times daily (with meals) 2.4 mL 1    aspirin 81 MG EC tablet Take 81 mg by mouth daily      DULoxetine (CYMBALTA) 60 MG extended release capsule Take 60 mg by mouth daily      metoprolol tartrate (LOPRESSOR) 25 MG tablet 25 mg by PEG Tube route 2 times daily      allopurinol (ZYLOPRIM) 100 MG tablet 1 tablet by PEG Tube route daily 30 tablet 3    rosuvastatin (CRESTOR) 10 MG tablet 1 tablet by PEG Tube route nightly 30 tablet 3    insulin lispro, 1 Unit Dial, (HUMALOG KWIKPEN) 100 UNIT/ML SOPN Check blood sugar TID and dose insulin 0-12 units according to medium dose sliding scale. 3 pen 0    Insulin Pen Needle (PEN NEEDLES) 31G X 6 MM MISC 1 each by Does not apply route daily Check blood sugar TID and dose according to medium dose sliding scale. 100 each 1    miconazole (MICOTIN) 2 % powder Apply topically 2 times daily. 45 g 3    blood glucose test strips (ACCU-CHEK RUI PLUS) strip TEST twice a day FASTING BEFORE BREAKFAST AND SUPPER 100 strip 2    ACCU-CHEK MULTICLIX LANCETS MISC Check BS Twice daily 200 each 1    Insulin Pen Needle (B-D UF III MINI PEN NEEDLES) 31G X 5 MM MISC use twice a day 100 each 5    Insulin Pen Needle (B-D ULTRAFINE III SHORT PEN) 31G X 8 MM MISC Inject 1 kit into the skin 2 times daily 100 each 5    Misc. Devices (MATTRESS PAD) MISC 1 each by Does not apply route once for 1 dose 1 each 0     No current facility-administered medications on file prior to encounter.         REVIEW OF SYSTEMS See HPI    Objective:    /68   Pulse 80 Temp 97.8 °F (36.6 °C) (Temporal)   Resp 18   Ht 5' 4\" (1.626 m)   Wt 225 lb (102.1 kg)   BMI 38.62 kg/m²   Wt Readings from Last 3 Encounters:   01/05/21 225 lb (102.1 kg)   11/17/20 225 lb (102.1 kg)   10/27/20 225 lb (102.1 kg)     PHYSICAL EXAM  CONSTITUTIONAL:   Awake, alert, cooperative   EYES:  lids and lashes normal   ENT: external ears and nose without lesions   NECK:  supple, symmetrical, trachea midline   SKIN:  Open wound Present    Assessment:     Problem List Items Addressed This Visit     None          Pre Debridement Measurements:  Are located in the [place]  Documentation Flow Sheet  Post Debridement Measurements:  Wound/Ulcer Descriptions are Pre Debridement except measurements:     Wound 07/14/20 Abdomen Lateral;Lower (Active)   Number of days: 176       Wound 08/04/20 Buttocks Right #1  (Active)   Wound Image   01/05/21 1551   Wound Etiology Pressure Stage  3 08/04/20 1518   Dressing Status Clean;Dry; Intact 01/05/21 1613   Wound Cleansed Irrigated with saline 01/05/21 1613   Dressing/Treatment Alginate;Dry dressing 01/05/21 1613   Wound Length (cm) 3.3 cm 01/05/21 1551   Wound Width (cm) 2.3 cm 01/05/21 1551   Wound Depth (cm) 4.1 cm 01/05/21 1551   Wound Surface Area (cm^2) 7.59 cm^2 01/05/21 1551   Change in Wound Size % (l*w) 51.16 01/05/21 1551   Wound Volume (cm^3) 31.12 cm^3 01/05/21 1551   Wound Healing % 33 01/05/21 1551   Post-Procedure Length (cm) 3.4 cm 01/05/21 1613   Post-Procedure Width (cm) 2.4 cm 01/05/21 1613   Post-Procedure Depth (cm) 4.5 cm 01/05/21 1613   Post-Procedure Surface Area (cm^2) 8.16 cm^2 01/05/21 1613   Post-Procedure Volume (cm^3) 36.72 cm^3 01/05/21 1613   Undermining Starts ___ O'Clock 9 01/05/21 1551   Undermining Ends___ O'Clock 4 01/05/21 1551   Undermining Maxium Distance (cm) 4.1 at 10 oclock 01/05/21 1551   Wound Assessment Granulation tissue;Pale granulation tissue;Fibrin 01/05/21 1551   Drainage Amount Large 01/05/21 1551   Drainage Description  WOUND WITH NORMAL STERILE SALINE, LIGHTLY PACK  MOIST TO MOIST SALINE MARY LOU INTO WOUND, COVER WITH DRY GAUZE DRESSING  AND SECURE WITH TAPE. CHANGE DAILY.      VAC ORDERS:  CLEANSE WOUND WITH NORMAL STERILE SALINE.  APPLY WOUND VAC FOLLOWING  MANUFACTURE GUIDELINES USING BLACK FOAM AND DRAPE WITH CONT SUCTION  @125MM, DRAPE, SPONGE TO BE CHANGED  THREE TIMES A WEEK . CANISTER TO BE CHANGED WEEKLY AND PRN   MAY USE SKIN PREP, ADHESIVE REMOVER, THIN DUODERM TO PROTECT PERIWOUND  IF WOUND VAC FAILS, NOTIFY PHYSICIAN AND VAC COMPANY, REMOVE VAC AFTER 2 HOURS  AND APPLY:    DRESSING TO RT BUTTOCKS: CLEANSE  WOUND WITH NORMAL STERILE SALINE, AND PLACE MOIST TO MOIST NSS MARY LOU DRESSING AND SECURE WITH TAPE. CHANGE DAILY     Turn every 2 hours      LOW AIR LOSS MATTRESS TO BED. TURN AND REPOSITION EVERY 1-2 HOURS. KEEP PRESSURE OFF WOUND. USE PILLOWS FOR POSITIONING. MONITOR INFLATION OF MATTRESS- IF MATTRESS DEFLATES OR SAGS IMMEDIATELY NOTIFY MANUFACTURE AND REMOVE PATIENT FROM THE BED. LIMIT TIME IN CHAIR obtain high back WHEEL chair      Pt to come by cart for medical necessity for all appointments DUE TO PAIN DISCOMFORT, STAGE 4 PRESSURE ULCER, NO TRUNK SUPPORT         Treatment Orders:      [address] [facility],[address] followup visit __________1 WEEK ___________________  (Please note your next appointment above and if you are unable to keep, kindly give a 24 hour notice.  Thank you.)     Physician signature:__________________________        If you experience any of the following, please call the [facility] during business hours:     * Increase in Pain  * Temperature over 101  * Increase in drainage from your wound  * Drainage with a foul odor  * Bleeding  * Increase in swelling  * Need for compression bandage changes due to slippage, breakthrough drainage.     If you need medical attention outside of the business hours of the [facility] please contact your PCP or go to the nearest emergency room.     Contact your doctor if you have a temperature above 100.4 with any of the following:               Persistent cough             Shortness of breath            Chills            Fatigue            Chest pain or pressure            Difficulty breathing            Decreased consciousness of confusion             Headache     Recommend:                Wash hands often and for 20 seconds or more             Avoid touching eyes, nose, mouth and face             Social distance ( 6 feet)             Stay at home as much as possible             Call health care provider with any concerns        Electronically signed by Keara Patino MD on 1/6/2021 at 11:28 AM

## 2021-01-12 NOTE — PROGRESS NOTES
Wound Healing Center Followup Visit Note    Referring Physician : MD Antelmo Coleman [facility]  MEDICAL RECORD NUMBER:  [id number]  AGE: 69 y.o. GENDER: female  : 1950  EPISODE DATE:  2021    Subjective:     Chief Complaint   Patient presents with    Wound Check     right buttocks      HISTORY of PRESENT ILLNESS HPI   Zak Manriquez is a 69 y.o. female who presents today in regards to follow up evaluation and treatment of wound/ulcer. That patient's past medical, family and social hx were reviewed and changes were made if present. History of Wound Context:  The patient has a stage 3 sacral decubitus of the buttock which was debrided in the hospital in 2020.      20 CT showed osteomyelitis of the sacrum. Per her son, she has a lot of drainage.     Wound/Ulcer Pain Timing/Severity: intermittent  Quality of pain: dull  Severity:  2 / 10   Modifying Factors: Pain is relieved/improved with rest  Associated Signs/Symptoms: pain    Ulcer Identification:  Ulcer Type: pressure  Contributing Factors: chronic pressure and decreased mobility    Diabetic/Pressure/Non Pressure Ulcers only:  Ulcer: Pressure ulcer, Stage 3    Wound: N/A     2021  · Patient has a deep wound, with yellowish exudate in the calf due to some necrotic tissue, debrided, otherwise overall fairly clean and granulating        PAST MEDICAL HISTORY      Diagnosis Date    Cataract, right     Cerebral artery occlusion with cerebral infarction ([address]     DM (diabetes mellitus), type 2 ([address] 11/3/2010    HTN (hypertension) 11/3/2010    Hyperlipidemia 11/3/2010    Obesity     Osteoarthritis 11/3/2010    Sinusitis chronic     seasonal    Wheelchair confinement     can pivot with assistance;  uses a lift chair     Past Surgical History:   Procedure Laterality Date    GASTROSTOMY TUBE PLACEMENT N/A 10/7/2020    EGD PEG TUBE PLACEMENT performed by Yamileth Chin MD at [address] [place] [address]  ME [facility] RM[facility] INSJ IO LENS PROSTH W/O ECP Right 8/10/2018    RIGHT EYE CATARACT EXTRACTION WITH  IOL IMPLANT performed by Christa Lua MD at [address]     History reviewed. No pertinent family history. Social History     Tobacco Use    Smoking status: Never Smoker    Smokeless tobacco: Never Used   Substance Use Topics    Alcohol use: No     Alcohol/week: 0.0 standard drinks    Drug use: No     No Known Allergies  Current Outpatient Medications on File Prior to Encounter   Medication Sig Dispense Refill    aspirin 81 MG EC tablet Take 81 mg by mouth daily      DULoxetine (CYMBALTA) 60 MG extended release capsule Take 60 mg by mouth daily      metoprolol tartrate (LOPRESSOR) 25 MG tablet 25 mg by PEG Tube route 2 times daily      allopurinol (ZYLOPRIM) 100 MG tablet 1 tablet by PEG Tube route daily 30 tablet 3    rosuvastatin (CRESTOR) 10 MG tablet 1 tablet by PEG Tube route nightly 30 tablet 3    insulin lispro, 1 Unit Dial, (HUMALOG KWIKPEN) 100 UNIT/ML SOPN Check blood sugar TID and dose insulin 0-12 units according to medium dose sliding scale. 3 pen 0    BYETTA 10 MCG PEN 10 MCG/0.04ML injection Inject 0.04 mLs into the skin 2 times daily (with meals) 2.4 mL 1    Insulin Pen Needle (PEN NEEDLES) 31G X 6 MM MISC 1 each by Does not apply route daily Check blood sugar TID and dose according to medium dose sliding scale. 100 each 1    miconazole (MICOTIN) 2 % powder Apply topically 2 times daily. 45 g 3    blood glucose test strips (ACCU-CHEK RUI PLUS) strip TEST twice a day FASTING BEFORE BREAKFAST AND SUPPER 100 strip 2    ACCU-CHEK MULTICLIX LANCETS MISC Check BS Twice daily 200 each 1    Insulin Pen Needle (B-D UF III MINI PEN NEEDLES) 31G X 5 MM MISC use twice a day 100 each 5    Insulin Pen Needle (B-D ULTRAFINE III SHORT PEN) 31G X 8 MM MISC Inject 1 kit into the skin 2 times daily 100 each 5    Misc.  Devices (MATTRESS PAD) MISC 1 each by Does not apply route once for 1 dose 1 each 0     No current facility-administered medications on file prior to encounter. REVIEW OF SYSTEMS See HPI    Objective:    /72   Pulse 84   Temp 96.5 °F (35.8 °C) (Temporal)   Resp 16   Ht 5' 4\" (1.626 m)   Wt 179 lb (81.2 kg)   BMI 30.73 kg/m²   Wt Readings from Last 3 Encounters:   01/12/21 179 lb (81.2 kg)   01/05/21 225 lb (102.1 kg)   11/17/20 225 lb (102.1 kg)     PHYSICAL EXAM  CONSTITUTIONAL:   Awake, alert, cooperative   EYES:  lids and lashes normal   ENT: external ears and nose without lesions   NECK:  supple, symmetrical, trachea midline   SKIN:  Open wound Present    Assessment:     Problem List Items Addressed This Visit     None          Pre Debridement Measurements:  Are located in the [place]  Documentation Flow Sheet  Post Debridement Measurements:  Wound/Ulcer Descriptions are Pre Debridement except measurements:     Wound 07/14/20 Abdomen Lateral;Lower (Active)   Number of days: 182       Wound 08/04/20 Coccyx Mid #1  (Active)   Wound Image   01/05/21 1551   Wound Etiology Pressure Stage  3 08/04/20 1518   Dressing Status Clean;Dry; Intact 01/05/21 1613   Wound Cleansed Irrigated with saline 01/05/21 1613   Dressing/Treatment Alginate;Dry dressing 01/05/21 1613   Wound Length (cm) 2.9 cm 01/12/21 1326   Wound Width (cm) 3 cm 01/12/21 1326   Wound Depth (cm) 2.8 cm 01/12/21 1326   Wound Surface Area (cm^2) 8.7 cm^2 01/12/21 1326   Change in Wound Size % (l*w) 44.02 01/12/21 1326   Wound Volume (cm^3) 24.36 cm^3 01/12/21 1326   Wound Healing % 48 01/12/21 1326   Post-Procedure Length (cm) 2.9 cm 01/12/21 1341   Post-Procedure Width (cm) 3.2 cm 01/12/21 1341   Post-Procedure Depth (cm) 3.6 cm 01/12/21 1341   Post-Procedure Surface Area (cm^2) 9.28 cm^2 01/12/21 1341   Post-Procedure Volume (cm^3) 33.41 cm^3 01/12/21 1341   Undermining Starts ___ O'Clock 12 01/12/21 1326   Undermining Ends___ O'Clock 12 01/12/21 1326   Undermining Maxium Distance (cm) 4.8 @11 01/12/21 1326 Wound Assessment Granulation tissue;Fibrin 01/12/21 1326   Drainage Amount Large 01/12/21 1326   Drainage Description Serosanguinous 01/12/21 1326   Odor None 01/12/21 1326   Svetlana-wound Assessment Intact 01/12/21 1326   Number of days: 160          Procedure Note  Indications:  Based on my examination of this patient's wound(s)/ulcer(s) today, debridement is required to promote healing and evaluate the wound base. Performed by: Denise Figueredo MD    Consent obtained:  Yes    Time out taken:  Yes    Pain Control: Anesthetic  Anesthetic: 4% Lidocaine Liquid Topical     Debridement:Excisional Debridement    Using curette the wound(s)/ulcer(s) was/were sharply debrided down through and including the removal of subcutaneous tissue. Devitalized Tissue Debrided:  fibrin, biofilm, slough, exudate and callus to stimulate bleeding to promote healing, post debridement good bleeding base and wound edges noted    Wound/Ulcer #: 1    Percent of Wound/Ulcer Debrided: 100%    Total Surface Area Debrided: 8.16 sq cm     Estimated Blood Loss:  Minimal  Hemostasis Achieved:  by pressure    Procedural Pain:  7  / 10   Post Procedural Pain:  2 / 10     Response to treatment:  Well tolerated by patient. CT abd pelvis:     Impression    Decubitus ulcer at the level the sacrum suspicious for osteomyelitis    Limited evaluation of the left side of the abdomen, portion of the    left-sided abdomen is not included on the examination                  Consult ID - appointment next week  Wound vac - continue    Plan:   Treatment Note please see attached Discharge Instructions    Written patient dismissal instructions given to patient and signed by patient or POA.          Discharge Instructions         Discharge Instructions      Visit Discharge/Physician Orders     Discharge condition: Stable     Assessment of pain at discharge:     Anesthetic used: 4% lidocaine solutioin     Discharge to: Home     Left via:Private doctor if you have a temperature above 100.4 with any of the following:               Persistent cough             Shortness of breath            Chills            Fatigue            Chest pain or pressure            Difficulty breathing            Decreased consciousness of confusion             Headache     Recommend:                Wash hands often and for 20 seconds or more             Avoid touching eyes, nose, mouth and face             Social distance ( 6 feet)             Stay at home as much as possible             Call health care provider with any concerns                 Electronically signed by Kiara Rodgers MD on 1/12/2021 at 1:46 PM

## 2021-01-26 NOTE — PROGRESS NOTES
Wound Healing Center Followup Visit Note    Referring Physician : MD Maximus Rodríguez  [facility]  MEDICAL RECORD NUMBER:  [id number]  AGE: 69 y.o. GENDER: female  : 1950  EPISODE DATE:  2021    Subjective:     Chief Complaint   Patient presents with    Wound Check     coccyx       HISTORY of PRESENT ILLNESS HPI   Ollie Zapien is a 69 y.o. female who presents today in regards to follow up evaluation and treatment of wound/ulcer. That patient's past medical, family and social hx were reviewed and changes were made if present. History of Wound Context:  The patient has a stage 3 sacral decubitus of the buttock which was debrided in the hospital in 2020.      20 CT showed osteomyelitis of the sacrum. Per her son, she has a lot of drainage.     Wound/Ulcer Pain Timing/Severity: intermittent  Quality of pain: dull  Severity:  2 / 10   Modifying Factors: Pain is relieved/improved with rest  Associated Signs/Symptoms: pain    Ulcer Identification:  Ulcer Type: pressure  Contributing Factors: chronic pressure and decreased mobility    Diabetic/Pressure/Non Pressure Ulcers only:  Ulcer: Pressure ulcer, Stage 3    Wound: N/A        PAST MEDICAL HISTORY      Diagnosis Date    Cataract, right     Cerebral artery occlusion with cerebral infarction ([address]     DM (diabetes mellitus), type 2 ([address] 11/3/2010    HTN (hypertension) 11/3/2010    Hyperlipidemia 11/3/2010    Obesity     Osteoarthritis 11/3/2010    Sinusitis chronic     seasonal    Wheelchair confinement     can pivot with assistance;  uses a lift chair     Past Surgical History:   Procedure Laterality Date    GASTROSTOMY TUBE PLACEMENT N/A 10/7/2020    EGD PEG TUBE PLACEMENT performed by Kaycee Latham MD at [facility] IO LENS PROSTH W/O ECP Right 8/10/2018    RIGHT EYE CATARACT EXTRACTION WITH  IOL IMPLANT performed by Renato Sage MD at [address] [facility] History reviewed. No pertinent family history. Social History     Tobacco Use    Smoking status: Never Smoker    Smokeless tobacco: Never Used   Substance Use Topics    Alcohol use: No     Alcohol/week: 0.0 standard drinks    Drug use: No     No Known Allergies  Current Outpatient Medications on File Prior to Encounter   Medication Sig Dispense Refill    BYETTA 10 MCG PEN 10 MCG/0.04ML injection Inject 0.04 mLs into the skin 2 times daily (with meals) 2.4 mL 1    aspirin 81 MG EC tablet Take 81 mg by mouth daily      DULoxetine (CYMBALTA) 60 MG extended release capsule Take 60 mg by mouth daily      metoprolol tartrate (LOPRESSOR) 25 MG tablet 25 mg by PEG Tube route 2 times daily      allopurinol (ZYLOPRIM) 100 MG tablet 1 tablet by PEG Tube route daily 30 tablet 3    rosuvastatin (CRESTOR) 10 MG tablet 1 tablet by PEG Tube route nightly 30 tablet 3    insulin lispro, 1 Unit Dial, (HUMALOG KWIKPEN) 100 UNIT/ML SOPN Check blood sugar TID and dose insulin 0-12 units according to medium dose sliding scale. 3 pen 0    Insulin Pen Needle (PEN NEEDLES) 31G X 6 MM MISC 1 each by Does not apply route daily Check blood sugar TID and dose according to medium dose sliding scale. 100 each 1    miconazole (MICOTIN) 2 % powder Apply topically 2 times daily. 45 g 3    blood glucose test strips (ACCU-CHEK RUI PLUS) strip TEST twice a day FASTING BEFORE BREAKFAST AND SUPPER 100 strip 2    ACCU-CHEK MULTICLIX LANCETS MISC Check BS Twice daily 200 each 1    Insulin Pen Needle (B-D UF III MINI PEN NEEDLES) 31G X 5 MM MISC use twice a day 100 each 5    Insulin Pen Needle (B-D ULTRAFINE III SHORT PEN) 31G X 8 MM MISC Inject 1 kit into the skin 2 times daily 100 each 5    Misc. Devices (MATTRESS PAD) MISC 1 each by Does not apply route once for 1 dose 1 each 0     No current facility-administered medications on file prior to encounter.         REVIEW OF SYSTEMS See HPI    Objective:    /80   Pulse 88 Temp 95.6 °F (35.3 °C) (Temporal)   Resp 20   Wt Readings from Last 3 Encounters:   01/12/21 179 lb (81.2 kg)   01/05/21 225 lb (102.1 kg)   11/17/20 225 lb (102.1 kg)     PHYSICAL EXAM  CONSTITUTIONAL:   Awake, alert, cooperative   EYES:  lids and lashes normal   ENT: external ears and nose without lesions   NECK:  supple, symmetrical, trachea midline   SKIN:  Open wound Present    Assessment:     Problem List Items Addressed This Visit     None          Pre Debridement Measurements:  Are located in the [place]  Documentation Flow Sheet  Post Debridement Measurements:  Wound/Ulcer Descriptions are Pre Debridement except measurements:     Wound 07/14/20 Abdomen Lateral;Lower (Active)   Number of days: 196       Wound 08/04/20 Coccyx Mid #1  (Active)   Wound Image   01/26/21 1300   Wound Etiology Pressure Stage  3 08/04/20 1518   Dressing Status Clean;Dry; Intact 01/05/21 1613   Wound Cleansed Irrigated with saline 01/05/21 1613   Dressing/Treatment Alginate;Dry dressing 01/05/21 1613   Wound Length (cm) 2.8 cm 01/26/21 1300   Wound Width (cm) 3 cm 01/26/21 1300   Wound Depth (cm) 2.8 cm 01/26/21 1300   Wound Surface Area (cm^2) 8.4 cm^2 01/26/21 1300   Change in Wound Size % (l*w) 45.95 01/26/21 1300   Wound Volume (cm^3) 23.52 cm^3 01/26/21 1300   Wound Healing % 50 01/26/21 1300   Post-Procedure Length (cm) 2.9 cm 01/26/21 1310   Post-Procedure Width (cm) 3.1 cm 01/26/21 1310   Post-Procedure Depth (cm) 2.9 cm 01/26/21 1310   Post-Procedure Surface Area (cm^2) 8.99 cm^2 01/26/21 1310   Post-Procedure Volume (cm^3) 26.07 cm^3 01/26/21 1310   Undermining Starts ___ O'Clock 9 01/26/21 1300   Undermining Ends___ O'Clock 6 01/26/21 1300   Undermining Maxium Distance (cm) 3.6 01/26/21 1300   Wound Assessment Pink/red 01/26/21 1310   Drainage Amount Moderate 01/26/21 1300   Drainage Description Serosanguinous 01/26/21 1300   Odor None 01/26/21 1300   Svetlana-wound Assessment Intact 01/26/21 1300   Number of days: 175 Wound 01/26/21 Ischium Left #2 (Active)   Wound Image   01/26/21 1300   Wound Etiology Pressure Stage  2 01/26/21 1300   Wound Length (cm) 0.6 cm 01/26/21 1300   Wound Width (cm) 1.8 cm 01/26/21 1300   Wound Depth (cm) 0.1 cm 01/26/21 1300   Wound Surface Area (cm^2) 1.08 cm^2 01/26/21 1300   Wound Volume (cm^3) 0.11 cm^3 01/26/21 1300   Wound Assessment Pink/red 01/26/21 1300   Drainage Amount Scant 01/26/21 1300   Drainage Description [place] 01/26/21 1300   Odor None 01/26/21 1300   Number of days: 0          Procedure Note  Indications:  Based on my examination of this patient's wound(s)/ulcer(s) today, debridement is required to promote healing and evaluate the wound base. Performed by: Khushboo Andre MD    Consent obtained:  Yes    Time out taken:  Yes    Pain Control: Anesthetic  Anesthetic: 4% Lidocaine Liquid Topical     Debridement:Excisional Debridement    Using curette the wound(s)/ulcer(s) was/were sharply debrided down through and including the removal of subcutaneous tissue. Devitalized Tissue Debrided:  fibrin, biofilm, slough, exudate and callus to stimulate bleeding to promote healing, post debridement good bleeding base and wound edges noted    Wound/Ulcer #: 1    Percent of Wound/Ulcer Debrided: 100%    Total Surface Area Debrided: 8.99 sq cm     Estimated Blood Loss:  Minimal  Hemostasis Achieved:  by pressure    Procedural Pain:  7  / 10   Post Procedural Pain:  2 / 10     Response to treatment:  Well tolerated by patient. CT abd pelvis:     Impression    Decubitus ulcer at the level the sacrum suspicious for osteomyelitis    Limited evaluation of the left side of the abdomen, portion of the    left-sided abdomen is not included on the examination                  Consult ID - appointment next week  Wound vac - continue    Plan:   Treatment Note please see attached Discharge Instructions    Written patient dismissal instructions given to patient and signed by patient or POA. Discharge Instructions       Visit Discharge/Physician Orders     Discharge condition: Stable     Assessment of pain at discharge:     Anesthetic used: 4% lidocaine solutioin     Discharge to: Home     Left via:Private automobile     Accompanied by:  SON     ECF/H[facility]: [address] [facility]      Dressing Orders: RT BUTTOCK:(at [facility]) CLEANSE  WOUND WITH NORMAL STERILE SALINE, LIGHTLY PACK  MOIST TO MOIST SALINE MARY LOU INTO WOUND, COVER WITH DRY GAUZE DRESSING  AND SECURE WITH TAPE.       VAC ORDERS:  CLEANSE WOUND WITH NORMAL STERILE SALINE.  APPLY WOUND VAC FOLLOWING  MANUFACTURE GUIDELINES USING BLACK FOAM AND DRAPE WITH CONT SUCTION  @125MM, DRAPE, SPONGE TO BE CHANGED  THREE TIMES A WEEK . CANISTER TO BE CHANGED WEEKLY AND PRN   MAY USE SKIN PREP, ADHESIVE REMOVER, THIN DUODERM TO PROTECT PERIWOUND  IF WOUND VAC FAILS, NOTIFY PHYSICIAN AND VAC COMPANY, REMOVE VAC AFTER 2 HOURS  AND APPLY:      Turn every 2 hours      LOW AIR LOSS MATTRESS TO BED. TURN AND REPOSITION EVERY 1-2 HOURS. KEEP PRESSURE OFF WOUND. USE PILLOWS FOR POSITIONING. MONITOR INFLATION OF MATTRESS- IF MATTRESS DEFLATES OR SAGS IMMEDIATELY NOTIFY MANUFACTURE AND REMOVE PATIENT FROM THE BED.      LIMIT TIME IN CHAIR to 30 minutes 3x/day for meals only. Obtain high back WHEEL chair      Pt to come by cart for medical necessity for all appointments DUE TO PAIN DISCOMFORT, STAGE 4 PRESSURE ULCER, NO TRUNK SUPPORT      Treatment Orders:      [facility] followup visit :2 weeks ________________________________  (Please note your next appointment above and if you are unable to keep, kindly give a 24 hour notice.  Thank you.)     Physician signature:__________________________        If you experience any of the following, please call the [facility] during business hours:     * Increase in Pain  * Temperature over 101  * Increase in drainage from your wound  * Drainage with a foul odor  * Bleeding  * Increase in swelling  * Need for compression bandage changes due to slippage, breakthrough drainage.     If you need medical attention outside of the business hours of the [address] [place] [address] Road please contact your PCP or go to the nearest emergency room.     Contact your doctor if you have a temperature above 100.4 with any of the following:               Persistent cough             Shortness of breath            Chills            Fatigue            Chest pain or pressure            Difficulty breathing            Decreased consciousness of confusion             Headache     Recommend:                Wash hands often and for 20 seconds or more             Avoid touching eyes, nose, mouth and face             Social distance ( 6 feet)             Stay at home as much as possible             Call health care provider with any concerns            Electronically signed by Lola Ferrer MD on 1/26/2021 at 2:19 PM

## 2021-02-23 NOTE — PROGRESS NOTES
Wound Healing Center Followup Visit Note    Referring Physician : Dennard Rickers, MD Darci Duverney  [facility]  MEDICAL RECORD NUMBER:  [id number]  AGE: 69 y.o. GENDER: female  : 1950  EPISODE DATE:  2021    Subjective:     Chief Complaint   Patient presents with    Wound Check     left ischium/coccyx      HISTORY of PRESENT ILLNESS HPI   Iggy Hamilton is a 69 y.o. female who presents today in regards to follow up evaluation and treatment of wound/ulcer. That patient's past medical, family and social hx were reviewed and changes were made if present. History of Wound Context:  The patient has a stage 3 sacral decubitus of the buttock which was debrided in the hospital in 2020.      20 CT showed osteomyelitis of the sacrum. Per her son, she has a lot of drainage.     Wound/Ulcer Pain Timing/Severity: intermittent  Quality of pain: dull  Severity:  2 / 10   Modifying Factors: Pain is relieved/improved with rest  Associated Signs/Symptoms: pain    Ulcer Identification:  Ulcer Type: pressure  Contributing Factors: chronic pressure and decreased mobility    Diabetic/Pressure/Non Pressure Ulcers only:  Ulcer: Pressure ulcer, Stage 3    Wound: N/A        PAST MEDICAL HISTORY      Diagnosis Date    Cataract, right     Cerebral artery occlusion with [address] [facility] [address] [address]     DM (diabetes mellitus), type 2 ([address] 11/3/2010    HTN (hypertension) 11/3/2010    Hyperlipidemia 11/3/2010    Obesity     Osteoarthritis 11/3/2010    Sinusitis chronic     seasonal    Wheelchair confinement     can pivot with assistance;  uses a lift chair     Past Surgical History:   Procedure Laterality Date    GASTROSTOMY TUBE PLACEMENT N/A 10/7/2020    EGD PEG TUBE PLACEMENT performed by Jerri Peña MD at [facility] IO LENS PROSTH W/O ECP Right 8/10/2018    RIGHT EYE CATARACT EXTRACTION WITH  IOL IMPLANT performed by Buster Bahena MD at [facility] OR     History reviewed. No pertinent family history. Social History     Tobacco Use    Smoking status: Never Smoker    Smokeless tobacco: Never Used   Substance Use Topics    Alcohol use: No     Alcohol/week: 0.0 standard drinks    Drug use: No     No Known Allergies  Current Outpatient Medications on File Prior to Encounter   Medication Sig Dispense Refill    insulin glargine (LANTUS SOLOSTAR) 100 UNIT/ML injection Inject into the skin 2 times daily 10 units in am and 20 units in pm      aspirin 81 MG EC tablet Take 81 mg by mouth daily      DULoxetine (CYMBALTA) 60 MG extended release capsule Take 60 mg by mouth daily      metoprolol tartrate (LOPRESSOR) 25 MG tablet 25 mg by PEG Tube route 2 times daily      rosuvastatin (CRESTOR) 10 MG tablet 1 tablet by PEG Tube route nightly 30 tablet 3    Insulin Pen Needle (PEN NEEDLES) 31G X 6 MM MISC 1 each by Does not apply route daily Check blood sugar TID and dose according to medium dose sliding scale. 100 each 1    miconazole (MICOTIN) 2 % powder Apply topically 2 times daily. 45 g 3    blood glucose test strips (ACCU-CHEK RUI PLUS) strip TEST twice a day FASTING BEFORE BREAKFAST AND SUPPER 100 strip 2    ACCU-CHEK MULTICLIX LANCETS MISC Check BS Twice daily 200 each 1    Insulin Pen Needle (B-D UF III MINI PEN NEEDLES) 31G X 5 MM MISC use twice a day 100 each 5    Insulin Pen Needle (B-D ULTRAFINE III SHORT PEN) 31G X 8 MM MISC Inject 1 kit into the skin 2 times daily 100 each 5    allopurinol (ZYLOPRIM) 100 MG tablet 1 tablet by PEG Tube route daily 30 tablet 3    Misc. Devices (MATTRESS PAD) MISC 1 each by Does not apply route once for 1 dose 1 each 0     No current facility-administered medications on file prior to encounter.         REVIEW OF SYSTEMS See HPI    Objective:    /68   Pulse 104   Temp 97 °F (36.1 °C) (Temporal)   Resp 18   Ht 5' 4\" (1.626 m)   Wt 179 lb (81.2 kg)   BMI 30.73 kg/m²   Wt Readings from Last 3 Encounters: 02/23/21 179 lb (81.2 kg)   01/12/21 179 lb (81.2 kg)   01/05/21 225 lb (102.1 kg)     PHYSICAL EXAM  CONSTITUTIONAL:   Awake, alert, cooperative   EYES:  lids and lashes normal   ENT: external ears and nose without lesions   NECK:  supple, symmetrical, trachea midline   SKIN:  Open wound Present    Assessment:     Problem List Items Addressed This Visit     None          Pre Debridement Measurements:  Are located in the [place]  Documentation Flow Sheet  Post Debridement Measurements:  Wound/Ulcer Descriptions are Pre Debridement except measurements:     Wound 07/14/20 Abdomen Lateral;Lower (Active)   Number of days: 224       Wound 08/04/20 Coccyx Mid #1  (Active)   Wound Image   02/23/21 1603   Wound Etiology Pressure Stage  3 08/04/20 1518   Dressing Status Clean;Dry; Intact 01/05/21 1613   Wound Cleansed Irrigated with saline 01/05/21 1613   Dressing/Treatment Alginate;Dry dressing 01/05/21 1613   Wound Length (cm) 2.5 cm 02/23/21 1603   Wound Width (cm) 1.9 cm 02/23/21 1603   Wound Depth (cm) 3 cm 02/23/21 1603   Wound Surface Area (cm^2) 4.75 cm^2 02/23/21 1603   Change in Wound Size % (l*w) 69.43 02/23/21 1603   Wound Volume (cm^3) 14.25 cm^3 02/23/21 1603   Wound Healing % 69 02/23/21 1603   Post-Procedure Length (cm) 2.5 cm 02/23/21 1615   Post-Procedure Width (cm) 2 cm 02/23/21 1615   Post-Procedure Depth (cm) 3 cm 02/23/21 1615   Post-Procedure Surface Area (cm^2) 5 cm^2 02/23/21 1615   Post-Procedure Volume (cm^3) 15 cm^3 02/23/21 1615   Undermining Starts ___ O'Clock 12 02/23/21 1603   Undermining Ends___ O'Clock 5 02/23/21 1603   Undermining Maxium Distance (cm) [employer] 02/23/21 1603   Wound Assessment Granulation tissue;Fibrin 02/23/21 1603   Drainage Amount Large 02/23/21 1603   Drainage Description Serosanguinous; Serous 02/23/21 1603   Odor None 02/23/21 1603   Svetlnaa-wound Assessment Intact 02/23/21 1603   Number of days: 203       Wound 01/26/21 Ischium Left #2 (Active)   Wound Image   02/23/21 1603   Wound Etiology Pressure Stage  2 01/26/21 1300   Dressing Status New dressing applied 01/26/21 1423   Wound Cleansed Irrigated with saline 01/26/21 1423   Dressing/Treatment Alginate;Dry dressing 01/26/21 1423   Wound Length (cm) 0 cm 02/23/21 1603   Wound Width (cm) 0 cm 02/23/21 1603   Wound Depth (cm) 0 cm 02/23/21 1603   Wound Surface Area (cm^2) 0 cm^2 02/23/21 1603   Change in Wound Size % (l*w) 100 02/23/21 1603   Wound Volume (cm^3) 0 cm^3 02/23/21 1603   Wound Healing % 100 02/23/21 1603   Wound Assessment Pink/red 01/26/21 1300   Drainage Amount Scant 01/26/21 1300   Drainage Description [place] 01/26/21 1300   Odor None 01/26/21 1300   Number of days: 28          Procedure Note  Indications:  Based on my examination of this patient's wound(s)/ulcer(s) today, debridement is required to promote healing and evaluate the wound base. Performed by: Lucas Potts MD    Consent obtained:  Yes    Time out taken:  Yes    Pain Control: Anesthetic  Anesthetic: 4% Lidocaine Liquid Topical     Debridement:Excisional Debridement    Using curette the wound(s)/ulcer(s) was/were sharply debrided down through and including the removal of subcutaneous tissue. Devitalized Tissue Debrided:  fibrin, biofilm, slough, exudate and callus to stimulate bleeding to promote healing, post debridement good bleeding base and wound edges noted    Wound/Ulcer #: 1    Percent of Wound/Ulcer Debrided: 100%    Total Surface Area Debrided: 5 sq cm     Estimated Blood Loss:  Minimal  Hemostasis Achieved:  by pressure    Procedural Pain:  7  / 10   Post Procedural Pain:  2 / 10     Response to treatment:  Well tolerated by patient. Wound vac - continue    Plan:   Treatment Note please see attached Discharge Instructions    Written patient dismissal instructions given to patient and signed by patient or POA.          Discharge Instructions       Visit Discharge/Physician Orders     Discharge condition: Stable     Assessment of pain at discharge:     Anesthetic used: 4% lidocaine solutioin     Discharge to: Home     Left via:Private automobile     Accompanied by: Ryan Paulino     ECF/HHA: [address] for [facility] AFTER COMPETENCY TO APPLY VAC 1 TIMES A WEEK AND PRN      Dressing Orders: RT BUTTOCK:(at [facility]) CLEANSE  WOUND WITH NORMAL STERILE SALINE, LIGHTLY PACK  MOIST TO MOIST SALINE MARY LOU INTO WOUND, COVER WITH DRY GAUZE DRESSING  AND SECURE WITH TAPE.       VAC ORDERS:  CLEANSE WOUND WITH NORMAL STERILE SALINE.  APPLY WOUND VAC FOLLOWING  MANUFACTURE GUIDELINES USING BLACK FOAM AND DRAPE WITH CONT SUCTION [employer], DRAPE, SPONGE TO BE CHANGED  THREE TIMES A WEEK .  CANISTER TO BE CHANGED WEEKLY AND PRN   MAY USE SKIN PREP, ADHESIVE REMOVER, THIN DUODERM TO PROTECT PERIWOUND  IF WOUND VAC FAILS, NOTIFY PHYSICIAN AND VAC COMPANY, REMOVE VAC AFTER 2 HOURS  AND APPLY:      Turn every 2 hours      LOW AIR LOSS MATTRESS TO BED. TURN AND REPOSITION EVERY 1-2 HOURS. KEEP PRESSURE OFF WOUND. USE PILLOWS FOR POSITIONING. MONITOR INFLATION OF MATTRESS- IF MATTRESS DEFLATES OR SAGS IMMEDIATELY NOTIFY MANUFACTURE AND REMOVE PATIENT FROM THE BED.      LIMIT TIME IN CHAIR to 30 minutes 3x/day for meals only. Obtain high back WHEEL chair      Pt to come by cart for medical necessity for all appointments DUE TO PAIN DISCOMFORT, STAGE 4 PRESSURE ULCER, NO TRUNK SUPPORT      Treatment Orders: keep peg tube dressings on top of tube      [facility] followup visit :2 weeks ________________________________  (Please note your next appointment above and if you are unable to keep, kindly give a 24 hour notice.  Thank you.)     Physician signature:__________________________        If you experience any of the following, please call the [address] during business hours:     * Increase in Pain  * Temperature over 101  * Increase in drainage from your wound  * Drainage with a foul odor  * Bleeding  * Increase in swelling  * Need for compression bandage changes due to slippage, breakthrough drainage.     If you need medical attention outside of the business hours of the [address] [place] [address] Road please contact your PCP or go to the nearest emergency room.     Contact your doctor if you have a temperature above 100.4 with any of the following:               Persistent cough             Shortness of breath            Chills            Fatigue            Chest pain or pressure            Difficulty breathing            Decreased consciousness of confusion             Headache     Recommend:                Wash hands often and for 20 seconds or more             Avoid touching eyes, nose, mouth and face             Social distance ( 6 feet)             Stay at home as much as possible             Call health care provider with any concerns                     Electronically signed by Darrel Harvey MD on 2/23/2021 at 4:18 PM

## 2021-02-23 NOTE — PLAN OF CARE
Problem: Pressure Ulcer:  Goal: Signs of wound healing will improve  Description: Signs of wound healing will improve  Outcome: Met This Shift  Goal: Absence of new pressure ulcer  Description: Absence of new pressure ulcer  Outcome: Met This Shift  Goal: Will show no infection signs and symptoms  Description: Will show no infection signs and symptoms  Outcome: Met This Shift     Problem: Wound:  Goal: Will show signs of wound healing; wound closure and no evidence of infection  Description: Will show signs of wound healing; wound closure and no evidence of infection  Outcome: Met This Shift     Problem: Weight control:  Goal: Ability to maintain an optimal weight for height and age will be supported  Description: Ability to maintain an optimal weight for height and age will be supported  Outcome: Met This Shift     Problem: Falls - Risk of:  Goal: Will remain free from falls  Description: Will remain free from falls  Outcome: Met This Shift     Problem: Blood Glucose:  Goal: Ability to maintain appropriate glucose levels will improve  Description: Ability to maintain appropriate glucose levels will improve  Outcome: Met This Shift

## 2021-03-16 NOTE — TELEPHONE ENCOUNTER
Called [facility]. Notified unable to sign SMN/CMN for incontinence items as pt last seen in office 7/23/20. Office attempted on 3/11/21 to reach patient to notify of need to be seen but was unable to reach patient. States they will follow up on their end.

## 2021-03-23 NOTE — PROGRESS NOTES
Wound Healing Center Followup Visit Note    Referring Physician : MD Tong Bartholomew [facility]  MEDICAL RECORD NUMBER:  [id number]  AGE: 69 y.o. GENDER: female  : 1950  EPISODE DATE:  3/23/2021    Subjective:     Chief Complaint   Patient presents with    Wound Check      left ischial and coccyx ulcers       HISTORY of PRESENT ILLNESS HPI   Agatha Sykes is a 69 y.o. female who presents today in regards to follow up evaluation and treatment of wound/ulcer. That patient's past medical, family and social hx were reviewed and changes were made if present. History of Wound Context:  The patient has a stage 3 sacral decubitus of the buttock which was debrided in the hospital in 2020.      20 CT showed osteomyelitis of the sacrum. Per her son, she has a lot of drainage.     Wound/Ulcer Pain Timing/Severity: intermittent  Quality of pain: dull  Severity:  2 / 10   Modifying Factors: Pain is relieved/improved with rest  Associated Signs/Symptoms: pain    Ulcer Identification:  Ulcer Type: pressure  Contributing Factors: chronic pressure and decreased mobility    Diabetic/Pressure/Non Pressure Ulcers only:  Ulcer: Pressure ulcer, Stage 3    Wound: N/A        PAST MEDICAL HISTORY      Diagnosis Date    Cataract, right     Cerebral artery occlusion with cerebral infarction ([address]     DM (diabetes mellitus), type 2 ([address] 11/3/2010    HTN (hypertension) 11/3/2010    Hyperlipidemia 11/3/2010    Obesity     Osteoarthritis 11/3/2010    Sinusitis chronic     seasonal    Wheelchair confinement     can pivot with assistance;  uses a lift chair     Past Surgical History:   Procedure Laterality Date    GASTROSTOMY TUBE PLACEMENT N/A 10/7/2020    EGD PEG TUBE PLACEMENT performed by Glenn Hunt MD at [facility] IO LENS PROSTH W/O ECP Right 8/10/2018    RIGHT EYE CATARACT EXTRACTION WITH  IOL IMPLANT performed by Rebekah Morillo I my examination of this patient's wound(s)/ulcer(s) today, debridement is required to promote healing and evaluate the wound base. Performed by: Altaf Urbina MD    Consent obtained:  Yes    Time out taken:  Yes    Pain Control: Anesthetic  Anesthetic: 4% Lidocaine Liquid Topical     Debridement:Excisional Debridement    Using curette the wound(s)/ulcer(s) was/were sharply debrided down through and including the removal of subcutaneous tissue. Devitalized Tissue Debrided:  fibrin, biofilm, slough, exudate and callus to stimulate bleeding to promote healing, post debridement good bleeding base and wound edges noted    Wound/Ulcer #: 1    Percent of Wound/Ulcer Debrided: 100%    Total Surface Area Debrided: 4.41 sq cm     Estimated Blood Loss:  Minimal  Hemostasis Achieved:  by pressure    Procedural Pain:  7  / 10   Post Procedural Pain:  2 / 10     Response to treatment:  Well tolerated by patient. Wound vac - continue    Plan:   Treatment Note please see attached Discharge Instructions    Written patient dismissal instructions given to patient and signed by patient or POA.          Discharge Instructions        Visit Discharge/Physician Orders     Discharge condition: Stable     Assessment of pain at discharge:     Anesthetic used: 4% lidocaine solutioin     Discharge to: Home     Left via:Private automobile     Accompanied by: Thomas Dobbs     ECF/HHA: [address] [facility] AFTER COMPETENCY TO APPLY VAC 1 TIMES A WEEK AND PRN      Dressing Orders: RT BUTTOCK:(at [facility]) CLEANSE  WOUND WITH NORMAL STERILE SALINE, LIGHTLY PACK  MOIST TO MOIST SALINE MARY LOU INTO WOUND, COVER WITH DRY GAUZE DRESSING  AND SECURE WITH TAPE.       VAC ORDERS:  CLEANSE WOUND WITH NORMAL STERILE SALINE.  APPLY WOUND VAC FOLLOWING  MANUFACTURE GUIDELINES USING BLACK FOAM AND DRAPE WITH CONT SUCTION [employer], DRAPE, SPONGE TO BE CHANGED  THREE TIMES A WEEK .  CANISTER TO BE CHANGED WEEKLY AND PRN   MAY USE SKIN PREP, ADHESIVE REMOVER, THIN DUODERM TO PROTECT PERIWOUND  IF WOUND VAC FAILS, NOTIFY PHYSICIAN AND VAC COMPANY, REMOVE VAC AFTER 2 HOURS  AND APPLY:      Turn every 2 hours      LOW AIR LOSS MATTRESS TO BED. TURN AND REPOSITION EVERY 1-2 HOURS. KEEP PRESSURE OFF WOUND. USE PILLOWS FOR POSITIONING. MONITOR INFLATION OF MATTRESS- IF MATTRESS DEFLATES OR SAGS IMMEDIATELY NOTIFY MANUFACTURE AND REMOVE PATIENT FROM THE BED.      LIMIT TIME IN CHAIR to 30 minutes 3x/day for meals only. Obtain high back WHEEL chair      Pt to come by cart for medical necessity for all appointments DUE TO PAIN DISCOMFORT, STAGE 4 PRESSURE ULCER, NO TRUNK SUPPORT      Treatment Orders: keep peg tube dressings on top of tube      [address] [facility] [facility] followup visit :2 weeks ________________________________  (Please note your next appointment above and if you are unable to keep, kindly give a 24 hour notice.  Thank you.)     Physician signature:__________________________        If you experience any of the following, please call the [email or web address] during business hours:     * Increase in Pain  * Temperature over 101  * Increase in drainage from your wound  * Drainage with a foul odor  * Bleeding  * Increase in swelling  * Need for compression bandage changes due to slippage, breakthrough drainage.     If you need medical attention outside of the business hours of the [email or web address] [employer] please contact your PCP or go to the nearest emergency room.     Contact your doctor if you have a temperature above 100.4 with any of the following:               Persistent cough             Shortness of breath            Chills            Fatigue            Chest pain or pressure            Difficulty breathing            Decreased consciousness of confusion             Headache     Recommend:                Wash hands often and for 20 seconds or more             Avoid touching eyes, nose, mouth and face             Social distance ( 6 feet)             Stay at home as much as possible             Call health care provider with any concerns                            Electronically signed by Shayna Moya MD on 3/23/2021 at 3:27 PM

## 2021-04-06 PROBLEM — J96.90 RESPIRATORY FAILURE (HCC): Status: RESOLVED | Noted: 2020-01-01 | Resolved: 2021-01-01

## 2021-04-06 PROBLEM — J96.01 ACUTE HYPOXEMIC RESPIRATORY FAILURE (HCC): Status: RESOLVED | Noted: 2020-01-01 | Resolved: 2021-01-01

## 2021-04-06 PROBLEM — Z20.822 SUSPECTED COVID-19 VIRUS INFECTION: Status: RESOLVED | Noted: 2020-01-01 | Resolved: 2021-01-01

## 2021-04-06 PROBLEM — Z86.73 H/O: CVA (CEREBROVASCULAR ACCIDENT): Status: ACTIVE | Noted: 2021-01-01

## 2021-04-06 NOTE — PROGRESS NOTES
Wound Healing Center Followup Visit Note    Referring Physician : MD Afshin Head [facility]  MEDICAL RECORD NUMBER:  [id number]  AGE: 69 y.o. GENDER: female  : 1950  EPISODE DATE:  2021    Subjective:     Chief Complaint   Patient presents with    Wound Check      HISTORY of PRESENT ILLNESS HPI   Sudheer Felix is a 69 y.o. female who presents today in regards to follow up evaluation and treatment of wound/ulcer. That patient's past medical, family and social hx were reviewed and changes were made if present. History of Wound Context:  The patient has a stage 3 sacral decubitus of the buttock which was debrided in the hospital in 2020.      20 CT showed osteomyelitis of the sacrum. Per her son, she has a lot of drainage.     Wound/Ulcer Pain Timing/Severity: intermittent  Quality of pain: dull  Severity:  2 / 10   Modifying Factors: Pain is relieved/improved with rest  Associated Signs/Symptoms: pain    Ulcer Identification:  Ulcer Type: pressure  Contributing Factors: chronic pressure and decreased mobility    Diabetic/Pressure/Non Pressure Ulcers only:  Ulcer: Pressure ulcer, Stage 3    Wound: N/A     2021  · Patient does have a deep sacral wound stage IV, fairly clean with undermining        PAST MEDICAL HISTORY      Diagnosis Date    Cataract, right     Cerebral artery occlusion with cerebral infarction ([address]     DM (diabetes mellitus), type 2 ([address] 11/3/2010    H/O: CVA (cerebrovascular accident) 2021    HTN (hypertension) 11/3/2010    Hyperlipidemia 11/3/2010    Obesity     Osteoarthritis 11/3/2010    Sinusitis chronic     seasonal    Wheelchair confinement     can pivot with assistance;  uses a lift chair     Past Surgical History:   Procedure Laterality Date    GASTROSTOMY TUBE PLACEMENT N/A 10/7/2020    EGD PEG TUBE PLACEMENT performed by Agatha Osler, MD at [facility]  PROSTH W/O ECP Right 8/10/2018    RIGHT EYE CATARACT EXTRACTION WITH  IOL IMPLANT performed by Eli Fleischer, MD at [address] [facility] [address]     History reviewed. No pertinent family history. Social History     Tobacco Use    Smoking status: Never Smoker    Smokeless tobacco: Never Used   Substance Use Topics    Alcohol use: No     Alcohol/week: 0.0 standard drinks    Drug use: No     No Known Allergies  Current Outpatient Medications on File Prior to Encounter   Medication Sig Dispense Refill    insulin glargine (LANTUS SOLOSTAR) 100 UNIT/ML injection Inject into the skin 2 times daily 10 units in am and 20 units in pm      aspirin 81 MG EC tablet Take 81 mg by mouth daily      DULoxetine (CYMBALTA) 60 MG extended release capsule Take 60 mg by mouth daily      metoprolol tartrate (LOPRESSOR) 25 MG tablet 25 mg by PEG Tube route 2 times daily      rosuvastatin (CRESTOR) 10 MG tablet 1 tablet by PEG Tube route nightly 30 tablet 3    Insulin Pen Needle (PEN NEEDLES) 31G X 6 MM MISC 1 each by Does not apply route daily Check blood sugar TID and dose according to medium dose sliding scale. 100 each 1    miconazole (MICOTIN) 2 % powder Apply topically 2 times daily. 45 g 3    blood glucose test strips (ACCU-CHEK RUI PLUS) strip TEST twice a day FASTING BEFORE BREAKFAST AND SUPPER 100 strip 2    ACCU-CHEK MULTICLIX LANCETS MISC Check BS Twice daily 200 each 1    Insulin Pen Needle (B-D UF III MINI PEN NEEDLES) 31G X 5 MM MISC use twice a day 100 each 5    Insulin Pen Needle (B-D ULTRAFINE III SHORT PEN) 31G X 8 MM MISC Inject 1 kit into the skin 2 times daily 100 each 5    allopurinol (ZYLOPRIM) 100 MG tablet 1 tablet by PEG Tube route daily 30 tablet 3    Misc. Devices (MATTRESS PAD) MISC 1 each by Does not apply route once for 1 dose 1 each 0     No current facility-administered medications on file prior to encounter.         REVIEW OF SYSTEMS See HPI    Objective:    /68   Pulse 80   Temp 96.3 °F (35.7 °C) 04/06/21 1530   Drainage Amount Large 04/06/21 1530   Drainage Description Yellow 04/06/21 1530   Odor None 04/06/21 1530   Svetlana-wound Assessment Intact 04/06/21 1530   Number of days: 245          Procedure Note  Indications:  Based on my examination of this patient's wound(s)/ulcer(s) today, debridement is required to promote healing and evaluate the wound base. Performed by: Marilee Carter MD    Consent obtained:  Yes    Time out taken:  Yes    Pain Control: Anesthetic  Anesthetic: 4% Lidocaine Liquid Topical     Debridement:Excisional Debridement    Using curette the wound(s)/ulcer(s) was/were sharply debrided down through and including the removal of subcutaneous tissue. Devitalized Tissue Debrided:  fibrin, biofilm, slough, exudate and callus to stimulate bleeding to promote healing, post debridement good bleeding base and wound edges noted    Wound/Ulcer #: 1    Percent of Wound/Ulcer Debrided: 100%    Total Surface Area Debrided: 4.41 sq cm     Estimated Blood Loss:  Minimal  Hemostasis Achieved:  by pressure    Procedural Pain:  7  / 10   Post Procedural Pain:  2 / 10     Response to treatment:  Well tolerated by patient. Wound vac - continue    Plan:   Treatment Note please see attached Discharge Instructions    Written patient dismissal instructions given to patient and signed by patient or POA.          Discharge Instructions       Visit Discharge/Physician Orders     Discharge condition: Stable     Assessment of pain at discharge:     Anesthetic used: 4% lidocaine solutioin     Discharge to: Home     Left via:Private automobile     Accompanied by: Carlitos REECEF/HHA: [address] [facility] for [facility] AFTER COMPETENCY TO APPLY VAC 1 TIMES A WEEK AND PRN      Dressing Orders: RT BUTTOCK:(at [facility]) CLEANSE  WOUND WITH NORMAL STERILE SALINE, LIGHTLY PACK  MOIST TO MOIST SALINE MARY LOU INTO WOUND, COVER WITH DRY GAUZE DRESSING  AND SECURE WITH TAPE.       VAC ORDERS:  CLEANSE WOUND WITH NORMAL STERILE

## 2021-04-27 NOTE — PROGRESS NOTES
Wound Healing Center Followup Visit Note    Referring Physician : MD Leonardo Castañeda [facility]  MEDICAL RECORD NUMBER:  [id number]  AGE: 69 y.o. GENDER: female  : 1950  EPISODE DATE:  2021    Subjective:     Chief Complaint   Patient presents with    Wound Check     coccyx      HISTORY of PRESENT ILLNESS HPI   Jesusita Contreras is a 69 y.o. female who presents today in regards to follow up evaluation and treatment of wound/ulcer. That patient's past medical, family and social hx were reviewed and changes were made if present. History of Wound Context:  The patient has a stage 3 sacral decubitus of the buttock which was debrided in the hospital in 2020.      20 CT showed osteomyelitis of the sacrum. Per her son, she has a lot of drainage.     Wound/Ulcer Pain Timing/Severity: intermittent  Quality of pain: dull  Severity:  2 / 10   Modifying Factors: Pain is relieved/improved with rest  Associated Signs/Symptoms: pain    Ulcer Identification:  Ulcer Type: pressure  Contributing Factors: chronic pressure and decreased mobility    Diabetic/Pressure/Non Pressure Ulcers only:  Ulcer: Pressure ulcer, Stage 3    Wound: N/A        PAST MEDICAL HISTORY      Diagnosis Date    Cataract, right     Cerebral artery occlusion with cerebral infarction ([address]     DM (diabetes mellitus), type 2 ([address] 11/3/2010    H/O: CVA (cerebrovascular accident) 2021    HTN (hypertension) 11/3/2010    Hyperlipidemia 11/3/2010    Obesity     Osteoarthritis 11/3/2010    Sinusitis chronic     seasonal    Wheelchair confinement     can pivot with assistance;  uses a lift chair     Past Surgical History:   Procedure Laterality Date    GASTROSTOMY TUBE PLACEMENT N/A 10/7/2020    [facility]GD [facility] LENS PROSTH W/O ECP Right 8/10/2018    RIGHT EYE CATARACT EXTRACTION WITH  IOL IMPLANT performed by Rachell Thomas MD at [address] [facility]     History reviewed. No pertinent family history. Social History     Tobacco Use    Smoking status: Never Smoker    Smokeless tobacco: Never Used   Substance Use Topics    Alcohol use: No     Alcohol/week: 0.0 standard drinks    Drug use: No     No Known Allergies  Current Outpatient Medications on File Prior to Encounter   Medication Sig Dispense Refill    insulin glargine (LANTUS SOLOSTAR) 100 UNIT/ML injection Inject into the skin 2 times daily 10 units in am and 20 units in pm      aspirin 81 MG EC tablet Take 81 mg by mouth daily      DULoxetine (CYMBALTA) 60 MG extended release capsule Take 60 mg by mouth daily      metoprolol tartrate (LOPRESSOR) 25 MG tablet 25 mg by PEG Tube route 2 times daily      rosuvastatin (CRESTOR) 10 MG tablet 1 tablet by PEG Tube route nightly 30 tablet 3    Insulin Pen Needle (PEN NEEDLES) 31G X 6 MM MISC 1 each by Does not apply route daily Check blood sugar TID and dose according to medium dose sliding scale. 100 each 1    miconazole (MICOTIN) 2 % powder Apply topically 2 times daily. 45 g 3    blood glucose test strips (ACCU-CHEK RUI PLUS) strip TEST twice a day FASTING BEFORE BREAKFAST AND SUPPER 100 strip 2    ACCU-CHEK MULTICLIX LANCETS MISC Check BS Twice daily 200 each 1    Insulin Pen Needle (B-D UF III MINI PEN NEEDLES) 31G X 5 MM MISC use twice a day 100 each 5    Insulin Pen Needle (B-D ULTRAFINE III SHORT PEN) 31G X 8 MM MISC Inject 1 kit into the skin 2 times daily 100 each 5    allopurinol (ZYLOPRIM) 100 MG tablet 1 tablet by PEG Tube route daily 30 tablet 3    Misc. Devices (MATTRESS PAD) MISC 1 each by Does not apply route once for 1 dose 1 each 0     No current facility-administered medications on file prior to encounter.         REVIEW OF SYSTEMS See HPI    Objective:    /76   Pulse 84   Temp 96.4 °F (35.8 °C) (Temporal)   Resp 16   Ht 5' 4\" (1.626 m)   Wt 179 lb (81.2 kg)   BMI 30.73 kg/m²   Wt Readings from Last 3 Encounters:   04/27/21 179 lb (81.2 kg)   04/06/21 179 lb (81.2 kg)   03/23/21 179 lb (81.2 kg)     PHYSICAL EXAM  CONSTITUTIONAL:   Awake, alert, cooperative   EYES:  lids and lashes normal   ENT: external ears and nose without lesions   NECK:  supple, symmetrical, trachea midline   SKIN:  Open wound Present    Assessment:     Problem List Items Addressed This Visit     None          Pre Debridement Measurements:  Are located in the [place]  Documentation Flow Sheet  Post Debridement Measurements:  Wound/Ulcer Descriptions are Pre Debridement except measurements:     Wound 07/14/20 Abdomen Lateral;Lower (Active)   Number of days: 287       Wound 08/04/20 Coccyx Mid #1  (Active)   Wound Image   02/23/21 1603   Wound Etiology Pressure Stage  3 08/04/20 1518   Dressing Status New dressing applied 04/27/21 1544   Wound Cleansed Cleansed with saline 04/27/21 1544   Dressing/Treatment Moist to dry;ABD 04/27/21 1544   Offloading for Diabetic Foot Ulcers No offloading required 03/23/21 1536   Wound Length (cm) 2.2 cm 04/27/21 1521   Wound Width (cm) 1.8 cm 04/27/21 1521   Wound Depth (cm) 3.2 cm 04/27/21 1521   Wound Surface Area (cm^2) 3.96 cm^2 04/27/21 1521   Change in Wound Size % (l*w) 74.52 04/27/21 1521   Wound Volume (cm^3) 12.67 cm^3 04/27/21 1521   Wound Healing % 73 04/27/21 1521   Post-Procedure Length (cm) 2.3 cm 04/27/21 1541   Post-Procedure Width (cm) 1.9 cm 04/27/21 1541   Post-Procedure Depth (cm) 3.3 cm 04/27/21 1541   Post-Procedure Surface Area (cm^2) 4.37 cm^2 04/27/21 1541   Post-Procedure Volume (cm^3) 14.42 cm^3 04/27/21 1541   Undermining Starts ___ O'Clock 10 04/27/21 1521   Undermining Ends___ O'Clock 2 04/27/21 1521   Undermining Maxium Distance (cm) [email or web address] 04/27/21 1521   Wound Assessment Granulation tissue 04/06/21 1530   Drainage Amount Large 04/06/21 1530   Drainage Description Yellow 04/06/21 1530   Odor None 04/06/21 1530   Svetlana-wound Assessment Intact 04/06/21 1530   Number of days: 266          Procedure Note  Indications:  Based on my examination of this patient's wound(s)/ulcer(s) today, debridement is required to promote healing and evaluate the wound base. Performed by: Grecia Crum MD    Consent obtained:  Yes    Time out taken:  Yes    Pain Control: Anesthetic  Anesthetic: 4% Lidocaine Liquid Topical     Debridement:Excisional Debridement    Using curette the wound(s)/ulcer(s) was/were sharply debrided down through and including the removal of subcutaneous tissue. Devitalized Tissue Debrided:  fibrin, biofilm, slough, exudate and callus to stimulate bleeding to promote healing, post debridement good bleeding base and wound edges noted    Wound/Ulcer #: 1    Percent of Wound/Ulcer Debrided: 100%    Total Surface Area Debrided: 14.42 sq cm     Estimated Blood Loss:  Minimal  Hemostasis Achieved:  by pressure    Procedural Pain:  7  / 10   Post Procedural Pain:  2 / 10     Response to treatment:  Well tolerated by patient. Wound vac - continue    Plan:   Treatment Note please see attached Discharge Instructions    Written patient dismissal instructions given to patient and signed by patient or POA.          Discharge Instructions       Visit Discharge/Physician Orders     Discharge condition: Stable     Assessment of pain at discharge:     Anesthetic used: 4% lidocaine solutioin     Discharge to: Home     Left via:Private automobile     [facility]     ECF/HHA: [facility] for [facility] AFTER COMPETENCY TO APPLY VAC 1 TIMES A WEEK AND PRN      Dressing Orders: RT BUTTOCK:(at [facility]) CLEANSE  WOUND WITH NORMAL STERILE SALINE, LIGHTLY PACK  MOIST TO MOIST SALINE MARY LOU INTO WOUND, COVER WITH DRY GAUZE DRESSING  AND SECURE WITH TAPE.       VAC ORDERS:  CLEANSE WOUND WITH NORMAL STERILE SALINE.  APPLY WOUND VAC FOLLOWING  MANUFACTURE GUIDELINES USING BLACK FOAM AND DRAPE WITH CONT SUCTION [email or web address], DRAPE, SPONGE TO BE CHANGED Liliana Epps TIMES A WEEK .  CANISTER TO BE CHANGED WEEKLY AND PRN   MAY USE SKIN PREP, ADHESIVE REMOVER, THIN DUODERM TO PROTECT PERIWOUND  IF WOUND VAC FAILS, NOTIFY PHYSICIAN AND VAC COMPANY, REMOVE VAC AFTER 2 HOURS  AND APPLY WET TO DRY DRESSING.      Turn every 2 hours      LOW AIR LOSS MATTRESS TO BED. TURN AND REPOSITION EVERY 1-2 HOURS. KEEP PRESSURE OFF WOUND. USE PILLOWS FOR POSITIONING. MONITOR INFLATION OF MATTRESS- IF MATTRESS DEFLATES OR SAGS IMMEDIATELY NOTIFY MANUFACTURE AND REMOVE PATIENT FROM THE BED.      LIMIT TIME IN CHAIR to 30 minutes 3x/day for meals only. Obtain high back WHEEL chair      Pt to come by cart for medical necessity for all appointments DUE TO PAIN DISCOMFORT, STAGE 4 PRESSURE ULCER, NO TRUNK SUPPORT      Treatment Orders: keep peg tube dressings on top of tube      [address] [facility] [facility] followup visit :2 weeks ________________________________  (Please note your next appointment above and if you are unable to keep, kindly give a 24 hour notice.  Thank you.)     Physician signature:__________________________        If you experience any of the following, please call the [facility] during business hours:     * Increase in Pain  * Temperature over 101  * Increase in drainage from your wound  * Drainage with a foul odor  * Bleeding  * Increase in swelling  * Need for compression bandage changes due to slippage, breakthrough drainage.     If you need medical attention outside of the business hours of the [facility] please contact your PCP or go to the nearest emergency room.     Contact your doctor if you have a temperature above 100.4 with any of the following:               Persistent cough             Shortness of breath            Chills            Fatigue            Chest pain or pressure            Difficulty breathing            Decreased consciousness of confusion             Headache     Recommend:                Wash hands often and for 20 seconds or more             Avoid touching eyes, nose, mouth and face             Social distance ( 6 feet)             Stay at home as much as possible             Call health care provider with any concerns                                                            Electronically signed by Severa Ear, MD on 4/27/2021 at 3:47 PM

## 2021-05-11 NOTE — PROGRESS NOTES
Wound Healing Center Followup Visit Note    Referring Physician : Shelly Mullins MD  Alex Been [facility]  MEDICAL RECORD NUMBER:  [id number]  AGE: 69 y.o. GENDER: female  : 1950  EPISODE DATE:  2021    Subjective:     Chief Complaint   Patient presents with    Wound Check     right buttock      HISTORY of PRESENT ILLNESS HPI   Damaris Chino is a 69 y.o. female who presents today in regards to follow up evaluation and treatment of wound/ulcer. That patient's past medical, family and social hx were reviewed and changes were made if present. History of Wound Context:  The patient has a stage 3 sacral decubitus of the buttock which was debrided in the hospital in 2020.      20 CT showed osteomyelitis of the sacrum. Per her son, she has a lot of drainage.     Wound/Ulcer Pain Timing/Severity: intermittent  Quality of pain: dull  Severity:  2 / 10   Modifying Factors: Pain is relieved/improved with rest  Associated Signs/Symptoms: pain    Ulcer Identification:  Ulcer Type: pressure  Contributing Factors: chronic pressure and decreased mobility    Diabetic/Pressure/Non Pressure Ulcers only:  Ulcer: Pressure ulcer, Stage 3    Wound: N/A        PAST MEDICAL HISTORY      Diagnosis Date    Cataract, right     Cerebral artery occlusion with cerebral infarction ([address]     DM (diabetes mellitus), type 2 ([address] 11/3/2010    H/O: CVA (cerebrovascular accident) 2021    HTN (hypertension) 11/3/2010    Hyperlipidemia 11/3/2010    Obesity     Osteoarthritis 11/3/2010    Sinusitis chronic     seasonal    Wheelchair confinement     can pivot with assistance;  uses a lift chair     Past Surgical History:   Procedure Laterality Date    GASTROSTOMY TUBE PLACEMENT N/A 10/7/2020    EGD PEG TUBE PLACEMENT performed by Ina Nazario MD at [facility] INSJ IO LENS PROSTH W/O ECP Right 8/10/2018    RIGHT EYE CATARACT EXTRACTION WITH  IOL IMPLANT performed by Daniel Baker MD at [facility]     History reviewed. No pertinent family history. Social History     Tobacco Use    Smoking status: Never Smoker    Smokeless tobacco: Never Used   Substance Use Topics    Alcohol use: No     Alcohol/week: 0.0 standard drinks    Drug use: No     No Known Allergies  Current Outpatient Medications on File Prior to Encounter   Medication Sig Dispense Refill    aspirin 81 MG EC tablet Take 81 mg by mouth daily      DULoxetine (CYMBALTA) 60 MG extended release capsule Take 60 mg by mouth daily      metoprolol tartrate (LOPRESSOR) 25 MG tablet 25 mg by PEG Tube route 2 times daily      allopurinol (ZYLOPRIM) 100 MG tablet 1 tablet by PEG Tube route daily 30 tablet 3    rosuvastatin (CRESTOR) 10 MG tablet 1 tablet by PEG Tube route nightly 30 tablet 3    miconazole (MICOTIN) 2 % powder Apply topically 2 times daily. 45 g 3    insulin glargine (LANTUS SOLOSTAR) 100 UNIT/ML injection Inject into the skin 2 times daily 10 units in am and 20 units in pm      Insulin Pen Needle (PEN NEEDLES) 31G X 6 MM MISC 1 each by Does not apply route daily Check blood sugar TID and dose according to medium dose sliding scale. 100 each 1    blood glucose test strips (ACCU-CHEK RUI PLUS) strip TEST twice a day FASTING BEFORE BREAKFAST AND SUPPER 100 strip 2    ACCU-CHEK MULTICLIX LANCETS MISC Check BS Twice daily 200 each 1    Insulin Pen Needle (B-D UF III MINI PEN NEEDLES) 31G X 5 MM MISC use twice a day 100 each 5    Insulin Pen Needle (B-D ULTRAFINE III SHORT PEN) 31G X 8 MM MISC Inject 1 kit into the skin 2 times daily 100 each 5    Misc. Devices (MATTRESS PAD) MISC 1 each by Does not apply route once for 1 dose 1 each 0     No current facility-administered medications on file prior to encounter.         REVIEW OF SYSTEMS See HPI    Objective:    Pulse 94   Temp 97.3 °F (36.3 °C) (Temporal)   Resp 18   Ht 5' 4\" (1.626 m)   Wt 179 lb (81.2 kg)   BMI 30.73 kg/m² Wt Readings from Last 3 Encounters:   05/11/21 179 lb (81.2 kg)   04/27/21 179 lb (81.2 kg)   04/06/21 179 lb (81.2 kg)     PHYSICAL EXAM  CONSTITUTIONAL:   Awake, alert, cooperative   EYES:  lids and lashes normal   ENT: external ears and nose without lesions   NECK:  supple, symmetrical, trachea midline   SKIN:  Open wound Present    Assessment:     Problem List Items Addressed This Visit     * (Principal) Wound of right buttock - Primary    Relevant Orders    Initiate Outpatient Wound Care Protocol          Pre Debridement Measurements:  Are located in the [place]  Documentation Flow Sheet  Post Debridement Measurements:  Wound/Ulcer Descriptions are Pre Debridement except measurements:     Wound 07/14/20 Abdomen Lateral;Lower (Active)   Number of days: 301       Wound 08/04/20 Coccyx Mid #1  (Active)   Wound Image   02/23/21 1603   Wound Etiology Pressure Stage  3 08/04/20 1518   Dressing Status New dressing applied 04/27/21 1544   Wound Cleansed Cleansed with saline 04/27/21 1544   Dressing/Treatment Moist to dry;ABD 04/27/21 1544   Offloading for Diabetic Foot Ulcers No offloading required 03/23/21 1536   Wound Length (cm) 2.6 cm 05/11/21 1519   Wound Width (cm) 1.5 cm 05/11/21 1519   Wound Depth (cm) 3.9 cm 05/11/21 1519   Wound Surface Area (cm^2) 3.9 cm^2 05/11/21 1519   Change in Wound Size % (l*w) 74.9 05/11/21 1519   Wound Volume (cm^3) 15.21 cm^3 05/11/21 1519   Wound Healing % 67 05/11/21 1519   Post-Procedure Length (cm) 2.7 cm 05/11/21 1525   Post-Procedure Width (cm) 1.6 cm 05/11/21 1525   Post-Procedure Depth (cm) 4 cm 05/11/21 1525   Post-Procedure Surface Area (cm^2) 4.32 cm^2 05/11/21 1525   Post-Procedure Volume (cm^3) 17.28 cm^3 05/11/21 1525   Undermining Starts ___ O'Clock 7 05/11/21 1519   Undermining Ends___ O'Clock 2 05/11/21 1519   Undermining Maxium Distance (cm) [email or web address] 05/11/21 1519   Wound Assessment Fibrin;Granulation tissue 05/11/21 1519   Drainage Amount Large 05/11/21 1519 Drainage Description Serosanguinous 05/11/21 1519   Odor None 05/11/21 1519   Svetlana-wound Assessment Intact 05/11/21 1519   Number of days: 280          Procedure Note  Indications:  Based on my examination of this patient's wound(s)/ulcer(s) today, debridement is required to promote healing and evaluate the wound base. Performed by: Luis Cervantes MD    Consent obtained:  Yes    Time out taken:  Yes    Pain Control: Anesthetic  Anesthetic: 5% Lidocaine Ointment Topical     Debridement:Excisional Debridement    Using curette the wound(s)/ulcer(s) was/were sharply debrided down through and including the removal of subcutaneous tissue. Devitalized Tissue Debrided:  fibrin, biofilm, slough, exudate and callus to stimulate bleeding to promote healing, post debridement good bleeding base and wound edges noted    Wound/Ulcer #: 1    Percent of Wound/Ulcer Debrided: 100%    Total Surface Area Debrided: 17.28 sq cm     Estimated Blood Loss:  Minimal  Hemostasis Achieved:  by pressure    Procedural Pain:  7  / 10   Post Procedural Pain:  2 / 10     Response to treatment:  Well tolerated by patient. Wound vac - continue    Plan:   Treatment Note please see attached Discharge Instructions    Written patient dismissal instructions given to patient and signed by patient or POA.          Discharge Instructions       Visit Discharge/Physician Orders     Discharge condition: Stable     Assessment of pain at discharge:     Anesthetic used: 4% lidocaine solutioin     Discharge to: Home     Left via:Private automobile     Accompanied by: Sandra Bernal     ECF/HHA: [facility] for [facility] AFTER COMPETENCY TO APPLY VAC 1 TIMES A WEEK AND PRN      Dressing Orders: RT BUTTOCK:(at [facility]) CLEANSE  WOUND WITH NORMAL STERILE SALINE, LIGHTLY PACK  MOIST TO MOIST SALINE MARY LOU INTO WOUND, COVER WITH DRY GAUZE DRESSING  AND SECURE WITH TAPE.       VAC ORDERS:  CLEANSE WOUND WITH NORMAL STERILE SALINE.  APPLY WOUND VAC FOLLOWING  MANUFACTURE GUIDELINES USING BLACK FOAM AND DRAPE WITH CONT SUCTION [email or web address], DRAPE, SPONGE TO BE CHANGED  THREE TIMES A WEEK .  CANISTER TO BE CHANGED WEEKLY AND PRN   MAY USE SKIN PREP, ADHESIVE REMOVER, THIN DUODERM TO PROTECT PERIWOUND  IF WOUND VAC FAILS, NOTIFY PHYSICIAN AND VAC COMPANY, REMOVE VAC AFTER 2 HOURS  AND APPLY WET TO DRY DRESSING.      Turn every 2 hours      LOW AIR LOSS MATTRESS TO BED. TURN AND REPOSITION EVERY 1-2 HOURS. KEEP PRESSURE OFF WOUND. USE PILLOWS FOR POSITIONING. MONITOR INFLATION OF MATTRESS- IF MATTRESS DEFLATES OR SAGS IMMEDIATELY NOTIFY MANUFACTURE AND REMOVE PATIENT FROM THE BED.      LIMIT TIME IN CHAIR to 30 minutes 3x/day for meals only. Obtain high back WHEEL chair      Pt to come by cart for medical necessity for all appointments DUE TO PAIN DISCOMFORT, STAGE 4 PRESSURE ULCER, NO TRUNK SUPPORT      Treatment Orders: keep peg tube dressings on top of tube      [facility] followup visit :2 weeks ________________________________  (Please note your next appointment above and if you are unable to keep, kindly give a 24 hour notice.  Thank you.)     Physician signature:__________________________        If you experience any of the following, please call the [facility] during business hours:     * Increase in Pain  * Temperature over 101  * Increase in drainage from your wound  * Drainage with a foul odor  * Bleeding  * Increase in swelling  * Need for compression bandage changes due to slippage, breakthrough drainage.     If you need medical attention outside of the business hours of the [facility] please contact your PCP or go to the nearest emergency room.     Contact your doctor if you have a temperature above 100.4 with any of the following:               Persistent cough             Shortness of breath            Chills            Fatigue            Chest pain or pressure            Difficulty breathing            Decreased consciousness of confusion           Headache     Recommend:                Wash hands often and for 20 seconds or more             Avoid touching eyes, nose, mouth and face             Social distance ( 6 feet)             Stay at home as much as possible             Call health care provider with any concerns                                                                                 Electronically signed by Andrez Garcia MD on 5/11/2021 at 3:34 PM

## 2021-05-11 NOTE — PLAN OF CARE
Problem: Wound:  Goal: Will show signs of wound healing; wound closure and no evidence of infection  Description: Will show signs of wound healing; wound closure and no evidence of infection  Outcome: Met This Shift     Problem: Pressure Ulcer:  Goal: Signs of wound healing will improve  Description: Signs of wound healing will improve  Outcome: Met This Shift  Goal: Absence of new pressure ulcer  Description: Absence of new pressure ulcer  Outcome: Met This Shift  Goal: Will show no infection signs and symptoms  Description: Will show no infection signs and symptoms  Outcome: Met This Shift

## 2021-05-22 NOTE — PROGRESS NOTES
Continue current warfarin dose, Repeat Pt/ INR in 1 month  Pulse 88   Temp 98.2 °F (36.8 °C) (Oral)   Resp 18   Ht 5' 4\" (1.626 m)   Wt 237 lb 10.5 oz (107.8 kg)   SpO2 95%   BMI 40.79 kg/m²   General Appearance: appears more awake and alert today. Able to state name/ hand grasps to command. Skin: warm and dry  Head: normocephalic and atraumatic  Neck: neck supple and non tender without mass   Pulmonary/Chest: diminished throughout to auscultation   Cardiovascular: normal rate, normal S1 and S2 and no carotid bruits  Abdomen: soft, non-tender, non-distended, normal bowel sounds- peg tube   Extremities: no cyanosis, no clubbing and no edema  Neurologic: TERESA        Recent Labs     10/08/20  0625 10/09/20  0435 10/09/20  1642 10/10/20  0905    142  --  140   K 2.5* 2.4* 3.1* 3.4*    104  --  104   CO2 30* 31*  --  31*   BUN 11 12  --  15   CREATININE 0.5 0.6  --  0.6   GLUCOSE 125* 104*  --  189*   CALCIUM 8.1* 8.1*  --  8.4*       Recent Labs     10/08/20  0625 10/09/20  0435 10/10/20  0905   WBC 11.3 10.2 7.4   RBC 3.47* 3.40* 3.10*   HGB 8.4* 8.3* 7.5*   HCT 27.4* 26.8* 25.2*   MCV 79.0* 78.8* 81.3   MCH 24.2* 24.4* 24.2*   MCHC 30.7* 31.0* 29.8*   RDW 19.4* 19.8* 20.6*    268 289   MPV 9.6 9.1 9.6           Assessment:    Active Problems:    Essential hypertension    Type 2 diabetes mellitus with hyperglycemia, without long-term current use of insulin (formerly [facility])    Osteoarthritis    Hyperlipidemia    General weakness    Morbid obesity due to excess calories (formerly [facility])    Wound of right buttock    Decubitus ulcer of sacral region    Respiratory failure (HCC)    Acute hypoxemic respiratory failure (HCC)    Community acquired pneumonia    Suspected COVID-19 virus infection    Abnormal urinalysis    Pneumonia due to COVID-19 virus    Recurrent episodes of unresponsiveness    Acute encephalopathy    Acute ischemic VBA thalamic stroke, right (HCC)  Resolved Problems:    * No resolved hospital problems.  *      Plan:  1.  Severe sepsis with septic shock: pt found unresponsive at home- EMS called. She was initially on mechanical ventilation/ pressors in ICU. Shock resolved. Thought to be related to aspiration vs HCAP/ VRE bacteremia/ acute CVA: ID following. solucortef weaned off.      2. Unresponsive episode: questionable etiology- likely metabolic/ + CVA. Patient's son reporting he checked her blood sugar when EMS called and was reported \" low. \"   on admission. No arrhythmia. CT head showed no acute pathology. No hx CNS pathology. MRI 10/8 showing acute CVA.     3. Encephalopathy: pt awake/ alert. Appears much more awake/ alert. ? Aphasia at times. Per son pt does this when stressed. ? If related to infection. Neurology consulted- appreciate input. EEG reviewed. MRI reviewed showing acute CVA.     4. Acute respiratory failure- likely related to pneumonia- aspiration. Pulmonology following. S/p extubation. On RA     5. VRE bacteremia: ID following. Repeat BC sent. Unable to do BRITTANY 10/9due to hypokalemia- now planning on Monday. On IV merrem/ IV daptomycin     6. Acute CVA right thalamus; MRI 10/8 - showing acute infarct within the right thalamus and probable infarcts within the ipsilateral hippocampus and occiptal love. Neurology stared on ASA. Lipid panel reviewed. On statin. Not a good candidate for acute rehab.      7. DM; hg a1c 5.3: hold oral meds. Per son pt hypoglycemic prior to arrival. On lantus. Stop lantus for now- nursing has been holding. And monitor BS     8. HTN; holding bp meds as bp was running low     9. Dysphagia: s/p peg tube: on TF     10. Electrolyte disturbances: supplement     11. Deconditioning: not ambulatory. In wheelchair at baseline     12. Chronic decubitus sacral ulcer stage III     13. Acute on chronic anemia: hg slight decrease today 7.5- will repeat this afternoon    14. ? SVT/ tachycardia: consult cardiology    15. Constipation; bowel regimen        Dispo:  Son reporting plan is home with [facility] discussed case 10/9/20- family is not

## 2021-06-01 NOTE — PROGRESS NOTES
Wound Healing Center Followup Visit Note    Referring Physician : Zaki Cortez MD  [facility]  MEDICAL RECORD NUMBER:  [id number]  AGE: 69 y.o. GENDER: female  : 1950  EPISODE DATE:  2021    Subjective:     Chief Complaint   Patient presents with    Wound Check     coccyx      HISTORY of PRESENT ILLNESS HPI   Paul Quezada is a 69 y.o. female who presents today in regards to follow up evaluation and treatment of wound/ulcer. That patient's past medical, family and social hx were reviewed and changes were made if present. History of Wound Context:  The patient has a stage 3 sacral decubitus of the buttock which was debrided in the hospital in 2020.      20 CT showed osteomyelitis of the sacrum. Per her son, she has a lot of drainage.     Wound/Ulcer Pain Timing/Severity: intermittent  Quality of pain: dull  Severity:  2 / 10   Modifying Factors: Pain is relieved/improved with rest  Associated Signs/Symptoms: pain    Ulcer Identification:  Ulcer Type: pressure  Contributing Factors: chronic pressure and decreased mobility    Diabetic/Pressure/Non Pressure Ulcers only:  Ulcer: Pressure ulcer, Stage 3    Wound: N/A        PAST MEDICAL HISTORY      Diagnosis Date    Cataract, right     Cerebral artery occlusion with cerebral infarction ([address]     DM (diabetes mellitus), type 2 ([address] 11/3/2010    H/O: CVA (cerebrovascular accident) 2021    HTN (hypertension) 11/3/2010    Hyperlipidemia 11/3/2010    Obesity     Osteoarthritis 11/3/2010    Sinusitis chronic     seasonal    Wheelchair confinement     can pivot with assistance;  uses a lift chair     Past Surgical History:   Procedure Laterality Date    GASTROSTOMY TUBE PLACEMENT N/A 10/7/2020    [facility]GD [facility] LENS [facility] W/O ECP Right 8/10/2018    RIGHT EYE CATARACT EXTRACTION WITH  IOL IMPLANT performed by Melba Glass MD at [facility]     History reviewed. No pertinent family history. Social History     Tobacco Use    Smoking status: Never Smoker    Smokeless tobacco: Never Used   Vaping Use    Vaping Use: Never used   Substance Use Topics    Alcohol use: No     Alcohol/week: 0.0 standard drinks    Drug use: No     No Known Allergies  Current Outpatient Medications on File Prior to Encounter   Medication Sig Dispense Refill    predniSONE (DELTASONE) 50 MG tablet Take 50 mg by mouth 2 times daily      insulin glargine (LANTUS SOLOSTAR) 100 UNIT/ML injection Inject 20 Units into the skin 2 times daily       aspirin 81 MG EC tablet Take 81 mg by mouth daily      DULoxetine (CYMBALTA) 60 MG extended release capsule Take 60 mg by mouth daily      metoprolol tartrate (LOPRESSOR) 25 MG tablet 25 mg by PEG Tube route 2 times daily      allopurinol (ZYLOPRIM) 100 MG tablet 1 tablet by PEG Tube route daily 30 tablet 3    rosuvastatin (CRESTOR) 10 MG tablet 1 tablet by PEG Tube route nightly 30 tablet 3    miconazole (MICOTIN) 2 % powder Apply topically 2 times daily. 45 g 3    QUEtiapine (SEROQUEL) 25 MG tablet Take 25 mg by mouth nightly       RA LAXATIVE & STOOL SOFTENER 8.6-50 MG per tablet Take 1 tablet by mouth 2 times daily      Insulin Pen Needle (PEN NEEDLES) 31G X 6 MM MISC 1 each by Does not apply route daily Check blood sugar TID and dose according to medium dose sliding scale. 100 each 1    blood glucose test strips (ACCU-CHEK RUI PLUS) strip TEST twice a day FASTING BEFORE BREAKFAST AND SUPPER 100 strip 2    ACCU-CHEK MULTICLIX LANCETS MISC Check BS Twice daily 200 each 1    Insulin Pen Needle (B-D UF III MINI PEN NEEDLES) 31G X 5 MM MISC use twice a day 100 each 5    Insulin Pen Needle (B-D ULTRAFINE III SHORT PEN) 31G X 8 MM MISC Inject 1 kit into the skin 2 times daily 100 each 5    Misc.  Devices (MATTRESS PAD) MISC 1 each by Does not apply route once for 1 dose 1 each 0 Post-Procedure Volume (cm^3) 23.76 cm^3 06/01/21 1512   Undermining Starts ___ O'Clock 7 06/01/21 1507   Undermining Ends___ O'Clock 2 06/01/21 1507   Undermining Maxium Distance (cm) [email or web address] 06/01/21 1507   Wound Assessment Fibrin;Granulation tissue 06/01/21 1507   Drainage Amount Large 06/01/21 1507   Drainage Description Serosanguinous; Serous 06/01/21 1507   Odor None 06/01/21 1507   Svetlana-wound Assessment Intact 06/01/21 1507   Number of days: 301          Procedure Note  Indications:  Based on my examination of this patient's wound(s)/ulcer(s) today, debridement is required to promote healing and evaluate the wound base. Performed by: Jose Rafael Gatica MD    Consent obtained:  Yes    Time out taken:  Yes    Pain Control: Anesthetic  Anesthetic: 4% Lidocaine Liquid Topical     Debridement:Excisional Debridement    Using curette the wound(s)/ulcer(s) was/were sharply debrided down through and including the removal of subcutaneous tissue. Devitalized Tissue Debrided:  fibrin, biofilm, slough, exudate and callus to stimulate bleeding to promote healing, post debridement good bleeding base and wound edges noted    Wound/Ulcer #: 1    Percent of Wound/Ulcer Debrided: 100%    Total Surface Area Debrided: 5.94 sq cm     Estimated Blood Loss:  Minimal  Hemostasis Achieved:  by pressure    Procedural Pain:  7  / 10   Post Procedural Pain:  2 / 10     Response to treatment:  Well tolerated by patient. Wound vac - continue    Plan:   Treatment Note please see attached Discharge Instructions    Written patient dismissal instructions given to patient and signed by patient or POA.          Discharge Instructions       Visit Discharge/Physician Orders     Discharge condition: Stable     Assessment of pain at discharge:     Anesthetic used: 4% lidocaine solutioin     Discharge to: Home     Left via:Private automobile     Accompanied by: Shani JADE/HHA: [address] [facility] for [address] [place] [address] VAC 1 TIMES A WEEK AND PRN      Dressing Orders: RT BUTTOCK:(at [facility]) CLEANSE  WOUND WITH NORMAL STERILE SALINE, LIGHTLY PACK  MOIST TO MOIST SALINE MARY LOU INTO WOUND, COVER WITH DRY GAUZE DRESSING  AND SECURE WITH TAPE.       VAC ORDERS:  CLEANSE WOUND WITH NORMAL STERILE SALINE.  APPLY WOUND VAC FOLLOWING  MANUFACTURE GUIDELINES USING BLACK FOAM AND DRAPE WITH CONT SUCTION [email or web address], DRAPE, SPONGE TO BE CHANGED  THREE TIMES A WEEK .  CANISTER TO BE CHANGED WEEKLY AND PRN   MAY USE SKIN PREP, ADHESIVE REMOVER, THIN DUODERM TO PROTECT PERIWOUND  IF WOUND VAC FAILS, NOTIFY PHYSICIAN AND VAC COMPANY, REMOVE VAC AFTER 2 HOURS  AND APPLY WET TO DRY DRESSING.      Turn every 2 hours      LOW AIR LOSS MATTRESS TO BED. TURN AND REPOSITION EVERY 1-2 HOURS. KEEP PRESSURE OFF WOUND. USE PILLOWS FOR POSITIONING. MONITOR INFLATION OF MATTRESS- IF MATTRESS DEFLATES OR SAGS IMMEDIATELY NOTIFY MANUFACTURE AND REMOVE PATIENT FROM THE BED.      LIMIT TIME IN CHAIR to 30 minutes 3x/day for meals only. Obtain high back WHEEL chair      Pt to come by cart for medical necessity for all appointments DUE TO PAIN DISCOMFORT, STAGE 4 PRESSURE ULCER, NO TRUNK SUPPORT      Treatment Orders: keep peg tube dressings on top of tube      [facility] followup visit :2 weeks ________________________________  (Please note your next appointment above and if you are unable to keep, kindly give a 24 hour notice.  Thank you.)     Physician signature:__________________________        If you experience any of the following, please call the [facility] during business hours:     * Increase in Pain  * Temperature over 101  * Increase in drainage from your wound  * Drainage with a foul odor  * Bleeding  * Increase in swelling  * Need for compression bandage changes due to slippage, breakthrough drainage.     If you need medical attention outside of the business hours of the [facility] please contact your PCP or go to the nearest emergency room.     Contact your doctor if you have a temperature above 100.4 with any of the following:               Persistent cough             Shortness of breath            Chills            Fatigue            Chest pain or pressure            Difficulty breathing            Decreased consciousness of confusion             Headache     Recommend:                Wash hands often and for 20 seconds or more             Avoid touching eyes, nose, mouth and face             Social distance ( 6 feet)             Stay at home as much as possible             Call health care provider with any concerns                          Electronically signed by Barrett Norton MD on 6/1/2021 at 3:14 PM

## 2021-06-15 NOTE — PROGRESS NOTES
[facility] Followup Visit Note    Referring Physician : MD Afshin Head [facility]  MEDICAL RECORD NUMBER:  [id number]  AGE: 69 y.o. GENDER: female  : 1950  EPISODE DATE:  6/15/2021    Subjective:     Chief Complaint   Patient presents with    Wound Check      HISTORY of PRESENT ILLNESS HPI   Sudheer Felix is a 69 y.o. female who presents today in regards to follow up evaluation and treatment of wound/ulcer. That patient's past medical, family and social hx were reviewed and changes were made if present. History of Wound Context:  The patient has a stage 3 sacral decubitus of the buttock which was debrided in the hospital in 2020.      20 CT showed osteomyelitis of the sacrum. Per her son, she has a lot of drainage.     Wound/Ulcer Pain Timing/Severity: intermittent  Quality of pain: dull  Severity:  2 / 10   Modifying Factors: Pain is relieved/improved with rest  Associated Signs/Symptoms: pain    Ulcer Identification:  Ulcer Type: pressure  Contributing Factors: chronic pressure and decreased mobility    Diabetic/Pressure/Non Pressure Ulcers only:  Ulcer: Pressure ulcer, Stage 3    Wound: N/A        PAST MEDICAL HISTORY      Diagnosis Date    Cataract, right     Cerebral artery occlusion with cerebral infarction ([address]     DM (diabetes mellitus), type 2 ([address] 11/3/2010    H/O: CVA (cerebrovascular accident) 2021    HTN (hypertension) 11/3/2010    Hyperlipidemia 11/3/2010    Obesity     Osteoarthritis 11/3/2010    Sinusitis chronic     seasonal    Wheelchair confinement     can pivot with assistance;  uses a lift chair     Past Surgical History:   Procedure Laterality Date    GASTROSTOMY TUBE PLACEMENT N/A 10/7/2020    EGD PEG TUBE PLACEMENT performed by Agatha Osler, MD at [facility] INSJ IO LENS PROSTH W/O ECP Right 8/10/2018    RIGHT EYE CATARACT EXTRACTION WITH  IOL IMPLANT performed by Lincoln Araiza MD at [facility]     History reviewed. No pertinent family history. Social History     Tobacco Use    Smoking status: Never Smoker    Smokeless tobacco: Never Used   Vaping Use    Vaping Use: Never used   Substance Use Topics    Alcohol use: No     Alcohol/week: 0.0 standard drinks    Drug use: No     No Known Allergies  Current Outpatient Medications on File Prior to Encounter   Medication Sig Dispense Refill    insulin lispro (HUMALOG) 100 UNIT/ML injection vial Inject into the skin 3 times daily (before meals) Sliding scale      levoFLOXacin (LEVAQUIN) 750 MG tablet Take 750 mg by mouth daily      QUEtiapine (SEROQUEL) 25 MG tablet Take 25 mg by mouth nightly       RA LAXATIVE & STOOL SOFTENER 8.6-50 MG per tablet Take 1 tablet by mouth 2 times daily      predniSONE (DELTASONE) 50 MG tablet Take 50 mg by mouth 2 times daily      insulin glargine (LANTUS SOLOSTAR) 100 UNIT/ML injection Inject 20 Units into the skin 2 times daily       aspirin 81 MG EC tablet Take 81 mg by mouth daily      DULoxetine (CYMBALTA) 60 MG extended release capsule Take 60 mg by mouth daily      metoprolol tartrate (LOPRESSOR) 25 MG tablet 25 mg by PEG Tube route 2 times daily      rosuvastatin (CRESTOR) 10 MG tablet 1 tablet by PEG Tube route nightly 30 tablet 3    miconazole (MICOTIN) 2 % powder Apply topically 2 times daily. 45 g 3    allopurinol (ZYLOPRIM) 100 MG tablet 1 tablet by PEG Tube route daily 30 tablet 3    Insulin Pen Needle (PEN NEEDLES) 31G X 6 MM MISC 1 each by Does not apply route daily Check blood sugar TID and dose according to medium dose sliding scale.  100 each 1    blood glucose test strips (ACCU-CHEK RUI PLUS) strip TEST twice a day FASTING BEFORE BREAKFAST AND SUPPER 100 strip 2    ACCU-CHEK MULTICLIX LANCETS MISC Check BS Twice daily 200 each 1    Insulin Pen Needle (B-D UF III MINI PEN NEEDLES) 31G X 5 MM MISC use twice a day 100 each 5    Insulin Pen Needle (B-D ULTRAFINE III SHORT PEN) 31G X 8 MM MISC Inject 1 kit into the skin 2 times daily 100 each 5    Misc. Devices (MATTRESS PAD) MISC 1 each by Does not apply route once for 1 dose 1 each 0     No current facility-administered medications on file prior to encounter. REVIEW OF SYSTEMS See HPI    Objective:    BP (!) 108/58   Pulse 80   Temp 97 °F (36.1 °C) (Temporal)   Resp 16   Wt Readings from Last 3 Encounters:   05/11/21 179 lb (81.2 kg)   04/27/21 179 lb (81.2 kg)   04/06/21 179 lb (81.2 kg)     PHYSICAL EXAM  CONSTITUTIONAL:   Awake, alert, cooperative   EYES:  lids and lashes normal   ENT: external ears and nose without lesions   NECK:  supple, symmetrical, trachea midline   SKIN:  Open wound Present    Assessment:     Problem List Items Addressed This Visit     None          Pre Debridement Measurements:  Are located in the [employer]  Documentation Flow Sheet  Post Debridement Measurements:  Wound/Ulcer Descriptions are Pre Debridement except measurements:     Wound 07/14/20 Abdomen Lateral;Lower (Active)   Number of days: 336       Wound 08/04/20 Coccyx Mid #1  (Active)   Wound Image   05/11/21 1519   Wound Etiology Pressure Stage  3 08/04/20 1518   Dressing Status New dressing applied;Clean;Dry; Intact 06/01/21 1525   Wound Cleansed Cleansed with saline 06/01/21 1525   Dressing/Treatment Moist to moist;Dry dressing 06/01/21 1525   Offloading for Diabetic Foot Ulcers No offloading required 05/11/21 1527   Wound Length (cm) 2.5 cm 06/15/21 1512   Wound Width (cm) 2.3 cm 06/15/21 1512   Wound Depth (cm) 3.5 cm 06/15/21 1512   Wound Surface Area (cm^2) 5.75 cm^2 06/15/21 1512   Change in Wound Size % (l*w) 63 06/15/21 1512   Wound Volume (cm^3) 20.12 cm^3 06/15/21 1512   Wound Healing % 57 06/15/21 1512   Post-Procedure Length (cm) 2.6 cm 06/15/21 1529   Post-Procedure Width (cm) 2.4 cm 06/15/21 1529   Post-Procedure Depth (cm) 3.6 cm 06/15/21 1529   Post-Procedure Surface Area (cm^2) 6.24 cm^2 06/15/21 1525 Post-Procedure Volume (cm^3) 22.46 cm^3 06/15/21 1529   Undermining Starts ___ O'Clock 7 06/15/21 1512   Undermining Ends___ O'Clock 2 06/15/21 1512   Undermining Maxium Distance (cm) 5.2 06/15/21 1512   Wound Assessment Pink/red 06/15/21 1512   Drainage Amount Moderate 06/15/21 1512   Drainage Description Serosanguinous 06/15/21 1512   Odor None 06/15/21 1512   Svetlana-wound Assessment Intact 06/15/21 1512   Number of days: 315          Procedure Note  Indications:  Based on my examination of this patient's wound(s)/ulcer(s) today, debridement is required to promote healing and evaluate the wound base. Performed by: Fito Zeng MD    Consent obtained:  Yes    Time out taken:  Yes    Pain Control: Anesthetic  Anesthetic: 4% Lidocaine Liquid Topical     Debridement:Excisional Debridement    Using curette the wound(s)/ulcer(s) was/were sharply debrided down through and including the removal of subcutaneous tissue. Devitalized Tissue Debrided:  fibrin, biofilm, slough, exudate and callus to stimulate bleeding to promote healing, post debridement good bleeding base and wound edges noted    Wound/Ulcer #: 1    Percent of Wound/Ulcer Debrided: 100%    Total Surface Area Debrided: 6.24 sq cm     Estimated Blood Loss:  Minimal  Hemostasis Achieved:  by pressure    Procedural Pain:  7  / 10   Post Procedural Pain:  2 / 10     Response to treatment:  Well tolerated by patient. Wound vac - continue    Plan:   Treatment Note please see attached Discharge Instructions    Written patient dismissal instructions given to patient and signed by patient or POA.          Discharge Instructions       Visit Discharge/Physician Orders     Discharge condition: Stable     Assessment of pain at discharge:     Anesthetic used: 4% lidocaine solutioin     Discharge to: Home     Left via:Private automobile     Accompanied by: Stephani Nogueira     ECF/HHA: [facility] for [facility] AFTER COMPETENCY TO APPLY VAC 1 TIMES A WEEK AND PRN      Dressing Orders: RT BUTTOCK:(at [facility]) CLEANSE  WOUND WITH NORMAL STERILE SALINE, LIGHTLY PACK  MOIST TO MOIST SALINE MARY LOU INTO WOUND, COVER WITH DRY GAUZE DRESSING  AND SECURE WITH TAPE.      Left heel: cleanse with normal saline, apply plain alginate and cover with dry dressing. Change daily. Keep heel well-padded.     VAC ORDERS:  CLEANSE WOUND WITH NORMAL STERILE SALINE.  APPLY WOUND VAC FOLLOWING  MANUFACTURE GUIDELINES USING BLACK FOAM AND DRAPE WITH CONT SUCTION [email or web address], DRAPE, SPONGE TO BE CHANGED  THREE TIMES A WEEK .  CANISTER TO BE CHANGED WEEKLY AND PRN   MAY USE SKIN PREP, ADHESIVE REMOVER, THIN DUODERM TO PROTECT PERIWOUND  IF WOUND VAC FAILS, NOTIFY PHYSICIAN AND VAC COMPANY, REMOVE VAC AFTER 2 HOURS  AND APPLY WET TO DRY DRESSING.      Turn every 2 hours      LOW AIR LOSS MATTRESS TO BED. TURN AND REPOSITION EVERY 1-2 HOURS. KEEP PRESSURE OFF WOUND. USE PILLOWS FOR POSITIONING. MONITOR INFLATION OF MATTRESS- IF MATTRESS DEFLATES OR SAGS IMMEDIATELY NOTIFY MANUFACTURE AND REMOVE PATIENT FROM THE BED.      LIMIT TIME IN CHAIR to 30 minutes 3x/day for meals only. Obtain high back WHEEL chair      Pt to come by cart for medical necessity for all appointments DUE TO PAIN DISCOMFORT, STAGE 4 PRESSURE ULCER, NO TRUNK SUPPORT      Treatment Orders: keep peg tube dressings on top of tube      [address] [facility] followup visit :2 weeks ________________________________  (Please note your next appointment above and if you are unable to keep, kindly give a 24 hour notice.  Thank you.)     Physician signature:__________________________        If you experience any of the following, please call the [facility] [address] during business hours:     * Increase in Pain  * Temperature over 101  * Increase in drainage from your wound  * Drainage with a foul odor  * Bleeding  * Increase in swelling  * Need for compression bandage changes due to slippage, breakthrough drainage.     If you need medical attention outside of the business hours of the [address] please contact your PCP or go to the nearest emergency room.     Contact your doctor if you have a temperature above 100.4 with any of the following:               Persistent cough             Shortness of breath            Chills            Fatigue            Chest pain or pressure            Difficulty breathing            Decreased consciousness of confusion             Headache     Recommend:                Wash hands often and for 20 seconds or more             Avoid touching eyes, nose, mouth and face             Social distance ( 6 feet)             Stay at home as much as possible             Call health care provider with any concerns                                  Electronically signed by Krystle Linares MD on 6/15/2021 at 3:31 PM

## 2021-06-24 NOTE — PROGRESS NOTES
Occupational Therapy  OCCUPATIONAL THERAPY INITIAL EVALUATION  [address] [address]    Date: 2021     Patient Name: Tobin Garza  MRN: [id number]  : 1950  Room:     Evaluating OT: AGNIESZKA Schmitz/JOSUÉ - [id number]    Referring Provider: Rani Leon DO  Specific Provider Orders/Date: \"OT eval and treat\" - 2021    Diagnosis: No admission diagnoses are documented for this encounter. Patient brought to ED by patient's son secondary to bleeding around PEG tube site and increased weakness recently. Pertinent Medical History: PEG tube placement (2020), CVA, obesity, OA, HTN     Precautions: fall risk, skin integrity    Assessment of Current Deficits:    [x] Functional mobility   [x]ADLs  [x] Strength               [x]Cognition   [x] Functional transfers   [x] IADLs         [x] Safety Awareness   [x]Endurance   [] Fine Coordination              [x] Balance      [] Vision/perception   [x]Sensation    []Gross Motor Coordination  [] ROM  [] Delirium                   [] Motor Control     OT PLAN OF CARE   OT POC is based on physician orders, patient diagnosis, and results of clinical assessment.   Frequency/Duration 2-5 days/week for 2 weeks PRN   Specific OT Treatment Interventions to Include:   * Instruction/training on adapted ADL techniques and AE recommendations to increase functional independence within precautions       * Training on energy conservation strategies, correct breathing pattern and techniques to improve independence/tolerance for self-care routine  * Functional transfer/mobility training/DME recommendations for increased independence, safety, and fall prevention  * Patient/Family education to increase follow through with safety techniques and functional independence  * Recommendation of environmental modifications for increased safety with functional Toileting Max A  Patient with chronic santos catheter. Mod A   Bed Mobility  Supine-to-Sit: Max A  Sit-to-Supine: Max A      Functional Transfers Sit-to-Stand: Mod Ax2   from edge of ED cart (elevated surface)  Min A   Functional Mobility Not assessed. Patient unable to move her feet while standing to take side step(s) toward HOB. N/A  Patient's son reported that patient does not walk at home; manual wheelchair used for functional mobility. Balance Sitting: Fair  (at EOB)  Standing: Poor  (without device)  Fair dynamic standing balance during completion of ADLs and other functional tasks. Activity Tolerance Fair-  Patient tolerated less than 3 minutes of static standing at EOB. Patient will tolerate 5 minutes of static/dynamic standing in order to maximize patient's independence/participation with ADLs. Visual/  Perceptual WFL grossly     N/A   B UE Strength B Shoulders: 2-/5  Distal B UEs: 3+/5 grossly  Patient will demonstrate 4-/5 distal B UE strength in order to maximize independence with ADLs and functional transfers. Additional Long-Term Goal: Patient will increase functional independence to PLOF in order to allow patient to live in least restrictive environment. ROM: Additional Information:    R UE  0 - 30 degrees of active-assisted shoulder flexion; distal AROM WFL grossly Fair FMC noted. 3+/5 to 4-/5  strength. L UE 0 - 45 degrees of active-assisted shoulder flexion; distal AROM WFL grossly Fair- FMC noted. 3+/5  strength. Hand Dominance: Right    Hearing: [facility] grossly  Sensation: Patient denied experiencing numbness/tingling in B UEs. Tone: WFL  Edema: No    Comments: Upon arrival, patient supine on ED cart with bilateral rails in place. At end of session, patient supine on ED cart with call light in reach, bilateral rails in place, patient's son present, and all lines and tubes intact.  Patient would benefit from continued skilled OT to increase safety and independence with completion of ADL/IADL tasks for functional independence and quality of life. Rehab Potential: Good for established goals. Patient / Family Goal: Patient's son would like for patient to go to SNF/[facility] to get stronger. Patient and/or family were instructed on functional diagnosis, prognosis/goals, and OT plan of care. Patient's son demonstrated Good understanding. Eval Complexity: Low    Time In: 1420  Time Out: 1440  Total Treatment Time: 0 minutes      Minutes Units   OT Eval Low 85333 20 1   OT Eval Medium 23231     OT Eval High 65875     OT Re-Eval [id number]     Therapeutic Ex 51845     Therapeutic Activities 34149     ADL/Self Care 26678     Orthotic Management 89369     Neuro Re-Ed 30248     Non-Billable Time N/A ---     Evaluation time includes thorough review of current medical information, gathering information on past medical history/social history and prior level of function, completion of standardized testing/informal observation of tasks, assessment of data, and education on plan of care and goals. AGNIESZKA Martinez/L  License Number: [id number]

## 2021-06-24 NOTE — ED NOTES
Pt arrived to ED with open, cavernous wound to coccyx. Son states wound care at home with wet to dry dressings.        James Arroyo RN  06/24/21 7263

## 2021-06-24 NOTE — ED PROVIDER NOTES
HPI   Patient is a 69 y.o. female with a past medical history of hypertension, diabetes, CVA, sacral wound, hyperlipidemia presenting to the Emergency Department for bleeding around PEG tube and generalized weakness, wants PT. History obtained by patient patient's son. Symptoms are mild to moderate in severity and constant since onset. They are improved by nothing and worsened by exercise and movement. Patient presents for bleeding around her PEG tube and generalized weakness. She is with her son who is her primary caregiver. She is \"nonambulatory on at baseline but does help her son when she needs to stand up or move from bed to chair. They state over the last few weeks she has just been more weak. They state her legs are weaker when she tries to stand. Symptoms are bilateral in the upper and lower extremities. He thinks is all started after receiving the COVID-19 vaccine approximately 4 weeks ago. Today when he was bathing her he did note there is bleeding around her PEG site. They do not use the PEG tube at this current time but it is in place in case they need it. Denies any trauma to the region. Patient denies any chest pain, shortness of breath, abdominal pain, numbness, tingling. They deny any fevers or chills. Review of Systems   Constitutional: Positive for fatigue. Negative for chills and fever. HENT: Negative for congestion and sore throat. Eyes: Negative for pain and visual disturbance. Respiratory: Negative for cough and shortness of breath. Cardiovascular: Negative for chest pain. Gastrointestinal: Negative for abdominal pain, diarrhea, nausea and vomiting. Genitourinary: Negative for dysuria and frequency. Musculoskeletal: Negative for arthralgias and back pain. Skin: Positive for wound (around peg). Negative for rash. Neurological: Negative for weakness and headaches. All other systems reviewed and are negative.        Physical Exam  Vitals and nursing note reviewed. Constitutional:       General: She is not in acute distress. Appearance: She is well-developed. She is not ill-appearing. HENT:      Head: Normocephalic and atraumatic. Eyes:      Extraocular Movements: Extraocular movements intact. Cardiovascular:      Rate and Rhythm: Normal rate and regular rhythm. Pulmonary:      Effort: Pulmonary effort is normal. No respiratory distress. Breath sounds: Normal breath sounds. No wheezing or rales. Abdominal:      Palpations: Abdomen is soft. Tenderness: There is no abdominal tenderness. There is no guarding or rebound. Comments: Peg tube to the epigastric region, mild bloody drainage around tube that is dried, no active bleeding   Musculoskeletal:      Cervical back: Normal range of motion and neck supple. Skin:     General: Skin is warm and dry. Neurological:      Mental Status: She is alert and oriented to person, place, and time. Mental status is at baseline. Cranial Nerves: No cranial nerve deficit. Sensory: No sensory deficit. Coordination: Coordination normal.          Procedures     MDM   Patient presented to the Emergency Department for bleeding around PEG tube as well as generalized weakness. They are clinically stable, VSS, non toxic appearing. No focal neurological deficits, neurovascularly intact. No concern for CVA at this time. She has 4-5 strength in the bilateral upper and lower extremities. She has no chest pain or shortness of breath. Work-up initiated. Labs resulting in a mild PADMINI with creatinine of 1.5 and a mild hyperkalemia with potassium 5.2. Patient given IV fluids. Also found to be hyperglycemic. She is a diabetic. IV fluids given and 4u of insulin ordered. Rest of patient's lab work demonstrating a baseline anemia and no other evidence of infectious cause. She has mild dried blood around her PEG tube but no active bleeding or hemorrhage. Most likely secondary to irritation.   X-ray performed and PEG tube in place. She does not use the PEG tube at this time. Patient has abdominal tenderness palpation, no urinary complaints. She has an indwelling urinary catheter, UA most likely colonized. Patient is wheelchair-bound at baseline but per son she is weaker than usual.  I do believe patient could benefit from physical therapy. Social work was consulted and patient to be discharged to rehab facility pending PT OT evaluation.              ----------------------------------------------- PAST HISTORY --------------------------------------------  Past Medical History:  has a past medical history of Cataract, right, Cerebral artery occlusion with cerebral infarction ([facility] DM (diabetes mellitus), type 2 ([facility] [address] H/O: CVA (cerebrovascular accident), HTN (hypertension), Hyperlipidemia, Obesity, Osteoarthritis, Sinusitis chronic, and Wheelchair confinement. Past Surgical History:  has a past surgical history that includes Hysterectomy; pr xcapsl ctrc rmvl insj io lens prosth w/o ecp (Right, 8/10/2018); and Gastrostomy tube placement (N/A, 10/7/2020). Social History:  reports that she has never smoked. She has never used smokeless tobacco. She reports that she does not drink alcohol and does not use drugs. Family History: family history is not on file. The patients home medications have been reviewed. Allergies: Patient has no known allergies.     ------------------------------------------------ RESULTS ---------------------------------------------------    LABS:  Results for orders placed or performed during the hospital encounter of 06/24/21   CBC Auto Differential   Result Value Ref Range    WBC 11.4 4.5 - 11.5 E9/L    RBC 3.88 3.50 - 5.50 E12/L    Hemoglobin 8.7 (L) 11.5 - 15.5 g/dL    Hematocrit 29.8 (L) 34.0 - 48.0 %    MCV 76.8 (L) 80.0 - 99.9 fL    MCH 22.4 (L) 26.0 - 35.0 pg    MCHC 29.2 (L) 32.0 - 34.5 %    RDW 18.6 (H) 11.5 - 15.0 fL    Platelets 856 351 - 082 E9/L    MPV 9.2 7.0 - 12.0 fL    Neutrophils % 72.5 43.0 - 80.0 %    Immature Granulocytes % 1.1 0.0 - 5.0 %    Lymphocytes % 19.3 (L) 20.0 - 42.0 %    Monocytes % 5.7 2.0 - 12.0 %    Eosinophils % 1.0 0.0 - 6.0 %    Basophils % 0.4 0.0 - 2.0 %    Neutrophils Absolute 8.23 (H) 1.80 - 7.30 E9/L    Immature Granulocytes # 0.13 E9/L    Lymphocytes Absolute 2.19 1.50 - 4.00 E9/L    Monocytes Absolute 0.65 0.10 - 0.95 E9/L    Eosinophils Absolute 0.11 0.05 - 0.50 E9/L    Basophils Absolute 0.04 0.00 - 0.20 E9/L   Comprehensive Metabolic Panel w/ Reflex to MG   Result Value Ref Range    Sodium 132 132 - 146 mmol/L    Potassium reflex Magnesium 5.2 (H) 3.5 - 5.0 mmol/L    Chloride 97 (L) 98 - 107 mmol/L    CO2 26 22 - 29 mmol/L    Anion Gap 9 7 - 16 mmol/L    Glucose 344 (H) 74 - 99 mg/dL    BUN 30 (H) 6 - 23 mg/dL    CREATININE 1.5 (H) 0.5 - 1.0 mg/dL    GFR Non-African American 41 >=60 mL/min/1.73    GFR African American 41     Calcium 9.3 8.6 - 10.2 mg/dL    Total Protein 8.5 (H) 6.4 - 8.3 g/dL    Albumin 3.0 (L) 3.5 - 5.2 g/dL    Total Bilirubin 0.2 0.0 - 1.2 mg/dL    Alkaline Phosphatase 132 (H) 35 - 104 U/L    ALT 10 0 - 32 U/L    AST 12 0 - 31 U/L   Urinalysis, reflex to microscopic   Result Value Ref Range    Color, UA Yellow Straw/Yellow    Clarity, UA Clear Clear    Glucose, Ur 100 (A) Negative mg/dL    Bilirubin Urine Negative Negative    Ketones, Urine Negative Negative mg/dL    Specific Gravity, UA 1.010 1.005 - 1.030    Blood, Urine Negative Negative    pH, UA 6.5 5.0 - 9.0    Protein, UA TRACE Negative mg/dL    Urobilinogen, Urine 0.2 <2.0 E.U./dL    Nitrite, Urine Negative Negative    Leukocyte Esterase, Urine MODERATE (A) Negative   Microscopic Urinalysis   Result Value Ref Range    WBC, UA 2-5 0 - 5 /HPF    RBC, UA 5-10 (A) 0 - 2 /HPF    Bacteria, UA FEW (A) None Seen /HPF    Yeast, UA Present (A) None Seen /HPF   POCT Glucose   Result Value Ref Range    Meter Glucose 291 (H) 74 - 99 mg/dL       RADIOLOGY:    All pending social work and PT OT evaluation. Patient condition is stable. END OF PROVIDER NOTE.             Rani Leon DO  Resident  06/24/21 9129

## 2021-06-24 NOTE — LETTER
[place] Department Medicaid  CERTIFICATION OF NECESSITY  FOR NON-EMERGENCY TRANSPORTATION   BY GROUND AMBULANCE      Individual Information   1. Name: Amaya Bahena 2. [place] Medicaid Billing Number: [id number]   3. Address: [address] [place] [address] Drive [address]    Transportation Provider Information   4. Provider Name: [facility]   5. [place] Provider Number:  National Provider Identifier (NPI):    Certification  7. Criteria:  During transport, this individual requires:  [x] Medical treatment or continuous     supervision by an EMT. [] The administration or regulation of oxygen by another person. [] Supervised protective restraint. 8. Period Beginning Date: 21   9. Length  [x] Not more than 1 day(s)  [] One Year   Additional Information Relevant to Certification   10. Comments or Explanations, If Necessary or Appropriate   Pt S/P CVA, Decubitus ulcer stage 3 unable to sit in a standard wheelchair, Fall risk. Certifying Practitioner Information   11. Name of Practitioner: Margi Shell DO   12. [place] Medicaid Provider Number, If Applicable:  [address] Provider Identifier (NPI):    Signature Information   14. Date of Signature: 21 15. Name of Person Signing: Mando Degree   71. Signature and Professional Designation:      [facility] [id number]  Rev. 2015          [address] Encounter Date/Time: 2021 [address] [place] [address] Account: [de-identified]    MRN: [id number]    Patient: Amaya Bahena    Contact Serial #: [id number]      ENCOUNTER          Patient Class: E Private Enc?   No Unit RM BD: [facility] ED    Hospital Service: ERS   Encounter DX:     ADM Provider:     Procedure:     ATT Provider: Margi Shell DO   REF Provider:        Admission DX:  and DX codes:       PATIENT                 Name: Amaya Bahena : 1950 (70 yrs)   Address: [address] [place] [address] Sex: Female   City: [facility]         Marital Status: Single   Employer: RETIRED

## 2021-08-03 NOTE — PROGRESS NOTES
Wound Healing Center Followup Visit Note    Referring Physician : MD Esequiel Barr [facility]  MEDICAL RECORD NUMBER:  [id number]  AGE: 69 y.o. GENDER: female  : 1950  EPISODE DATE:  8/3/2021    Subjective:     Chief Complaint   Patient presents with    Wound Check     buttocks      HISTORY of PRESENT ILLNESS HPI   Ana Laura Toussaint is a 69 y.o. female who presents today in regards to follow up evaluation and treatment of wound/ulcer. That patient's past medical, family and social hx were reviewed and changes were made if present. History of Wound Context:  The patient has a stage 3 sacral decubitus of the buttock which was debrided in the hospital in 2020.      20 CT showed osteomyelitis of the sacrum. Per her son, she has a lot of drainage.     Wound/Ulcer Pain Timing/Severity: intermittent  Quality of pain: dull  Severity:  2 / 10   Modifying Factors: Pain is relieved/improved with rest  Associated Signs/Symptoms: pain    Ulcer Identification:  Ulcer Type: pressure  Contributing Factors: chronic pressure and decreased mobility    Diabetic/Pressure/Non Pressure Ulcers only:  Ulcer: Pressure ulcer, Stage 3    Wound: N/A        PAST MEDICAL HISTORY      Diagnosis Date    Cataract, right     Cerebral artery occlusion with cerebral infarction ([address]     DM (diabetes mellitus), type 2 ([address] 11/3/2010    H/O: CVA (cerebrovascular accident) 2021    HTN (hypertension) 11/3/2010    Hyperlipidemia 11/3/2010    Obesity     Osteoarthritis 11/3/2010    Sinusitis chronic     seasonal    Wheelchair confinement     can pivot with assistance;  uses a lift chair     Past Surgical History:   Procedure Laterality Date    GASTROSTOMY TUBE PLACEMENT N/A 10/7/2020    EGD PEG TUBE PLACEMENT performed by Aung Begum MD at [facility] INSJ IO LENS PROSTH W/O ECP Right 8/10/2018    RIGHT EYE CATARACT EXTRACTION WITH  IOL IMPLANT performed by Stan Beauchamp MD at [facility]     History reviewed. No pertinent family history. Social History     Tobacco Use    Smoking status: Never Smoker    Smokeless tobacco: Never Used   Vaping Use    Vaping Use: Never used   Substance Use Topics    Alcohol use: No     Alcohol/week: 0.0 standard drinks    Drug use: No     No Known Allergies  Current Outpatient Medications on File Prior to Encounter   Medication Sig Dispense Refill    hydrOXYzine (VISTARIL) 25 MG capsule Take 25 mg by mouth 3 times daily as needed for Itching      insulin lispro (HUMALOG) 100 UNIT/ML injection vial Inject into the skin 3 times daily (before meals) Sliding scale      QUEtiapine (SEROQUEL) 25 MG tablet Take 25 mg by mouth nightly       RA LAXATIVE & STOOL SOFTENER 8.6-50 MG per tablet Take 1 tablet by mouth 2 times daily      insulin glargine (LANTUS SOLOSTAR) 100 UNIT/ML injection Inject 20 Units into the skin 2 times daily       aspirin 81 MG EC tablet Take 81 mg by mouth daily      DULoxetine (CYMBALTA) 60 MG extended release capsule Take 60 mg by mouth daily      metoprolol tartrate (LOPRESSOR) 25 MG tablet 25 mg by PEG Tube route 2 times daily      allopurinol (ZYLOPRIM) 100 MG tablet 1 tablet by PEG Tube route daily 30 tablet 3    rosuvastatin (CRESTOR) 10 MG tablet 1 tablet by PEG Tube route nightly 30 tablet 3    Insulin Pen Needle (B-D UF III MINI PEN NEEDLES) 31G X 5 MM MISC use twice a day 100 each 5    Insulin Pen Needle (PEN NEEDLES) 31G X 6 MM MISC 1 each by Does not apply route daily Check blood sugar TID and dose according to medium dose sliding scale. 100 each 1    miconazole (MICOTIN) 2 % powder Apply topically 2 times daily.  45 g 3    blood glucose test strips (ACCU-CHEK RUI PLUS) strip TEST twice a day FASTING BEFORE BREAKFAST AND SUPPER 100 strip 2    ACCU-CHEK MULTICLIX LANCETS MISC Check BS Twice daily 200 each 1    Insulin Pen Needle (B-D ULTRAFINE III SHORT PEN) 31G X 8 MM MISC Inject 1 kit into the skin 2 times daily 100 each 5    Misc. Devices (MATTRESS PAD) MISC 1 each by Does not apply route once for 1 dose 1 each 0     No current facility-administered medications on file prior to encounter. REVIEW OF SYSTEMS See HPI    Objective:    BP (!) 122/58   Pulse 73   Temp 96.9 °F (36.1 °C) (Temporal)   Resp 16   Ht 5' 4\" (1.626 m)   Wt 179 lb (81.2 kg)   BMI 30.73 kg/m²   Wt Readings from Last 3 Encounters:   08/03/21 179 lb (81.2 kg)   06/24/21 189 lb (85.7 kg)   05/11/21 179 lb (81.2 kg)     PHYSICAL EXAM  CONSTITUTIONAL:   Awake, alert, cooperative   EYES:  lids and lashes normal   ENT: external ears and nose without lesions   NECK:  supple, symmetrical, trachea midline   SKIN:  Open wound Present    Assessment:     Problem List Items Addressed This Visit     * (Principal) Wound of right buttock - Primary    Relevant Orders    Initiate Outpatient Wound Care Protocol          Pre Debridement Measurements:  Are located in the [facility]  Documentation Flow Sheet  Post Debridement Measurements:  Wound/Ulcer Descriptions are Pre Debridement except measurements:     Wound 07/14/20 Abdomen Lateral;Lower (Active)   Number of days: 385       Wound 08/04/20 Coccyx Mid #1  (Active)   Wound Image   08/03/21 1517   Wound Etiology Pressure Stage  3 08/04/20 1518   Dressing Status New dressing applied;Clean;Dry; Intact 06/01/21 1525   Wound Cleansed Cleansed with saline 06/01/21 1525   Dressing/Treatment Moist to moist;Dry dressing 06/01/21 1525   Offloading for Diabetic Foot Ulcers No offloading required 05/11/21 1527   Wound Length (cm) 2.5 cm 08/03/21 1517   Wound Width (cm) 2.5 cm 08/03/21 1517   Wound Depth (cm) 3.5 cm 08/03/21 1517   Wound Surface Area (cm^2) 6.25 cm^2 08/03/21 1517   Change in Wound Size % (l*w) 59.78 08/03/21 1517   Wound Volume (cm^3) 21.875 cm^3 08/03/21 1517   Wound Healing % 53 08/03/21 1517   Post-Procedure Length (cm) 2.6 cm 08/03/21 1532   Post-Procedure Width used: 4% lidocaine solutioin     Discharge to: Home     Left via:Private automobile     Accompanied by: Deven Soria     ECF/HHA: [facility] for DIL AFTER COMPETENCY TO APPLY VAC 1 TIMES A WEEK AND PRN      Dressing Orders: RT BUTTOCK:(at [facility]) CLEANSE  WOUND WITH NORMAL STERILE SALINE, LIGHTLY PACK  MOIST TO MOIST SALINE MARY LOU INTO WOUND, COVER WITH DRY GAUZE DRESSING  AND SECURE WITH TAPE.       Left heel: cleanse with normal saline, apply plain alginate and cover with dry dressing. Change daily. Keep heel well-padded.     VAC ORDERS:  CLEANSE WOUND WITH NORMAL STERILE SALINE.  APPLY WOUND VAC FOLLOWING  MANUFACTURE GUIDELINES USING BLACK FOAM AND DRAPE WITH CONT SUCTION [email or web address], DRAPE, SPONGE TO BE CHANGED  THREE TIMES A WEEK .  CANISTER TO BE CHANGED WEEKLY AND PRN   MAY USE SKIN PREP, ADHESIVE REMOVER, THIN DUODERM TO PROTECT PERIWOUND  IF WOUND VAC FAILS, NOTIFY PHYSICIAN AND VAC COMPANY, REMOVE VAC AFTER 2 HOURS  AND APPLY WET TO DRY DRESSING.      Turn every 2 hours      LOW AIR LOSS MATTRESS TO BED. TURN AND REPOSITION EVERY 1-2 HOURS. KEEP PRESSURE OFF WOUND. USE PILLOWS FOR POSITIONING. MONITOR INFLATION OF MATTRESS- IF MATTRESS DEFLATES OR SAGS IMMEDIATELY NOTIFY MANUFACTURE AND REMOVE PATIENT FROM THE BED.      LIMIT TIME IN CHAIR to 30 minutes 3x/day for meals only. Obtain high back WHEEL chair      Pt to come by cart for medical necessity for all appointments DUE TO PAIN DISCOMFORT, STAGE 4 PRESSURE ULCER, NO TRUNK SUPPORT      Treatment Orders: keep peg tube dressings on top of tube      [facility] followup visit :2 weeks ________________________________  (Please note your next appointment above and if you are unable to keep, kindly give a 24 hour notice.  Thank you.)     Physician signature:__________________________        If you experience any of the following, please call the [facility] [address] during business hours:     * Increase in Pain  * Temperature over 101  * Increase in drainage from your wound  * Drainage with a foul odor  * Bleeding  * Increase in swelling  * Need for compression bandage changes due to slippage, breakthrough drainage.     If you need medical attention outside of the business hours of the [address] [place] [address] Road please contact your PCP or go to the nearest emergency room.     Contact your doctor if you have a temperature above 100.4 with any of the following:               Persistent cough             Shortness of breath            Chills            Fatigue            Chest pain or pressure            Difficulty breathing            Decreased consciousness of confusion             Headache     Recommend:                Wash hands often and for 20 seconds or more             Avoid touching eyes, nose, mouth and face             Social distance ( 6 feet)             Stay at home as much as possible             Call health care provider with any concerns                                                      Electronically signed by Robin Bearden MD on 8/3/2021 at 3:35 PM

## 2021-08-03 NOTE — PLAN OF CARE
Problem: Pressure Ulcer:  Goal: Absence of new pressure ulcer  Description: Absence of new pressure ulcer  Outcome: Met This Shift  Goal: Will show no infection signs and symptoms  Description: Will show no infection signs and symptoms  Outcome: Met This Shift     Problem: Wound:  Goal: Will show signs of wound healing; wound closure and no evidence of infection  Description: Will show signs of wound healing; wound closure and no evidence of infection  Outcome: Ongoing     Problem: Pressure Ulcer:  Goal: Signs of wound healing will improve  Description: Signs of wound healing will improve  Outcome: Ongoing

## 2021-08-06 NOTE — ED PROVIDER NOTES
mellitus), type 2 ([address] H/O: CVA (cerebrovascular accident), HTN (hypertension), Hyperlipidemia, Obesity, Osteoarthritis, Sinusitis chronic, and Wheelchair confinement. Past Surgical History:  has a past surgical history that includes Hysterectomy; pr xcapsl ctrc rmvl insj io lens prosth w/o ecp (Right, 8/10/2018); and Gastrostomy tube placement (N/A, 10/7/2020). Social History:  reports that she has never smoked. She has never used smokeless tobacco. She reports that she does not drink alcohol and does not use drugs. Family History: family history is not on file. The patients home medications have been reviewed. Allergies: Patient has no known allergies. -------------------------------------------------- RESULTS -------------------------------------------------  Labs:  No results found for this visit on 21. Radiology:  No orders to display       ------------------------- NURSING NOTES AND VITALS REVIEWED ---------------------------  Date / Time Roomed:  2021 11:34 AM  ED Bed Assignment:      The nursing notes within the ED encounter and vital signs as below have been reviewed. There were no vitals taken for this visit. Oxygen Saturation Interpretation: No pulse ox available        Diagnosis:  1. Cardiac arrest ([facility]    2. Choking, initial encounter    3.          Disposition:  Patient's disposition: Discharge to   Patient's condition is               Joseph Heaton MD  Resident  [phone]

## 2021-08-06 NOTE — ED NOTES
I called the  and they are aware of hold for [facility] on this patient that they will be in the morgue for pickup after 3 PM.      Luann Greenfield RN  08/06/21 3700

## 2021-08-06 NOTE — CARE COORDINATION
Social Work/Transition of Care:    Pastoral Care notified per family request, spoke to Susan Deutsch.     .Electronically signed by CLEOPATRA Ross on 3/5/0331 at 12:09 PM

## 2021-08-06 NOTE — ED NOTES
Spoke with [facility], notified of eye care completion, family has left facility, and pt has been transported to [facility] by [facility].  Police notified to be present     Hayder Lawrence RN  08/06/21 0719

## 2021-08-06 NOTE — ED NOTES
Bed: 22  Expected date:   Expected time:   Means of arrival:   Comments:  [address] [place] [address], RN  08/06/21 [id number]

## 2025-04-22 NOTE — PROGRESS NOTES
Wound Healing Center Followup Visit Note    Referring Physician : Carli Pollack MD  [facility]  MEDICAL RECORD NUMBER:  [id number]  AGE: 78 y.o. GENDER: female  : 1950  EPISODE DATE:  2020    Subjective:     Chief Complaint   Patient presents with    Wound Check     buttocks      HISTORY of PRESENT ILLNESS HPI   Armin Evans is a 78 y.o. female who presents today in regards to follow up evaluation and treatment of wound/ulcer. That patient's past medical, family and social hx were reviewed and changes were made if present. History of Wound Context:  The patient has a stage 3 sacral decubitus of the buttock which was debrided in the hospital in 2020.      20 CT showed osteomyelitis of the sacrum. Per her son, she has a lot of drainage. Wound/Ulcer Pain Timing/Severity: intermittent  Quality of pain: dull  Severity:  2 / 10   Modifying Factors: Pain is relieved/improved with rest  Associated Signs/Symptoms: pain    Ulcer Identification:  Ulcer Type: pressure  Contributing Factors: chronic pressure and decreased mobility    Diabetic/Pressure/Non Pressure Ulcers only:  Ulcer: Pressure ulcer, Stage 3    Wound: N/A        PAST MEDICAL HISTORY      Diagnosis Date    Cataract, right     DM (diabetes mellitus), type 2 ([address] 11/3/2010    HTN (hypertension) 11/3/2010    Hyperlipidemia 11/3/2010    Obesity     Osteoarthritis 11/3/2010    Sinusitis chronic     seasonal    Wheelchair confinement     can pivot with assistance;  uses a lift chair     Past Surgical History:   Procedure Laterality Date    HYSTERECTOMY      TN XCAPSL CTRC RMVL INSJ IO LENS PROSTH W/O ECP Right 8/10/2018    RIGHT EYE CATARACT EXTRACTION WITH  IOL IMPLANT performed by Tana Terry MD at [address] [place] [address]     History reviewed. No pertinent family history. Social History     Tobacco Use    Smoking status: Never Smoker    Smokeless tobacco: Never Used   Substance Use Topics    Alcohol use:  No dressing 08/18/20 1535   Wound Cleansed Rinsed/Irrigated with saline 08/18/20 1535   Wound Length (cm) 3 cm 09/01/20 1419   Wound Width (cm) 2.2 cm 09/01/20 1419   Wound Depth (cm) 1.6 cm 09/01/20 1419   Wound Surface Area (cm^2) 6.6 cm^2 09/01/20 1419   Change in Wound Size % (l*w) 57.53 09/01/20 1419   Wound Volume (cm^3) 10.56 cm^3 09/01/20 1419   Wound Healing % 77 09/01/20 1419   Post-Procedure Length (cm) 3.1 cm 09/01/20 1444   Post-Procedure Width (cm) 2.2 cm 09/01/20 1444   Post-Procedure Depth (cm) 1.7 cm 09/01/20 1444   Post-Procedure Surface Area (cm^2) 6.82 cm^2 09/01/20 1444   Post-Procedure Volume (cm^3) 11.59 cm^3 09/01/20 1444   Undermining Starts ___ O'Clock 8 09/01/20 1419   Undermining Ends___ O'Clock 1 09/01/20 1419   Undermining Maxium Distance (cm) [email or web address] 09/01/20 1419   Wound Assessment Red;Yellow 09/01/20 1419   Drainage Amount Large 09/01/20 1419   Drainage Description Serosanguinous; Serous; Yellow 09/01/20 1419   Odor None 09/01/20 1419   Svetlana-wound Assessment Intact; Pink 09/01/20 1419   Culture Taken Yes 08/04/20 1537   Number of days: 27          Procedure Note  Indications:  Based on my examination of this patient's wound(s)/ulcer(s) today, debridement is required to promote healing and evaluate the wound base. Performed by: Ebony Purvis MD    Consent obtained:  Yes    Time out taken:  Yes    Pain Control: Anesthetic  Anesthetic: 4% Lidocaine Liquid Topical     Debridement:Excisional Debridement    Using curette the wound(s)/ulcer(s) was/were sharply debrided down through and including the removal of subcutaneous tissue.     Devitalized Tissue Debrided:  fibrin, biofilm, slough, exudate and callus to stimulate bleeding to promote healing, post debridement good bleeding base and wound edges noted    Wound/Ulcer #: 1    Percent of Wound/Ulcer Debrided: 100%    Total Surface Area Debrided: 6.82 sq cm     Estimated Blood Loss:  Minimal  Hemostasis Achieved:  by pressure    Procedural Pain:  7  / 10   Post Procedural Pain:  2 / 10     Response to treatment:  Well tolerated by patient. CT abd pelvis:     Impression    Decubitus ulcer at the level the sacrum suspicious for osteomyelitis    Limited evaluation of the left side of the abdomen, portion of the    left-sided abdomen is not included on the examination                  Consult ID  Wound vac    Plan:   Treatment Note please see attached Discharge Instructions    Written patient dismissal instructions given to patient and signed by patient or POA. Discharge Instructions       Visit Discharge/Physician Orders     Discharge condition: Stable     Assessment of pain at discharge:     Anesthetic used: 4% lidocaine solutioin     Discharge to: Home     Left via:Private automobile     Accompanied by: accompanied by SON     [facility] [address] [facility] [facility]     Dressing Orders:RT BUTTOCK: CLEANSE WOUND WITH NORMAL STERILE SALINE. Apply santyl to wound bed, nickel thickness,  Cover with moist dressing, dry dressing  And secure. Change daily. WHEN AVAILABLE: CLEANSE WOUND WITH NORMAL STERILE SALINE.  APPLY WOUND VAC FOLLOWING  MANUFACTURE GUIDELINES USING BLACK FOAM AND DRAPE WITH CONT SUCTION  @125MM, DRAPE, SPONGE TO BE CHANGED  THREE TIMES A WEEK . CANISTER TO BE CHANGED WEEKLY AND PRN   MAY USE SKIN PREP, ADHESIVE REMOVER, THIN DUODERM TO PROTECT SHAYAN WOUND    IF WOUND VAC FAILS, NOTIFY PHYSICIAN AND VAC COMPANY, REMOVE VAC AFTER 2 HOURS  AND APPLY: DRESSING TO RT BUTTOCKS: CLEANSE  WOUND WITH NORMAL STERILE SALINE, AND PLACE MOIST TO MOIST NSS MARY LOU DRESSING AND SECURE WITH TAPE. CHANGE DAILY    LOW AIR LOSS MATTRESS TO BED. TURN AND REPOSITION EVERY 1-2 HOURS. KEEP PRESSURE OFF WOUND. USE PILLOWS FOR POSITIONING. MONITOR INFLATION OF MATTRESS- IF MATTRESS DEFLATES OR SAGS IMMEDIATELY NOTIFY MANUFACTURE AND REMOVE PATIENT FROM THE BED.    LIMIT TIME IN CHAIR     Treatment Orders: REFERRAL TO INFECTIOUS DISEASE        [facility] followup visit __________1 WEEK ___________________  (Please note your next appointment above and if you are unable to keep, kindly give a 24 hour notice.  Thank you.)     Physician signature:__________________________        If you experience any of the following, please call the [address] [place] [address] during business hours:     * Increase in Pain  * Temperature over 101  * Increase in drainage from your wound  * Drainage with a foul odor  * Bleeding  * Increase in swelling  * Need for compression bandage changes due to slippage, breakthrough drainage.     If you need medical attention outside of the business hours of the [address] [place] [address] please contact your PCP or go to the nearest emergency room.     Contact your doctor if you have a temperature above 100.4 with any of the following:               Persistent cough             Shortness of breath            Chills            Fatigue            Chest pain or pressure            Difficulty breathing            Decreased consciousness of confusion             Headache     Recommend:                Wash hands often and for 20 seconds or more             Avoid touching eyes, nose, mouth and face             Social distance ( 6 feet)             Stay at home as much as possible             Call health care provider with any concerns                        Electronically signed by Digna Bedolla MD on 9/1/2020 at 2:51 PM Admission Reconciliation is Completed  Discharge Reconciliation is Not Complete Admission Reconciliation is Completed  Discharge Reconciliation is Completed

## (undated) DEVICE — SPEAR SURG TRIANG SHP HNDL PCH WECK-CEL

## (undated) DEVICE — TOWEL,OR,DSP,ST,BLUE,STD,6/PK,12PK/CS: Brand: MEDLINE

## (undated) DEVICE — THE MONARCH® III "C" CARTRIDGE IS A SINGLE-USE POLYPROPYLENE CARTRIDGE FOR POSTERIOR CHAMBER IOL DELIVERY.: Brand: MONARCH®

## (undated) DEVICE — 3M™ MICROPORE™ TAPE, 1530-2: Brand: 3M™ MICROPORE™

## (undated) DEVICE — SOLUTION IV IRRIG WATER 1000ML POUR BRL 2F7114

## (undated) DEVICE — PACK PROCEDURE SURG SURG CATARACT CUSTOM

## (undated) DEVICE — KNIFE OPHTH DIA275MM 45DEG SLT W HNDL SHRP ANG PNT DEL SGL

## (undated) DEVICE — BLOCK BITE 60FR RUBBER ADLT DENTAL

## (undated) DEVICE — GLASSES SAFETY PROTCT GRN

## (undated) DEVICE — SPONGE,DRAIN,NONWVN,4"X4",6PLY,STRL,LF: Brand: MEDLINE

## (undated) DEVICE — NEEDLE FLTR 18GA L1.5IN MEM THK5UM BLNT DISP

## (undated) DEVICE — SHIELD EYE W3XL2.5IN UNIV CLR PLAS LTWT

## (undated) DEVICE — Device: Brand: ALLEGRO 1X SILICONE I/A HANDPIECE (6)

## (undated) DEVICE — SPONGE GZ W4XL4IN RAYON POLY FILL CVR W/ NONWOVEN FAB

## (undated) DEVICE — SYSTEM FLD MGMT ACT W/ 0.9MM INFUS SL MICROSMOOTH 30DEG ABS

## (undated) DEVICE — STRIP,CLOSURE,WOUND,MEDI-STRIP,1/4X3: Brand: MEDLINE

## (undated) DEVICE — EYE EXTRAS

## (undated) DEVICE — Device

## (undated) DEVICE — GRADUATE TRIANG MEASURE 1000ML BLK PRNT

## (undated) DEVICE — SOLUTION IRRIG BSS ST 500ML

## (undated) DEVICE — BINDER ABD UNISX 4 PNL PREM 6INX6INX12IN L XL 4

## (undated) DEVICE — SOLUTION IRRIGATION BAL SALT SOLUTION 15 ML STRL BSS

## (undated) DEVICE — MICROSURGICAL INSTRUMENT ANTERIOR CHAMBER CANNULA 30GA: Brand: ALCON

## (undated) DEVICE — OPHTHALMIC CORNEAL/SCLERAL V-LANCE KNIFE 20 GAUGE [1.3MM]: Brand: V-LANCE